# Patient Record
Sex: MALE | Race: BLACK OR AFRICAN AMERICAN | Employment: UNEMPLOYED | URBAN - METROPOLITAN AREA
[De-identification: names, ages, dates, MRNs, and addresses within clinical notes are randomized per-mention and may not be internally consistent; named-entity substitution may affect disease eponyms.]

---

## 2017-03-22 ENCOUNTER — HOSPITAL ENCOUNTER (INPATIENT)
Age: 52
LOS: 1 days | Discharge: HOME | DRG: 199 | End: 2017-03-23
Attending: EMERGENCY MEDICINE | Admitting: INTERNAL MEDICINE
Payer: MEDICAID

## 2017-03-22 DIAGNOSIS — I16.1 HYPERTENSIVE EMERGENCY: Primary | ICD-10-CM

## 2017-03-22 LAB
ABSOLUTE EOS #: 0.2 K/UL (ref 0–0.4)
ABSOLUTE LYMPH #: 1.4 K/UL (ref 1–4.8)
ABSOLUTE MONO #: 0.2 K/UL (ref 0.2–0.8)
ANION GAP SERPL CALCULATED.3IONS-SCNC: 12 MMOL/L (ref 9–17)
BASOPHILS # BLD: 1 % (ref 0–2)
BASOPHILS ABSOLUTE: 0 K/UL (ref 0–0.2)
BUN BLDV-MCNC: 19 MG/DL (ref 6–20)
BUN/CREAT BLD: 12 (ref 9–20)
CALCIUM SERPL-MCNC: 8.3 MG/DL (ref 8.6–10.4)
CHLORIDE BLD-SCNC: 103 MMOL/L (ref 98–107)
CO2: 27 MMOL/L (ref 20–31)
CREAT SERPL-MCNC: 1.53 MG/DL (ref 0.7–1.2)
DIFFERENTIAL TYPE: NORMAL
EOSINOPHILS RELATIVE PERCENT: 4 % (ref 1–4)
GFR AFRICAN AMERICAN: 58 ML/MIN
GFR NON-AFRICAN AMERICAN: 48 ML/MIN
GFR SERPL CREATININE-BSD FRML MDRD: ABNORMAL ML/MIN/{1.73_M2}
GFR SERPL CREATININE-BSD FRML MDRD: ABNORMAL ML/MIN/{1.73_M2}
GLUCOSE BLD-MCNC: 120 MG/DL (ref 70–99)
HCT VFR BLD CALC: 43.8 % (ref 41–53)
HEMOGLOBIN: 15.2 G/DL (ref 13.5–17.5)
LYMPHOCYTES # BLD: 30 % (ref 24–44)
MCH RBC QN AUTO: 32.2 PG (ref 26–34)
MCHC RBC AUTO-ENTMCNC: 34.6 G/DL (ref 31–37)
MCV RBC AUTO: 93.1 FL (ref 80–100)
MONOCYTES # BLD: 5 % (ref 1–7)
MYOGLOBIN: 74 NG/ML (ref 28–72)
PDW BLD-RTO: 12.7 % (ref 11.5–14.5)
PLATELET # BLD: 152 K/UL (ref 130–400)
PLATELET ESTIMATE: NORMAL
PMV BLD AUTO: 10.3 FL (ref 6–12)
POTASSIUM SERPL-SCNC: 3.7 MMOL/L (ref 3.7–5.3)
RBC # BLD: 4.7 M/UL (ref 4.5–5.9)
RBC # BLD: NORMAL 10*6/UL
SEG NEUTROPHILS: 60 % (ref 36–66)
SEGMENTED NEUTROPHILS ABSOLUTE COUNT: 2.8 K/UL (ref 1.8–7.7)
SODIUM BLD-SCNC: 142 MMOL/L (ref 135–144)
TROPONIN INTERP: ABNORMAL
TROPONIN T: <0.03 NG/ML
WBC # BLD: 4.6 K/UL (ref 3.5–11)
WBC # BLD: NORMAL 10*3/UL

## 2017-03-22 PROCEDURE — 85025 COMPLETE CBC W/AUTO DIFF WBC: CPT

## 2017-03-22 PROCEDURE — 83874 ASSAY OF MYOGLOBIN: CPT

## 2017-03-22 PROCEDURE — 84484 ASSAY OF TROPONIN QUANT: CPT

## 2017-03-22 PROCEDURE — 80048 BASIC METABOLIC PNL TOTAL CA: CPT

## 2017-03-22 PROCEDURE — 6360000002 HC RX W HCPCS: Performed by: NURSE PRACTITIONER

## 2017-03-22 PROCEDURE — 93005 ELECTROCARDIOGRAM TRACING: CPT

## 2017-03-22 PROCEDURE — 2500000003 HC RX 250 WO HCPCS: Performed by: NURSE PRACTITIONER

## 2017-03-22 PROCEDURE — 2000000000 HC ICU R&B

## 2017-03-22 PROCEDURE — 99285 EMERGENCY DEPT VISIT HI MDM: CPT

## 2017-03-22 RX ORDER — SODIUM CHLORIDE 0.9 % (FLUSH) 0.9 %
10 SYRINGE (ML) INJECTION EVERY 12 HOURS SCHEDULED
Status: DISCONTINUED | OUTPATIENT
Start: 2017-03-22 | End: 2017-03-23 | Stop reason: HOSPADM

## 2017-03-22 RX ORDER — SODIUM CHLORIDE 0.9 % (FLUSH) 0.9 %
10 SYRINGE (ML) INJECTION PRN
Status: DISCONTINUED | OUTPATIENT
Start: 2017-03-22 | End: 2017-03-23 | Stop reason: HOSPADM

## 2017-03-22 RX ORDER — ACETAMINOPHEN 325 MG/1
650 TABLET ORAL EVERY 4 HOURS PRN
Status: DISCONTINUED | OUTPATIENT
Start: 2017-03-22 | End: 2017-03-23 | Stop reason: HOSPADM

## 2017-03-22 RX ORDER — HYDRALAZINE HYDROCHLORIDE 20 MG/ML
20 INJECTION INTRAMUSCULAR; INTRAVENOUS ONCE
Status: COMPLETED | OUTPATIENT
Start: 2017-03-22 | End: 2017-03-22

## 2017-03-22 RX ADMIN — METOPROLOL TARTRATE 5 MG: 5 INJECTION INTRAVENOUS at 18:48

## 2017-03-22 RX ADMIN — NICARDIPINE HYDROCHLORIDE 10 MG/HR: 0.2 INJECTION, SOLUTION INTRAVENOUS at 23:50

## 2017-03-22 RX ADMIN — HYDRALAZINE HYDROCHLORIDE 20 MG: 20 INJECTION INTRAMUSCULAR; INTRAVENOUS at 16:59

## 2017-03-22 RX ADMIN — NICARDIPINE HYDROCHLORIDE 5 MG/HR: 0.2 INJECTION, SOLUTION INTRAVENOUS at 20:01

## 2017-03-22 ASSESSMENT — ENCOUNTER SYMPTOMS
COLOR CHANGE: 0
NAUSEA: 0
ABDOMINAL PAIN: 0
SORE THROAT: 0
COUGH: 0
DIARRHEA: 0
CONSTIPATION: 0
SHORTNESS OF BREATH: 0
RHINORRHEA: 0
SINUS PRESSURE: 0
VOMITING: 0
WHEEZING: 0

## 2017-03-22 ASSESSMENT — PAIN SCALES - GENERAL: PAINLEVEL_OUTOF10: 0

## 2017-03-23 VITALS
SYSTOLIC BLOOD PRESSURE: 146 MMHG | OXYGEN SATURATION: 95 % | HEIGHT: 70 IN | DIASTOLIC BLOOD PRESSURE: 80 MMHG | BODY MASS INDEX: 31.65 KG/M2 | RESPIRATION RATE: 20 BRPM | WEIGHT: 221.1 LBS | HEART RATE: 70 BPM | TEMPERATURE: 97.9 F

## 2017-03-23 PROBLEM — I10 HTN (HYPERTENSION), MALIGNANT: Status: ACTIVE | Noted: 2017-03-23

## 2017-03-23 PROBLEM — Z72.0 TOBACCO ABUSE: Status: ACTIVE | Noted: 2017-03-23

## 2017-03-23 PROCEDURE — 96375 TX/PRO/DX INJ NEW DRUG ADDON: CPT

## 2017-03-23 PROCEDURE — 2580000003 HC RX 258: Performed by: INTERNAL MEDICINE

## 2017-03-23 PROCEDURE — 2500000003 HC RX 250 WO HCPCS: Performed by: NURSE PRACTITIONER

## 2017-03-23 PROCEDURE — 96365 THER/PROPH/DIAG IV INF INIT: CPT

## 2017-03-23 PROCEDURE — 6370000000 HC RX 637 (ALT 250 FOR IP): Performed by: INTERNAL MEDICINE

## 2017-03-23 PROCEDURE — 99222 1ST HOSP IP/OBS MODERATE 55: CPT | Performed by: INTERNAL MEDICINE

## 2017-03-23 RX ORDER — AMLODIPINE BESYLATE 5 MG/1
5 TABLET ORAL DAILY
Status: DISCONTINUED | OUTPATIENT
Start: 2017-03-23 | End: 2017-03-23 | Stop reason: HOSPADM

## 2017-03-23 RX ORDER — METOPROLOL TARTRATE 50 MG/1
50 TABLET, FILM COATED ORAL 2 TIMES DAILY
Status: DISCONTINUED | OUTPATIENT
Start: 2017-03-23 | End: 2017-03-23 | Stop reason: HOSPADM

## 2017-03-23 RX ORDER — AMLODIPINE BESYLATE 5 MG/1
5 TABLET ORAL DAILY
Qty: 30 TABLET | Refills: 3 | Status: SHIPPED | OUTPATIENT
Start: 2017-03-23 | End: 2018-06-26

## 2017-03-23 RX ORDER — METOPROLOL TARTRATE 50 MG/1
50 TABLET, FILM COATED ORAL 2 TIMES DAILY
Qty: 60 TABLET | Refills: 3 | Status: SHIPPED | OUTPATIENT
Start: 2017-03-23 | End: 2020-06-12

## 2017-03-23 RX ADMIN — AMLODIPINE BESYLATE 5 MG: 5 TABLET ORAL at 11:06

## 2017-03-23 RX ADMIN — NICARDIPINE HYDROCHLORIDE 7.5 MG/HR: 0.2 INJECTION, SOLUTION INTRAVENOUS at 04:14

## 2017-03-23 RX ADMIN — Medication 10 ML: at 10:42

## 2017-03-23 RX ADMIN — NICARDIPINE HYDROCHLORIDE 5 MG/HR: 0.2 INJECTION, SOLUTION INTRAVENOUS at 11:25

## 2017-03-23 RX ADMIN — METOPROLOL TARTRATE 50 MG: 50 TABLET ORAL at 11:06

## 2017-03-23 ASSESSMENT — PAIN SCALES - GENERAL
PAINLEVEL_OUTOF10: 0
PAINLEVEL_OUTOF10: 0

## 2017-03-24 ENCOUNTER — OFFICE VISIT (OUTPATIENT)
Dept: INTERNAL MEDICINE CLINIC | Age: 52
End: 2017-03-24
Payer: MEDICAID

## 2017-03-24 VITALS
TEMPERATURE: 98 F | HEIGHT: 70 IN | OXYGEN SATURATION: 95 % | WEIGHT: 224.8 LBS | BODY MASS INDEX: 32.18 KG/M2 | RESPIRATION RATE: 20 BRPM | DIASTOLIC BLOOD PRESSURE: 90 MMHG | HEART RATE: 80 BPM | SYSTOLIC BLOOD PRESSURE: 140 MMHG

## 2017-03-24 DIAGNOSIS — Z72.0 TOBACCO ABUSE: ICD-10-CM

## 2017-03-24 DIAGNOSIS — Z12.11 SPECIAL SCREENING FOR MALIGNANT NEOPLASMS, COLON: Primary | ICD-10-CM

## 2017-03-24 DIAGNOSIS — N28.9 RENAL INSUFFICIENCY: ICD-10-CM

## 2017-03-24 DIAGNOSIS — R73.9 HYPERGLYCEMIA: ICD-10-CM

## 2017-03-24 DIAGNOSIS — I10 ESSENTIAL HYPERTENSION: ICD-10-CM

## 2017-03-24 DIAGNOSIS — Z71.6 TOBACCO ABUSE COUNSELING: ICD-10-CM

## 2017-03-24 LAB
EKG ATRIAL RATE: 68 BPM
EKG P AXIS: 47 DEGREES
EKG P-R INTERVAL: 144 MS
EKG Q-T INTERVAL: 428 MS
EKG QRS DURATION: 96 MS
EKG QTC CALCULATION (BAZETT): 455 MS
EKG R AXIS: -31 DEGREES
EKG T AXIS: -157 DEGREES
EKG VENTRICULAR RATE: 68 BPM

## 2017-03-24 PROCEDURE — 99204 OFFICE O/P NEW MOD 45 MIN: CPT | Performed by: FAMILY MEDICINE

## 2017-03-24 ASSESSMENT — ENCOUNTER SYMPTOMS
EYES NEGATIVE: 1
RESPIRATORY NEGATIVE: 1
ALLERGIC/IMMUNOLOGIC NEGATIVE: 1

## 2018-06-26 ENCOUNTER — HOSPITAL ENCOUNTER (EMERGENCY)
Age: 53
Discharge: HOME | End: 2018-06-26
Attending: EMERGENCY MEDICINE
Payer: COMMERCIAL

## 2018-06-26 VITALS
SYSTOLIC BLOOD PRESSURE: 185 MMHG | WEIGHT: 215 LBS | HEIGHT: 70 IN | TEMPERATURE: 98.2 F | RESPIRATION RATE: 18 BRPM | OXYGEN SATURATION: 98 % | BODY MASS INDEX: 30.78 KG/M2 | HEART RATE: 63 BPM | DIASTOLIC BLOOD PRESSURE: 115 MMHG

## 2018-06-26 DIAGNOSIS — I10 ASYMPTOMATIC HYPERTENSION: Primary | ICD-10-CM

## 2018-06-26 PROCEDURE — 99283 EMERGENCY DEPT VISIT LOW MDM: CPT

## 2018-06-26 RX ORDER — AMLODIPINE BESYLATE 5 MG/1
5 TABLET ORAL DAILY
Qty: 30 TABLET | Refills: 0 | Status: SHIPPED | OUTPATIENT
Start: 2018-06-26 | End: 2018-07-10 | Stop reason: DRUGHIGH

## 2018-07-10 ENCOUNTER — OFFICE VISIT (OUTPATIENT)
Dept: INTERNAL MEDICINE CLINIC | Age: 53
End: 2018-07-10
Payer: COMMERCIAL

## 2018-07-10 VITALS
BODY MASS INDEX: 31.78 KG/M2 | TEMPERATURE: 98.4 F | DIASTOLIC BLOOD PRESSURE: 92 MMHG | HEIGHT: 70 IN | WEIGHT: 222 LBS | SYSTOLIC BLOOD PRESSURE: 148 MMHG | HEART RATE: 50 BPM | OXYGEN SATURATION: 98 %

## 2018-07-10 DIAGNOSIS — Z72.0 TOBACCO ABUSE: ICD-10-CM

## 2018-07-10 DIAGNOSIS — Z71.6 TOBACCO ABUSE COUNSELING: ICD-10-CM

## 2018-07-10 DIAGNOSIS — K04.7 DENTAL INFECTION: Primary | ICD-10-CM

## 2018-07-10 DIAGNOSIS — N28.9 RENAL INSUFFICIENCY: ICD-10-CM

## 2018-07-10 DIAGNOSIS — I10 HYPERTENSION, UNSPECIFIED TYPE: ICD-10-CM

## 2018-07-10 DIAGNOSIS — F41.8 ANXIETY ABOUT HEALTH: ICD-10-CM

## 2018-07-10 PROCEDURE — 99214 OFFICE O/P EST MOD 30 MIN: CPT | Performed by: FAMILY MEDICINE

## 2018-07-10 PROCEDURE — G8427 DOCREV CUR MEDS BY ELIG CLIN: HCPCS | Performed by: FAMILY MEDICINE

## 2018-07-10 PROCEDURE — G8417 CALC BMI ABV UP PARAM F/U: HCPCS | Performed by: FAMILY MEDICINE

## 2018-07-10 PROCEDURE — 4004F PT TOBACCO SCREEN RCVD TLK: CPT | Performed by: FAMILY MEDICINE

## 2018-07-10 PROCEDURE — 3017F COLORECTAL CA SCREEN DOC REV: CPT | Performed by: FAMILY MEDICINE

## 2018-07-10 RX ORDER — IBUPROFEN 800 MG/1
TABLET ORAL
COMMUNITY
Start: 2018-07-09 | End: 2020-08-11

## 2018-07-10 RX ORDER — CLINDAMYCIN HYDROCHLORIDE 150 MG/1
150 CAPSULE ORAL 3 TIMES DAILY
Qty: 21 CAPSULE | Refills: 0 | Status: SHIPPED | OUTPATIENT
Start: 2018-07-10 | End: 2018-07-17

## 2018-07-10 RX ORDER — AMLODIPINE BESYLATE 5 MG/1
10 TABLET ORAL DAILY
Qty: 30 TABLET | Refills: 0 | Status: SHIPPED | COMMUNITY
Start: 2018-07-10 | End: 2020-06-12

## 2018-07-10 ASSESSMENT — PATIENT HEALTH QUESTIONNAIRE - PHQ9
SUM OF ALL RESPONSES TO PHQ9 QUESTIONS 1 & 2: 0
SUM OF ALL RESPONSES TO PHQ QUESTIONS 1-9: 0
2. FEELING DOWN, DEPRESSED OR HOPELESS: 0
1. LITTLE INTEREST OR PLEASURE IN DOING THINGS: 0

## 2018-07-10 NOTE — PATIENT INSTRUCTIONS
Patient Education        Stopping Smoking: Care Instructions  Your Care Instructions  Cigarette smokers crave the nicotine in cigarettes. Giving it up is much harder than simply changing a habit. Your body has to stop craving the nicotine. It is hard to quit, but you can do it. There are many tools that people use to quit smoking. You may find that combining tools works best for you. There are several steps to quitting. First you get ready to quit. Then you get support to help you. After that, you learn new skills and behaviors to become a nonsmoker. For many people, a necessary step is getting and using medicine. Your doctor will help you set up the plan that best meets your needs. You may want to attend a smoking cessation program to help you quit smoking. When you choose a program, look for one that has proven success. Ask your doctor for ideas. You will greatly increase your chances of success if you take medicine as well as get counseling or join a cessation program.  Some of the changes you feel when you first quit tobacco are uncomfortable. Your body will miss the nicotine at first, and you may feel short-tempered and grumpy. You may have trouble sleeping or concentrating. Medicine can help you deal with these symptoms. You may struggle with changing your smoking habits and rituals. The last step is the tricky one: Be prepared for the smoking urge to continue for a time. This is a lot to deal with, but keep at it. You will feel better. Follow-up care is a key part of your treatment and safety. Be sure to make and go to all appointments, and call your doctor if you are having problems. It's also a good idea to know your test results and keep a list of the medicines you take. How can you care for yourself at home? · Ask your family, friends, and coworkers for support. You have a better chance of quitting if you have help and support.   · Join a support group, such as Nicotine Anonymous, for people who are

## 2018-07-11 ASSESSMENT — ENCOUNTER SYMPTOMS
EYES NEGATIVE: 1
RESPIRATORY NEGATIVE: 1
GASTROINTESTINAL NEGATIVE: 1
ALLERGIC/IMMUNOLOGIC NEGATIVE: 1

## 2018-07-11 NOTE — PROGRESS NOTES
Subjective:      Patient ID: Edilia Wilson is a 45 y.o. male. Hypertension   This is a chronic problem. The current episode started more than 1 month ago. The problem has been gradually improving since onset. The problem is controlled. Associated symptoms include anxiety. Risk factors for coronary artery disease include stress and male gender. Past treatments include beta blockers and calcium channel blockers. The current treatment provides moderate improvement. There are no compliance problems. Identifiable causes of hypertension include a hypertension causing med. Tobacco use, anti-inflammatories. Review of Systems   Constitutional: Negative. HENT: Negative. Tooth pain   Eyes: Negative. Respiratory: Negative. Cardiovascular: Negative. Gastrointestinal: Negative. Endocrine: Negative. Musculoskeletal: Negative. Skin: Negative. Allergic/Immunologic: Negative. Neurological: Negative. Hematological: Negative. Psychiatric/Behavioral: The patient is nervous/anxious. Past family and social history unremarkable. Objective:   Physical Exam   Constitutional: He is oriented to person, place, and time. He appears well-developed and well-nourished. HENT:   Head: Normocephalic and atraumatic. Right Ear: External ear normal.   Left Ear: External ear normal.   Nose: Nose normal.   Mouth/Throat: Oropharynx is clear and moist.   Dental infection   Eyes: Conjunctivae and EOM are normal. Pupils are equal, round, and reactive to light. Neck: Normal range of motion. Neck supple. Cardiovascular: Normal rate, regular rhythm, normal heart sounds and intact distal pulses. Pulmonary/Chest: Effort normal and breath sounds normal.   Abdominal: Soft. Bowel sounds are normal.   Musculoskeletal: Normal range of motion. Neurological: He is alert and oriented to person, place, and time. He has normal reflexes. Skin: Skin is warm and dry.    Psychiatric:   Anxious however that every mistake has been identified and corrected by editing.

## 2020-06-12 ENCOUNTER — OFFICE VISIT (OUTPATIENT)
Dept: FAMILY MEDICINE CLINIC | Age: 55
End: 2020-06-12
Payer: COMMERCIAL

## 2020-06-12 VITALS
HEIGHT: 70 IN | RESPIRATION RATE: 16 BRPM | DIASTOLIC BLOOD PRESSURE: 100 MMHG | OXYGEN SATURATION: 98 % | SYSTOLIC BLOOD PRESSURE: 160 MMHG | BODY MASS INDEX: 30.98 KG/M2 | WEIGHT: 216.4 LBS | HEART RATE: 82 BPM

## 2020-06-12 PROCEDURE — G8417 CALC BMI ABV UP PARAM F/U: HCPCS | Performed by: FAMILY MEDICINE

## 2020-06-12 PROCEDURE — G8427 DOCREV CUR MEDS BY ELIG CLIN: HCPCS | Performed by: FAMILY MEDICINE

## 2020-06-12 PROCEDURE — 99204 OFFICE O/P NEW MOD 45 MIN: CPT | Performed by: FAMILY MEDICINE

## 2020-06-12 PROCEDURE — 4004F PT TOBACCO SCREEN RCVD TLK: CPT | Performed by: FAMILY MEDICINE

## 2020-06-12 PROCEDURE — 3017F COLORECTAL CA SCREEN DOC REV: CPT | Performed by: FAMILY MEDICINE

## 2020-06-12 RX ORDER — TERBINAFINE HYDROCHLORIDE 250 MG/1
250 TABLET ORAL DAILY
Qty: 42 TABLET | Refills: 0 | Status: SHIPPED | OUTPATIENT
Start: 2020-06-12 | End: 2020-07-24

## 2020-06-12 RX ORDER — SILDENAFIL 100 MG/1
100 TABLET, FILM COATED ORAL PRN
Qty: 30 TABLET | Refills: 3 | Status: SHIPPED | OUTPATIENT
Start: 2020-06-12 | End: 2020-09-11

## 2020-06-12 RX ORDER — LISINOPRIL AND HYDROCHLOROTHIAZIDE 12.5; 1 MG/1; MG/1
1 TABLET ORAL DAILY
Qty: 30 TABLET | Refills: 1 | Status: SHIPPED | OUTPATIENT
Start: 2020-06-12 | End: 2020-08-03

## 2020-06-12 ASSESSMENT — PATIENT HEALTH QUESTIONNAIRE - PHQ9
SUM OF ALL RESPONSES TO PHQ QUESTIONS 1-9: 0
1. LITTLE INTEREST OR PLEASURE IN DOING THINGS: 0
SUM OF ALL RESPONSES TO PHQ9 QUESTIONS 1 & 2: 0
SUM OF ALL RESPONSES TO PHQ QUESTIONS 1-9: 0
2. FEELING DOWN, DEPRESSED OR HOPELESS: 0

## 2020-06-12 NOTE — PATIENT INSTRUCTIONS
Learning About Obesity  What is obesity? Obesity means having a body mass index (BMI) of 30 or above. BMI is a number that is calculated from your weight and your height. How do you know if your weight is in the obesity range? To know if your weight is in the obesity range, your doctor looks at your body mass index (BMI) and waist size. BMI is a number that is calculated from your weight and your height. To figure out your BMI for yourself, you can use an online tool, such as http://www.banks.com/ on the [employer] of [employer]. If your BMI is 30.0 or higher, it falls within the obesity range. Keep in mind that BMI and waist size are only guides. They are not tools to determine your ideal body weight. What causes obesity? When you take in more calories than you burn off, you gain weight. How you eat, how active you are, and other things affect how your body uses calories and whether you gain weight. If you have family members who have too much body fat, you may have inherited a tendency to gain weight. And your family also helps form your eating and lifestyle habits, which can lead to obesity. Also, our busy lives make it harder to plan and cook healthy meals. For many of us, it's easier to reach for prepared foods, go out to eat, or go to the drive-through. But these foods are often high in saturated fat and calories. Portions are often too large. What can you do to reach a healthy weight? Focus on health, not diets. Diets are hard to stay on and don't work in the long run. It is very hard to stay with a diet that includes lots of big changes in your eating habits. Instead of a diet, focus on lifestyle changes that will improve your health and achieve the right balance of energy and calories. To lose weight, you need to burn more calories than you take in.  You can do it by eating healthy foods in reasonable amounts and becoming more harder for you, and how to handle them. Try tracking. People who keep track of what they eat, feel, and do are better at losing weight. Try writing down things like:  · What and how much you eat. · How you feel before and after each meal.  · Details about each meal (like eating out or at home, eating alone, or with friends or family). · What you do to be active. Look and plan. As you track, look for patterns that you may want to change. Take note of:  · When you eat and whether you skip meals. · How often you eat out. · How many fruits and vegetables you eat. · When you eat beyond feeling full. · When and why you eat for reasons other than being hungry. When you stray from your plan, don't get upset. Figure out what made you slip up and how you can fix it. Can you take medicines or have surgery to lose weight? If you have a BMI in a certain range and have not been able to lose weight with diet and exercise, medicine or surgery may be an option for you. If you have a BMI of at least 30.0 (or a BMI of at least 27.0 and another health problem related to your weight), ask your doctor about weight-loss medicines. They work by making you feel less hungry, making you feel full more quickly, or changing how you digest fat. Medicines are used along with diet changes and more physical activity to help you make lasting changes. If you have a BMI of 40.0 or more (or a BMI of 35.0 or more and another health problem related to your weight), your doctor may talk with you about surgery. Weight-loss surgery has risks, and you will need to work with your doctor to compare the risk of having obesity with the risks of surgery. With any option you choose, you will still need to eat a healthy diet and get regular exercise. Follow-up care is a key part of your treatment and safety. Be sure to make and go to all appointments, and call your doctor if you are having problems.  It's also a good idea to know your test results and keep a list of the medicines you take. Where can you learn more? Go to https://chpepiceweb.[email or web address] org and sign in to your [employer] account. Enter N111 in the [facility] box to learn more about \"Learning About Obesity. \"     If you do not have an account, please click on the \"Sign Up Now\" link. Current as of: December 11, 2019               Content Version: 12.5  © 4455-4066 Healthwise, Incorporated. Care instructions adapted under license by [facility] ([facility]). If you have questions about a medical condition or this instruction, always ask your healthcare professional. [address] any warranty or liability for your use of this information. Learning About Benefits From Quitting Smoking  How does quitting smoking make you healthier? If you're thinking about quitting smoking, you may have a few reasons to be smoke-free. Your health may be one of them. · When you quit smoking, you lower your risks for cancer, lung disease, heart attack, stroke, blood vessel disease, and blindness from macular degeneration. · When you're smoke-free, you get sick less often, and you heal faster. You are less likely to get colds, flu, bronchitis, and pneumonia. · As a nonsmoker, you may find that your mood is better and you are less stressed. When and how will you feel healthier? Quitting has real health benefits that start from day 1 of being smoke-free. And the longer you stay smoke-free, the healthier you get and the better you feel. The first hours  · After just 20 minutes, your blood pressure and heart rate go down. That means there's less stress on your heart and blood vessels. · Within 12 hours, the level of carbon monoxide in your blood drops back to normal. That makes room for more oxygen. With more oxygen in your body, you may notice that you have more energy than when you smoked. After 2 weeks  · Your lungs start to work better.   · Your risk of heart attack starts to drop. After 1 month  · When your lungs are clear, you cough less and breathe deeper, so it's easier to be active. · Your sense of taste and smell return. That means you can enjoy food more than you have since you started smoking. Over the years  · Over the years, your risks of heart disease, heart attack, and stroke are lower. · After 10 years, your risk of dying from lung cancer is cut by about half. And your risk for many other types of cancer is lower too. How would quitting help others in your life? When you quit smoking, you improve the health of everyone who now breathes in your smoke. · Their heart, lung, and cancer risks drop, much like yours. · They are sick less. For babies and small children, living smoke-free means they're less likely to have ear infections, pneumonia, and bronchitis. · If you're a woman who is or will be pregnant someday, quitting smoking means a healthier . · Children who are close to you are less likely to become adult smokers. Where can you learn more? Go to https://KnowtapeBreadtrip.SWYF. org and sign in to your [employer] account. Enter [phone] in the [facility] box to learn more about \"Learning About Benefits From Quitting Smoking. \"     If you do not have an account, please click on the \"Sign Up Now\" link. Current as of: 2020               Content Version: 12.5  © 3845-4867 Healthwise, Incorporated. Care instructions adapted under license by [facility] ([facility]). If you have questions about a medical condition or this instruction, always ask your healthcare professional. [place] [address] any warranty or liability for your use of this information. Deciding About Using Medicines To Quit Smoking  How can you decide about using medicines to quit smoking? What are the medicines you can use? Your doctor may prescribe varenicline (Chantix) or bupropion (Zyban).  These medicines can help you cope with cravings for

## 2020-06-12 NOTE — PROGRESS NOTES
monitoring  03/22/2018    Creatinine monitoring  03/22/2018        Current Medications:     Current Outpatient Medications   Medication Sig Dispense Refill    sildenafil (VIAGRA) 100 MG tablet Take 1 tablet by mouth as needed for Erectile Dysfunction 30 tablet 3    terbinafine (LAMISIL) 250 MG tablet Take 1 tablet by mouth daily 42 tablet 0    ciclopirox (PENLAC) 8 % solution Apply to adjacent skin and affected nails daily. Remove with alcohol every 7 days 6 mL 2    lisinopril-hydroCHLOROthiazide (PRINZIDE;ZESTORETIC) 10-12.5 MG per tablet Take 1 tablet by mouth daily 30 tablet 1    ibuprofen (ADVIL;MOTRIN) 800 MG tablet        No current facility-administered medications for this visit. Allergies:   No Known Allergies     Medical History:     Past Medical History:   Diagnosis Date    Benign essential HTN        Past Surgical History:   Procedure Laterality Date    TONSILLECTOMY         Family History   Problem Relation Age of Onset    Hypertension Mother     Hypertension Father         Social History:     Social History     Socioeconomic History    Marital status:      Spouse name: Not on file    Number of children: Not on file    Years of education: Not on file    Highest education level: Not on file   Occupational History    Not on file   Social Needs    Financial resource strain: Not on file    Food insecurity     Worry: Not on file     Inability: Not on file    Transportation needs     Medical: Not on file     Non-medical: Not on file   Tobacco Use    Smoking status: Current Every Day Smoker     Types: Cigarettes    Smokeless tobacco: Former User   Substance and Sexual Activity    Alcohol use:  Yes    Drug use: No    Sexual activity: Not on file   Lifestyle    Physical activity     Days per week: Not on file     Minutes per session: Not on file    Stress: Not on file   Relationships    Social connections     Talks on phone: Not on file     Gets together: Not on file Future  -     lisinopril-hydroCHLOROthiazide (PRINZIDE;ZESTORETIC) 10-12.5 MG per tablet; Take 1 tablet by mouth daily    Dyslipidemia  -     ALT; Future  -     AST; Future  -     CBC Auto Differential; Future  -     Lipid Panel; Future  -     TSH with Reflex; Future    Hyperglycemia  -     Hemoglobin A1C; Future    Other male erectile dysfunction  -     Testosterone; Future  -     sildenafil (VIAGRA) 100 MG tablet; Take 1 tablet by mouth as needed for Erectile Dysfunction    Colon cancer screening  -     Jazmin Lyon MD, Gastroenterology, [address]    Toenail fungus  -     terbinafine (LAMISIL) 250 MG tablet; Take 1 tablet by mouth daily  -     ciclopirox (PENLAC) 8 % solution; Apply to adjacent skin and affected nails daily. Remove with alcohol every 7 days    Other fatigue  -     Vitamin B12 & Folate; Future  -     Vitamin D 25 Hydroxy; Future    Personal history of tobacco use, presenting hazards to health  -     LA TOBACCO USE CESSATION INTERMEDIATE 3-10 MINUTES [26136]    Follow up on labs. Encouraged well balanced low sodium diet. Encouraged 150 mins of aerobic activity weekly. Strongly encouraged smoking cessation. Rx as above for toenail fungus. Referral for colonoscopy provided. EKG in one month. Needs tighter BP control. Start two agents today. Cerumen flushed by MA. Will send to ENT if he has further issues. All questions answered and addressed to patient satisfaction. Patient understands and agrees to the plan. The patient was evaluated and treated today based on the osteopathic principle that each person is a unit of body, mind, and spirit, the body is capable of self-regulation, self-healing, and health maintenance and that structure and function are reciprocally interrelated. Follow-up:   Return in about 1 month (around 7/12/2020) for htn/ekg; 20 min appt.       Farrah Stephens D.O.    BMI was elevated today, and weight loss plan recommended is : conventional weight

## 2020-06-23 ENCOUNTER — HOSPITAL ENCOUNTER (OUTPATIENT)
Age: 55
Setting detail: SPECIMEN
Discharge: HOME | End: 2020-06-23
Payer: COMMERCIAL

## 2020-06-23 LAB
ABSOLUTE EOS #: 0.12 K/UL (ref 0–0.44)
ABSOLUTE IMMATURE GRANULOCYTE: <0.03 K/UL (ref 0–0.3)
ABSOLUTE LYMPH #: 0.94 K/UL (ref 1.1–3.7)
ABSOLUTE MONO #: 0.18 K/UL (ref 0.1–1.2)
ALBUMIN SERPL-MCNC: 4.2 G/DL (ref 3.5–5.2)
ALT SERPL-CCNC: 11 U/L (ref 5–41)
ANION GAP SERPL CALCULATED.3IONS-SCNC: 16 MMOL/L (ref 9–17)
AST SERPL-CCNC: 16 U/L
BASOPHILS # BLD: 1 % (ref 0–2)
BASOPHILS ABSOLUTE: 0.03 K/UL (ref 0–0.2)
BUN BLDV-MCNC: 24 MG/DL (ref 6–20)
BUN/CREAT BLD: ABNORMAL (ref 9–20)
CALCIUM SERPL-MCNC: 8.6 MG/DL (ref 8.6–10.4)
CHLORIDE BLD-SCNC: 104 MMOL/L (ref 98–107)
CHOLESTEROL/HDL RATIO: 2.9
CHOLESTEROL: 169 MG/DL
CO2: 23 MMOL/L (ref 20–31)
CREAT SERPL-MCNC: 2.09 MG/DL (ref 0.7–1.2)
DIFFERENTIAL TYPE: ABNORMAL
EOSINOPHILS RELATIVE PERCENT: 3 % (ref 1–4)
ESTIMATED AVERAGE GLUCOSE: 80 MG/DL
FOLATE: 7.7 NG/ML
GFR AFRICAN AMERICAN: 40 ML/MIN
GFR NON-AFRICAN AMERICAN: 33 ML/MIN
GFR SERPL CREATININE-BSD FRML MDRD: ABNORMAL ML/MIN/{1.73_M2}
GFR SERPL CREATININE-BSD FRML MDRD: ABNORMAL ML/MIN/{1.73_M2}
GLUCOSE BLD-MCNC: 94 MG/DL (ref 70–99)
HBA1C MFR BLD: 4.4 % (ref 4–6)
HCT VFR BLD CALC: 46.8 % (ref 40.7–50.3)
HDLC SERPL-MCNC: 58 MG/DL
HEMOGLOBIN: 15.8 G/DL (ref 13–17)
HEPATITIS C ANTIBODY: NONREACTIVE
HIV AG/AB: NONREACTIVE
IMMATURE GRANULOCYTES: 1 %
LDL CHOLESTEROL: 88 MG/DL (ref 0–130)
LYMPHOCYTES # BLD: 24 % (ref 24–43)
MAGNESIUM: 2.4 MG/DL (ref 1.6–2.6)
MCH RBC QN AUTO: 31.2 PG (ref 25.2–33.5)
MCHC RBC AUTO-ENTMCNC: 33.8 G/DL (ref 28.4–34.8)
MCV RBC AUTO: 92.3 FL (ref 82.6–102.9)
MONOCYTES # BLD: 5 % (ref 3–12)
NRBC AUTOMATED: 0 PER 100 WBC
PDW BLD-RTO: 12.2 % (ref 11.8–14.4)
PHOSPHORUS: 2.5 MG/DL (ref 2.5–4.5)
PLATELET # BLD: 161 K/UL (ref 138–453)
PLATELET ESTIMATE: ABNORMAL
PMV BLD AUTO: 12.6 FL (ref 8.1–13.5)
POTASSIUM SERPL-SCNC: 3.6 MMOL/L (ref 3.7–5.3)
PROSTATE SPECIFIC ANTIGEN: 1.56 UG/L
RBC # BLD: 5.07 M/UL (ref 4.21–5.77)
RBC # BLD: ABNORMAL 10*6/UL
SEG NEUTROPHILS: 66 % (ref 36–65)
SEGMENTED NEUTROPHILS ABSOLUTE COUNT: 2.57 K/UL (ref 1.5–8.1)
SODIUM BLD-SCNC: 143 MMOL/L (ref 135–144)
TESTOSTERONE TOTAL: 556 NG/DL (ref 220–1000)
TRIGL SERPL-MCNC: 116 MG/DL
TSH SERPL DL<=0.05 MIU/L-ACNC: 1.44 MIU/L (ref 0.3–5)
VITAMIN B-12: 501 PG/ML (ref 232–1245)
VITAMIN D 25-HYDROXY: 14.6 NG/ML (ref 30–100)
VLDLC SERPL CALC-MCNC: NORMAL MG/DL (ref 1–30)
WBC # BLD: 3.9 K/UL (ref 3.5–11.3)
WBC # BLD: ABNORMAL 10*3/UL

## 2020-06-24 RX ORDER — ERGOCALCIFEROL (VITAMIN D2) 1250 MCG
50000 CAPSULE ORAL WEEKLY
Qty: 4 CAPSULE | Refills: 3 | Status: SHIPPED | OUTPATIENT
Start: 2020-06-24 | End: 2020-11-18

## 2020-06-26 ENCOUNTER — TELEPHONE (OUTPATIENT)
Dept: FAMILY MEDICINE CLINIC | Age: 55
End: 2020-06-26

## 2020-06-29 ENCOUNTER — HOSPITAL ENCOUNTER (OUTPATIENT)
Dept: GENERAL RADIOLOGY | Age: 55
Discharge: HOME | End: 2020-07-01
Payer: COMMERCIAL

## 2020-06-29 ENCOUNTER — HOSPITAL ENCOUNTER (OUTPATIENT)
Age: 55
Discharge: HOME | End: 2020-07-01
Payer: COMMERCIAL

## 2020-06-29 PROCEDURE — 71046 X-RAY EXAM CHEST 2 VIEWS: CPT

## 2020-07-06 ENCOUNTER — TELEPHONE (OUTPATIENT)
Dept: GASTROENTEROLOGY | Age: 55
End: 2020-07-06

## 2020-07-10 NOTE — TELEPHONE ENCOUNTER
Pt's wife, Terra Pate, called to sched GI referral.  After going through colon screen questionnaire with Renata over the phone, [facility] @ 2[facility]15pm.  Appt reminder mailed to pt's home address.  New pt pkt mailed

## 2020-08-03 RX ORDER — LISINOPRIL AND HYDROCHLOROTHIAZIDE 12.5; 1 MG/1; MG/1
TABLET ORAL
Qty: 30 TABLET | Refills: 1 | Status: SHIPPED | OUTPATIENT
Start: 2020-08-03 | End: 2020-08-11

## 2020-08-04 ENCOUNTER — OFFICE VISIT (OUTPATIENT)
Dept: GASTROENTEROLOGY | Age: 55
End: 2020-08-04
Payer: COMMERCIAL

## 2020-08-04 VITALS — RESPIRATION RATE: 14 BRPM | TEMPERATURE: 97.2 F | WEIGHT: 210 LBS | HEIGHT: 70 IN | BODY MASS INDEX: 30.06 KG/M2

## 2020-08-04 PROCEDURE — 3017F COLORECTAL CA SCREEN DOC REV: CPT | Performed by: INTERNAL MEDICINE

## 2020-08-04 PROCEDURE — G8417 CALC BMI ABV UP PARAM F/U: HCPCS | Performed by: INTERNAL MEDICINE

## 2020-08-04 PROCEDURE — G8427 DOCREV CUR MEDS BY ELIG CLIN: HCPCS | Performed by: INTERNAL MEDICINE

## 2020-08-04 PROCEDURE — 4004F PT TOBACCO SCREEN RCVD TLK: CPT | Performed by: INTERNAL MEDICINE

## 2020-08-04 PROCEDURE — 99204 OFFICE O/P NEW MOD 45 MIN: CPT | Performed by: INTERNAL MEDICINE

## 2020-08-04 RX ORDER — DEXTROMETHORPHN/ACETAMINOPH/CP 10-325-2MG
TABLET ORAL
COMMUNITY
End: 2020-09-29 | Stop reason: ALTCHOICE

## 2020-08-04 ASSESSMENT — ENCOUNTER SYMPTOMS
VOMITING: 0
ANAL BLEEDING: 0
COUGH: 1
DIARRHEA: 1
ABDOMINAL DISTENTION: 0
BLOOD IN STOOL: 0
NAUSEA: 0
CHOKING: 0
TROUBLE SWALLOWING: 1
RECTAL PAIN: 0
ABDOMINAL PAIN: 0
CONSTIPATION: 0
WHEEZING: 0

## 2020-08-04 NOTE — PROGRESS NOTES
to adjacent skin and affected nails daily. Remove with alcohol every 7 days, Disp: 6 mL, Rfl: 2    ibuprofen (ADVIL;MOTRIN) 800 MG tablet, , Disp: , Rfl:     ergocalciferol (DRISDOL) 1.25 MG (69061 UT) capsule, Take 1 capsule by mouth once a week (Patient not taking: Reported on 8/4/2020), Disp: 4 capsule, Rfl: 3    ALLERGIES:   No Known Allergies    FAMILY HISTORY:       Problem Relation Age of Onset    Hypertension Mother     Hypertension Father          SOCIAL HISTORY:   Social History     Socioeconomic History    Marital status:      Spouse name: Not on file    Number of children: Not on file    Years of education: Not on file    Highest education level: Not on file   Occupational History    Not on file   Social Needs    Financial resource strain: Not on file    Food insecurity     Worry: Not on file     Inability: Not on file    Transportation needs     Medical: Not on file     Non-medical: Not on file   Tobacco Use    Smoking status: Current Every Day Smoker     Types: Cigarettes    Smokeless tobacco: Former User   Substance and Sexual Activity    Alcohol use:  Yes    Drug use: No    Sexual activity: Not on file   Lifestyle    Physical activity     Days per week: Not on file     Minutes per session: Not on file    Stress: Not on file   Relationships    Social connections     Talks on phone: Not on file     Gets together: Not on file     Attends Hoahaoism service: Not on file     Active member of club or organization: Not on file     Attends meetings of clubs or organizations: Not on file     Relationship status: Not on file    Intimate partner violence     Fear of current or ex partner: Not on file     Emotionally abused: Not on file     Physically abused: Not on file     Forced sexual activity: Not on file   Other Topics Concern    Not on file   Social History Narrative    Not on file       REVIEW OF SYSTEMS: A 12-point review of systemswas obtained and pertinent positives and negatives were enumerated above in the history of present illness. All other reviewed systems / symptoms were negative. Review of Systems   Constitutional: Negative for appetite change, fatigue and unexpected weight change. HENT: Positive for trouble swallowing. Respiratory: Positive for cough. Negative for choking and wheezing. Cardiovascular: Positive for palpitations. Negative for chest pain and leg swelling. Gastrointestinal: Positive for diarrhea (occasional). Negative for abdominal distention, abdominal pain, anal bleeding, blood in stool, constipation, nausea, rectal pain and vomiting. Genitourinary: Negative for difficulty urinating. Allergic/Immunologic: Negative for environmental allergies and food allergies. Neurological: Negative for dizziness, weakness, light-headedness, numbness and headaches. Hematological: Does not bruise/bleed easily. Psychiatric/Behavioral: Negative for sleep disturbance. The patient is not nervous/anxious. LABORATORY DATA: Reviewed  Lab Results   Component Value Date    WBC 3.9 06/23/2020    HGB 15.8 06/23/2020    HCT 46.8 06/23/2020    MCV 92.3 06/23/2020     06/23/2020     06/23/2020    K 3.6 (L) 06/23/2020     06/23/2020    CO2 23 06/23/2020    BUN 24 (H) 06/23/2020    CREATININE 2.09 (H) 06/23/2020    LABALBU 4.2 06/23/2020    AST 16 06/23/2020    ALT 11 06/23/2020         Lab Results   Component Value Date    RBC 5.07 06/23/2020    HGB 15.8 06/23/2020    MCV 92.3 06/23/2020    MCH 31.2 06/23/2020    MCHC 33.8 06/23/2020    RDW 12.2 06/23/2020    MPV 12.6 06/23/2020    BASOPCT 1 06/23/2020    LYMPHSABS 0.94 (L) 06/23/2020    MONOSABS 0.18 06/23/2020    NEUTROABS 2.57 06/23/2020    EOSABS 0.12 06/23/2020    BASOSABS 0.03 06/23/2020         DIAGNOSTIC TESTING:     No results found.      Temp 97.2 °F (36.2 °C)   Resp 14   Ht 5' 9.5\" (1.765 m)   Wt 210 lb (95.3 kg)   BMI 30.57 kg/m²     PHYSICAL EXAMINATION: Vital signs reviewed per the nursing documentation. Body mass index is 30.57 kg/m². Physical Exam  Vitals signs and nursing note reviewed. Constitutional:       General: He is not in acute distress. Appearance: He is well-developed. He is not diaphoretic. HENT:      Head: Normocephalic and atraumatic. Eyes:      General: No scleral icterus. Pupils: Pupils are equal, round, and reactive to light. Neck:      Musculoskeletal: Normal range of motion and neck supple. Thyroid: No thyromegaly. Vascular: No JVD. Trachea: No tracheal deviation. Cardiovascular:      Rate and Rhythm: Normal rate and regular rhythm. Heart sounds: Normal heart sounds. No murmur. Pulmonary:      Effort: Pulmonary effort is normal. No respiratory distress. Breath sounds: Normal breath sounds. No wheezing. Abdominal:      General: Bowel sounds are normal. There is no distension. Palpations: Abdomen is soft. There is no mass. Tenderness: There is no abdominal tenderness. There is no guarding or rebound. Musculoskeletal: Normal range of motion. General: No tenderness. Skin:     General: Skin is warm. Coloration: Skin is not pale. Findings: No erythema or rash. Comments: He is not diaphoretic   Neurological:      Mental Status: He is alert and oriented to person, place, and time. Deep Tendon Reflexes: Reflexes are normal and symmetric. Psychiatric:         Behavior: Behavior normal.         Thought Content: Thought content normal.         Judgment: Judgment normal.         IMPRESSION: Mr. Sherman Armstrong is a 62 y.o. male with      Diagnosis Orders   1.  Colon cancer screening  COLONOSCOPY W/ OR W/O BIOPSY   2. Diarrhea, unspecified type  COLONOSCOPY W/ OR W/O BIOPSY   3. Esophageal dysphagia  EGD     Will proceed with the above although the symptoms of dysphagia and diarrhea are trivial  But seems likely that having them chronically  Will take biopsies for the diarrhea  Neck sure there is no oropharyngeal or esophageal cause of dysphagia      Diet/life style/natural hx /complication of the dx were all explained in details   Past medical, past surgical, social history, psychiatric history, medications or allergies, all reviewed and  updated    Spent 50 minutes providing patient education and counseling. Thank you for allowing me to participate in the care of Mr. Bert Villegas. For any further questions please do not hesitate to contact me. I have reviewed and agree with the MA/RN ROS. Note is dictated utilizing voice recognition software. Unfortunately this leads to occasional typographical errors. Please contact our office if you have any questions.     Rosa Maria Grossman MD  [facility]  O: #[phone]

## 2020-08-10 ENCOUNTER — TELEPHONE (OUTPATIENT)
Dept: FAMILY MEDICINE CLINIC | Age: 55
End: 2020-08-10

## 2020-08-10 NOTE — TELEPHONE ENCOUNTER
Patient wife called to say if he is not feeling well after echo,she will need to cancel appt,she does not want him to be a no show,but just does Not know what the test involves,so if he can not make it do not jose alejandro a no show due to late cancel please.

## 2020-08-10 NOTE — TELEPHONE ENCOUNTER
Writer attempted to contact pt by phone in regards to scheduling EGD/colon procs ordered at 8/4/20 OV, but there was no answer and we were unable to leave msg d/t vm is full. We will try at another date.

## 2020-08-11 ENCOUNTER — OFFICE VISIT (OUTPATIENT)
Dept: FAMILY MEDICINE CLINIC | Age: 55
End: 2020-08-11
Payer: COMMERCIAL

## 2020-08-11 ENCOUNTER — HOSPITAL ENCOUNTER (OUTPATIENT)
Dept: NON INVASIVE DIAGNOSTICS | Age: 55
Discharge: HOME | End: 2020-08-11
Payer: COMMERCIAL

## 2020-08-11 VITALS
OXYGEN SATURATION: 96 % | TEMPERATURE: 96.8 F | BODY MASS INDEX: 31.1 KG/M2 | HEIGHT: 69 IN | HEART RATE: 75 BPM | SYSTOLIC BLOOD PRESSURE: 180 MMHG | DIASTOLIC BLOOD PRESSURE: 100 MMHG | WEIGHT: 210 LBS

## 2020-08-11 LAB
LV EF: 40 %
LVEF MODALITY: NORMAL

## 2020-08-11 PROCEDURE — 99214 OFFICE O/P EST MOD 30 MIN: CPT | Performed by: FAMILY MEDICINE

## 2020-08-11 PROCEDURE — G8417 CALC BMI ABV UP PARAM F/U: HCPCS | Performed by: FAMILY MEDICINE

## 2020-08-11 PROCEDURE — 4004F PT TOBACCO SCREEN RCVD TLK: CPT | Performed by: FAMILY MEDICINE

## 2020-08-11 PROCEDURE — G8427 DOCREV CUR MEDS BY ELIG CLIN: HCPCS | Performed by: FAMILY MEDICINE

## 2020-08-11 PROCEDURE — 93306 TTE W/DOPPLER COMPLETE: CPT

## 2020-08-11 PROCEDURE — 3017F COLORECTAL CA SCREEN DOC REV: CPT | Performed by: FAMILY MEDICINE

## 2020-08-11 RX ORDER — AMLODIPINE BESYLATE 10 MG/1
10 TABLET ORAL DAILY
Qty: 30 TABLET | Refills: 1 | Status: SHIPPED | OUTPATIENT
Start: 2020-08-11 | End: 2020-09-11 | Stop reason: SDUPTHER

## 2020-08-11 RX ORDER — CARVEDILOL 6.25 MG/1
6.25 TABLET ORAL 2 TIMES DAILY
Qty: 60 TABLET | Refills: 1 | Status: SHIPPED | OUTPATIENT
Start: 2020-08-11 | End: 2020-10-05

## 2020-08-11 RX ORDER — LISINOPRIL AND HYDROCHLOROTHIAZIDE 25; 20 MG/1; MG/1
1 TABLET ORAL DAILY
Qty: 30 TABLET | Refills: 1 | Status: SHIPPED | OUTPATIENT
Start: 2020-08-11 | End: 2020-08-11

## 2020-08-11 RX ORDER — ATORVASTATIN CALCIUM 20 MG/1
20 TABLET, FILM COATED ORAL DAILY
Qty: 30 TABLET | Refills: 3 | Status: SHIPPED | OUTPATIENT
Start: 2020-08-11 | End: 2020-09-11 | Stop reason: SDUPTHER

## 2020-08-11 SDOH — ECONOMIC STABILITY: FOOD INSECURITY: WITHIN THE PAST 12 MONTHS, YOU WORRIED THAT YOUR FOOD WOULD RUN OUT BEFORE YOU GOT MONEY TO BUY MORE.: NEVER TRUE

## 2020-08-11 SDOH — ECONOMIC STABILITY: TRANSPORTATION INSECURITY
IN THE PAST 12 MONTHS, HAS LACK OF TRANSPORTATION KEPT YOU FROM MEETINGS, WORK, OR FROM GETTING THINGS NEEDED FOR DAILY LIVING?: NO

## 2020-08-11 SDOH — ECONOMIC STABILITY: FOOD INSECURITY: WITHIN THE PAST 12 MONTHS, THE FOOD YOU BOUGHT JUST DIDN'T LAST AND YOU DIDN'T HAVE MONEY TO GET MORE.: NEVER TRUE

## 2020-08-11 SDOH — ECONOMIC STABILITY: TRANSPORTATION INSECURITY
IN THE PAST 12 MONTHS, HAS THE LACK OF TRANSPORTATION KEPT YOU FROM MEDICAL APPOINTMENTS OR FROM GETTING MEDICATIONS?: NO

## 2020-08-11 SDOH — ECONOMIC STABILITY: INCOME INSECURITY: HOW HARD IS IT FOR YOU TO PAY FOR THE VERY BASICS LIKE FOOD, HOUSING, MEDICAL CARE, AND HEATING?: NOT HARD AT ALL

## 2020-08-11 ASSESSMENT — PATIENT HEALTH QUESTIONNAIRE - PHQ9
SUM OF ALL RESPONSES TO PHQ QUESTIONS 1-9: 0
SUM OF ALL RESPONSES TO PHQ9 QUESTIONS 1 & 2: 0
1. LITTLE INTEREST OR PLEASURE IN DOING THINGS: 0
SUM OF ALL RESPONSES TO PHQ QUESTIONS 1-9: 0
2. FEELING DOWN, DEPRESSED OR HOPELESS: 0

## 2020-08-11 NOTE — PATIENT INSTRUCTIONS
harder for you, and how to handle them. Try tracking. People who keep track of what they eat, feel, and do are better at losing weight. Try writing down things like:  · What and how much you eat. · How you feel before and after each meal.  · Details about each meal (like eating out or at home, eating alone, or with friends or family). · What you do to be active. Look and plan. As you track, look for patterns that you may want to change. Take note of:  · When you eat and whether you skip meals. · How often you eat out. · How many fruits and vegetables you eat. · When you eat beyond feeling full. · When and why you eat for reasons other than being hungry. When you stray from your plan, don't get upset. Figure out what made you slip up and how you can fix it. Can you take medicines or have surgery to lose weight? If you have a BMI in a certain range and have not been able to lose weight with diet and exercise, medicine or surgery may be an option for you. If you have a BMI of at least 30.0 (or a BMI of at least 27.0 and another health problem related to your weight), ask your doctor about weight-loss medicines. They work by making you feel less hungry, making you feel full more quickly, or changing how you digest fat. Medicines are used along with diet changes and more physical activity to help you make lasting changes. If you have a BMI of 40.0 or more (or a BMI of 35.0 or more and another health problem related to your weight), your doctor may talk with you about surgery. Weight-loss surgery has risks, and you will need to work with your doctor to compare the risk of having obesity with the risks of surgery. With any option you choose, you will still need to eat a healthy diet and get regular exercise. Follow-up care is a key part of your treatment and safety. Be sure to make and go to all appointments, and call your doctor if you are having problems.  It's also a good idea to know your test results and with changing your smoking habits and rituals. The last step is the tricky one: Be prepared for the smoking urge to continue for a time. This is a lot to deal with, but keep at it. You will feel better. Follow-up care is a key part of your treatment and safety. Be sure to make and go to all appointments, and call your doctor if you are having problems. It's also a good idea to know your test results and keep a list of the medicines you take. How can you care for yourself at home? · Ask your family, friends, and coworkers for support. You have a better chance of quitting if you have help and support. · Join a support group, such as Nicotine Anonymous, for people who are trying to quit smoking. · Consider signing up for a smoking cessation program, such as the American Lung Association's Freedom from Smoking program.  · Get text messaging support. Go to the website at www.smokefree. gov to sign up for the [facility] program.  · Set a quit date. Pick your date carefully so that it is not right in the middle of a big deadline or stressful time. Once you quit, do not even take a puff. Get rid of all ashtrays and lighters after your last cigarette. Clean your house and your clothes so that they do not smell of smoke. · Learn how to be a nonsmoker. Think about ways you can avoid those things that make you reach for a cigarette. ? Avoid situations that put you at greatest risk for smoking. For some people, it is hard to have a drink with friends without smoking. For others, they might skip a coffee break with coworkers who smoke. ? Change your daily routine. Take a different route to work or eat a meal in a different place. · Cut down on stress. Calm yourself or release tension by doing an activity you enjoy, such as reading a book, taking a hot bath, or gardening. · Talk to your doctor or pharmacist about nicotine replacement therapy, which replaces the nicotine in your body.  You still get nicotine but you do not use tobacco. Nicotine replacement products help you slowly reduce the amount of nicotine you need. These products come in several forms, many of them available over-the-counter:  ? Nicotine patches  ? Nicotine gum and lozenges  ? Nicotine inhaler  · Ask your doctor about bupropion (Wellbutrin) or varenicline (Chantix), which are prescription medicines. They do not contain nicotine. They help you by reducing withdrawal symptoms, such as stress and anxiety. · Some people find hypnosis, acupuncture, and massage helpful for ending the smoking habit. · Eat a healthy diet and get regular exercise. Having healthy habits will help your body move past its craving for nicotine. · Be prepared to keep trying. Most people are not successful the first few times they try to quit. Do not get mad at yourself if you smoke again. Make a list of things you learned and think about when you want to try again, such as next week, next month, or next year. Where can you learn more? Go to https://UndertonechiomaGazemetrix.SCYNEXIS. org and sign in to your Flash Auto [employer]ailing account. Enter N300 in the HelloWallet box to learn more about \"Stopping Smoking: Care Instructions. \"     If you do not have an account, please click on the \"Sign Up Now\" link. Current as of: March 12, 2020               Content Version: 12.5  © 6419-2037 Healthwise, Incorporated. Care instructions adapted under license by [facility] . If you have questions about a medical condition or this instruction, always ask your healthcare professional. [place] [address] any warranty or liability for your use of this information. Learning About Benefits From Quitting Smoking  How does quitting smoking make you healthier? If you're thinking about quitting smoking, you may have a few reasons to be smoke-free. Your health may be one of them.   · When you quit smoking, you lower your risks for cancer, lung disease, heart attack, stroke, blood vessel disease, and blindness from macular degeneration. · When you're smoke-free, you get sick less often, and you heal faster. You are less likely to get colds, flu, bronchitis, and pneumonia. · As a nonsmoker, you may find that your mood is better and you are less stressed. When and how will you feel healthier? Quitting has real health benefits that start from day 1 of being smoke-free. And the longer you stay smoke-free, the healthier you get and the better you feel. The first hours  · After just 20 minutes, your blood pressure and heart rate go down. That means there's less stress on your heart and blood vessels. · Within 12 hours, the level of carbon monoxide in your blood drops back to normal. That makes room for more oxygen. With more oxygen in your body, you may notice that you have more energy than when you smoked. After 2 weeks  · Your lungs start to work better. · Your risk of heart attack starts to drop. After 1 month  · When your lungs are clear, you cough less and breathe deeper, so it's easier to be active. · Your sense of taste and smell return. That means you can enjoy food more than you have since you started smoking. Over the years  · Over the years, your risks of heart disease, heart attack, and stroke are lower. · After 10 years, your risk of dying from lung cancer is cut by about half. And your risk for many other types of cancer is lower too. How would quitting help others in your life? When you quit smoking, you improve the health of everyone who now breathes in your smoke. · Their heart, lung, and cancer risks drop, much like yours. · They are sick less. For babies and small children, living smoke-free means they're less likely to have ear infections, pneumonia, and bronchitis. · If you're a woman who is or will be pregnant someday, quitting smoking means a healthier . · Children who are close to you are less likely to become adult smokers.   Where can you learn more?  Go to https://chpepiceweb.healthSocialeyes Apppartners. org and sign in to your ""t account. Enter [id number] in the Hypemarks box to learn more about \"Learning About Benefits From Quitting Smoking. \"     If you do not have an account, please click on the \"Sign Up Now\" link. Current as of: March 12, 2020               Content Version: 12.5  © 2006-2020 Healthwise, Incorporated. Care instructions adapted under license by [facility] ([facility]). If you have questions about a medical condition or this instruction, always ask your healthcare professional. [place] [address] any warranty or liability for your use of this information.

## 2020-08-11 NOTE — PROGRESS NOTES
Socioeconomic History    Marital status:      Spouse name: Not on file    Number of children: Not on file    Years of education: Not on file    Highest education level: Not on file   Occupational History    Not on file   Social Needs    Financial resource strain: Not hard at all    Food insecurity     Worry: Never true     Inability: Never true   [employer] needs     Medical: No     Non-medical: No   Tobacco Use    Smoking status: Current Every Day Smoker     Types: Cigarettes    Smokeless tobacco: Former User    Tobacco comment: 1 ciagarette a day   Substance and Sexual Activity    Alcohol use: Yes    Drug use: No    Sexual activity: Not on file   Lifestyle    Physical activity     Days per week: Not on file     Minutes per session: Not on file    Stress: Not on file   Relationships    Social connections     Talks on phone: Not on file     Gets together: Not on file     Attends Latter day service: Not on file     Active member of club or organization: Not on file     Attends meetings of clubs or organizations: Not on file     Relationship status: Not on file    Intimate partner violence     Fear of current or ex partner: Not on file     Emotionally abused: Not on file     Physically abused: Not on file     Forced sexual activity: Not on file   Other Topics Concern    Not on file   Social History Narrative    Not on file        ROS:     Constitutional: No fevers, chills, fatigue. ENT: No nasal congestion or sore throat  Respiratory: No difficulty in breathing or cough. Cardiovascular: No chest pain, palpitations or shortness of breath  Gastrointestinal: No abdominal pain or change in bowel movements. Genitourinary: No change in urinary frequency or dysuria. Skin: No rashes or skin lesions. Neurological: No weakness. No headaches.          Last Filed Vitals:  BP (!) 180/100   Pulse 75   Temp 96.8 °F (36 °C) (Temporal)   Ht 5' 9\" (1.753 m)   Wt 210 lb (95.3 kg)   SpO2 96% BMI 31.01 kg/m²      Physical Examination:     GENERAL APPEARANCE: in no acute distress, well developed, well nourished. HEAD: normocephalic, atraumatic. EYES: extraocular movement intact (EOMI), pupils equal, round, reactive to light and accommodation. EARS: normal, tympanic membrane intact, clear, auditory canal clear. NOSE: nares patent, no erythema, sinuses nontender bilaterally, no rhinorrhea. ORAL CAVITY: mucosa moist, no lesions. THROAT: clear, no mass, no exudate. NECK/THYROID: neck supple, full range of motion, no thyromegaly. HEART: no murmurs, regular rate and rhythm, S1, S2 normal.   LUNGS: clear to auscultation bilaterally, no wheezes, rales, rhonchi.    ABDOMEN: normal, bowel sounds present, soft, nontender, nondistended, no rebound guarding or rigidity    Recent Labs/ In Office Testing/ Radiograph review:     Hospital Outpatient Visit on 06/23/2020   Component Date Value Ref Range Status    ALT 06/23/2020 11  5 - 41 U/L Final    AST 06/23/2020 16  <40 U/L Final    WBC 06/23/2020 3.9  3.5 - 11.3 k/uL Final    RBC 06/23/2020 5.07  4.21 - 5.77 m/uL Final    Hemoglobin 06/23/2020 15.8  13.0 - 17.0 g/dL Final    Hematocrit 06/23/2020 46.8  40.7 - 50.3 % Final    MCV 06/23/2020 92.3  82.6 - 102.9 fL Final    MCH 06/23/2020 31.2  25.2 - 33.5 pg Final    MCHC 06/23/2020 33.8  28.4 - 34.8 g/dL Final    RDW 06/23/2020 12.2  11.8 - 14.4 % Final    Platelets 01/72/9815 161  138 - 453 k/uL Final    MPV 06/23/2020 12.6  8.1 - 13.5 fL Final    NRBC Automated 06/23/2020 0.0  0.0 per 100 WBC Final    Differential Type 06/23/2020 NOT REPORTED   Final    Seg Neutrophils 06/23/2020 66* 36 - 65 % Final    Lymphocytes 06/23/2020 24  24 - 43 % Final    Monocytes 06/23/2020 5  3 - 12 % Final    Eosinophils % 06/23/2020 3  1 - 4 % Final    Basophils 06/23/2020 1  0 - 2 % Final    Immature Granulocytes 06/23/2020 1* 0 % Final    Segs Absolute 06/23/2020 2.57  1.50 - 8.10 k/uL Final    Absolute Lymph # 06/23/2020 0.94* 1.10 - 3.70 k/uL Final    Absolute Mono # 06/23/2020 0.18  0.10 - 1.20 k/uL Final    Absolute Eos # 06/23/2020 0.12  0.00 - 0.44 k/uL Final    Basophils Absolute 06/23/2020 0.03  0.00 - 0.20 k/uL Final    Absolute Immature Granulocyte 06/23/2020 <0.03  0.00 - 0.30 k/uL Final    WBC Morphology 06/23/2020 NOT REPORTED   Final    RBC Morphology 06/23/2020 NOT REPORTED   Final    Platelet Estimate 43/71/6028 NOT REPORTED   Final    Hemoglobin A1C 06/23/2020 4.4  4.0 - 6.0 % Final    Estimated Avg Glucose 06/23/2020 80  mg/dL Final    Comment: The ADA and AACC recommend providing the estimated average glucose result to permit better   patient understanding of their HBA1c result.  Hepatitis C Ab 06/23/2020 NONREACTIVE  NONREACTIVE Final    Comment:       The hepatitis C procedure used in our laboratory is a Chemiluminescent test specific for   three recombinant HCV antigens. A negative anti-HCV result indicates that the antibodies to   hepatitis C virus are not present at this time. Individuals with reactive anti-HCV should be considered infected and infectious until proven   otherwise. Confirmation of all equivocal or reactive results is recommended by ordering   HCV RNA by PCR.  HIV Ag/Ab 06/23/2020 NONREACTIVE  NONREACTIVE Final    Comment: No laboratory evidence of HIV infection. If acute HIV infection is suspected, consider   testing for HIV-1 RNA.       Testosterone 06/23/2020 556  220 - 1,000 ng/dL Final    Vitamin B-12 06/23/2020 501  232 - 1245 pg/mL Final    Folate 06/23/2020 7.7  >4.8 ng/mL Final    Vit D, 25-Hydroxy 06/23/2020 14.6* 30.0 - 100.0 ng/mL Final    Comment:    Reference Range:  Vitamin D status         Range   Deficiency              <20 ng/mL   Mild Deficiency       20-30 ng/mL   Sufficiency           ng/mL   Toxicity               >100 ng/mL      Cholesterol 06/23/2020 169  <200 mg/dL Final    Comment:    Cholesterol Guidelines: <200  Desirable   200-240  Borderline      >240  Undesirable         HDL 06/23/2020 58  >40 mg/dL Final    Comment:    HDL Guidelines:    <40     Undesirable   40-59    Borderline    >59     Desirable         LDL Cholesterol 06/23/2020 88  0 - 130 mg/dL Final    Comment:    LDL Guidelines:     <100    Desirable   100-129   Near to/above Desirable   130-159   Borderline      >159   Undesirable     Direct (measured) LDL and calculated LDL are not interchangeable tests.  Chol/HDL Ratio 06/23/2020 2.9  <5 Final            Triglycerides 06/23/2020 116  <150 mg/dL Final    Comment:    Triglyceride Guidelines:     <150   Desirable   150-199  Borderline   200-499  High     >499   Very high   Based on AHA Guidelines for fasting triglyceride, October 2012.  VLDL 06/23/2020 NOT REPORTED  1 - 30 mg/dL Final    Magnesium 06/23/2020 2.4  1.6 - 2.6 mg/dL Final    Glucose 06/23/2020 94  70 - 99 mg/dL Final    BUN 06/23/2020 24* 6 - 20 mg/dL Final    CREATININE 06/23/2020 2.09* 0.70 - 1.20 mg/dL Final    Bun/Cre Ratio 06/23/2020 NOT REPORTED  9 - 20 Final    Calcium 06/23/2020 8.6  8.6 - 10.4 mg/dL Final    Alb 06/23/2020 4.2  3.5 - 5.2 g/dL Final    Phosphorus 06/23/2020 2.5  2.5 - 4.5 mg/dL Final    Sodium 06/23/2020 143  135 - 144 mmol/L Final    Potassium 06/23/2020 3.6* 3.7 - 5.3 mmol/L Final    Chloride 06/23/2020 104  98 - 107 mmol/L Final    CO2 06/23/2020 23  20 - 31 mmol/L Final    Anion Gap 06/23/2020 16  9 - 17 mmol/L Final    GFR Non- 06/23/2020 33* >60 mL/min Final    GFR  06/23/2020 40* >60 mL/min Final    GFR Comment 06/23/2020        Final    Comment: Average GFR for 52-63 years old:   80 mL/min/1.73sq m  Chronic Kidney Disease:   <60 mL/min/1.73sq m  Kidney failure:   <15 mL/min/1.73sq m              eGFR calculated using average adult body mass.  Additional eGFR calculator available at:        [email or web address]  GFR Staging 06/23/2020 NOT REPORTED   Final    PSA 06/23/2020 1.56  <4.1 ug/L Final    Comment: The Roche \"ECLIA\" assay is used. Results obtained with different assay methods cannot be   used interchangeably.  TSH 06/23/2020 1.44  0.30 - 5.00 mIU/L Final       No results found for this visit on 08/11/20. Assessment/Plan:     Farhad Mcneal was seen today for hypertension. Diagnoses and all orders for this visit:    HTN, goal below 130/80  -     Discontinue: lisinopril-hydroCHLOROthiazide (PRINZIDE;ZESTORETIC) 20-25 MG per tablet; Take 1 tablet by mouth daily  -     amLODIPine (NORVASC) 10 MG tablet; Take 1 tablet by mouth daily  -     carvedilol (COREG) 6.25 MG tablet; Take 1 tablet by mouth 2 times daily  -     Renal Function Panel; Future    Hypertensive urgency    BMI 31.0-31.9,adult    Depressed left ventricular ejection fraction  -     [facility] - Radha Dobson MD, Cardiology, [place]    Dyslipidemia  -     atorvastatin (LIPITOR) 20 MG tablet; Take 1 tablet by mouth daily    CKD (chronic kidney disease) stage 3, GFR 30-59 ml/min ([facility])  -     [facility]) - Janna Enriquez MD, Nephrology, [place]  -     Renal Function Panel; Future    Dietary counseling  -      BMI ABOVE NORMAL F/U    Personal history of tobacco use, presenting hazards to health  -     [place] TOBACCO USE CESSATION INTERMEDIATE 3-10 MINUTES [40519]      The 10-year ASCVD risk score (Twan Santana., et al., 2013) is: 30.7%    Values used to calculate the score:      Age: 62 years      Sex: Male      Is Non- : Yes      Diabetic: No      Tobacco smoker: Yes      Systolic Blood Pressure: 292 mmHg      Is BP treated: Yes      HDL Cholesterol: 58 mg/dL      Total Cholesterol: 169 mg/dL    Will start statin due to his 10 year ASCVD risk score >7.5%. I did ask him to start an 81 mg ASA daily as well. Cr 2.04. Decreased GFR. Stop lisinopril/hctz. Encouraged him not to take NSAIDs and only tylenol for aches and pains.

## 2020-09-10 ENCOUNTER — HOSPITAL ENCOUNTER (OUTPATIENT)
Age: 55
Setting detail: SPECIMEN
Discharge: HOME | End: 2020-09-10
Payer: COMMERCIAL

## 2020-09-10 LAB
ALBUMIN SERPL-MCNC: 4.4 G/DL (ref 3.5–5.2)
ANION GAP SERPL CALCULATED.3IONS-SCNC: 13 MMOL/L (ref 9–17)
BUN BLDV-MCNC: 30 MG/DL (ref 6–20)
BUN/CREAT BLD: ABNORMAL (ref 9–20)
CALCIUM SERPL-MCNC: 9 MG/DL (ref 8.6–10.4)
CHLORIDE BLD-SCNC: 105 MMOL/L (ref 98–107)
CO2: 26 MMOL/L (ref 20–31)
CREAT SERPL-MCNC: 1.9 MG/DL (ref 0.7–1.2)
GFR AFRICAN AMERICAN: 45 ML/MIN
GFR NON-AFRICAN AMERICAN: 37 ML/MIN
GFR SERPL CREATININE-BSD FRML MDRD: ABNORMAL ML/MIN/{1.73_M2}
GFR SERPL CREATININE-BSD FRML MDRD: ABNORMAL ML/MIN/{1.73_M2}
GLUCOSE BLD-MCNC: 88 MG/DL (ref 70–99)
PHOSPHORUS: 3 MG/DL (ref 2.5–4.5)
POTASSIUM SERPL-SCNC: 4.1 MMOL/L (ref 3.7–5.3)
SODIUM BLD-SCNC: 144 MMOL/L (ref 135–144)

## 2020-09-11 ENCOUNTER — OFFICE VISIT (OUTPATIENT)
Dept: FAMILY MEDICINE CLINIC | Age: 55
End: 2020-09-11
Payer: COMMERCIAL

## 2020-09-11 VITALS
HEART RATE: 67 BPM | WEIGHT: 213 LBS | TEMPERATURE: 98 F | HEIGHT: 70 IN | DIASTOLIC BLOOD PRESSURE: 80 MMHG | BODY MASS INDEX: 30.49 KG/M2 | OXYGEN SATURATION: 97 % | SYSTOLIC BLOOD PRESSURE: 130 MMHG

## 2020-09-11 PROCEDURE — 4004F PT TOBACCO SCREEN RCVD TLK: CPT | Performed by: FAMILY MEDICINE

## 2020-09-11 PROCEDURE — G8417 CALC BMI ABV UP PARAM F/U: HCPCS | Performed by: FAMILY MEDICINE

## 2020-09-11 PROCEDURE — 99214 OFFICE O/P EST MOD 30 MIN: CPT | Performed by: FAMILY MEDICINE

## 2020-09-11 PROCEDURE — G8427 DOCREV CUR MEDS BY ELIG CLIN: HCPCS | Performed by: FAMILY MEDICINE

## 2020-09-11 PROCEDURE — 3017F COLORECTAL CA SCREEN DOC REV: CPT | Performed by: FAMILY MEDICINE

## 2020-09-11 RX ORDER — ATORVASTATIN CALCIUM 20 MG/1
20 TABLET, FILM COATED ORAL DAILY
Qty: 90 TABLET | Refills: 1 | Status: SHIPPED | OUTPATIENT
Start: 2020-09-11 | End: 2020-12-15 | Stop reason: SDUPTHER

## 2020-09-11 RX ORDER — AMLODIPINE BESYLATE 10 MG/1
10 TABLET ORAL DAILY
Qty: 90 TABLET | Refills: 1 | Status: SHIPPED | OUTPATIENT
Start: 2020-09-11 | End: 2020-12-15 | Stop reason: SDUPTHER

## 2020-09-11 ASSESSMENT — PATIENT HEALTH QUESTIONNAIRE - PHQ9
SUM OF ALL RESPONSES TO PHQ QUESTIONS 1-9: 0
SUM OF ALL RESPONSES TO PHQ9 QUESTIONS 1 & 2: 0
SUM OF ALL RESPONSES TO PHQ QUESTIONS 1-9: 0
1. LITTLE INTEREST OR PLEASURE IN DOING THINGS: 0
2. FEELING DOWN, DEPRESSED OR HOPELESS: 0

## 2020-09-11 NOTE — PATIENT INSTRUCTIONS
Learning About Obesity  What is obesity? Obesity means having a body mass index (BMI) of 30 or above. BMI is a number that is calculated from your weight and your height. How do you know if your weight is in the obesity range? To know if your weight is in the obesity range, your doctor looks at your body mass index (BMI) and waist size. BMI is a number that is calculated from your weight and your height. To figure out your BMI for yourself, you can use an online tool, such as http://www.banks.com/ on the [employer]. If your BMI is 30.0 or higher, it falls within the obesity range. Keep in mind that BMI and waist size are only guides. They are not tools to determine your ideal body weight. What causes obesity? When you take in more calories than you burn off, you gain weight. How you eat, how active you are, and other things affect how your body uses calories and whether you gain weight. If you have family members who have too much body fat, you may have inherited a tendency to gain weight. And your family also helps form your eating and lifestyle habits, which can lead to obesity. Also, our busy lives make it harder to plan and cook healthy meals. For many of us, it's easier to reach for prepared foods, go out to eat, or go to the drive-through. But these foods are often high in saturated fat and calories. Portions are often too large. What can you do to reach a healthy weight? Focus on health, not diets. Diets are hard to stay on and don't work in the long run. It is very hard to stay with a diet that includes lots of big changes in your eating habits. Instead of a diet, focus on lifestyle changes that will improve your health and achieve the right balance of energy and calories. To lose weight, you need to burn more calories than you take in.  You can do it by eating healthy foods in reasonable amounts and becoming more harder for you, and how to handle them. Try tracking. People who keep track of what they eat, feel, and do are better at losing weight. Try writing down things like:  · What and how much you eat. · How you feel before and after each meal.  · Details about each meal (like eating out or at home, eating alone, or with friends or family). · What you do to be active. Look and plan. As you track, look for patterns that you may want to change. Take note of:  · When you eat and whether you skip meals. · How often you eat out. · How many fruits and vegetables you eat. · When you eat beyond feeling full. · When and why you eat for reasons other than being hungry. When you stray from your plan, don't get upset. Figure out what made you slip up and how you can fix it. Can you take medicines or have surgery to lose weight? If you have a BMI in a certain range and have not been able to lose weight with diet and exercise, medicine or surgery may be an option for you. If you have a BMI of at least 30.0 (or a BMI of at least 27.0 and another health problem related to your weight), ask your doctor about weight-loss medicines. They work by making you feel less hungry, making you feel full more quickly, or changing how you digest fat. Medicines are used along with diet changes and more physical activity to help you make lasting changes. If you have a BMI of 40.0 or more (or a BMI of 35.0 or more and another health problem related to your weight), your doctor may talk with you about surgery. Weight-loss surgery has risks, and you will need to work with your doctor to compare the risk of having obesity with the risks of surgery. With any option you choose, you will still need to eat a healthy diet and get regular exercise. Follow-up care is a key part of your treatment and safety. Be sure to make and go to all appointments, and call your doctor if you are having problems.  It's also a good idea to know your test results and keep a list of the medicines you take. Where can you learn more? Go to https://chpepiceweb.Keepy. org and sign in to your [employer] account. Enter N111 in the [facility] box to learn more about \"Learning About Obesity. \"     If you do not have an account, please click on the \"Sign Up Now\" link. Current as of: December 11, 2019               Content Version: 12.5  © 9826-4945 Healthwise, Incorporated. Care instructions adapted under license by [facility] ([facility]). If you have questions about a medical condition or this instruction, always ask your healthcare professional. [address] any warranty or liability for your use of this information. Stopping Smoking: Care Instructions  Your Care Instructions     Cigarette smokers crave the nicotine in cigarettes. Giving it up is much harder than simply changing a habit. Your body has to stop craving the nicotine. It is hard to quit, but you can do it. There are many tools that people use to quit smoking. You may find that combining tools works best for you. There are several steps to quitting. First you get ready to quit. Then you get support to help you. After that, you learn new skills and behaviors to become a nonsmoker. For many people, a necessary step is getting and using medicine. Your doctor will help you set up the plan that best meets your needs. You may want to attend a smoking cessation program to help you quit smoking. When you choose a program, look for one that has proven success. Ask your doctor for ideas. You will greatly increase your chances of success if you take medicine as well as get counseling or join a cessation program.  Some of the changes you feel when you first quit tobacco are uncomfortable. Your body will miss the nicotine at first, and you may feel short-tempered and grumpy. You may have trouble sleeping or concentrating. Medicine can help you deal with these symptoms.  You may struggle with changing your smoking habits and rituals. The last step is the tricky one: Be prepared for the smoking urge to continue for a time. This is a lot to deal with, but keep at it. You will feel better. Follow-up care is a key part of your treatment and safety. Be sure to make and go to all appointments, and call your doctor if you are having problems. It's also a good idea to know your test results and keep a list of the medicines you take. How can you care for yourself at home? · Ask your family, friends, and coworkers for support. You have a better chance of quitting if you have help and support. · Join a support group, such as Nicotine Anonymous, for people who are trying to quit smoking. · Consider signing up for a smoking cessation program, such as the American Lung Association's Freedom from Smoking program.  · Get text messaging support. Go to the website at www.smokefree. gov to sign up for the [facility] program.  · Set a quit date. Pick your date carefully so that it is not right in the middle of a big deadline or stressful time. Once you quit, do not even take a puff. Get rid of all ashtrays and lighters after your last cigarette. Clean your house and your clothes so that they do not smell of smoke. · Learn how to be a nonsmoker. Think about ways you can avoid those things that make you reach for a cigarette. ? Avoid situations that put you at greatest risk for smoking. For some people, it is hard to have a drink with friends without smoking. For others, they might skip a coffee break with coworkers who smoke. ? Change your daily routine. Take a different route to work or eat a meal in a different place. · Cut down on stress. Calm yourself or release tension by doing an activity you enjoy, such as reading a book, taking a hot bath, or gardening. · Talk to your doctor or pharmacist about nicotine replacement therapy, which replaces the nicotine in your body.  You still get nicotine but you do not use tobacco. Nicotine replacement products help you slowly reduce the amount of nicotine you need. These products come in several forms, many of them available over-the-counter:  ? Nicotine patches  ? Nicotine gum and lozenges  ? Nicotine inhaler  · Ask your doctor about bupropion (Wellbutrin) or varenicline (Chantix), which are prescription medicines. They do not contain nicotine. They help you by reducing withdrawal symptoms, such as stress and anxiety. · Some people find hypnosis, acupuncture, and massage helpful for ending the smoking habit. · Eat a healthy diet and get regular exercise. Having healthy habits will help your body move past its craving for nicotine. · Be prepared to keep trying. Most people are not successful the first few times they try to quit. Do not get mad at yourself if you smoke again. Make a list of things you learned and think about when you want to try again, such as next week, next month, or next year. Where can you learn more? Go to https://[employer].takokat. org and sign in to your InC[employer]u account. Enter V053 in the [employer] box to learn more about \"Stopping Smoking: Care Instructions. \"     If you do not have an account, please click on the \"Sign Up Now\" link. Current as of: March 12, 2020               Content Version: 12.5  © 1521-3665 Healthwise, Incorporated. Care instructions adapted under license by [facility] ([facility]). If you have questions about a medical condition or this instruction, always ask your healthcare professional. [place] [address] any warranty or liability for your use of this information. Learning About Benefits From Quitting Smoking  How does quitting smoking make you healthier? If you're thinking about quitting smoking, you may have a few reasons to be smoke-free. Your health may be one of them.   · When you quit smoking, you lower your risks for cancer, lung disease, heart attack, stroke, blood vessel disease, and blindness from macular degeneration. · When you're smoke-free, you get sick less often, and you heal faster. You are less likely to get colds, flu, bronchitis, and pneumonia. · As a nonsmoker, you may find that your mood is better and you are less stressed. When and how will you feel healthier? Quitting has real health benefits that start from day 1 of being smoke-free. And the longer you stay smoke-free, the healthier you get and the better you feel. The first hours  · After just 20 minutes, your blood pressure and heart rate go down. That means there's less stress on your heart and blood vessels. · Within 12 hours, the level of carbon monoxide in your blood drops back to normal. That makes room for more oxygen. With more oxygen in your body, you may notice that you have more energy than when you smoked. After 2 weeks  · Your lungs start to work better. · Your risk of heart attack starts to drop. After 1 month  · When your lungs are clear, you cough less and breathe deeper, so it's easier to be active. · Your sense of taste and smell return. That means you can enjoy food more than you have since you started smoking. Over the years  · Over the years, your risks of heart disease, heart attack, and stroke are lower. · After 10 years, your risk of dying from lung cancer is cut by about half. And your risk for many other types of cancer is lower too. How would quitting help others in your life? When you quit smoking, you improve the health of everyone who now breathes in your smoke. · Their heart, lung, and cancer risks drop, much like yours. · They are sick less. For babies and small children, living smoke-free means they're less likely to have ear infections, pneumonia, and bronchitis. · If you're a woman who is or will be pregnant someday, quitting smoking means a healthier . · Children who are close to you are less likely to become adult smokers.   Where can you learn

## 2020-09-11 NOTE — PROGRESS NOTES
Progress Note    Luisito Blank is a 62 y.o.  male who presents today alone for evaluation of   Chief Complaint   Patient presents with    Hypertension    Hyperlipidemia           HPI:   Patient is here for follow up on HTN/decreased LVEF on echo EF 40%/moderate LVH. Patient denies cp/sob/le edema/dizziness/lightheadedness/blurry va/ha. Patient denies PND/orthopnea. Patient states he does smoke 1 cig per day. Patient denies excessive sodium/caffeine. Patients last Cr was 1.90. Patient denies chest pressure/tightness/squeezing. PHQ-9 Total Score: 0 (9/11/2020  2:29 PM)      Health Maintenance Due   Topic Date Due    Pneumococcal 0-64 years Vaccine (1 of 1 - PPSV23) 11/13/1971    DTaP/Tdap/Td vaccine (1 - Tdap) 11/13/1982    Colon cancer screen colonoscopy  11/13/2013    Flu vaccine (1) 09/01/2020        Current Medications:     Current Outpatient Medications   Medication Sig Dispense Refill    amLODIPine (NORVASC) 10 MG tablet Take 1 tablet by mouth daily 90 tablet 1    atorvastatin (LIPITOR) 20 MG tablet Take 1 tablet by mouth daily 90 tablet 1    carvedilol (COREG) 6.25 MG tablet Take 1 tablet by mouth 2 times daily 60 tablet 1    DM-APAP-CPM (CORICIDIN HBP) -2 MG TABS Take by mouth      ergocalciferol (DRISDOL) 1.25 MG (72598 UT) capsule Take 1 capsule by mouth once a week 4 capsule 3     No current facility-administered medications for this visit.         Allergies:   No Known Allergies     Medical History:     Past Medical History:   Diagnosis Date    Benign essential HTN        Past Surgical History:   Procedure Laterality Date    TONSILLECTOMY         Family History   Problem Relation Age of Onset    Hypertension Mother     Hypertension Father         Social History:     Social History     Socioeconomic History    Marital status:      Spouse name: Not on file    Number of children: Not on file    Years of education: Not on file    Highest education level: Not on file   Occupational History    Not on file   Social Needs    Financial resource strain: Not hard at all   [place] insecurity     Worry: Never true     Inability: Never true   [employer] needs     Medical: No     Non-medical: No   Tobacco Use    Smoking status: Current Every Day Smoker     Types: Cigarettes    Smokeless tobacco: Former User    Tobacco comment: 1 ciagarette a day   Substance and Sexual Activity    Alcohol use: Yes    Drug use: No    Sexual activity: Not on file   Lifestyle    Physical activity     Days per week: Not on file     Minutes per session: Not on file    Stress: Not on file   Relationships    Social connections     Talks on phone: Not on file     Gets together: Not on file     Attends Rastafarian service: Not on file     Active member of club or organization: Not on file     Attends meetings of clubs or organizations: Not on file     Relationship status: Not on file    Intimate partner violence     Fear of current or ex partner: Not on file     Emotionally abused: Not on file     Physically abused: Not on file     Forced sexual activity: Not on file   Other Topics Concern    Not on file   Social History Narrative    Not on file        ROS:     Constitutional: No fevers, chills, fatigue. ENT: No nasal congestion or sore throat  Respiratory: No difficulty in breathing or cough. Cardiovascular: No chest pain, palpitations or shortness of breath  Gastrointestinal: No abdominal pain or change in bowel movements. Genitourinary: No change in urinary frequency or dysuria. Skin: No rashes or skin lesions. Neurological: No weakness. No headaches. Last Filed Vitals:  /80   Pulse 67   Temp 98 °F (36.7 °C)   Ht 5' 9.5\" (1.765 m)   Wt 213 lb (96.6 kg)   SpO2 97%   BMI 31.00 kg/m²      Physical Examination:     GENERAL APPEARANCE: in no acute distress, well developed, well nourished. HEAD: normocephalic, atraumatic.    EYES: extraocular movement intact (EOMI),

## 2020-09-29 PROBLEM — N18.30 CKD (CHRONIC KIDNEY DISEASE), STAGE III (HCC): Status: ACTIVE | Noted: 2020-09-29

## 2020-10-05 RX ORDER — CARVEDILOL 6.25 MG/1
TABLET ORAL
Qty: 60 TABLET | Refills: 1 | Status: SHIPPED | OUTPATIENT
Start: 2020-10-05 | End: 2020-12-07

## 2020-11-23 ENCOUNTER — TELEPHONE (OUTPATIENT)
Dept: GASTROENTEROLOGY | Age: 55
End: 2020-11-23

## 2020-11-23 NOTE — TELEPHONE ENCOUNTER
Called and spoke with pt's wife, Hamilton Sarabia, regarding changing OV to VV with Dr Emmett Vicente on Tuesday 11/24/20 due to Covid concerns. Informed pt they will receive call between 330pm and 430pm. Wife states that pt's cell phone number is [phone]. She said that if we have problems with sending link to that number, we call send link to her cell phone ([phone]). Disclaimer read and pt voices understanding.

## 2020-11-24 ENCOUNTER — TELEPHONE (OUTPATIENT)
Dept: GASTROENTEROLOGY | Age: 55
End: 2020-11-24

## 2020-11-24 NOTE — TELEPHONE ENCOUNTER
Called patient for his VV today to discuss colonoscopy and EGD. Patient was never cleared by his cardiologist so the procedure was never done. Advised patient to follow up with his cardiologist for that clearance so we can schedule him for a f/u in order to get this scheduled.

## 2020-12-07 RX ORDER — CARVEDILOL 6.25 MG/1
TABLET ORAL
Qty: 60 TABLET | Refills: 1 | Status: SHIPPED | OUTPATIENT
Start: 2020-12-07 | End: 2020-12-15 | Stop reason: SDUPTHER

## 2020-12-07 NOTE — TELEPHONE ENCOUNTER
Klaudia Dejesus is calling to request a refill on the following medication(s):    Medication Request:  Requested Prescriptions     Pending Prescriptions Disp Refills    carvedilol (COREG) 6.25 MG tablet [Pharmacy Med Name: CARVEDILOL 6.25 MG TABLET] 60 tablet 1     Sig: take 1 tablet by mouth twice a day       Last Visit Date (If Applicable):  4/48/3563    Next Visit Date:    12/15/2020

## 2020-12-15 ENCOUNTER — OFFICE VISIT (OUTPATIENT)
Dept: FAMILY MEDICINE CLINIC | Age: 55
End: 2020-12-15
Payer: COMMERCIAL

## 2020-12-15 ENCOUNTER — HOSPITAL ENCOUNTER (OUTPATIENT)
Age: 55
Setting detail: SPECIMEN
Discharge: HOME | End: 2020-12-15
Payer: COMMERCIAL

## 2020-12-15 VITALS
TEMPERATURE: 98.6 F | HEART RATE: 73 BPM | SYSTOLIC BLOOD PRESSURE: 140 MMHG | OXYGEN SATURATION: 98 % | DIASTOLIC BLOOD PRESSURE: 90 MMHG | WEIGHT: 233.8 LBS | BODY MASS INDEX: 34.03 KG/M2

## 2020-12-15 LAB
COMPLEMENT C3: 127 MG/DL (ref 90–180)
COMPLEMENT C4: 42 MG/DL (ref 10–40)
CREATININE URINE: 225.8 MG/DL (ref 39–259)
FREE KAPPA/LAMBDA RATIO: 0.87 (ref 0.26–1.65)
KAPPA FREE LIGHT CHAINS QNT: 2.5 MG/DL (ref 0.37–1.94)
LAMBDA FREE LIGHT CHAINS QNT: 2.87 MG/DL (ref 0.57–2.63)
PHOSPHORUS: 3 MG/DL (ref 2.5–4.5)
PTH INTACT: 160.3 PG/ML (ref 15–65)
TOTAL PROTEIN, URINE: 43 MG/DL
URINE TOTAL PROTEIN CREATININE RATIO: 0.19 (ref 0–0.2)

## 2020-12-15 PROCEDURE — 4004F PT TOBACCO SCREEN RCVD TLK: CPT | Performed by: FAMILY MEDICINE

## 2020-12-15 PROCEDURE — G8484 FLU IMMUNIZE NO ADMIN: HCPCS | Performed by: FAMILY MEDICINE

## 2020-12-15 PROCEDURE — 3017F COLORECTAL CA SCREEN DOC REV: CPT | Performed by: FAMILY MEDICINE

## 2020-12-15 PROCEDURE — G8427 DOCREV CUR MEDS BY ELIG CLIN: HCPCS | Performed by: FAMILY MEDICINE

## 2020-12-15 PROCEDURE — 99214 OFFICE O/P EST MOD 30 MIN: CPT | Performed by: FAMILY MEDICINE

## 2020-12-15 PROCEDURE — G8417 CALC BMI ABV UP PARAM F/U: HCPCS | Performed by: FAMILY MEDICINE

## 2020-12-15 RX ORDER — SILDENAFIL 100 MG/1
100 TABLET, FILM COATED ORAL PRN
Qty: 30 TABLET | Refills: 3 | Status: SHIPPED | OUTPATIENT
Start: 2020-12-15 | End: 2021-03-03

## 2020-12-15 RX ORDER — AMLODIPINE BESYLATE 10 MG/1
10 TABLET ORAL DAILY
Qty: 90 TABLET | Refills: 1 | Status: SHIPPED | OUTPATIENT
Start: 2020-12-15 | End: 2021-09-21 | Stop reason: SDUPTHER

## 2020-12-15 RX ORDER — CARVEDILOL 6.25 MG/1
TABLET ORAL
Qty: 180 TABLET | Refills: 1 | Status: SHIPPED | OUTPATIENT
Start: 2020-12-15 | End: 2021-03-03 | Stop reason: HOSPADM

## 2020-12-15 RX ORDER — ATORVASTATIN CALCIUM 20 MG/1
20 TABLET, FILM COATED ORAL DAILY
Qty: 90 TABLET | Refills: 1 | Status: SHIPPED | OUTPATIENT
Start: 2020-12-15 | End: 2021-08-27

## 2020-12-15 ASSESSMENT — PATIENT HEALTH QUESTIONNAIRE - PHQ9
2. FEELING DOWN, DEPRESSED OR HOPELESS: 0
SUM OF ALL RESPONSES TO PHQ9 QUESTIONS 1 & 2: 0
1. LITTLE INTEREST OR PLEASURE IN DOING THINGS: 0
SUM OF ALL RESPONSES TO PHQ QUESTIONS 1-9: 0

## 2020-12-15 NOTE — PATIENT INSTRUCTIONS
Learning About Obesity  What is obesity? Obesity means having a body mass index (BMI) of 30 or above. BMI is a number that is calculated from your weight and your height. How do you know if your weight is in the obesity range? To know if your weight is in the obesity range, your doctor looks at your body mass index (BMI) and waist size. BMI is a number that is calculated from your weight and your height. To figure out your BMI for yourself, you can use an online tool, such as http://www.banks.com/ on the [employer] of [employer]. If your BMI is 30.0 or higher, it falls within the obesity range. Keep in mind that BMI and waist size are only guides. They are not tools to determine your ideal body weight. What causes obesity? When you take in more calories than you burn off, you gain weight. How you eat, how active you are, and other things affect how your body uses calories and whether you gain weight. If you have family members who have too much body fat, you may have inherited a tendency to gain weight. And your family also helps form your eating and lifestyle habits, which can lead to obesity. Also, our busy lives make it harder to plan and cook healthy meals. For many of us, it's easier to reach for prepared foods, go out to eat, or go to the drive-through. But these foods are often high in saturated fat and calories. Portions are often too large. What can you do to reach a healthy weight? Focus on health, not diets. Diets are hard to stay on and don't work in the long run. It is very hard to stay with a diet that includes lots of big changes in your eating habits. Instead of a diet, focus on lifestyle changes that will improve your health and achieve the right balance of energy and calories. To lose weight, you need to burn more calories than you take in. You can do it by eating healthy foods in reasonable amounts and becoming more active, even a little bit every day. Making small changes over time can add up to a lot. Make a plan for change. Many people have found that naming their reasons for change and staying focused on their plan can make a big difference. Work with your doctor to create a plan that is right for you. · Ask yourself: Donna Grant are my personal, most powerful reasons for wanting this change? What will my life look like when I've made the change? \"  · Set your long-term goal. Make it specific, such as \"I will lose x pounds. \"  · Break your long-term goal into smaller, short-term goals. Make these small steps specific and within your reach, things you know you can do. These steps are what keep you going from day to day. Talk with your doctor about other weight-loss options. If you have a BMI in a certain range and have not been able to lose weight with diet and exercise, medicine or surgery may be an option for you. Before your doctor will prescribe medicines or surgery, he or she will probably want you to be more active and follow your healthy eating plan for a period of time. These habits are key lifelong changes for managing your weight, with or without other medical treatment. And these changes can help you avoid weight-related health problems. How can you stay on your plan for change? Be ready. Choose to start during a time when there are few events that might trigger slip-ups, like holidays, social events, and high-stress periods. Decide on your first few steps. Most people have more success when they make small changes, one step at a time. For example, you might switch a daily candy bar to a piece of fruit, walk 10 minutes more, or add more vegetables to a meal.  Line up your support people. Make sure you're not going to be alone as you make this change. Connect with people who understand how important it is to you. Ask family members and friends for help in keeping with your plan. And think about who could make it harder for you, and how to handle them. Try tracking. People who keep track of what they eat, feel, and do are better at losing weight. Try writing down things like:  · What and how much you eat. · How you feel before and after each meal.  · Details about each meal (like eating out or at home, eating alone, or with friends or family). · What you do to be active. Look and plan. As you track, look for patterns that you may want to change. Take note of:  · When you eat and whether you skip meals. · How often you eat out. · How many fruits and vegetables you eat. · When you eat beyond feeling full. · When and why you eat for reasons other than being hungry. When you stray from your plan, don't get upset. Figure out what made you slip up and how you can fix it. Can you take medicines or have surgery to lose weight? If you have a BMI in a certain range and have not been able to lose weight with diet and exercise, medicine or surgery may be an option for you. If you have a BMI of at least 30.0 (or a BMI of at least 27.0 and another health problem related to your weight), ask your doctor about weight-loss medicines. They work by making you feel less hungry, making you feel full more quickly, or changing how you digest fat. Medicines are used along with diet changes and more physical activity to help you make lasting changes. If you have a BMI of 40.0 or more (or a BMI of 35.0 or more and another health problem related to your weight), your doctor may talk with you about surgery. Weight-loss surgery has risks, and you will need to work with your doctor to compare the risk of having obesity with the risks of surgery. With any option you choose, you will still need to eat a healthy diet and get regular exercise. Follow-up care is a key part of your treatment and safety. Be sure to make and go to all appointments, and call your doctor if you are having problems. It's also a good idea to know your test results and keep a list of the medicines you take. Where can you learn more? Go to https://LaunchSide.compereportbraineb.Agree[employer] - Onvelop. org and sign in to your [employer] account. Enter N111 in the i.Meter box to learn more about \"Learning About Obesity. \"     If you do not have an account, please click on the \"Sign Up Now\" link. Current as of: December 11, 2019               Content Version: 12.6  © 4778-8586 [employer], [employer]. Care instructions adapted under license by [facility] ([facility]). If you have questions about a medical condition or this instruction, always ask your healthcare professional. [place] [address] any warranty or liability for your use of this information. Stopping Smoking: Care Instructions  Your Care Instructions     Cigarette smokers crave the nicotine in cigarettes. Giving it up is much harder than simply changing a habit. Your body has to stop craving the nicotine. It is hard to quit, but you can do it. There are many tools that people use to quit smoking. You may find that combining tools works best for you. There are several steps to quitting. First you get ready to quit. Then you get support to help you. After that, you learn new skills and behaviors to become a nonsmoker. For many people, a necessary step is getting and using medicine. Your doctor will help you set up the plan that best meets your needs. You may want to attend a smoking cessation program to help you quit smoking. When you choose a program, look for one that has proven success. Ask your doctor for ideas. You will greatly increase your chances of success if you take medicine as well as get counseling or join a cessation program.  Some of the changes you feel when you first quit tobacco are uncomfortable. Your body will miss the nicotine at first, and you may feel short-tempered and grumpy. You may have trouble sleeping or concentrating. Medicine can help you deal with these symptoms. You may struggle with changing your smoking habits and rituals. The last step is the tricky one: Be prepared for the smoking urge to continue for a time. This is a lot to deal with, but keep at it. You will feel better. Follow-up care is a key part of your treatment and safety. Be sure to make and go to all appointments, and call your doctor if you are having problems. It's also a good idea to know your test results and keep a list of the medicines you take. How can you care for yourself at home? · Ask your family, friends, and coworkers for support. You have a better chance of quitting if you have help and support. · Join a support group, such as Nicotine Anonymous, for people who are trying to quit smoking. · Consider signing up for a smoking cessation program, such as the American Lung Association's Freedom from Smoking program.  · Get text messaging support. Go to the website at www.smokefree. gov to sign up for the [facility] program.  · Set a quit date. Pick your date carefully so that it is not right in the middle of a big deadline or stressful time. Once you quit, do not even take a puff. Get rid of all ashtrays and lighters after your last cigarette. Clean your house and your clothes so that they do not smell of smoke. · Learn how to be a nonsmoker. Think about ways you can avoid those things that make you reach for a cigarette. ? Avoid situations that put you at greatest risk for smoking. For some people, it is hard to have a drink with friends without smoking. For others, they might skip a coffee break with coworkers who smoke. ? Change your daily routine. Take a different route to work or eat a meal in a different place. · Cut down on stress. Calm yourself or release tension by doing an activity you enjoy, such as reading a book, taking a hot bath, or gardening. · Talk to your doctor or pharmacist about nicotine replacement therapy, which replaces the nicotine in your body. You still get nicotine but you do not use tobacco. Nicotine replacement products help you slowly reduce the amount of nicotine you need. These products come in several forms, many of them available over-the-counter:  ? Nicotine patches  ? Nicotine gum and lozenges  ? Nicotine inhaler  · Ask your doctor about bupropion (Wellbutrin) or varenicline (Chantix), which are prescription medicines. They do not contain nicotine. They help you by reducing withdrawal symptoms, such as stress and anxiety. · Some people find hypnosis, acupuncture, and massage helpful for ending the smoking habit. · Eat a healthy diet and get regular exercise. Having healthy habits will help your body move past its craving for nicotine. · Be prepared to keep trying. Most people are not successful the first few times they try to quit. Do not get mad at yourself if you smoke again. Make a list of things you learned and think about when you want to try again, such as next week, next month, or next year. Where can you learn more? Go to https://tod.Neuroware.io. org and sign in to your [employer] account. Enter K309 in the [facility] box to learn more about \"Stopping Smoking: Care Instructions. \"

## 2020-12-15 NOTE — PROGRESS NOTES
Cardiovascular: No chest pain, palpitations or shortness of breath  Gastrointestinal: No abdominal pain or change in bowel movements. Genitourinary: No change in urinary frequency or dysuria. +ED  Skin: No rashes or skin lesions. Neurological: No weakness. No headaches. Last Filed Vitals:  BP (!) 140/90   Pulse 73   Temp 98.6 °F (37 °C) (Temporal)   Wt 233 lb 12.8 oz (106.1 kg)   SpO2 98%   BMI 34.03 kg/m²      Physical Examination:     GENERAL APPEARANCE: in no acute distress, well developed, well nourished. HEAD: normocephalic, atraumatic. EYES: extraocular movement intact (EOMI), pupils equal, round, reactive to light and accommodation. EARS: normal, tympanic membrane intact, clear, auditory canal clear. NOSE: nares patent, no erythema, sinuses nontender bilaterally, no rhinorrhea. ORAL CAVITY: mucosa moist, no lesions. THROAT: clear, no mass, no exudate. NECK/THYROID: neck supple, full range of motion, no thyromegaly. HEART: no murmurs, regular rate and rhythm, S1, S2 normal.   LUNGS: clear to auscultation bilaterally, no wheezes, rales, rhonchi.    ABDOMEN: normal, bowel sounds present, soft, nontender, nondistended, no rebound guarding or rigidity    Recent Labs/ In Office Testing/ Radiograph review:     Hospital Outpatient Visit on 09/10/2020   Component Date Value Ref Range Status    Glucose 09/10/2020 88  70 - 99 mg/dL Final    BUN 09/10/2020 30* 6 - 20 mg/dL Final    CREATININE 09/10/2020 1.90* 0.70 - 1.20 mg/dL Final    Bun/Cre Ratio 09/10/2020 NOT REPORTED  9 - 20 Final    Calcium 09/10/2020 9.0  8.6 - 10.4 mg/dL Final    Alb 09/10/2020 4.4  3.5 - 5.2 g/dL Final    Phosphorus 09/10/2020 3.0  2.5 - 4.5 mg/dL Final    Sodium 09/10/2020 144  135 - 144 mmol/L Final    Potassium 09/10/2020 4.1  3.7 - 5.3 mmol/L Final    Chloride 09/10/2020 105  98 - 107 mmol/L Final    CO2 09/10/2020 26  20 - 31 mmol/L Final    Anion Gap 09/10/2020 13  9 - 17 mmol/L Final    GFR Non- 09/10/2020 37* >60 mL/min Final    GFR  09/10/2020 45* >60 mL/min Final    GFR Comment 09/10/2020        Final    Comment: Average GFR for 52-63 years old:   80 mL/min/1.73sq m  Chronic Kidney Disease:   <60 mL/min/1.73sq m  Kidney failure:   <15 mL/min/1.73sq m              eGFR calculated using average adult body mass. Additional eGFR calculator available at:        Cellity.br            GFR Staging 09/10/2020 NOT REPORTED   Final       No results found for this visit on 12/15/20. Assessment/Plan:     Shelbie Gibbs was seen today for hyperlipidemia, hypertension, chronic kidney disease, heart problem and discuss labs. Diagnoses and all orders for this visit:    HTN, goal below 130/80  -     Magnesium; Future  -     Renal Function Panel; Future  -     carvedilol (COREG) 6.25 MG tablet; take 1 tablet by mouth twice a day  -     amLODIPine (NORVASC) 10 MG tablet; Take 1 tablet by mouth daily    Dyslipidemia  -     ALT; Future  -     AST; Future  -     CBC Auto Differential; Future  -     Lipid Panel; Future  -     atorvastatin (LIPITOR) 20 MG tablet; Take 1 tablet by mouth daily    Stage 3b chronic kidney disease  -     Magnesium; Future  -     Renal Function Panel; Future    Hyperglycemia  -     Hemoglobin A1C; Future    BMI 32.0-32.9,adult    Other male erectile dysfunction  -     Eleonora Marie MD, Urology, [place]  -     sildenafil (VIAGRA) 100 MG tablet; Take 1 tablet by mouth as needed for Erectile Dysfunction    Dietary counseling  -      BMI ABOVE NORMAL F/U    Cardiomegaly  -     AFL - Estrella Liriano MD, Cardiology, [address] [place] [address] history of tobacco use, presenting hazards to health  -     CT TOBACCO USE CESSATION INTERMEDIATE 3-10 MINUTES [59275]    Follow up on labs as above. Referral to cardiology as above. Strongly encouraged smoking cessation. Refills as above. He did decline vaccinations today. Referral to urology as requested. Encouraged well balanced low sodium diet. Asked him to take his medications as directed as he did miss these today. Asked him to log his BP at home. All questions answered and addressed to patient satisfaction. Patient understands and agrees to the plan. The patient was evaluated and treated today based on the osteopathic principle that each person is a unit of body, mind, and spirit, the body is capable of self-regulation, self-healing, and health maintenance and that structure and function are reciprocally interrelated. Follow-up:   Return in about 3 months (around 3/15/2021) for htn; 20 min appt. Alexandro Haley D.O.    BMI was elevated today, and weight loss plan recommended is : conventional weight loss. Tobacco Cessation Counseling: Patient advised about behavior change, including information about personal health harms, usage of appropriate cessation measures and benefits of cessation.   Time spent (minutes): 3-10

## 2020-12-16 LAB
ALBUMIN (CALCULATED): 4.4 G/DL (ref 3.2–5.2)
ALBUMIN PERCENT: 65 % (ref 45–65)
ALPHA 1 PERCENT: 2 % (ref 3–6)
ALPHA 2 PERCENT: 9 % (ref 6–13)
ALPHA-1-GLOBULIN: 0.1 G/DL (ref 0.1–0.4)
ALPHA-2-GLOBULIN: 0.6 G/DL (ref 0.5–0.9)
ANTI-NUCLEAR ANTIBODY (ANA): NEGATIVE
BETA GLOBULIN: 0.7 G/DL (ref 0.5–1.1)
BETA PERCENT: 11 % (ref 11–19)
GAMMA GLOBULIN %: 13 % (ref 9–20)
GAMMA GLOBULIN: 0.9 G/DL (ref 0.5–1.5)
PATHOLOGIST: ABNORMAL
PATHOLOGIST: NORMAL
PROTEIN ELECTROPHORESIS, SERUM: ABNORMAL
SERUM IFX INTERP: NORMAL
TOTAL PROT. SUM,%: 100 % (ref 98–102)
TOTAL PROT. SUM: 6.7 G/DL (ref 6.3–8.2)
TOTAL PROTEIN: 6.7 G/DL (ref 6.4–8.3)

## 2020-12-17 ENCOUNTER — HOSPITAL ENCOUNTER (OUTPATIENT)
Age: 55
Setting detail: SPECIMEN
Discharge: HOME | End: 2020-12-17
Payer: COMMERCIAL

## 2020-12-17 LAB
ABSOLUTE EOS #: 0.18 K/UL (ref 0–0.44)
ABSOLUTE IMMATURE GRANULOCYTE: <0.03 K/UL (ref 0–0.3)
ABSOLUTE LYMPH #: 1.28 K/UL (ref 1.1–3.7)
ABSOLUTE MONO #: 0.34 K/UL (ref 0.1–1.2)
ALBUMIN SERPL-MCNC: 4.1 G/DL (ref 3.5–5.2)
ALT SERPL-CCNC: 17 U/L (ref 5–41)
ANION GAP SERPL CALCULATED.3IONS-SCNC: 14 MMOL/L (ref 9–17)
AST SERPL-CCNC: 21 U/L
BASOPHILS # BLD: 1 % (ref 0–2)
BASOPHILS ABSOLUTE: 0.03 K/UL (ref 0–0.2)
BUN BLDV-MCNC: 24 MG/DL (ref 6–20)
BUN/CREAT BLD: ABNORMAL (ref 9–20)
CALCIUM SERPL-MCNC: 9 MG/DL (ref 8.6–10.4)
CHLORIDE BLD-SCNC: 104 MMOL/L (ref 98–107)
CHOLESTEROL/HDL RATIO: 2.5
CHOLESTEROL: 130 MG/DL
CO2: 25 MMOL/L (ref 20–31)
CREAT SERPL-MCNC: 1.9 MG/DL (ref 0.7–1.2)
DIFFERENTIAL TYPE: ABNORMAL
EOSINOPHILS RELATIVE PERCENT: 4 % (ref 1–4)
ESTIMATED AVERAGE GLUCOSE: 77 MG/DL
GFR AFRICAN AMERICAN: 45 ML/MIN
GFR NON-AFRICAN AMERICAN: 37 ML/MIN
GFR SERPL CREATININE-BSD FRML MDRD: ABNORMAL ML/MIN/{1.73_M2}
GFR SERPL CREATININE-BSD FRML MDRD: ABNORMAL ML/MIN/{1.73_M2}
GLUCOSE BLD-MCNC: 69 MG/DL (ref 70–99)
HBA1C MFR BLD: 4.3 % (ref 4–6)
HCT VFR BLD CALC: 41.2 % (ref 40.7–50.3)
HDLC SERPL-MCNC: 53 MG/DL
HEMOGLOBIN: 14.1 G/DL (ref 13–17)
IMMATURE GRANULOCYTES: 0 %
LDL CHOLESTEROL: 43 MG/DL (ref 0–130)
LYMPHOCYTES # BLD: 27 % (ref 24–43)
MAGNESIUM: 2.1 MG/DL (ref 1.6–2.6)
MCH RBC QN AUTO: 31.1 PG (ref 25.2–33.5)
MCHC RBC AUTO-ENTMCNC: 34.2 G/DL (ref 28.4–34.8)
MCV RBC AUTO: 90.7 FL (ref 82.6–102.9)
MONOCYTES # BLD: 7 % (ref 3–12)
NRBC AUTOMATED: 0 PER 100 WBC
PDW BLD-RTO: 11.7 % (ref 11.8–14.4)
PHOSPHORUS: 2.8 MG/DL (ref 2.5–4.5)
PLATELET # BLD: 165 K/UL (ref 138–453)
PLATELET ESTIMATE: ABNORMAL
PMV BLD AUTO: 11.8 FL (ref 8.1–13.5)
POTASSIUM SERPL-SCNC: 3.9 MMOL/L (ref 3.7–5.3)
RBC # BLD: 4.54 M/UL (ref 4.21–5.77)
RBC # BLD: ABNORMAL 10*6/UL
SEG NEUTROPHILS: 61 % (ref 36–65)
SEGMENTED NEUTROPHILS ABSOLUTE COUNT: 2.83 K/UL (ref 1.5–8.1)
SODIUM BLD-SCNC: 143 MMOL/L (ref 135–144)
TRIGL SERPL-MCNC: 172 MG/DL
VLDLC SERPL CALC-MCNC: ABNORMAL MG/DL (ref 1–30)
WBC # BLD: 4.7 K/UL (ref 3.5–11.3)
WBC # BLD: ABNORMAL 10*3/UL

## 2020-12-18 LAB
ALDOSTERONE COMMENT: NORMAL
ALDOSTERONE: 21.7 NG/DL

## 2020-12-19 LAB
RENIN ACTIVITY: 1.8 NG/ML/HR
RENIN COMMENT: NORMAL

## 2021-01-08 ENCOUNTER — OFFICE VISIT (OUTPATIENT)
Dept: UROLOGY | Age: 56
End: 2021-01-08
Payer: COMMERCIAL

## 2021-01-08 VITALS
SYSTOLIC BLOOD PRESSURE: 132 MMHG | WEIGHT: 233 LBS | HEART RATE: 62 BPM | DIASTOLIC BLOOD PRESSURE: 88 MMHG | HEIGHT: 70 IN | TEMPERATURE: 98.3 F | BODY MASS INDEX: 33.36 KG/M2

## 2021-01-08 DIAGNOSIS — Z12.5 PROSTATE CANCER SCREENING: ICD-10-CM

## 2021-01-08 DIAGNOSIS — N52.01 ERECTILE DYSFUNCTION DUE TO ARTERIAL INSUFFICIENCY: Primary | ICD-10-CM

## 2021-01-08 PROCEDURE — G8484 FLU IMMUNIZE NO ADMIN: HCPCS | Performed by: UROLOGY

## 2021-01-08 PROCEDURE — G8427 DOCREV CUR MEDS BY ELIG CLIN: HCPCS | Performed by: UROLOGY

## 2021-01-08 PROCEDURE — 99204 OFFICE O/P NEW MOD 45 MIN: CPT | Performed by: UROLOGY

## 2021-01-08 PROCEDURE — 3017F COLORECTAL CA SCREEN DOC REV: CPT | Performed by: UROLOGY

## 2021-01-08 PROCEDURE — G8417 CALC BMI ABV UP PARAM F/U: HCPCS | Performed by: UROLOGY

## 2021-01-08 PROCEDURE — 4004F PT TOBACCO SCREEN RCVD TLK: CPT | Performed by: UROLOGY

## 2021-01-08 ASSESSMENT — ENCOUNTER SYMPTOMS
COUGH: 0
SHORTNESS OF BREATH: 0
NAUSEA: 0
CONSTIPATION: 0
WHEEZING: 0
BACK PAIN: 0
DIARRHEA: 0
EYE REDNESS: 0
VOMITING: 0
EYE PAIN: 0
ABDOMINAL PAIN: 0

## 2021-01-08 NOTE — PROGRESS NOTES
[address] [address]  Dept: [phone]  Dept Fax: [phone] Ronak Patel [facility] Urology Office Note - New patient    Patient:  To Armendariz  YOB: 1965  Date: 1/8/2021    The patient is a 60 y.o. male who presents todayfor evaluation of the following problems:   Chief Complaint   Patient presents with    New Patient     Ref by Robert Quijano DO     Erectile Dysfunction    referred by Rony Sandy DO.      HPI  He is here for ED. He has been having issues with both obtaining and maintaining erections. He has noticed things have been worsened he can rarely get an erection on his own. He tried Viagra once, with no results. He has no FHx of prostate cancer. (Patient's old records have been requested, reviewed and summarized in today's note.)    Summary of old records: N/A    Additional History: N/A    Procedures Today: N/A    Last several PSA's:  Lab Results   Component Value Date    PSA 1.56 06/23/2020     Last total testosterone:  Lab Results   Component Value Date    TESTOSTERONE 556 06/23/2020     Urinalysis today:  No results found for this visit on 01/08/21. AUA Symptom Score (1/8/2021):   INCOMPLETE EMPTYING: How often have you had the sensation of not emptying your bladder?: Less than 1 to 5 times  FREQUENCY: How often do you have to urinate less than every two hours?: Less than 1 to 5 times  INTERMITTENCY: How often have you found you stopped and started again several times when you urinated?: Not at all  URGENCY: How often have you found it difficult to postpone urination?: Not at all  WEAK STREAM: How often have you had a weak urinary stream?: Not at all  STRAINING: How often have you had to strain to start  urination?: Not at all  NOCTURIA: How many times did you typically get up at night to uriniate?: 2 Times  TOTAL I-PSS SCORE[de-identified] 4 How would you feel if you were to spend the rest of your life with your urinary condition?: Mostly Satisfied    Last BUN and creatinine:  Lab Results   Component Value Date    BUN 24 (H) 12/17/2020     Lab Results   Component Value Date    CREATININE 1.90 (H) 12/17/2020       Additional Lab/Culture results: none    Imaging Reviewed during this Office Visit: none  (results were independently reviewed by physician and radiology report verified)    PAST MEDICAL, FAMILY AND SOCIAL HISTORY:  Past Medical History:   Diagnosis Date    Benign essential HTN     CKD (chronic kidney disease), stage III 9/29/2020    Secondary to suspected ischemic nephrosclerosis baseline creatinine 1.51.9     Past Surgical History:   Procedure Laterality Date    TONSILLECTOMY       Family History   Problem Relation Age of Onset    Hypertension Mother     Hypertension Father      Outpatient Medications Marked as Taking for the 1/8/21 encounter (Office Visit) with Blu Ames MD   Medication Sig Dispense Refill    sildenafil (VIAGRA) 100 MG tablet Take 1 tablet by mouth as needed for Erectile Dysfunction 30 tablet 3    carvedilol (COREG) 6.25 MG tablet take 1 tablet by mouth twice a day 180 tablet 1    amLODIPine (NORVASC) 10 MG tablet Take 1 tablet by mouth daily 90 tablet 1    atorvastatin (LIPITOR) 20 MG tablet Take 1 tablet by mouth daily 90 tablet 1    VITAMIN D, CHOLECALCIFEROL, PO Take by mouth daily      aspirin 81 MG EC tablet Take 81 mg by mouth daily          Patient has no known allergies.   Social History     Tobacco Use   Smoking Status Current Some Day Smoker    Types: Cigarettes   Smokeless Tobacco Former User   Tobacco Comment    1 ciagarette a day      (If patient a smoker, smoking cessation counseling offered)   Social History     Substance and Sexual Activity   Alcohol Use Yes       REVIEW OF SYSTEMS:  Review of Systems    Physical Exam:    This a 60 y.o. male   Vitals:    01/08/21 1110   BP: (!) 184/102 Pulse: 62   Temp: 98.3 °F (36.8 °C)     Body mass index is 33.91 kg/m². Physical Exam  Constitutional: Patient in no acute distress. Neuro: Alert and oriented to person, place and time. Psych: Mood normal, affect normal  Skin: No rash noted  Lungs:Respiratory effort is normal  Cardiovascular: Warm & Pink  Abdomen: Soft, non-tender, non-distendedwith no CVA,  No flank tenderness,  Orhepatosplenomegaly   Lymphatics: No palpable lymphadenopathy. Bladder non-tender and not distended. Musculoskeletal: Normal gait and station  Penis normal and circumcised  Urethral meatus normal  Scrotal exam normal  Testicles normal bilaterally  Epididymis normal bilaterally  No evidence of inguinal hernia  Normal rectal tone with no masses  Prostate:  40 grams, smooth. Assessment and Plan      1. Erectile dysfunction due to arterial insufficiency    2. Prostate cancer screening           Plan:        Would continue with Viagra as needed. We discussed the proper way to take it. PSA LIAM normal.   F/U in 6 months. Prescriptions Ordered:  No orders of the defined types were placed in this encounter. Orders Placed:  No orders of the defined types were placed in this encounter. Elizabeth Pop MD    Agree with the ROS entered by the MA.

## 2021-02-15 NOTE — TELEPHONE ENCOUNTER
Pt's wife called stating pt's cardiologist is Dr Bean Ashraf; their fax number is [phone].      Writer faxed clearance to Dr Bean Ashraf

## 2021-02-15 NOTE — TELEPHONE ENCOUNTER
Writer spoke to pt's wife, Savi Felt, over the phone in regards to see if pt is still interested in scheduling EGD/colon proc. Wife states pt needs clearance from cardiologist and he just completed his stress test on 2/12/21. Wife could not remember pt's cardiologist's name. She will call us back w/ information so we may send clearance to them for his procedures.

## 2021-03-03 ENCOUNTER — HOSPITAL ENCOUNTER (OUTPATIENT)
Dept: CARDIAC CATH/INVASIVE PROCEDURES | Age: 56
Discharge: HOME | End: 2021-03-03
Payer: COMMERCIAL

## 2021-03-03 VITALS
RESPIRATION RATE: 18 BRPM | TEMPERATURE: 98 F | HEIGHT: 70 IN | OXYGEN SATURATION: 97 % | WEIGHT: 228 LBS | DIASTOLIC BLOOD PRESSURE: 93 MMHG | BODY MASS INDEX: 32.64 KG/M2 | SYSTOLIC BLOOD PRESSURE: 155 MMHG | HEART RATE: 63 BPM

## 2021-03-03 DIAGNOSIS — I10 HTN, GOAL BELOW 130/80: ICD-10-CM

## 2021-03-03 LAB
ANION GAP SERPL CALCULATED.3IONS-SCNC: 10 MMOL/L (ref 9–17)
BUN BLDV-MCNC: 26 MG/DL (ref 6–20)
BUN/CREAT BLD: ABNORMAL (ref 9–20)
CALCIUM SERPL-MCNC: 8.8 MG/DL (ref 8.6–10.4)
CHLORIDE BLD-SCNC: 102 MMOL/L (ref 98–107)
CO2: 25 MMOL/L (ref 20–31)
CREAT SERPL-MCNC: 1.97 MG/DL (ref 0.7–1.2)
GFR AFRICAN AMERICAN: 43 ML/MIN
GFR NON-AFRICAN AMERICAN: 31 ML/MIN
GFR NON-AFRICAN AMERICAN: 35 ML/MIN
GFR SERPL CREATININE-BSD FRML MDRD: 38 ML/MIN
GFR SERPL CREATININE-BSD FRML MDRD: ABNORMAL ML/MIN/{1.73_M2}
GLUCOSE BLD-MCNC: 101 MG/DL (ref 74–100)
GLUCOSE BLD-MCNC: 90 MG/DL (ref 70–99)
PLATELET # BLD: 153 K/UL (ref 138–453)
POC CHLORIDE: 107 MMOL/L (ref 98–107)
POC CREATININE: 2.19 MG/DL (ref 0.51–1.19)
POC HEMATOCRIT: 41 % (ref 41–53)
POC HEMOGLOBIN: 13.9 G/DL (ref 13.5–17.5)
POC POTASSIUM: 4.1 MMOL/L (ref 3.5–4.5)
POC SODIUM: 141 MMOL/L (ref 138–146)
POTASSIUM SERPL-SCNC: 4 MMOL/L (ref 3.7–5.3)
SODIUM BLD-SCNC: 137 MMOL/L (ref 135–144)

## 2021-03-03 PROCEDURE — 82947 ASSAY GLUCOSE BLOOD QUANT: CPT

## 2021-03-03 PROCEDURE — C1725 CATH, TRANSLUMIN NON-LASER: HCPCS

## 2021-03-03 PROCEDURE — 6370000000 HC RX 637 (ALT 250 FOR IP): Performed by: INTERNAL MEDICINE

## 2021-03-03 PROCEDURE — 2709999900 HC NON-CHARGEABLE SUPPLY

## 2021-03-03 PROCEDURE — 2500000003 HC RX 250 WO HCPCS

## 2021-03-03 PROCEDURE — 84132 ASSAY OF SERUM POTASSIUM: CPT

## 2021-03-03 PROCEDURE — 85014 HEMATOCRIT: CPT

## 2021-03-03 PROCEDURE — 36415 COLL VENOUS BLD VENIPUNCTURE: CPT

## 2021-03-03 PROCEDURE — 85049 AUTOMATED PLATELET COUNT: CPT

## 2021-03-03 PROCEDURE — 6360000002 HC RX W HCPCS

## 2021-03-03 PROCEDURE — 80048 BASIC METABOLIC PNL TOTAL CA: CPT

## 2021-03-03 PROCEDURE — 82435 ASSAY OF BLOOD CHLORIDE: CPT

## 2021-03-03 PROCEDURE — 6360000004 HC RX CONTRAST MEDICATION

## 2021-03-03 PROCEDURE — 93458 L HRT ARTERY/VENTRICLE ANGIO: CPT | Performed by: INTERNAL MEDICINE

## 2021-03-03 PROCEDURE — 82565 ASSAY OF CREATININE: CPT

## 2021-03-03 PROCEDURE — C1769 GUIDE WIRE: HCPCS

## 2021-03-03 PROCEDURE — C1894 INTRO/SHEATH, NON-LASER: HCPCS

## 2021-03-03 PROCEDURE — 84295 ASSAY OF SERUM SODIUM: CPT

## 2021-03-03 RX ORDER — SODIUM CHLORIDE 9 MG/ML
INJECTION, SOLUTION INTRAVENOUS CONTINUOUS
Status: DISCONTINUED | OUTPATIENT
Start: 2021-03-03 | End: 2021-03-04 | Stop reason: HOSPADM

## 2021-03-03 RX ORDER — SODIUM CHLORIDE 0.9 % (FLUSH) 0.9 %
10 SYRINGE (ML) INJECTION PRN
Status: DISCONTINUED | OUTPATIENT
Start: 2021-03-03 | End: 2021-03-04 | Stop reason: HOSPADM

## 2021-03-03 RX ORDER — HYDRALAZINE HYDROCHLORIDE 20 MG/ML
20 INJECTION INTRAMUSCULAR; INTRAVENOUS EVERY 8 HOURS PRN
Status: DISCONTINUED | OUTPATIENT
Start: 2021-03-03 | End: 2021-03-04 | Stop reason: HOSPADM

## 2021-03-03 RX ORDER — SODIUM CHLORIDE 0.9 % (FLUSH) 0.9 %
10 SYRINGE (ML) INJECTION EVERY 12 HOURS SCHEDULED
Status: DISCONTINUED | OUTPATIENT
Start: 2021-03-03 | End: 2021-03-04 | Stop reason: HOSPADM

## 2021-03-03 RX ORDER — ACETAMINOPHEN 325 MG/1
650 TABLET ORAL EVERY 4 HOURS PRN
Status: DISCONTINUED | OUTPATIENT
Start: 2021-03-03 | End: 2021-03-04 | Stop reason: HOSPADM

## 2021-03-03 RX ORDER — HYDRALAZINE HYDROCHLORIDE 50 MG/1
50 TABLET, FILM COATED ORAL 3 TIMES DAILY
Qty: 90 TABLET | Refills: 3 | Status: SHIPPED | OUTPATIENT
Start: 2021-03-03 | End: 2021-09-21 | Stop reason: SDUPTHER

## 2021-03-03 RX ORDER — DOXAZOSIN 2 MG/1
1 TABLET ORAL DAILY
Status: DISCONTINUED | OUTPATIENT
Start: 2021-03-03 | End: 2021-03-04 | Stop reason: HOSPADM

## 2021-03-03 RX ORDER — DOXAZOSIN MESYLATE 1 MG/1
1 TABLET ORAL NIGHTLY
COMMUNITY
End: 2021-03-16

## 2021-03-03 RX ORDER — CARVEDILOL 25 MG/1
12.5 TABLET ORAL 2 TIMES DAILY
Qty: 60 TABLET | Refills: 3 | Status: SHIPPED | OUTPATIENT
Start: 2021-03-03 | End: 2021-09-21 | Stop reason: SDUPTHER

## 2021-03-03 RX ORDER — CARVEDILOL 6.25 MG/1
6.25 TABLET ORAL ONCE
Status: COMPLETED | OUTPATIENT
Start: 2021-03-03 | End: 2021-03-03

## 2021-03-03 RX ORDER — AMLODIPINE BESYLATE 10 MG/1
10 TABLET ORAL DAILY
Status: DISCONTINUED | OUTPATIENT
Start: 2021-03-03 | End: 2021-03-04 | Stop reason: HOSPADM

## 2021-03-03 RX ADMIN — HYDRALAZINE HYDROCHLORIDE 20 MG: 20 INJECTION INTRAMUSCULAR; INTRAVENOUS at 09:19

## 2021-03-03 RX ADMIN — AMLODIPINE BESYLATE 10 MG: 10 TABLET ORAL at 16:02

## 2021-03-03 RX ADMIN — DOXAZOSIN 1 MG: 2 TABLET ORAL at 16:04

## 2021-03-03 RX ADMIN — CARVEDILOL 6.25 MG: 6.25 TABLET, FILM COATED ORAL at 16:03

## 2021-03-03 NOTE — PROGRESS NOTES
Dr. Evelyn Evans informed of patient's high blood  Pressure and high creatinine. Orders received and carried out.

## 2021-03-03 NOTE — PROGRESS NOTES
Received post procedure to [facility] to room 4. Assessment obtained. Restrictions reviewed with patient. Post procedure pathway initiated. Right femoral site soft , band aid dry and intact. No hematoma noted. Family at side. Patient without complaints. Head of bed flat with right leg straight. Served lunch-visitor assisting.

## 2021-03-03 NOTE — H&P
[facility]  Pre- Procedure History and Physical/Update          Patient Name:  Virgil Gamez  MRN:    [id number]  YOB: 1965  Date of evaluation:  3/3/2021       Please refer to the consult note / H&P completed by Dr. Marcelo Royal on 2/12/21 in the medical record and note that:       [] I have examined the patient and reviewed the H&P/Consult and there are no changes to be made to the assessment or plan. [x] I have examined the patient and reviewed the H&P/Consult and have noted the following changes:      2/12/21:  1. Inferior large area of moderate ischemia (Reversible Defect)  2. No infarction (Fixed Defect)  3. Overall lower normal LV function with EF 47%. 4. Inferior wall hypokines    Past Medical History:   Diagnosis Date    Benign essential HTN     CKD (chronic kidney disease), stage III 9/29/2020    Secondary to suspected ischemic nephrosclerosis baseline creatinine 1.5-1.9       Past Surgical History:   Procedure Laterality Date    TONSILLECTOMY          reports that he has quit smoking. His smoking use included cigarettes. He has quit using smokeless tobacco. He reports current alcohol use. He reports that he does not use drugs. Prior to Admission medications    Medication Sig Start Date End Date Taking?  Authorizing Provider   doxazosin (CARDURA) 1 MG tablet Take 1 mg by mouth nightly   Yes Historical Provider, MD   spironolactone (ALDACTONE) 25 MG tablet Take 0.5 tablets by mouth daily 1/20/21  Yes Viraj Oseguera MD   carvedilol (COREG) 6.25 MG tablet take 1 tablet by mouth twice a day 12/15/20  Yes Conda Lemon, DO   amLODIPine (NORVASC) 10 MG tablet Take 1 tablet by mouth daily 12/15/20  Yes Conda Lemon, DO   atorvastatin (LIPITOR) 20 MG tablet Take 1 tablet by mouth daily 12/15/20  Yes Conda Lemon, DO   aspirin 81 MG EC tablet Take 81 mg by mouth daily   Yes Historical Provider, MD   VITAMIN D, CHOLECALCIFEROL, PO Take by mouth daily    Historical Provider, MD       No Known Allergies      REVIEW OF SYSTEMS:     A detailed review of system was performed as already noted and is otherwise as above. PHYSICAL EXAM:     Vitals:    03/03/21 1000   BP: (!) 152/74   Pulse: 64   Resp: 17   Temp:    SpO2: 97%        Constitutional and General Appearance: alert, cooperative, no distress and appears stated age  HEENT: PERRL, no cervical lymphadenopathy. No masses palpable. Normal oral mucosa  Respiratory:  · Normal excursion and expansion without use of accessory muscles  · Resp Auscultation: Good respiratory effort. No for increased work of breathing. On auscultation: clear to auscultation bilaterally  Cardiovascular:  · The apical impulse is not displaced  · Heart tones are crisp and normal. regular S1 and S2.  · Jugular venous pulsation Normal  · The carotid upstroke is normal in amplitude and contour without delay or bruit  · Peripheral pulses are symmetrical and full  Abdomen:  · No masses or tenderness  · Bowel sounds present  Extremities:  ·  No Cyanosis or Clubbing  ·  Lower extremity edema: No  · Skin: Warm and dry  Neurological:  · Alert and oriented. · Moves all extremities well  · No abnormalities of mood, affect, memory, mentation, or behavior are noted      Active Problems:    * No active hospital problems. *  Resolved Problems:    * No resolved hospital problems. *    2/12/21:  1. Inferior large area of moderate ischemia (Reversible Defect)  2. No infarction (Fixed Defect)  3. Overall lower normal LV function with EF 47%. 4. Inferior wall hypokines    Echo 08/11/2020:  EF 40%, moderate LVH, grade 1 diastolic dysfunction, no valvular abnormalities that are significant      Assessment:  1. Abnormal stress test 2/12/21: Inferior large area of moderate ischemia  2. Hypertension  3. CKD stage III, baseline Cr 1.7-1.9  4. Hyperlipidemia  5. Cardiomyopathy, EF 40%  6. Tobacco use  7.  Lipid panel will 17 20 showed cholesterol 130, HDL 53, LDL 43, triglyceride 172, ALT 17, AST 21  8. Erectile dysfunction          Plan:  1. Patient creatinine returned at 1.97 today, discussed with Dr Marya Rees with Nephrology who recommended proceeding with cardiac cath with 4 hours of hydration pre and post procedure. 2. Proceed with planned cardiac cath. 3. Further orders to follow. ASA:II  Mallampati:III     The risks, benefits, and alternatives of the prcoedure were discussed in detail with the patient. The patient agrees to proceed with the procedure, verbalizes understanding and signed consent.        Milton Fox MD  Fellow, [facility]

## 2021-03-04 NOTE — PROGRESS NOTES
All discharge instructions reviewed, questions answered, paper signed and given copy. Patient discharged per ambulatory with brother and belongings.

## 2021-03-11 RX ORDER — POLYETHYLENE GLYCOL 3350, SODIUM SULFATE ANHYDROUS, SODIUM BICARBONATE, SODIUM CHLORIDE, POTASSIUM CHLORIDE 236; 22.74; 6.74; 5.86; 2.97 G/4L; G/4L; G/4L; G/4L; G/4L
4 POWDER, FOR SOLUTION ORAL ONCE
Qty: 4000 ML | Refills: 0 | Status: SHIPPED | OUTPATIENT
Start: 2021-03-11 | End: 2021-03-11

## 2021-03-16 ENCOUNTER — OFFICE VISIT (OUTPATIENT)
Dept: FAMILY MEDICINE CLINIC | Age: 56
End: 2021-03-16
Payer: COMMERCIAL

## 2021-03-16 VITALS
OXYGEN SATURATION: 97 % | WEIGHT: 232.4 LBS | TEMPERATURE: 97.9 F | DIASTOLIC BLOOD PRESSURE: 100 MMHG | HEART RATE: 67 BPM | BODY MASS INDEX: 33.35 KG/M2 | SYSTOLIC BLOOD PRESSURE: 160 MMHG

## 2021-03-16 DIAGNOSIS — I10 HTN, GOAL BELOW 130/80: Primary | ICD-10-CM

## 2021-03-16 DIAGNOSIS — R73.9 HYPERGLYCEMIA: ICD-10-CM

## 2021-03-16 DIAGNOSIS — Z71.3 DIETARY COUNSELING: ICD-10-CM

## 2021-03-16 DIAGNOSIS — E78.5 DYSLIPIDEMIA: ICD-10-CM

## 2021-03-16 DIAGNOSIS — Z87.891 PERSONAL HISTORY OF TOBACCO USE, PRESENTING HAZARDS TO HEALTH: ICD-10-CM

## 2021-03-16 PROCEDURE — 3017F COLORECTAL CA SCREEN DOC REV: CPT | Performed by: FAMILY MEDICINE

## 2021-03-16 PROCEDURE — G8484 FLU IMMUNIZE NO ADMIN: HCPCS | Performed by: FAMILY MEDICINE

## 2021-03-16 PROCEDURE — 99214 OFFICE O/P EST MOD 30 MIN: CPT | Performed by: FAMILY MEDICINE

## 2021-03-16 PROCEDURE — 4004F PT TOBACCO SCREEN RCVD TLK: CPT | Performed by: FAMILY MEDICINE

## 2021-03-16 PROCEDURE — G8427 DOCREV CUR MEDS BY ELIG CLIN: HCPCS | Performed by: FAMILY MEDICINE

## 2021-03-16 PROCEDURE — G8417 CALC BMI ABV UP PARAM F/U: HCPCS | Performed by: FAMILY MEDICINE

## 2021-03-16 RX ORDER — DOXAZOSIN 2 MG/1
2 TABLET ORAL DAILY
Qty: 90 TABLET | Refills: 0 | Status: SHIPPED | OUTPATIENT
Start: 2021-03-16 | End: 2021-06-09

## 2021-03-16 RX ORDER — NEBULIZER AND COMPRESSOR
EACH MISCELLANEOUS
Qty: 1 KIT | Refills: 0 | Status: SHIPPED | OUTPATIENT
Start: 2021-03-16

## 2021-03-16 ASSESSMENT — PATIENT HEALTH QUESTIONNAIRE - PHQ9
SUM OF ALL RESPONSES TO PHQ QUESTIONS 1-9: 0
SUM OF ALL RESPONSES TO PHQ9 QUESTIONS 1 & 2: 0
1. LITTLE INTEREST OR PLEASURE IN DOING THINGS: 0
2. FEELING DOWN, DEPRESSED OR HOPELESS: 0

## 2021-03-16 NOTE — PATIENT INSTRUCTIONS
Learning About Obesity  What is obesity? Obesity means having an unhealthy amount of body fat. This puts your health in danger. It can lead to other health problems, such as type 2 diabetes and high blood pressure. How do you know if your weight is in the obesity range? To know if your weight is in the obesity range, your doctor looks at your body mass index (BMI) and waist size. BMI is a number that is calculated from your weight and your height. To figure out your BMI for yourself, you can use an online tool, such as http://www.banks.com/ on the [employer] of [employer]. If your BMI is 30.0 or higher, it falls within the obesity range. Keep in mind that BMI and waist size are only guides. They are not tools to determine your ideal body weight. What causes obesity? When you take in more calories than you burn off, you gain weight. How you eat, how active you are, and other things affect how your body uses calories and whether you gain weight. If you have family members who have too much body fat, you may have inherited a tendency to gain weight. And your family also helps form your eating and lifestyle habits, which can lead to obesity. Also, our busy lives make it harder to plan and cook healthy meals. For many of us, it's easier to reach for prepared foods, go out to eat, or go to the drive-through. But these foods are often high in saturated fat and calories. Portions are often too large. What can you do to reach a healthy weight? Focus on health, not diets. Diets are hard to stay on and don't work in the long run. It is very hard to stay with a diet that includes lots of big changes in your eating habits. Instead of a diet, focus on lifestyle changes that will improve your health and achieve the right balance of energy and calories. To lose weight, you need to burn more calories than you take in.  You can do it by eating healthy foods in reasonable amounts and becoming more active, even a little bit every day. Making small changes over time can add up to a lot. Make a plan for change. Many people have found that naming their reasons for change and staying focused on their plan can make a big difference. Work with your doctor to create a plan that is right for you. · Ask yourself: Todd Mario are my personal, most powerful reasons for wanting this change? What will my life look like when I've made the change? \"  · Set your long-term goal. Make it specific, such as \"I will lose x pounds. \"  · Break your long-term goal into smaller, short-term goals. Make these small steps specific and within your reach, things you know you can do. These steps are what keep you going from day to day. Talk with your doctor about other weight-loss options. If you have a BMI in a certain range and have not been able to lose weight with diet and exercise, medicine or surgery may be an option for you. Before your doctor will prescribe medicines or surgery, he or she will probably want you to be more active and follow your healthy eating plan for a period of time. These habits are key lifelong changes for managing your weight, with or without other medical treatment. And these changes can help you avoid weight-related health problems. How can you stay on your plan for change? Be ready. Choose to start during a time when there are few events like holidays, social events, and high-stress periods. These events might trigger slip-ups. Decide on your first few steps. Most people have more success when they make small changes, one step at a time. For example, you might switch a daily candy bar to a piece of fruit, walk 10 minutes more, or add more vegetables to a meal.  Line up your support people. Make sure you're not going to be alone as you make this change. Connect with people who understand how important it is to you.  Ask family members and friends for help in keeping with your plan. And think about who could make it harder for you, and how to handle them. Try tracking. People who keep track of what they eat, feel, and do are better at losing weight. Try writing down things like:  · What and how much you eat. · How you feel before and after each meal.  · Details about each meal (like eating out or at home, eating alone, or with friends or family). · What you do to be active. Look and plan. As you track, look for patterns that you may want to change. Take note of:  · When you eat and whether you skip meals. · How often you eat out. · How many fruits and vegetables you eat. · When you eat beyond feeling full. · When and why you eat for reasons other than being hungry. When you stray from your plan, don't get upset. Figure out what made you slip up and how you can fix it. Can you take medicines or have surgery to lose weight? If you have a BMI in a certain range and have not been able to lose weight with diet and exercise, medicine or surgery may be an option for you. If you have a BMI of at least 30.0 (or a BMI of at least 27.0 and another health problem related to your weight), ask your doctor about weight-loss medicines. They work by making you feel less hungry, making you feel full more quickly, or changing how you digest fat. Medicines are used along with diet changes and more physical activity to help you make lasting changes. If you have a BMI of 40.0 or more (or a BMI of 35.0 or more and another health problem related to your weight), your doctor may talk with you about surgery. Weight-loss surgery has risks, and you will need to work with your doctor to compare the risk of having obesity with the risks of surgery. With any option you choose, you will still need to eat a healthy diet and get regular exercise. Follow-up care is a key part of your treatment and safety. Be sure to make and go to all appointments, and call your doctor if you are having problems. It's also a good idea to know your test results and keep a list of the medicines you take. Where can you learn more? Go to https://chpepiceweb.[employer]. org and sign in to your [employer] account. Enter N111 in the [facility] box to learn more about \"Learning About Obesity. \"     If you do not have an account, please click on the \"Sign Up Now\" link. Current as of: September 23, 2020               Content Version: 12.8  © 2006-2021 [employer]. Care instructions adapted under license by [facility] ([facility]). If you have questions about a medical condition or this instruction, always ask your healthcare professional. [address] any warranty or liability for your use of this information. Stopping Smoking: Care Instructions  Your Care Instructions     Cigarette smokers crave the nicotine in cigarettes. Giving it up is much harder than simply changing a habit. Your body has to stop craving the nicotine. It is hard to quit, but you can do it. There are many tools that people use to quit smoking. You may find that combining tools works best for you. There are several steps to quitting. First you get ready to quit. Then you get support to help you. After that, you learn new skills and behaviors to become a nonsmoker. For many people, a necessary step is getting and using medicine. Your doctor will help you set up the plan that best meets your needs. You may want to attend a smoking cessation program to help you quit smoking. When you choose a program, look for one that has proven success. Ask your doctor for ideas. You will greatly increase your chances of success if you take medicine as well as get counseling or join a cessation program.  Some of the changes you feel when you first quit tobacco are uncomfortable. Your body will miss the nicotine at first, and you may feel short-tempered and grumpy. You may have trouble sleeping or concentrating.  Medicine can help you deal with these symptoms. You may struggle with changing your smoking habits and rituals. The last step is the tricky one: Be prepared for the smoking urge to continue for a time. This is a lot to deal with, but keep at it. You will feel better. Follow-up care is a key part of your treatment and safety. Be sure to make and go to all appointments, and call your doctor if you are having problems. It's also a good idea to know your test results and keep a list of the medicines you take. How can you care for yourself at home? · Ask your family, friends, and coworkers for support. You have a better chance of quitting if you have help and support. · Join a support group, such as Nicotine Anonymous, for people who are trying to quit smoking. · Consider signing up for a smoking cessation program, such as the American Lung Association's Freedom from Smoking program.  · Get text messaging support. Go to the website at www.smokefree. gov to sign up for the [facility] program.  · Set a quit date. Pick your date carefully so that it is not right in the middle of a big deadline or stressful time. Once you quit, do not even take a puff. Get rid of all ashtrays and lighters after your last cigarette. Clean your house and your clothes so that they do not smell of smoke. · Learn how to be a nonsmoker. Think about ways you can avoid those things that make you reach for a cigarette. ? Avoid situations that put you at greatest risk for smoking. For some people, it is hard to have a drink with friends without smoking. For others, they might skip a coffee break with coworkers who smoke. ? Change your daily routine. Take a different route to work or eat a meal in a different place. · Cut down on stress. Calm yourself or release tension by doing an activity you enjoy, such as reading a book, taking a hot bath, or gardening.   · Talk to your doctor or pharmacist about nicotine replacement therapy, which replaces the nicotine in your body. You still get nicotine but you do not use tobacco. Nicotine replacement products help you slowly reduce the amount of nicotine you need. These products come in several forms, many of them available over-the-counter:  ? Nicotine patches  ? Nicotine gum and lozenges  ? Nicotine inhaler  · Ask your doctor about bupropion (Wellbutrin) or varenicline (Chantix), which are prescription medicines. They do not contain nicotine. They help you by reducing withdrawal symptoms, such as stress and anxiety. · Some people find hypnosis, acupuncture, and massage helpful for ending the smoking habit. · Eat a healthy diet and get regular exercise. Having healthy habits will help your body move past its craving for nicotine. · Be prepared to keep trying. Most people are not successful the first few times they try to quit. Do not get mad at yourself if you smoke again. Make a list of things you learned and think about when you want to try again, such as next week, next month, or next year. Where can you learn more? Go to https://GoComm.Thumb. org and sign in to your [employer] account. Enter T073 in the [facility] box to learn more about \"Stopping Smoking: Care Instructions. \"     If you do not have an account, please click on the \"Sign Up Now\" link. Current as of: March 12, 2020               Content Version: 12.8  © 6660-4809 Healthwise[employer]. Care instructions adapted under license by [facility] ([facility]). If you have questions about a medical condition or this instruction, always ask your healthcare professional. [place] [address] any warranty or liability for your use of this information. Learning About Benefits From Quitting Smoking  How does quitting smoking make you healthier? If you're thinking about quitting smoking, you may have a few reasons to be smoke-free. Your health may be one of them.   · When you quit smoking, you lower your risks for cancer, lung disease, heart attack, stroke, blood vessel disease, and blindness from macular degeneration. · When you're smoke-free, you get sick less often, and you heal faster. You are less likely to get colds, flu, bronchitis, and pneumonia. · As a nonsmoker, you may find that your mood is better and you are less stressed. When and how will you feel healthier? Quitting has real health benefits that start from day 1 of being smoke-free. And the longer you stay smoke-free, the healthier you get and the better you feel. The first hours  · After just 20 minutes, your blood pressure and heart rate go down. That means there's less stress on your heart and blood vessels. · Within 12 hours, the level of carbon monoxide in your blood drops back to normal. That makes room for more oxygen. With more oxygen in your body, you may notice that you have more energy than when you smoked. After 2 weeks  · Your lungs start to work better. · Your risk of heart attack starts to drop. After 1 month  · When your lungs are clear, you cough less and breathe deeper, so it's easier to be active. · Your sense of taste and smell return. That means you can enjoy food more than you have since you started smoking. Over the years  · Over the years, your risks of heart disease, heart attack, and stroke are lower. · After 10 years, your risk of dying from lung cancer is cut by about half. And your risk for many other types of cancer is lower too. How would quitting help others in your life? When you quit smoking, you improve the health of everyone who now breathes in your smoke. · Their heart, lung, and cancer risks drop, much like yours. · They are sick less. For babies and small children, living smoke-free means they're less likely to have ear infections, pneumonia, and bronchitis. · If you're a woman who is or will be pregnant someday, quitting smoking means a healthier .   · Children who are close to you are less

## 2021-03-16 NOTE — PROGRESS NOTES
Progress Note    Abiodun Coughlin is a 60 y.o.  male who presents today alone for evaluation of   Chief Complaint   Patient presents with    Hypertension           HPI:   Patient is here for follow up on HTN/HFrEF/CKD/dyslipidemia. Patient denies cp/sob/le edema/dizziness/lightheadedness/blurry va/ha. Patient denies PND/orthopnea. Patient states he does smoke 1 cig per week. Patient denies excessive sodium/caffeine. Patient denies chest pressure/tightness/squeezing. Patient is following with cardiology. He did recently have a CC due to an abnormal stress test. Patient did not require stenting. Patient is following with nephrology for his CKD and HTN. PHQ-9 Total Score: 0 (3/16/2021  3:35 PM)      Health Maintenance Due   Topic Date Due    COVID-19 Vaccine (1) Never done    DTaP/Tdap/Td vaccine (1 - Tdap) Never done    Colon cancer screen colonoscopy  Never done    Flu vaccine (1) Never done        Current Medications:     Current Outpatient Medications   Medication Sig Dispense Refill    Blood Pressure Monitoring (ADULT BLOOD PRESSURE CUFF LG) KIT Check blood pressure daily 1 kit 0    doxazosin (CARDURA) 2 MG tablet Take 1 tablet by mouth daily 90 tablet 0    carvedilol (COREG) 25 MG tablet Take 0.5 tablets by mouth 2 times daily 60 tablet 3    hydrALAZINE (APRESOLINE) 50 MG tablet Take 1 tablet by mouth 3 times daily 90 tablet 3    spironolactone (ALDACTONE) 25 MG tablet Take 0.5 tablets by mouth daily 30 tablet 3    amLODIPine (NORVASC) 10 MG tablet Take 1 tablet by mouth daily 90 tablet 1    atorvastatin (LIPITOR) 20 MG tablet Take 1 tablet by mouth daily 90 tablet 1    VITAMIN D, CHOLECALCIFEROL, PO Take by mouth daily      aspirin 81 MG EC tablet Take 81 mg by mouth daily       No current facility-administered medications for this visit.         Allergies:   No Known Allergies     Medical History:     Past Medical History:   Diagnosis Date    Benign essential HTN     CKD (chronic kidney disease), stage III 9/29/2020    Secondary to suspected ischemic nephrosclerosis baseline creatinine 1.5-1.9       Past Surgical History:   Procedure Laterality Date    TONSILLECTOMY         Family History   Problem Relation Age of Onset    Hypertension Mother     Hypertension Father         Social History:     Social History     Socioeconomic History    Marital status:      Spouse name: Not on file    Number of children: Not on file    Years of education: Not on file    Highest education level: Not on file   Occupational History    Not on file   Social Needs    Financial resource strain: Not hard at all   Yuliana-Cely insecurity     Worry: Never true     Inability: Never true   [employer] needs     Medical: No     Non-medical: No   Tobacco Use    Smoking status: Light Tobacco Smoker     Packs/day: 1.00     Types: Cigarettes    Smokeless tobacco: Former User   Substance and Sexual Activity    Alcohol use: Yes     Comment: HALF PINT A WEEK    Drug use: No    Sexual activity: Not on file   Lifestyle    Physical activity     Days per week: Not on file     Minutes per session: Not on file    Stress: Not on file   Relationships    Social connections     Talks on phone: Not on file     Gets together: Not on file     Attends Restorationist service: Not on file     Active member of club or organization: Not on file     Attends meetings of clubs or organizations: Not on file     Relationship status: Not on file    Intimate partner violence     Fear of current or ex partner: Not on file     Emotionally abused: Not on file     Physically abused: Not on file     Forced sexual activity: Not on file   Other Topics Concern    Not on file   Social History Narrative    Not on file        ROS:     Constitutional: No fevers, chills, fatigue. ENT: No nasal congestion or sore throat  Respiratory: No difficulty in breathing or cough.    Cardiovascular: No chest pain, palpitations or shortness of breath  Gastrointestinal: No abdominal pain or change in bowel movements. Genitourinary: No change in urinary frequency or dysuria. Skin: No rashes or skin lesions. Neurological: No weakness. No headaches. Last Filed Vitals:  BP (!) 160/100   Pulse 67   Temp 97.9 °F (36.6 °C) (Temporal)   Wt 232 lb 6.4 oz (105.4 kg)   SpO2 97%   BMI 33.35 kg/m²      Physical Examination:     GENERAL APPEARANCE: in no acute distress, well developed, well nourished. HEAD: normocephalic, atraumatic. EYES: extraocular movement intact (EOMI), pupils equal, round, reactive to light and accommodation. EARS: normal, tympanic membrane intact, clear, auditory canal clear. NOSE: nares patent, no erythema, sinuses nontender bilaterally, no rhinorrhea. ORAL CAVITY: mucosa moist, no lesions. THROAT: clear, no mass, no exudate. NECK/THYROID: neck supple, full range of motion, no thyromegaly. HEART: no murmurs, regular rate and rhythm, S1, S2 normal.   LUNGS: clear to auscultation bilaterally, no wheezes, rales, rhonchi. ABDOMEN: normal, bowel sounds present, soft, nontender, nondistended, no rebound guarding or rigidity    Recent Labs/ In Office Testing/ Radiograph review:     Hospital Outpatient Visit on 03/03/2021   Component Date Value Ref Range Status    Platelets 86/52/0989 153  138 - 453 k/uL Final    POC Hemoglobin 03/03/2021 13.9  13.5 - 17.5 g/dL Final    POC Hematocrit 03/03/2021 41  41 - 53 % Final    POC Creatinine 03/03/2021 2.19* 0.51 - 1.19 mg/dL Final    GFR Comment 03/03/2021 38* >60 mL/min Final    GFR Non- 03/03/2021 31* >60 mL/min Final    GFR Comment 03/03/2021        Final    Comment: Average GFR for 52-63 years old:   80 mL/min/1.73sq m  Chronic Kidney Disease:   <60 mL/min/1.73sq m  Kidney failure:   <15 mL/min/1.73sq m              eGFR calculated using average adult body mass.  Additional eGFR calculator available at: [employer]            POC Sodium 03/03/2021 141  138 - 146 mmol/L Final    POC Potassium 03/03/2021 4.1  3.5 - 4.5 mmol/L Final    POC Chloride 03/03/2021 107  98 - 107 mmol/L Final    POC Glucose 03/03/2021 101* 74 - 100 mg/dL Final    Glucose 03/03/2021 90  70 - 99 mg/dL Final    BUN 03/03/2021 26* 6 - 20 mg/dL Final    CREATININE 03/03/2021 1.97* 0.70 - 1.20 mg/dL Final    Bun/Cre Ratio 03/03/2021 NOT REPORTED  9 - 20 Final    Calcium 03/03/2021 8.8  8.6 - 10.4 mg/dL Final    Sodium 03/03/2021 137  135 - 144 mmol/L Final    Potassium 03/03/2021 4.0  3.7 - 5.3 mmol/L Final    Chloride 03/03/2021 102  98 - 107 mmol/L Final    CO2 03/03/2021 25  20 - 31 mmol/L Final    Anion Gap 03/03/2021 10  9 - 17 mmol/L Final    GFR Non- 03/03/2021 35* >60 mL/min Final    GFR  03/03/2021 43* >60 mL/min Final    GFR Comment 03/03/2021        Final    Comment: Average GFR for 52-63 years old:   80 mL/min/1.73sq m  Chronic Kidney Disease:   <60 mL/min/1.73sq m  Kidney failure:   <15 mL/min/1.73sq m              eGFR calculated using average adult body mass. Additional eGFR calculator available at:        [employer]            GFR Staging 03/03/2021 NOT REPORTED   Final       No results found for this visit on 03/16/21. Assessment/Plan:     Sammy Richmond was seen today for hypertension. Diagnoses and all orders for this visit:    HTN, goal below 130/80  -     Blood Pressure Monitoring (ADULT BLOOD PRESSURE CUFF LG) KIT; Check blood pressure daily  -     doxazosin (CARDURA) 2 MG tablet; Take 1 tablet by mouth daily  -     ALT; Future  -     AST; Future  -     Magnesium; Future    Dyslipidemia  -     Lipid Panel; Future  -     ALT; Future  -     AST;  Future    Hyperglycemia  -     Hemoglobin A1C; Future    BMI 33.0-33.9,adult    Dietary counseling  -      BMI ABOVE NORMAL F/U    Personal history of

## 2021-04-05 ENCOUNTER — HOSPITAL ENCOUNTER (OUTPATIENT)
Dept: PREADMISSION TESTING | Age: 56
Discharge: HOME | End: 2021-04-09
Payer: COMMERCIAL

## 2021-04-05 VITALS — WEIGHT: 220 LBS | BODY MASS INDEX: 32.58 KG/M2 | HEIGHT: 69 IN

## 2021-04-05 NOTE — PROGRESS NOTES
Pre-op Instructions For Out-Patient Endoscopy Surgery    Medication Instructions:  · Please stop herbs and any supplements now (includes vitamins and minerals). · Please contact your surgeon and prescribing physician for pre-op instructions for any blood thinners. · If you have inhalers/aerosol treatments at home, please use them the morning of your surgery and bring the inhalers with you to the hospital.    · Please take the following medications the morning of your surgery with a sip of water:  Cardura, Hydralazine, Coreg, Amlodipine. Surgery Instructions:  1. After midnight before surgery:  Do not eat or drink anything, including water, mints, gum, and hard candy. You may brush your teeth without swallowing. No smoking, chewing tobacco, or street drugs. 2. Please shower or bathe before surgery. 3. Please do not wear any cologne, lotion, powder, jewelry, piercings, perfume, makeup, nail polish, hair accessories, or hair spray on the day of surgery. Wear loose comfortable clothing. 4. Leave your valuables at home. Bring a storage case for any glasses/contacts. 5. An adult who is responsible for you MUST drive you home and should be with you for the first 24 hours after surgery. The Day of Surgery:  · Arrive at [facility] AT [facility] Surgery Entrance at the time directed by your surgeon and check in at the desk. · If you have a living will or healthcare power of , please bring a copy. · You will be taken to the pre-op holding area where you will be prepared for surgery. A physical assessment will be performed by a nurse practitioner or house officer. Your IV will be started and you will meet your anesthesiologist.    · We are currently limiting visitors to only one designated person in the pre-op holding area. When you go to surgery, your family will be directed to the surgical waiting room, where the doctor should speak with them after your surgery.     · After surgery, you will be taken to the recovery room then when you are awake and stable you will go to the short stay unit for preparation to be discharged. Only your one designated person is allowed to come to short stay for your discharge. Instructions read to Anna Marie Romano and verbalizes understanding.

## 2021-04-07 ENCOUNTER — IMMUNIZATION (OUTPATIENT)
Dept: FAMILY MEDICINE CLINIC | Age: 56
End: 2021-04-07
Payer: COMMERCIAL

## 2021-04-07 PROCEDURE — 91300 COVID-19, PFIZER VACCINE 30MCG/0.3ML DOSE: CPT | Performed by: INTERNAL MEDICINE

## 2021-04-07 PROCEDURE — 0001A COVID-19, PFIZER VACCINE 30MCG/0.3ML DOSE: CPT | Performed by: INTERNAL MEDICINE

## 2021-04-10 ENCOUNTER — HOSPITAL ENCOUNTER (OUTPATIENT)
Dept: LAB | Age: 56
Setting detail: SPECIMEN
Discharge: HOME | End: 2021-04-10
Payer: COMMERCIAL

## 2021-04-10 DIAGNOSIS — Z01.818 PREOP TESTING: Primary | ICD-10-CM

## 2021-04-10 PROCEDURE — U0005 INFEC AGEN DETEC AMPLI PROBE: HCPCS

## 2021-04-10 PROCEDURE — U0003 INFECTIOUS AGENT DETECTION BY NUCLEIC ACID (DNA OR RNA); SEVERE ACUTE RESPIRATORY SYNDROME CORONAVIRUS 2 (SARS-COV-2) (CORONAVIRUS DISEASE [COVID-19]), AMPLIFIED PROBE TECHNIQUE, MAKING USE OF HIGH THROUGHPUT TECHNOLOGIES AS DESCRIBED BY CMS-2020-01-R: HCPCS

## 2021-04-11 LAB
SARS-COV-2: NORMAL
SARS-COV-2: NOT DETECTED
SOURCE: NORMAL

## 2021-04-12 ENCOUNTER — TELEPHONE (OUTPATIENT)
Dept: PRIMARY CARE CLINIC | Age: 56
End: 2021-04-12

## 2021-04-13 ENCOUNTER — TELEPHONE (OUTPATIENT)
Dept: GASTROENTEROLOGY | Age: 56
End: 2021-04-13

## 2021-04-13 ENCOUNTER — ANESTHESIA EVENT (OUTPATIENT)
Dept: ENDOSCOPY | Age: 56
End: 2021-04-13
Payer: COMMERCIAL

## 2021-04-13 NOTE — TELEPHONE ENCOUNTER
Writer spoke to pt's wife, Jocy Mejia, over the phone informing her we need change arrival & proc time on 4/14/21. Jocy Mejia is asked if they are able to arrive at 8:15am and proc will be at 10:15am.  Jocy Mejia states they are able to arrive at 8:15am.  Writer also reminded her of pt's CLD today. Renata states she has reminded her  of CLD.

## 2021-04-14 ENCOUNTER — ANESTHESIA (OUTPATIENT)
Dept: ENDOSCOPY | Age: 56
End: 2021-04-14
Payer: COMMERCIAL

## 2021-04-14 ENCOUNTER — HOSPITAL ENCOUNTER (OUTPATIENT)
Age: 56
Setting detail: OUTPATIENT SURGERY
Discharge: HOME | End: 2021-04-14
Attending: INTERNAL MEDICINE | Admitting: INTERNAL MEDICINE
Payer: COMMERCIAL

## 2021-04-14 VITALS — OXYGEN SATURATION: 97 % | DIASTOLIC BLOOD PRESSURE: 56 MMHG | SYSTOLIC BLOOD PRESSURE: 94 MMHG

## 2021-04-14 VITALS
OXYGEN SATURATION: 95 % | BODY MASS INDEX: 32.58 KG/M2 | RESPIRATION RATE: 14 BRPM | WEIGHT: 220 LBS | DIASTOLIC BLOOD PRESSURE: 74 MMHG | TEMPERATURE: 95.5 F | HEART RATE: 56 BPM | HEIGHT: 69 IN | SYSTOLIC BLOOD PRESSURE: 124 MMHG

## 2021-04-14 PROCEDURE — 3609010300 HC COLONOSCOPY W/BIOPSY SINGLE/MULTIPLE: Performed by: INTERNAL MEDICINE

## 2021-04-14 PROCEDURE — 7100000001 HC PACU RECOVERY - ADDTL 15 MIN: Performed by: INTERNAL MEDICINE

## 2021-04-14 PROCEDURE — 7100000031 HC ASPR PHASE II RECOVERY - ADDTL 15 MIN: Performed by: INTERNAL MEDICINE

## 2021-04-14 PROCEDURE — 6360000002 HC RX W HCPCS: Performed by: NURSE ANESTHETIST, CERTIFIED REGISTERED

## 2021-04-14 PROCEDURE — 88341 IMHCHEM/IMCYTCHM EA ADD ANTB: CPT

## 2021-04-14 PROCEDURE — 3700000000 HC ANESTHESIA ATTENDED CARE: Performed by: INTERNAL MEDICINE

## 2021-04-14 PROCEDURE — 2500000003 HC RX 250 WO HCPCS: Performed by: NURSE ANESTHETIST, CERTIFIED REGISTERED

## 2021-04-14 PROCEDURE — 88342 IMHCHEM/IMCYTCHM 1ST ANTB: CPT

## 2021-04-14 PROCEDURE — 43450 DILATE ESOPHAGUS 1/MULT PASS: CPT | Performed by: INTERNAL MEDICINE

## 2021-04-14 PROCEDURE — 45380 COLONOSCOPY AND BIOPSY: CPT | Performed by: INTERNAL MEDICINE

## 2021-04-14 PROCEDURE — 2580000003 HC RX 258: Performed by: ANESTHESIOLOGY

## 2021-04-14 PROCEDURE — 3700000001 HC ADD 15 MINUTES (ANESTHESIA): Performed by: INTERNAL MEDICINE

## 2021-04-14 PROCEDURE — 7100000030 HC ASPR PHASE II RECOVERY - FIRST 15 MIN: Performed by: INTERNAL MEDICINE

## 2021-04-14 PROCEDURE — 88305 TISSUE EXAM BY PATHOLOGIST: CPT

## 2021-04-14 PROCEDURE — 7100000000 HC PACU RECOVERY - FIRST 15 MIN: Performed by: INTERNAL MEDICINE

## 2021-04-14 PROCEDURE — 6360000002 HC RX W HCPCS: Performed by: ANESTHESIOLOGY

## 2021-04-14 PROCEDURE — 3609012400 HC EGD TRANSORAL BIOPSY SINGLE/MULTIPLE: Performed by: INTERNAL MEDICINE

## 2021-04-14 PROCEDURE — 2709999900 HC NON-CHARGEABLE SUPPLY: Performed by: INTERNAL MEDICINE

## 2021-04-14 PROCEDURE — 43239 EGD BIOPSY SINGLE/MULTIPLE: CPT | Performed by: INTERNAL MEDICINE

## 2021-04-14 RX ORDER — MORPHINE SULFATE 2 MG/ML
2 INJECTION, SOLUTION INTRAMUSCULAR; INTRAVENOUS EVERY 5 MIN PRN
Status: DISCONTINUED | OUTPATIENT
Start: 2021-04-14 | End: 2021-04-14 | Stop reason: HOSPADM

## 2021-04-14 RX ORDER — SODIUM CHLORIDE 9 MG/ML
25 INJECTION, SOLUTION INTRAVENOUS PRN
Status: DISCONTINUED | OUTPATIENT
Start: 2021-04-14 | End: 2021-04-14 | Stop reason: HOSPADM

## 2021-04-14 RX ORDER — LABETALOL HYDROCHLORIDE 5 MG/ML
5 INJECTION, SOLUTION INTRAVENOUS EVERY 10 MIN PRN
Status: DISCONTINUED | OUTPATIENT
Start: 2021-04-14 | End: 2021-04-14 | Stop reason: HOSPADM

## 2021-04-14 RX ORDER — LIDOCAINE HYDROCHLORIDE 10 MG/ML
INJECTION, SOLUTION EPIDURAL; INFILTRATION; INTRACAUDAL; PERINEURAL PRN
Status: DISCONTINUED | OUTPATIENT
Start: 2021-04-14 | End: 2021-04-14 | Stop reason: SDUPTHER

## 2021-04-14 RX ORDER — SODIUM CHLORIDE 0.9 % (FLUSH) 0.9 %
10 SYRINGE (ML) INJECTION PRN
Status: DISCONTINUED | OUTPATIENT
Start: 2021-04-14 | End: 2021-04-14 | Stop reason: HOSPADM

## 2021-04-14 RX ORDER — PROPOFOL 10 MG/ML
INJECTION, EMULSION INTRAVENOUS CONTINUOUS PRN
Status: DISCONTINUED | OUTPATIENT
Start: 2021-04-14 | End: 2021-04-14 | Stop reason: SDUPTHER

## 2021-04-14 RX ORDER — PROPOFOL 10 MG/ML
INJECTION, EMULSION INTRAVENOUS PRN
Status: DISCONTINUED | OUTPATIENT
Start: 2021-04-14 | End: 2021-04-14 | Stop reason: SDUPTHER

## 2021-04-14 RX ORDER — LIDOCAINE HYDROCHLORIDE 10 MG/ML
1 INJECTION, SOLUTION EPIDURAL; INFILTRATION; INTRACAUDAL; PERINEURAL
Status: DISCONTINUED | OUTPATIENT
Start: 2021-04-14 | End: 2021-04-14 | Stop reason: HOSPADM

## 2021-04-14 RX ORDER — DIPHENHYDRAMINE HYDROCHLORIDE 50 MG/ML
12.5 INJECTION INTRAMUSCULAR; INTRAVENOUS
Status: DISCONTINUED | OUTPATIENT
Start: 2021-04-14 | End: 2021-04-14 | Stop reason: HOSPADM

## 2021-04-14 RX ORDER — SODIUM CHLORIDE, SODIUM LACTATE, POTASSIUM CHLORIDE, CALCIUM CHLORIDE 600; 310; 30; 20 MG/100ML; MG/100ML; MG/100ML; MG/100ML
INJECTION, SOLUTION INTRAVENOUS CONTINUOUS
Status: DISCONTINUED | OUTPATIENT
Start: 2021-04-14 | End: 2021-04-14 | Stop reason: HOSPADM

## 2021-04-14 RX ORDER — GLYCOPYRROLATE 1 MG/5 ML
SYRINGE (ML) INTRAVENOUS PRN
Status: DISCONTINUED | OUTPATIENT
Start: 2021-04-14 | End: 2021-04-14 | Stop reason: SDUPTHER

## 2021-04-14 RX ORDER — SODIUM CHLORIDE 0.9 % (FLUSH) 0.9 %
10 SYRINGE (ML) INJECTION EVERY 12 HOURS SCHEDULED
Status: DISCONTINUED | OUTPATIENT
Start: 2021-04-14 | End: 2021-04-14 | Stop reason: HOSPADM

## 2021-04-14 RX ORDER — ONDANSETRON 2 MG/ML
4 INJECTION INTRAMUSCULAR; INTRAVENOUS
Status: COMPLETED | OUTPATIENT
Start: 2021-04-14 | End: 2021-04-14

## 2021-04-14 RX ORDER — MEPERIDINE HYDROCHLORIDE 25 MG/ML
12.5 INJECTION INTRAMUSCULAR; INTRAVENOUS; SUBCUTANEOUS EVERY 5 MIN PRN
Status: DISCONTINUED | OUTPATIENT
Start: 2021-04-14 | End: 2021-04-14 | Stop reason: HOSPADM

## 2021-04-14 RX ADMIN — PROPOFOL 100 MG: 10 INJECTION, EMULSION INTRAVENOUS at 10:32

## 2021-04-14 RX ADMIN — Medication 0.2 MG: at 11:02

## 2021-04-14 RX ADMIN — LIDOCAINE HYDROCHLORIDE 50 MG: 10 INJECTION, SOLUTION EPIDURAL; INFILTRATION; INTRACAUDAL; PERINEURAL at 10:32

## 2021-04-14 RX ADMIN — SODIUM CHLORIDE, POTASSIUM CHLORIDE, SODIUM LACTATE AND CALCIUM CHLORIDE: 600; 310; 30; 20 INJECTION, SOLUTION INTRAVENOUS at 08:59

## 2021-04-14 RX ADMIN — ONDANSETRON 4 MG: 2 INJECTION INTRAMUSCULAR; INTRAVENOUS at 12:20

## 2021-04-14 RX ADMIN — PROPOFOL 50 MG: 10 INJECTION, EMULSION INTRAVENOUS at 10:34

## 2021-04-14 RX ADMIN — PROPOFOL 150 MCG/KG/MIN: 10 INJECTION, EMULSION INTRAVENOUS at 10:32

## 2021-04-14 ASSESSMENT — PULMONARY FUNCTION TESTS
PIF_VALUE: 0
PIF_VALUE: 1
PIF_VALUE: 0

## 2021-04-14 ASSESSMENT — PAIN SCALES - GENERAL
PAINLEVEL_OUTOF10: 0

## 2021-04-14 ASSESSMENT — ENCOUNTER SYMPTOMS
SHORTNESS OF BREATH: 0
STRIDOR: 0

## 2021-04-14 ASSESSMENT — LIFESTYLE VARIABLES: SMOKING_STATUS: 0

## 2021-04-14 ASSESSMENT — PAIN - FUNCTIONAL ASSESSMENT: PAIN_FUNCTIONAL_ASSESSMENT: 0-10

## 2021-04-14 NOTE — H&P
HISTORY and Sina Gut[id number]       NAME:  Ros Vizcarra  MRN: [id number]   YOB: 1965   Date: 4/14/2021   Age: 60 y.o. Gender: male       COMPLAINT AND PRESENT HISTORY:     Ros Vizcarra is 60 y.o.,  male, having a screening colonoscopy and EGD. Pt denies having previous EGD or colonoscopy. Reports he has had intermittent diarrhea that initially started about two weeks ago. Has not taken any medications for relief. Reports he believes it is diet related but not able to specify a particular food making symptoms worse. Denies abdominal pain, heartburn, dysphagia, nausea, vomiting, hematochezia or melena. Denies Family Hx of colon or esophageal cancer. Completed prep. NPO. Took BP medications this am but unsure which ones. Denies taking any blood thinning medications. Denies chest pain/pressure, palpitations, SOB, recent URI, fever or chills. Denies complications with anesthesia with past procedures. PAST MEDICAL HISTORY     Past Medical History:   Diagnosis Date    Benign essential HTN     CKD (chronic kidney disease), stage III 9/29/2020    Secondary to suspected ischemic nephrosclerosis baseline creatinine 1.5-1.9    Enlarged heart        SURGICAL HISTORY       Past Surgical History:   Procedure Laterality Date    CARDIAC CATHETERIZATION Right 03/2021    no blockage, no stenting.     TONSILLECTOMY         FAMILY HISTORY       Family History   Problem Relation Age of Onset    Hypertension Mother     Hypertension Father        SOCIAL HISTORY       Social History     Socioeconomic History    Marital status:      Spouse name: Not on file    Number of children: Not on file    Years of education: Not on file    Highest education level: Not on file   Occupational History    Not on file   Social Needs    Financial resource strain: Not hard at all    Food insecurity     Worry: Never true     Inability: Never true   [employer] needs     Medical: No Non-medical: No   Tobacco Use    Smoking status: Former Smoker     Packs/day: 1.00     Types: Cigarettes    Smokeless tobacco: Former User    Tobacco comment: quit feb,2021   Substance and Sexual Activity    Alcohol use: Yes     Comment: socially    Drug use: No    Sexual activity: Not on file   Lifestyle    Physical activity     Days per week: Not on file     Minutes per session: Not on file    Stress: Not on file   Relationships    Social connections     Talks on phone: Not on file     Gets together: Not on file     Attends Mu-ism service: Not on file     Active member of club or organization: Not on file     Attends meetings of clubs or organizations: Not on file     Relationship status: Not on file    Intimate partner violence     Fear of current or ex partner: Not on file     Emotionally abused: Not on file     Physically abused: Not on file     Forced sexual activity: Not on file   Other Topics Concern    Not on file   Social History Narrative    Not on file        REVIEW OF SYSTEMS      No Known Allergies    No current facility-administered medications on file prior to encounter. Current Outpatient Medications on File Prior to Encounter   Medication Sig Dispense Refill    carvedilol (COREG) 25 MG tablet Take 0.5 tablets by mouth 2 times daily 60 tablet 3    hydrALAZINE (APRESOLINE) 50 MG tablet Take 1 tablet by mouth 3 times daily 90 tablet 3    spironolactone (ALDACTONE) 25 MG tablet Take 0.5 tablets by mouth daily 30 tablet 3    amLODIPine (NORVASC) 10 MG tablet Take 1 tablet by mouth daily 90 tablet 1    atorvastatin (LIPITOR) 20 MG tablet Take 1 tablet by mouth daily 90 tablet 1    VITAMIN D, CHOLECALCIFEROL, PO Take by mouth daily      aspirin 81 MG EC tablet Take 81 mg by mouth daily     Notation: Above medications are not currently reconciled at time of signing this H&P note, to be reconciled in pre-op per RN. Negative except for what is mentioned in the HPI.      GENERAL PHYSICAL EXAM     Vitals: Review vitals per RN flowsheet. GENERAL APPEARANCE:   Joseluis Tian is 60 y.o. male, nourished, conscious, alert. Does not appear to be in distress or pain at this time. SKIN:  Warm, dry, no cyanosis or jaundice. HEAD:  Normocephalic, atraumatic. EYES:  Pupils equal, reactive to light. EARS:  No discharge, no marked hearing loss. NOSE:  No rhinorrhea, epistaxis or septal deformity. THROAT:  Not congested. No ulceration bleeding or discharge. NECK:  No stiffness, trachea central.  No palpable masses or L.N.                 CHEST:  Symmetrical and equal on expansion. HEART:  RRR S1 > S2. No audible murmurs or gallops. LUNGS:  Equal on expansion, normal breath sounds. No wheezing, rhonchi or rales. ABDOMEN:  Soft on palpation. No localized tenderness. No guarding or rigidity. LOCOMOTOR, BACK AND SPINE:  No tenderness or deformities. EXTREMITIES:  Symmetrical, no pretibial edema. No calf tenderness. No discoloration or ulcerations. NEUROLOGIC:  The patient is conscious, alert, oriented. No facial drooping. Speech clear. No apparent focal sensory or motor deficits.              PROVISIONAL DIAGNOSES / SURGERY:      COLON CA SCREENING, DIARRHEA, ESOPHAGEAL DYSPHASIA    EGD / COLORECTAL CANCER SCREENING, NOT HIGH RISK    Patient Active Problem List    Diagnosis Date Noted    CKD (chronic kidney disease), stage III 09/29/2020    Renal insufficiency 03/24/2017    HTN (hypertension), malignant 03/23/2017    Tobacco abuse 03/23/2017           CHUCHO Anderson - CNP on 4/14/2021 at 8:34 AM

## 2021-04-14 NOTE — OP NOTE
met .        The prep was fair. Findings:  Terminal ileum: normal    Cecum/Ascending colon: normal    Transverse colon: normal    Descending/Sigmoid colon: abnormal:   4 mm polyp in the sigmoid removed with cold biopsy    Rectum/Anus: examined in normal and retroflexed positions and was abnormal: Hemorrhoids      Random colon biopsies taken because of the diarrhea      Withdrawal Time was (minutes): 10    The colon was decompressed and the scope was removed. The patient tolerated the procedure well. Recommendations/Plan:   1. Lifestyle and dietary modifications as discussed  2. F/U Biopsies  3. F/U In OfficeYes  4. Discussed with the family  5.  Repeat colonoscopy [id number]    Electronically signed by Hans Daniels MD  on 4/14/2021 at 11:32 AM

## 2021-04-14 NOTE — OP NOTE
Operative Note    PROCEDURE NOTE    DATE OF PROCEDURE: 4/14/2021     SURGEON: Suad Alcala MD  Facility: [facility]  ASSISTANT: None  Anesthesia: MAC  PREOPERATIVE DIAGNOSIS:   Dysphagia    Diagnosis:  Patient had irregular Z-line with a start of Little's esophagus I went ahead and took biopsies  I do not see loni stricturing or ring but because of the symptoms I went ahead and passed [facility]   Started with the [id number] [facility], the patient tolerated the procedure very well    Significant gastritis with numerous number of gastric polyps with balloon erosion on the polyps indicating that this most likely inflammatory type polyps we biopsied them      Gastritis biopsies were taken      Normal duodenum      Of note on the upper esophageal sphincter the patient seems to have a little bit of a spasm for which I elected to use Kiran's to dilate the entire esophagus including the upper esophageal sphincter        POSTOPERATIVE DIAGNOSIS: As described below    OPERATION: Upper GI endoscopy with Biopsy    ANESTHESIA: Moderate Sedation     ESTIMATED BLOOD LOSS: Less than 50 ml    COMPLICATIONS: None. SPECIMENS:  Was Obtained: as above     HISTORY: The patient is a 60y.o. year old male with history of above preop diagnosis. I recommended esophagogastroduodenoscopy with possible biopsy and I explained the risk, benefits, expected outcome, and alternatives to the procedure. Risks included but are not limited to bleeding, infection, respiratory distress, hypotension, and perforation of the esophagus, stomach, or duodenum. Patient understands and is in agreement. The patient was counseled at length about the risks of kimberly Covid-19 during their perioperative period and any recovery window from their procedure. The patient was made aware that kimberly Covid-19  may worsen their prognosis for recovering from their procedure  and lend to a higher morbidity and/or mortality risk.   All material risks, benefits, and reasonable alternatives including postponing the procedure were discussed. The patient does wish to proceed with the procedure at this time. PROCEDURE: The patient was given IV conscious sedation. The patient's SPO2 remained above 90% throughout the procedure. The gastroscope was inserted orally and advanced under direct vision through the esophagus, through the stomach, through the pylorus, and into the descending duodenum. Post sedation note : The patient's SPO2 remained above 90% throughout the procedure. the vital signs remained stable , and no immediate complication form the procedure noted, patient will be ready for d/c when criteria is met . Findings:    Retropharyngeal area was grossly normal appearing    Patient had irregular Z-line with a start of Little's esophagus I went ahead and took biopsies  I do not see loni stricturing or ring but because of the symptoms I went ahead and passed [facility]   Started with the 92-84-75 [facility], the patient tolerated the procedure very well    Significant gastritis with numerous number of gastric polyps with balloon erosion on the polyps indicating that this most likely inflammatory type polyps we biopsied them      Gastritis biopsies were taken      Normal duodenum      Of note on the upper esophageal sphincter the patient seems to have a little bit of a spasm for which I elected to use Kiran's to dilate the entire esophagus including the upper esophageal sphincter    The scope was removed and the patient tolerated the procedure well. Recommendations/Plan:   1. F/U Biopsies  2. F/U In Office in 3-4 weeks  3.  Discussed with the family    Electronically signed by Sandeep Vizcarra MD  on 4/14/2021 at 10:48 AM

## 2021-04-14 NOTE — ANESTHESIA POSTPROCEDURE EVALUATION
POST- ANESTHESIA EVALUATION       Pt Name: Robert Rodriguez  MRN: [id number]  YOB: 1965  Date of evaluation: 4/14/2021  Time:  12:35 PM      /88   Pulse (!) 46   Temp 95.5 °F (35.3 °C)   Resp 14   Ht 5' 9\" (1.753 m)   Wt 220 lb (99.8 kg)   SpO2 95%   BMI 32.49 kg/m²      Consciousness Level  Awake  Cardiopulmonary Status  Stable  Pain Adequately Treated YES  Nausea / Vomiting  NO  Adequate Hydration  YES  Anesthesia Related Complications NONE      Electronically signed by Whitney Joshi MD on 4/14/2021 at 12:35 PM       Department of Anesthesiology  Postprocedure Note    Patient: Robert Rodriguez  MRN: [id number]  YOB: 1965  Date of evaluation: 4/14/2021  Time:  12:35 PM     Procedure Summary     Date: 04/14/21 Room / Location: [address] [place] [address] 04 / [address] [place] [address]    Anesthesia Start: 1657 Anesthesia Stop: 1138    Procedures:       EGD BIOPSY, ESOPHAGEAL DIALATION (N/A Esophagus)      COLONOSCOPY WITH BIOPSY (N/A ) Diagnosis: (COLON CA SCREENING, DIARRHEA, ESOPHAGEAL DYSPHASIA)    Surgeons: Moy Wen MD Responsible Provider: Whitney Joshi MD    Anesthesia Type: MAC, general ASA Status: 3          Anesthesia Type: MAC, general    Eduardo Phase I: Eduardo Score: 10    Eduardo Phase II:      Last vitals: Reviewed and per EMR flowsheets.        Anesthesia Post Evaluation

## 2021-04-14 NOTE — ANESTHESIA PRE PROCEDURE
Department of Anesthesiology  Preprocedure Note       Name:  Alejandra Lima   Age:  60 y.o.  :  1965                                          MRN:  [id number]         Date:  2021      Surgeon: Max Madrid):  Agapito Hanson MD    Procedure: Procedure(s):  EGD  COLORECTAL CANCER SCREENING, NOT HIGH RISK    Medications prior to admission:   Prior to Admission medications    Medication Sig Start Date End Date Taking?  Authorizing Provider   doxazosin (CARDURA) 2 MG tablet Take 1 tablet by mouth daily 3/16/21  Yes Ethan Soto DO   carvedilol (COREG) 25 MG tablet Take 0.5 tablets by mouth 2 times daily 3/3/21  Yes Celso Grubbs MD   hydrALAZINE (APRESOLINE) 50 MG tablet Take 1 tablet by mouth 3 times daily 3/3/21  Yes Celso Grubbs MD   spironolactone (ALDACTONE) 25 MG tablet Take 0.5 tablets by mouth daily 21  Yes Purnima Meredith MD   amLODIPine (NORVASC) 10 MG tablet Take 1 tablet by mouth daily 12/15/20  Yes Ethan Soto DO   atorvastatin (LIPITOR) 20 MG tablet Take 1 tablet by mouth daily 12/15/20  Yes Ethan Soto DO   VITAMIN D, CHOLECALCIFEROL, PO Take by mouth daily   Yes Historical Provider, MD   Blood Pressure Monitoring (ADULT BLOOD PRESSURE CUFF LG) KIT Check blood pressure daily 3/16/21   Ethan Soto DO   aspirin 81 MG EC tablet Take 81 mg by mouth daily    Historical Provider, MD       Current medications:    Current Facility-Administered Medications   Medication Dose Route Frequency Provider Last Rate Last Admin    lactated ringers infusion   Intravenous Continuous Celso Bhatt MD        sodium chloride flush 0.9 % injection 10 mL  10 mL Intravenous 2 times per day Celso Bhatt MD        sodium chloride flush 0.9 % injection 10 mL  10 mL Intravenous PRN Celso Bhatt MD        0.9 % sodium chloride infusion  25 mL Intravenous PRN Celso Bhatt MD        lidocaine PF 1 % injection 1 mL  1 mL Intradermal Once PRN Celso Bhatt MD           Allergies:  No Known Allergies Problem List:    Patient Active Problem List   Diagnosis Code    HTN (hypertension), malignant I10    Tobacco abuse Z72.0    Renal insufficiency N28.9    CKD (chronic kidney disease), stage III N18.30       Past Medical History:        Diagnosis Date    Benign essential HTN     CKD (chronic kidney disease), stage III 9/29/2020    Secondary to suspected ischemic nephrosclerosis baseline creatinine 1.51.9    Enlarged heart        Past Surgical History:        Procedure Laterality Date    CARDIAC CATHETERIZATION Right 03/2021    no blockage, no stenting.  TONSILLECTOMY         Social History:    Social History     Tobacco Use    Smoking status: Current Some Day Smoker     Packs/day: 1.00     Types: Cigarettes    Smokeless tobacco: Former User    Tobacco comment: quit feb,2021   Substance Use Topics    Alcohol use: Yes     Comment: socially                                Ready to quit: Not Answered  Counseling given: Not Answered  Comment: quit feb,2021      Vital Signs (Current):   Vitals:    04/14/21 0845   BP: 139/80   Pulse: 57   Resp: 16   Temp: 97.5 °F (36.4 °C)   TempSrc: Infrared   SpO2: 96%   Weight: 220 lb (99.8 kg)   Height: 5' 9\" (1.753 m)                                              BP Readings from Last 3 Encounters:   04/14/21 139/80   03/16/21 (!) 160/100   03/03/21 (!) 155/93       NPO Status: Time of last liquid consumption: 2359                        Time of last solid consumption: 2359                        Date of last liquid consumption: 04/13/21                        Date of last solid food consumption: 04/12/21    BMI:   Wt Readings from Last 3 Encounters:   04/14/21 220 lb (99.8 kg)   04/05/21 220 lb (99.8 kg)   03/16/21 232 lb 6.4 oz (105.4 kg)     Body mass index is 32.49 kg/m².     CBC:   Lab Results   Component Value Date    WBC 4.7 12/17/2020    RBC 4.54 12/17/2020    HGB 14.1 12/17/2020    HCT 41.2 12/17/2020    MCV 90.7 12/17/2020    RDW 11.7 12/17/2020     03/03/2021       CMP:   Lab Results   Component Value Date     03/03/2021    K 4.0 03/03/2021     03/03/2021    CO2 25 03/03/2021    BUN 26 03/03/2021    CREATININE 1.97 03/03/2021    GFRAA 43 03/03/2021    LABGLOM 35 03/03/2021    GLUCOSE 90 03/03/2021    PROT 6.7 12/15/2020    CALCIUM 8.8 03/03/2021    AST 21 12/17/2020    ALT 17 12/17/2020       POC Tests: No results for input(s): POCGLU, POCNA, POCK, POCCL, POCBUN, POCHEMO, POCHCT in the last 72 hours. Coags: No results found for: PROTIME, INR, APTT    HCG (If Applicable): No results found for: PREGTESTUR, PREGSERUM, HCG, HCGQUANT     ABGs: No results found for: PHART, PO2ART, HGG9SAI, VZU6TOF, BEART, Y7OVFMOX     Type & Screen (If Applicable):  No results found for: LABABO, LABRH    Drug/Infectious Status (If Applicable):  Lab Results   Component Value Date    HEPCAB NONREACTIVE 06/23/2020       COVID-19 Screening (If Applicable):   Lab Results   Component Value Date    COVID19 Not Detected 04/10/2021           Anesthesia Evaluation  Patient summary reviewed and Nursing notes reviewed no history of anesthetic complications:   Airway: Mallampati: III  TM distance: >3 FB   Neck ROM: full  Mouth opening: > = 3 FB Dental:          Pulmonary:Negative Pulmonary ROS and normal exam  breath sounds clear to auscultation      (-) pneumonia, COPD, asthma, shortness of breath, recent URI, sleep apnea, rhonchi, wheezes, rales, stridor and not a current smoker          Patient smoked on day of surgery.                  Cardiovascular:  Exercise tolerance: good (>4 METS),   (+) hypertension: no interval change,     (-) pacemaker, valvular problems/murmurs, past MI, CAD, CABG/stent, dysrhythmias,  angina,  CHF, orthopnea, PND,  FAJARDO, murmur, weak pulses,  friction rub, systolic click, carotid bruit,  JVD and peripheral edema      Rhythm: regular  Rate: normal           Beta Blocker:  Dose within 24 Hrs         Neuro/Psych:   Negative Neuro/Psych ROS     (-) seizures, neuromuscular disease, TIA, CVA, headaches, psychiatric history and depression/anxiety            GI/Hepatic/Renal: Neg GI/Hepatic/Renal ROS       (-) hiatal hernia, GERD, PUD, hepatitis, liver disease, no renal disease, bowel prep and no morbid obesity       Endo/Other: Negative Endo/Other ROS   (+) no malignancy/cancer. (-) diabetes mellitus, hypothyroidism, hyperthyroidism, blood dyscrasia, arthritis, no electrolyte abnormalities, no malignancy/cancer               Abdominal:           Vascular: negative vascular ROS. - PVD, DVT and PE. Anesthesia Plan      MAC and general     ASA 3       Induction: intravenous. MIPS: Postoperative opioids intended and Prophylactic antiemetics administered. Anesthetic plan and risks discussed with patient. Plan discussed with CRNA. The patient was counseled at length about the risks of kimberly Covid-19 during their perioperative period and any recovery window from their procedure. The patient was made aware that kimberly Covid-19  may worsen their prognosis for recovering from their procedure  and lend to a higher morbidity and/or mortality risk. All material risks, benefits, and reasonable alternatives including postponing the procedure were discussed. The patient DOES wish to proceed with the procedure at this time.           Jennifer Palacio MD   4/14/2021

## 2021-04-16 LAB — SURGICAL PATHOLOGY REPORT: NORMAL

## 2021-04-28 ENCOUNTER — IMMUNIZATION (OUTPATIENT)
Dept: FAMILY MEDICINE CLINIC | Age: 56
End: 2021-04-28
Payer: COMMERCIAL

## 2021-04-28 PROCEDURE — 91300 COVID-19, PFIZER VACCINE 30MCG/0.3ML DOSE: CPT | Performed by: INTERNAL MEDICINE

## 2021-04-28 PROCEDURE — 0002A COVID-19, PFIZER VACCINE 30MCG/0.3ML DOSE: CPT | Performed by: INTERNAL MEDICINE

## 2021-06-09 DIAGNOSIS — I10 HTN, GOAL BELOW 130/80: ICD-10-CM

## 2021-06-09 RX ORDER — DOXAZOSIN 2 MG/1
TABLET ORAL
Qty: 90 TABLET | Refills: 0 | Status: SHIPPED | OUTPATIENT
Start: 2021-06-09 | End: 2021-09-13

## 2021-06-09 NOTE — TELEPHONE ENCOUNTER
Mily Waddell is calling to request a refill on the following medication(s):    Medication Request:  Requested Prescriptions     Pending Prescriptions Disp Refills    doxazosin (CARDURA) 2 MG tablet [Pharmacy Med Name: DOXAZOSIN MESYLATE 2 MG TAB] 90 tablet 0     Sig: take 1 tablet by mouth once daily       Last Visit Date (If Applicable):  3/30/4116    Next Visit Date:    6/29/2021

## 2021-06-29 ENCOUNTER — OFFICE VISIT (OUTPATIENT)
Dept: FAMILY MEDICINE CLINIC | Age: 56
End: 2021-06-29
Payer: COMMERCIAL

## 2021-06-29 ENCOUNTER — OFFICE VISIT (OUTPATIENT)
Dept: GASTROENTEROLOGY | Age: 56
End: 2021-06-29
Payer: COMMERCIAL

## 2021-06-29 VITALS
WEIGHT: 228 LBS | TEMPERATURE: 97.3 F | HEART RATE: 58 BPM | DIASTOLIC BLOOD PRESSURE: 92 MMHG | BODY MASS INDEX: 33.19 KG/M2 | SYSTOLIC BLOOD PRESSURE: 163 MMHG

## 2021-06-29 VITALS
SYSTOLIC BLOOD PRESSURE: 131 MMHG | WEIGHT: 229 LBS | HEIGHT: 70 IN | TEMPERATURE: 97.3 F | BODY MASS INDEX: 32.78 KG/M2 | OXYGEN SATURATION: 96 % | HEART RATE: 58 BPM | DIASTOLIC BLOOD PRESSURE: 89 MMHG

## 2021-06-29 DIAGNOSIS — Z71.3 DIETARY COUNSELING: ICD-10-CM

## 2021-06-29 DIAGNOSIS — Z13.89 MULTIPHASIC SCREENING: ICD-10-CM

## 2021-06-29 DIAGNOSIS — R13.19 ESOPHAGEAL DYSPHAGIA: ICD-10-CM

## 2021-06-29 DIAGNOSIS — D36.9 ADENOMATOUS POLYPS: Primary | ICD-10-CM

## 2021-06-29 DIAGNOSIS — J45.20 MILD INTERMITTENT EXTRINSIC ASTHMA WITHOUT COMPLICATION: ICD-10-CM

## 2021-06-29 DIAGNOSIS — K29.60 LYMPHOCYTIC GASTRITIS: ICD-10-CM

## 2021-06-29 DIAGNOSIS — Z00.00 WELL ADULT EXAM: Primary | ICD-10-CM

## 2021-06-29 DIAGNOSIS — E78.5 DYSLIPIDEMIA: ICD-10-CM

## 2021-06-29 DIAGNOSIS — N18.32 STAGE 3B CHRONIC KIDNEY DISEASE (HCC): ICD-10-CM

## 2021-06-29 DIAGNOSIS — Z71.82 EXERCISE COUNSELING: ICD-10-CM

## 2021-06-29 DIAGNOSIS — I10 HTN, GOAL BELOW 130/80: ICD-10-CM

## 2021-06-29 DIAGNOSIS — R73.9 HYPERGLYCEMIA: ICD-10-CM

## 2021-06-29 PROCEDURE — 3017F COLORECTAL CA SCREEN DOC REV: CPT | Performed by: INTERNAL MEDICINE

## 2021-06-29 PROCEDURE — 99396 PREV VISIT EST AGE 40-64: CPT | Performed by: FAMILY MEDICINE

## 2021-06-29 PROCEDURE — G8427 DOCREV CUR MEDS BY ELIG CLIN: HCPCS | Performed by: INTERNAL MEDICINE

## 2021-06-29 PROCEDURE — 4004F PT TOBACCO SCREEN RCVD TLK: CPT | Performed by: INTERNAL MEDICINE

## 2021-06-29 PROCEDURE — G8417 CALC BMI ABV UP PARAM F/U: HCPCS | Performed by: FAMILY MEDICINE

## 2021-06-29 PROCEDURE — 99214 OFFICE O/P EST MOD 30 MIN: CPT | Performed by: INTERNAL MEDICINE

## 2021-06-29 PROCEDURE — G8417 CALC BMI ABV UP PARAM F/U: HCPCS | Performed by: INTERNAL MEDICINE

## 2021-06-29 RX ORDER — ALBUTEROL SULFATE 90 UG/1
2 AEROSOL, METERED RESPIRATORY (INHALATION) EVERY 6 HOURS PRN
Qty: 1 INHALER | Refills: 3 | Status: SHIPPED | OUTPATIENT
Start: 2021-06-29

## 2021-06-29 ASSESSMENT — ENCOUNTER SYMPTOMS
DIARRHEA: 0
VOMITING: 0
ABDOMINAL PAIN: 0
TROUBLE SWALLOWING: 0
WHEEZING: 0
CONSTIPATION: 0
ANAL BLEEDING: 0
ABDOMINAL DISTENTION: 0
NAUSEA: 0
CHOKING: 0
RECTAL PAIN: 0
COUGH: 1
BLOOD IN STOOL: 0

## 2021-06-29 ASSESSMENT — PATIENT HEALTH QUESTIONNAIRE - PHQ9
SUM OF ALL RESPONSES TO PHQ QUESTIONS 1-9: 0
SUM OF ALL RESPONSES TO PHQ QUESTIONS 1-9: 0
1. LITTLE INTEREST OR PLEASURE IN DOING THINGS: 0
SUM OF ALL RESPONSES TO PHQ QUESTIONS 1-9: 0
2. FEELING DOWN, DEPRESSED OR HOPELESS: 0
SUM OF ALL RESPONSES TO PHQ9 QUESTIONS 1 & 2: 0

## 2021-06-29 NOTE — PROGRESS NOTES
GI CLINIC FOLLOW UP    INTERVAL HISTORY:   No referring provider defined for this encounter. Chief Complaint   Patient presents with    Follow-up     Patient is f/u on EGD and colonoscopy           HISTORY OF PRESENT ILLNESS: Marlon Roger is a 60 y.o. male , referred for evaluation of*dysphagia/diarrhea/screening     Here for f/u    saw him last visit with above, diarrhea, need for screening colonoscopy for which we proceeded with the colonoscopy result as below also EGD the result as below   please refer to last visit note   Today asymptomatic    No N/v   no abd pain   no melena/hematemsis/hematochezia  No dysphagia/odynophagia   no wt loss   no diarrhea /contipation     We scheduled him for egd/colon :  As below       Findings:     Retropharyngeal area was grossly normal appearing     Patient had irregular Z-line with a start of Little's esophagus I went ahead and took biopsies  I do not see loni stricturing or ring but because of the symptoms I went ahead and passed [facility]   Started with the 060290 [facility], the patient tolerated the procedure very well     Significant gastritis with numerous number of gastric polyps with balloon erosion on the polyps indicating that this most likely inflammatory type polyps we biopsied them      Gastritis biopsies were taken      Normal duodenum      Of note on the upper esophageal sphincter the patient seems to have a little bit of a spasm for which I elected to use Kiran's to dilate the entire esophagus including the upper esophageal sphincter     The scope was removed and the patient tolerated the procedure well.      Recommendations/Plan:   1. F/U Biopsies  2. F/U In Office in 3-4 weeks  3.  Discussed with the family     Electronically signed by Ansley Ly MD  on 4/14/2021 at 10:48 AM   The prep was fair.       Findings:  Terminal ileum: normal     Cecum/Ascending colon: normal     Transverse colon: normal     Descending/Sigmoid colon: abnormal:   4 mm Surgical History:   Procedure Laterality Date    CARDIAC CATHETERIZATION Right 03/2021    no blockage, no stenting.     COLONOSCOPY N/A 4/14/2021    COLONOSCOPY WITH BIOPSY performed by Cristobal Heimlich, MD at [facility] N/A 4/14/2021    EGD BIOPSY, ESOPHAGEAL DILATION performed by Cristobal Heimlich, MD at [address]:    Current Outpatient Medications:     albuterol sulfate HFA (PROVENTIL HFA) 108 (90 Base) MCG/ACT inhaler, Inhale 2 puffs into the lungs every 6 hours as needed for Wheezing, Disp: 1 Inhaler, Rfl: 3    doxazosin (CARDURA) 2 MG tablet, take 1 tablet by mouth once daily, Disp: 90 tablet, Rfl: 0    Blood Pressure Monitoring (ADULT BLOOD PRESSURE CUFF LG) KIT, Check blood pressure daily, Disp: 1 kit, Rfl: 0    carvedilol (COREG) 25 MG tablet, Take 0.5 tablets by mouth 2 times daily, Disp: 60 tablet, Rfl: 3    hydrALAZINE (APRESOLINE) 50 MG tablet, Take 1 tablet by mouth 3 times daily, Disp: 90 tablet, Rfl: 3    spironolactone (ALDACTONE) 25 MG tablet, Take 0.5 tablets by mouth daily, Disp: 30 tablet, Rfl: 3    amLODIPine (NORVASC) 10 MG tablet, Take 1 tablet by mouth daily, Disp: 90 tablet, Rfl: 1    atorvastatin (LIPITOR) 20 MG tablet, Take 1 tablet by mouth daily, Disp: 90 tablet, Rfl: 1    VITAMIN D, CHOLECALCIFEROL, PO, Take by mouth daily, Disp: , Rfl:     aspirin 81 MG EC tablet, Take 81 mg by mouth daily, Disp: , Rfl:     ALLERGIES:   No Known Allergies    FAMILY HISTORY:       Problem Relation Age of Onset    Hypertension Mother     Hypertension Father          SOCIAL HISTORY:   Social History     Socioeconomic History    Marital status:      Spouse name: Not on file    Number of children: Not on file    Years of education: Not on file    Highest education level: Not on file   Occupational History    Not on file   Tobacco Use    Smoking status: Light Tobacco Smoker     Packs/day: 1.00     Types: pain and vomiting. Genitourinary: Negative for difficulty urinating. Allergic/Immunologic: Negative for environmental allergies and food allergies. Neurological: Negative for dizziness, weakness, light-headedness, numbness and headaches. Hematological: Does not bruise/bleed easily. Psychiatric/Behavioral: Negative for sleep disturbance. The patient is not nervous/anxious. LABORATORY DATA: Reviewed  Lab Results   Component Value Date    WBC 4.7 12/17/2020    HGB 14.1 12/17/2020    HCT 41.2 12/17/2020    MCV 90.7 12/17/2020     03/03/2021     03/03/2021    K 4.0 03/03/2021     03/03/2021    CO2 25 03/03/2021    BUN 26 (H) 03/03/2021    CREATININE 1.97 (H) 03/03/2021    LABALBU 4.1 12/17/2020    AST 21 12/17/2020    ALT 17 12/17/2020         Lab Results   Component Value Date    RBC 4.54 12/17/2020    HGB 14.1 12/17/2020    MCV 90.7 12/17/2020    MCH 31.1 12/17/2020    MCHC 34.2 12/17/2020    RDW 11.7 (L) 12/17/2020    MPV 11.8 12/17/2020    BASOPCT 1 12/17/2020    LYMPHSABS 1.28 12/17/2020    MONOSABS 0.34 12/17/2020    NEUTROABS 2.83 12/17/2020    EOSABS 0.18 12/17/2020    BASOSABS 0.03 12/17/2020         DIAGNOSTIC TESTING:     No results found. PHYSICAL EXAMINATION: Vital signs reviewed per the nursing documentation. BP (!) 163/92   Pulse 58   Temp 97.3 °F (36.3 °C)   Wt 228 lb (103.4 kg)   BMI 33.19 kg/m²   Body mass index is 33.19 kg/m². Physical Exam  Vitals and nursing note reviewed. Constitutional:       General: He is not in acute distress. Appearance: He is well-developed. He is not diaphoretic. HENT:      Head: Normocephalic and atraumatic. Eyes:      General: No scleral icterus. Pupils: Pupils are equal, round, and reactive to light. Neck:      Thyroid: No thyromegaly. Vascular: No JVD. Trachea: No tracheal deviation. Cardiovascular:      Rate and Rhythm: Normal rate and regular rhythm.       Heart sounds: Normal heart

## 2021-06-29 NOTE — PATIENT INSTRUCTIONS

## 2021-06-29 NOTE — PROGRESS NOTES
Progress Note    Surekha Reid is a 60 y.o.  male who presents today alone for evaluation of   Chief Complaint   Patient presents with    Annual Exam           HPI:   Patient is here for CPE today. Patient PMH HTN, CKD, and cardiomegaly. Patient [address] tonsillectomy and PCI. Patient fhx mom HTN. Patient is a light tobacco smoker. Patient states he has smoked for 20-30 years. Patient denies regular EtOH consumption. Patient denies illicits. Patient denies SIG E CAPS. Patient denies SI/HI. Patient denies anxiety. Patient denies specific diet. Patient denies regular aerobic activity. Patient follows with nephrology and cardiology regularly. Patient last colonoscopy 2021 and was recommend to have repeat in 5 yrs. Patient is due for PSA. PHQ-9 Total Score: 0 (6/29/2021  2:19 PM)    Patient would like rx for albuterol inhaler. Patient states he occasionally has wheezing when his allergies flare. Patient denies history of asthma as a child. Patient is not currently taking an AH daily.     Health Maintenance Due   Topic Date Due    Pneumococcal 0-64 years Vaccine (1 of 2 - PPSV23) Never done    DTaP/Tdap/Td vaccine (1 - Tdap) Never done        Current Medications:     Current Outpatient Medications   Medication Sig Dispense Refill    albuterol sulfate HFA (PROVENTIL HFA) 108 (90 Base) MCG/ACT inhaler Inhale 2 puffs into the lungs every 6 hours as needed for Wheezing 1 Inhaler 3    doxazosin (CARDURA) 2 MG tablet take 1 tablet by mouth once daily 90 tablet 0    Blood Pressure Monitoring (ADULT BLOOD PRESSURE CUFF LG) KIT Check blood pressure daily 1 kit 0    carvedilol (COREG) 25 MG tablet Take 0.5 tablets by mouth 2 times daily 60 tablet 3    hydrALAZINE (APRESOLINE) 50 MG tablet Take 1 tablet by mouth 3 times daily 90 tablet 3    spironolactone (ALDACTONE) 25 MG tablet Take 0.5 tablets by mouth daily 30 tablet 3    amLODIPine (NORVASC) 10 MG tablet Take 1 tablet by mouth daily 90 tablet 1    atorvastatin (LIPITOR) 20 MG tablet Take 1 tablet by mouth daily 90 tablet 1    VITAMIN D, CHOLECALCIFEROL, PO Take by mouth daily      aspirin 81 MG EC tablet Take 81 mg by mouth daily       No current facility-administered medications for this visit. Allergies:   No Known Allergies     Medical History:     Past Medical History:   Diagnosis Date    Benign essential HTN     CKD (chronic kidney disease), stage III ([facility] [address] 9/29/2020    Secondary to suspected ischemic nephrosclerosis baseline creatinine 1.51.9    Enlarged heart        Past Surgical History:   Procedure Laterality Date    CARDIAC CATHETERIZATION Right 03/2021    no blockage, no stenting.     COLONOSCOPY N/A 4/14/2021    COLONOSCOPY WITH BIOPSY performed by Ruthann Guadalupe MD at [facility] ENDOSCOPY N/A 4/14/2021    EGD BIOPSY, ESOPHAGEAL DILATION performed by Ruthann Guadalupe MD at [address] [place] [address]       Family History   Problem Relation Age of Onset    Hypertension Mother     Hypertension Father         Social History:     Social History     Socioeconomic History    Marital status:      Spouse name: Not on file    Number of children: Not on file    Years of education: Not on file    Highest education level: Not on file   Occupational History    Not on file   Tobacco Use    Smoking status: Light Tobacco Smoker     Packs/day: 1.00     Types: Cigarettes    Smokeless tobacco: Former User    Tobacco comment: 1-2 per week   Vaping Use    Vaping Use: Never used   Substance and Sexual Activity    Alcohol use: Yes     Comment: socially    Drug use: No    Sexual activity: Not on file   Other Topics Concern    Not on file   Social History Narrative    Not on file     Social Determinants of Health     Financial Resource Strain: Low Risk     Difficulty of Paying Living Expenses: Not hard at all   Food Insecurity: No Food Insecurity    Worried About [facility] in the Last Year: Never true  Ran Out of Food in the Last Year: Never true   Transportation Needs: No Transportation Needs    Lack of Transportation (Medical): No    Lack of Transportation (Non-Medical): No   Physical Activity:     Days of Exercise per Week:     Minutes of Exercise per Session:    Stress:     Feeling of Stress :    Social Connections:     Frequency of Communication with Friends and Family:     Frequency of Social Gatherings with Friends and Family:     Attends Jehovah[employer] witness Services:     Active Member of Clubs or Organizations:     Attends Club or Organization Meetings:     Marital Status:    Intimate Partner Violence:     Fear of Current or Ex-Partner:     Emotionally Abused:     Physically Abused:     Sexually Abused:         ROS:     Constitutional: No fevers, chills, fatigue. ENT: No nasal congestion or sore throat  Respiratory: No difficulty in breathing or cough. +amb wheeze  Cardiovascular: No chest pain, palpitations or shortness of breath  Gastrointestinal: No abdominal pain or change in bowel movements. Genitourinary: No change in urinary frequency or dysuria. Skin: No rashes or skin lesions. Neurological: No weakness. No headaches. Last Filed Vitals:  /89 (Site: Left Upper Arm, Position: Sitting, Cuff Size: Large Adult)   Pulse 58   Temp 97.3 °F (36.3 °C) (Temporal)   Ht 5' 9.5\" (1.765 m)   Wt 229 lb (103.9 kg)   SpO2 96%   BMI 33.33 kg/m²      Physical Examination:     GENERAL APPEARANCE: in no acute distress, well developed, well nourished. HEAD: normocephalic, atraumatic. EYES: extraocular movement intact (EOMI), pupils equal, round, reactive to light and accommodation. EARS: normal, tympanic membrane intact, clear, auditory canal clear. NOSE: nares patent, no erythema, sinuses nontender bilaterally, no rhinorrhea. ORAL CAVITY: mucosa moist, no lesions. THROAT: clear, no mass, no exudate. NECK/THYROID: neck supple, full range of motion, no thyromegaly.    HEART: Description  Specimen \"A\":  Received in formalin labeled \"esophagus\" are two  portions of pink soft tissue each measuring approximately 0.2 cm in  greatest dimension. The specimen is entirely submitted in a single  cassette. [facility]         Specimen \"B\":  Received in formalin labeled \"gastric polyp\" are three  portions of pink soft tissue each measuring approximately 0.2 cm in  greatest dimension. The specimen is entirely submitted in a single  cassette. [facility]         Specimen \"C\":  Received in formalin labeled \"stom                          ach\" are four  portions of pink soft tissue each measuring approximately 0.2 cm in  greatest dimension. The specimen is entirely submitted in a single  cassette. [facility]         Specimen \"D\":  Received in formalin labeled \"random colon biopsies\"  are multiple portions of pink soft tissue each measuring approximately  0.2 to 0.3 cm in greatest dimension. The specimen is entirely  submitted in a single cassette. [facility]         Specimen \"E\":  Received in formalin labeled \"sigmoid polyp\" are two  portions of pink soft tissue ranging from 0.2 to 0.3 cm in greatest  dimension. The specimen is entirely submitted in a single cassette. [facility]          Microscopic Description  Specimen \"A\":  Microscopic examination of two H&E slides confirms  the diagnosis. Specimen \"B\":  Microscopic examination of two H&E slides confirms  the diagnosis. Specimen \"C\":  Sections of gastric-type mucosa are noted. The mucosa  is lined by foveolar epithelium. The lamina propria is expanded                           by an  infiltrate of lymphocytes and plasma cells. Increased intraepithelial  lymphocytes are also present. An immunostain for Helicobacter pylori  microorganisms is negative. A CD3 immunostain highlights increased  numbers of intraepithelial T-lymphocytes (controls adequate).              Specimen \"D\":  Microscopic examination of two H&E slides confirms  the

## 2021-08-27 DIAGNOSIS — E78.5 DYSLIPIDEMIA: ICD-10-CM

## 2021-08-27 RX ORDER — ATORVASTATIN CALCIUM 20 MG/1
TABLET, FILM COATED ORAL
Qty: 90 TABLET | Refills: 1 | Status: SHIPPED | OUTPATIENT
Start: 2021-08-27 | End: 2021-09-21 | Stop reason: SDUPTHER

## 2021-08-27 NOTE — TELEPHONE ENCOUNTER
Roland Vazquez is calling to request a refill on the following medication(s):    Medication Request:  Requested Prescriptions     Pending Prescriptions Disp Refills    atorvastatin (LIPITOR) 20 MG tablet [Pharmacy Med Name: ATORVASTATIN 20 MG TABLET] 90 tablet 1     Sig: take 1 tablet by mouth once daily       Last Visit Date (If Applicable):  7/72/3858    Next Visit Date:    1/4/2022

## 2021-09-13 DIAGNOSIS — I10 HTN, GOAL BELOW 130/80: ICD-10-CM

## 2021-09-13 RX ORDER — DOXAZOSIN 2 MG/1
TABLET ORAL
Qty: 90 TABLET | Refills: 0 | Status: SHIPPED | OUTPATIENT
Start: 2021-09-13 | End: 2021-12-13

## 2021-09-13 NOTE — TELEPHONE ENCOUNTER
Anna Gudino is calling to request a refill on the following medication(s):    Medication Request:  Requested Prescriptions     Pending Prescriptions Disp Refills    doxazosin (CARDURA) 2 MG tablet [Pharmacy Med Name: DOXAZOSIN MESYLATE 2 MG TAB] 90 tablet 0     Sig: take 1 tablet by mouth once daily       Last Visit Date (If Applicable):  4/62/6743    Next Visit Date:    1/4/2022

## 2021-09-21 DIAGNOSIS — E78.5 DYSLIPIDEMIA: ICD-10-CM

## 2021-09-21 DIAGNOSIS — I10 HTN, GOAL BELOW 130/80: ICD-10-CM

## 2021-09-21 RX ORDER — HYDRALAZINE HYDROCHLORIDE 50 MG/1
50 TABLET, FILM COATED ORAL 3 TIMES DAILY
Qty: 90 TABLET | Refills: 3 | Status: SHIPPED | OUTPATIENT
Start: 2021-09-21

## 2021-09-21 RX ORDER — SPIRONOLACTONE 25 MG/1
12.5 TABLET ORAL DAILY
Qty: 30 TABLET | Refills: 3 | Status: SHIPPED | OUTPATIENT
Start: 2021-09-21

## 2021-09-21 RX ORDER — AMLODIPINE BESYLATE 10 MG/1
10 TABLET ORAL DAILY
Qty: 90 TABLET | Refills: 1 | Status: SHIPPED | OUTPATIENT
Start: 2021-09-21 | End: 2022-04-08

## 2021-09-21 RX ORDER — CARVEDILOL 25 MG/1
12.5 TABLET ORAL 2 TIMES DAILY
Qty: 60 TABLET | Refills: 3 | Status: SHIPPED | OUTPATIENT
Start: 2021-09-21

## 2021-09-21 RX ORDER — ATORVASTATIN CALCIUM 20 MG/1
TABLET, FILM COATED ORAL
Qty: 90 TABLET | Refills: 1 | Status: SHIPPED | OUTPATIENT
Start: 2021-09-21 | End: 2022-06-06

## 2021-09-21 NOTE — TELEPHONE ENCOUNTER
Cal Lr is calling to request a refill on the following medication(s):    Medication Request:  Requested Prescriptions     Pending Prescriptions Disp Refills    atorvastatin (LIPITOR) 20 MG tablet 90 tablet 1     Sig: take 1 tablet by mouth once daily    spironolactone (ALDACTONE) 25 MG tablet 30 tablet 3     Sig: Take 0.5 tablets by mouth daily    carvedilol (COREG) 25 MG tablet 60 tablet 3     Sig: Take 0.5 tablets by mouth 2 times daily    amLODIPine (NORVASC) 10 MG tablet 90 tablet 1     Sig: Take 1 tablet by mouth daily    hydrALAZINE (APRESOLINE) 50 MG tablet 90 tablet 3     Sig: Take 1 tablet by mouth 3 times daily       Last Visit Date (If Applicable):  1/21/8653    Next Visit Date:    1/4/2022

## 2021-10-07 ENCOUNTER — TELEPHONE (OUTPATIENT)
Dept: FAMILY MEDICINE CLINIC | Age: 56
End: 2021-10-07

## 2021-10-07 NOTE — TELEPHONE ENCOUNTER
Pt currently has a job with heavy lifting. Pt has an enlarged heart.   pts wife wants to make sure that this is not something that is ok for him to do heavy lifting because when he came home after work he was very winded

## 2021-12-12 DIAGNOSIS — I10 HTN, GOAL BELOW 130/80: ICD-10-CM

## 2021-12-13 RX ORDER — DOXAZOSIN 2 MG/1
TABLET ORAL
Qty: 90 TABLET | Refills: 1 | Status: SHIPPED | OUTPATIENT
Start: 2021-12-13

## 2021-12-13 NOTE — TELEPHONE ENCOUNTER
Salome Jorge is calling to request a refill on the following medication(s):    Medication Request:  Requested Prescriptions     Pending Prescriptions Disp Refills    doxazosin (CARDURA) 2 MG tablet [Pharmacy Med Name: DOXAZOSIN MESYLATE 2 MG TAB] 90 tablet 0     Sig: take 1 tablet by mouth once daily       Last Visit Date (If Applicable):  5/13/7235    Next Visit Date:    1/4/2022

## 2022-02-21 ENCOUNTER — NURSE ONLY (OUTPATIENT)
Dept: FAMILY MEDICINE CLINIC | Age: 57
End: 2022-02-21
Payer: COMMERCIAL

## 2022-02-21 DIAGNOSIS — Z23 NEED FOR COVID-19 VACCINE: Primary | ICD-10-CM

## 2022-02-21 PROCEDURE — 91305 COVID-19, PFIZER GRAY TOP, DO NOT DILUTE, TRIS-SUCROSE, 12+ YRS, PF, 30MCG/ 0.3 ML DOSE: CPT | Performed by: FAMILY MEDICINE

## 2022-02-21 PROCEDURE — 0054A COVID-19, PFIZER GRAY TOP, DO NOT DILUTE, TRIS-SUCROSE, 12+ YRS, PF, 30MCG/ 0.3 ML DOSE: CPT | Performed by: FAMILY MEDICINE

## 2022-02-21 NOTE — PROGRESS NOTES
Patient presents in office for Covid Vaccine booster. Patient to wait 15 minutes before leaving office.

## 2022-03-28 ENCOUNTER — OFFICE VISIT (OUTPATIENT)
Dept: FAMILY MEDICINE CLINIC | Age: 57
End: 2022-03-28
Payer: COMMERCIAL

## 2022-03-28 VITALS
BODY MASS INDEX: 33.55 KG/M2 | HEART RATE: 72 BPM | DIASTOLIC BLOOD PRESSURE: 88 MMHG | WEIGHT: 230.5 LBS | SYSTOLIC BLOOD PRESSURE: 130 MMHG

## 2022-03-28 DIAGNOSIS — Z71.3 DIETARY COUNSELING: ICD-10-CM

## 2022-03-28 DIAGNOSIS — E78.5 DYSLIPIDEMIA: ICD-10-CM

## 2022-03-28 DIAGNOSIS — R73.9 HYPERGLYCEMIA: Primary | ICD-10-CM

## 2022-03-28 DIAGNOSIS — I10 HTN, GOAL BELOW 130/80: ICD-10-CM

## 2022-03-28 DIAGNOSIS — N18.32 STAGE 3B CHRONIC KIDNEY DISEASE (HCC): ICD-10-CM

## 2022-03-28 DIAGNOSIS — Z13.89 MULTIPHASIC SCREENING: ICD-10-CM

## 2022-03-28 PROCEDURE — G8484 FLU IMMUNIZE NO ADMIN: HCPCS | Performed by: FAMILY MEDICINE

## 2022-03-28 PROCEDURE — 3017F COLORECTAL CA SCREEN DOC REV: CPT | Performed by: FAMILY MEDICINE

## 2022-03-28 PROCEDURE — G8417 CALC BMI ABV UP PARAM F/U: HCPCS | Performed by: FAMILY MEDICINE

## 2022-03-28 PROCEDURE — G8427 DOCREV CUR MEDS BY ELIG CLIN: HCPCS | Performed by: FAMILY MEDICINE

## 2022-03-28 PROCEDURE — 4004F PT TOBACCO SCREEN RCVD TLK: CPT | Performed by: FAMILY MEDICINE

## 2022-03-28 PROCEDURE — 99214 OFFICE O/P EST MOD 30 MIN: CPT | Performed by: FAMILY MEDICINE

## 2022-03-28 SDOH — ECONOMIC STABILITY: FOOD INSECURITY: WITHIN THE PAST 12 MONTHS, YOU WORRIED THAT YOUR FOOD WOULD RUN OUT BEFORE YOU GOT MONEY TO BUY MORE.: NEVER TRUE

## 2022-03-28 SDOH — ECONOMIC STABILITY: FOOD INSECURITY: WITHIN THE PAST 12 MONTHS, THE FOOD YOU BOUGHT JUST DIDN'T LAST AND YOU DIDN'T HAVE MONEY TO GET MORE.: NEVER TRUE

## 2022-03-28 ASSESSMENT — PATIENT HEALTH QUESTIONNAIRE - PHQ9
SUM OF ALL RESPONSES TO PHQ QUESTIONS 1-9: 0
SUM OF ALL RESPONSES TO PHQ QUESTIONS 1-9: 0
2. FEELING DOWN, DEPRESSED OR HOPELESS: 0
1. LITTLE INTEREST OR PLEASURE IN DOING THINGS: 0
SUM OF ALL RESPONSES TO PHQ QUESTIONS 1-9: 0
SUM OF ALL RESPONSES TO PHQ QUESTIONS 1-9: 0
SUM OF ALL RESPONSES TO PHQ9 QUESTIONS 1 & 2: 0

## 2022-03-28 ASSESSMENT — SOCIAL DETERMINANTS OF HEALTH (SDOH): HOW HARD IS IT FOR YOU TO PAY FOR THE VERY BASICS LIKE FOOD, HOUSING, MEDICAL CARE, AND HEATING?: NOT HARD AT ALL

## 2022-03-28 NOTE — PROGRESS NOTES
Progress Note    Ros Vizcarra is a 60 y.o.  male who presents today alone for evaluation of   Chief Complaint   Patient presents with    Hypertension    Chronic Kidney Disease           HPI:   Patient is here for follow up on HTN/HFrEF/CKD/dyslipidemia. Patient denies cp/sob/le edema/dizziness/lightheadedness/blurry va/ha. Patient denies PND/orthopnea. Patient states he does smoke 1 cig per month. Patient states he has not had a cig in 3 weeks. Patient denies excessive sodium/caffeine. Patient denies chest pressure/tightness/squeezing. Patient denies checking blood pressure at home. Patient is following with cardiology. Patient is following with nephrology for his CKD and HTN.      PHQ-9 Total Score: 0 (3/28/2022 10:24 AM)      Health Maintenance Due   Topic Date Due    Pneumococcal 0-64 years Vaccine (1 of 2 - PPSV23) Never done    DTaP/Tdap/Td vaccine (1 - Tdap) Never done    Shingles Vaccine (1 of 2) Never done    Flu vaccine (1) Never done    Lipid screen  12/17/2021    Potassium monitoring  03/03/2022    Creatinine monitoring  03/03/2022        Current Medications:     Current Outpatient Medications   Medication Sig Dispense Refill    doxazosin (CARDURA) 2 MG tablet take 1 tablet by mouth once daily 90 tablet 1    atorvastatin (LIPITOR) 20 MG tablet take 1 tablet by mouth once daily 90 tablet 1    spironolactone (ALDACTONE) 25 MG tablet Take 0.5 tablets by mouth daily 30 tablet 3    carvedilol (COREG) 25 MG tablet Take 0.5 tablets by mouth 2 times daily 60 tablet 3    amLODIPine (NORVASC) 10 MG tablet Take 1 tablet by mouth daily 90 tablet 1    hydrALAZINE (APRESOLINE) 50 MG tablet Take 1 tablet by mouth 3 times daily 90 tablet 3    albuterol sulfate HFA (PROVENTIL HFA) 108 (90 Base) MCG/ACT inhaler Inhale 2 puffs into the lungs every 6 hours as needed for Wheezing 1 Inhaler 3    VITAMIN D, CHOLECALCIFEROL, PO Take by mouth daily      aspirin 81 MG EC tablet Take 81 mg by mouth daily      Blood Pressure Monitoring (ADULT BLOOD PRESSURE CUFF LG) KIT Check blood pressure daily 1 kit 0     No current facility-administered medications for this visit. Allergies:   No Known Allergies     Medical History:     Past Medical History:   Diagnosis Date    Benign essential HTN     CKD (chronic kidney disease), stage III ([facility] [address] 9/29/2020    Secondary to suspected ischemic nephrosclerosis baseline creatinine 1.51.9    Enlarged heart        Past Surgical History:   Procedure Laterality Date    CARDIAC CATHETERIZATION Right 03/2021    no blockage, no stenting.     COLONOSCOPY N/A 4/14/2021    COLONOSCOPY WITH BIOPSY performed by Torsten Phelps MD at [address] [place] [address] ENDOSCOPY N/A 4/14/2021    EGD BIOPSY, ESOPHAGEAL DILATION performed by Torsten Phelps MD at [facility] ENDO       Family History   Problem Relation Age of Onset    Hypertension Mother     Hypertension Father         Social History:     Social History     Socioeconomic History    Marital status:      Spouse name: Not on file    Number of children: Not on file    Years of education: Not on file    Highest education level: Not on file   Occupational History    Not on file   Tobacco Use    Smoking status: Light Tobacco Smoker     Packs/day: 1.00     Types: Cigarettes    Smokeless tobacco: Former User    Tobacco comment: 1-2 per week   Vaping Use    Vaping Use: Never used   Substance and Sexual Activity    Alcohol use: Yes     Comment: socially    Drug use: No    Sexual activity: Not on file   Other Topics Concern    Not on file   Social History Narrative    Not on file     Social Determinants of Health     Financial Resource Strain: Low Risk     Difficulty of Paying Living Expenses: Not hard at all   Food Insecurity: No Food Insecurity    Worried About [address] in the Last Year: Never true    [address] in the Last Year: Never true   Transportation Needs: No Transportation Needs    Lack of Transportation (Medical): No    Lack of Transportation (Non-Medical): No   Physical Activity:     Days of Exercise per Week: Not on file    Minutes of Exercise per Session: Not on file   Stress:     Feeling of Stress : Not on file   Social Connections:     Frequency of Communication with Friends and Family: Not on file    Frequency of Social Gatherings with Friends and Family: Not on file    Attends Mandaen Services: Not on file    Active Member of [address] [place] [address] or Organizations: Not on file    Attends Club or Organization Meetings: Not on file    Marital Status: Not on file   Intimate Partner Violence:     Fear of Current or Ex-Partner: Not on file    Emotionally Abused: Not on file    Physically Abused: Not on file    Sexually Abused: Not on file   Housing Stability:     Unable to Pay for Housing in the Last Year: Not on file    Number of [place] in the Last Year: Not on file    Unstable Housing in the Last Year: Not on file        ROS:     Constitutional: No fevers, chills, fatigue. ENT: No nasal congestion or sore throat  Respiratory: No difficulty in breathing or cough. Cardiovascular: No chest pain, palpitations or shortness of breath  Gastrointestinal: No abdominal pain or change in bowel movements. Genitourinary: No change in urinary frequency or dysuria. Skin: No rashes or skin lesions. Neurological: No weakness. No headaches. Last Filed Vitals:  /88   Pulse 72   Wt 230 lb 8 oz (104.6 kg)   BMI 33.55 kg/m²      Physical Examination:     GENERAL APPEARANCE: in no acute distress, well developed, well nourished. HEAD: normocephalic, atraumatic. EYES: extraocular movement intact (EOMI), pupils equal, round, reactive to light and accommodation. EARS: normal, tympanic membrane intact, clear, auditory canal clear. NOSE: nares patent, no erythema, sinuses nontender bilaterally, no rhinorrhea. ORAL CAVITY: mucosa moist, no lesions. THROAT: clear, no mass, no exudate. NECK/THYROID: neck supple, full range of motion, no thyromegaly. HEART: no murmurs, regular rate and rhythm, S1, S2 normal.   LUNGS: clear to auscultation bilaterally, no wheezes, rales, rhonchi. ABDOMEN: normal, bowel sounds present, soft, nontender, nondistended, no rebound guarding or rigidity    Recent Labs/ In Office Testing/ Radiograph review:     Admission on 04/14/2021, Discharged on 04/14/2021   Component Date Value Ref Range Status    Surgical Pathology Report 04/14/2021    Final                    Value:[id number]  [facility]  [address] [place] [address]. [facility].  [phone]  Fax: [phone]  SURGICAL PATHOLOGY REPORT    Patient Name: Nimco Gallagher  MR#: [id number]  Specimen #[id number]       Final Diagnosis  SPECIMEN \"A\": ESOPHAGUS, BIOPSY:         GASTRIC-TYPE MUCOSA WITH CHRONIC ACTIVE INFLAMMATION    THERE IS NO INTESTINAL METAPLASIA OR DYSPLASIA IDENTIFIED    BENIGN SQUAMOUS MUCOSA    SPECIMEN \"B\":  STOMACH, BIOPSY:         GASTRIC MUCOSA WITH FOVEOLAR HYPERPLASIA CONSISTENT WITH  HYPERPLASTIC POLYP    SPECIMEN \"C\":  STOMACH, BIOPSY:         CHRONIC INACTIVE GASTRITIS WITH INCREASED INTRAEPITHELIAL  T-LYMPHOCYTES CONSISTENT WITH LYMPHOCYTIC GASTRITIS    SPECIMEN \"D\":  COLON, RANDOM BIOPSIES:         LARGE INTESTINAL-TYPE MUCOSA DEMONSTRATING NO SIGNIFICANT  HISTOPATHOLOGIC FEATURES    THE FEATURES OF LYMPHOCYTIC AND/OR COLLAGENOUS COLITIS ARE NOT  IDENTIFIED    SPECIMEN \"E\":  SIGMOID COLON, POLYPECTOMY:         TUBULAR ADENOMA            Christian Harrison.  Fa                          ABHISHEK alegria  **Electronically Signed Out**         [facility]/4/15/2021    Clinical Information  Colon cancer screening, diarrhea, esophageal dysphasia; EGD biopsy  esophageal dilatation, gastric polyp; colonoscopy with biopsy, random  biopsies, hemorrhoids, sigmoid polyp; formalin time: A) 10:36, B)  10:40, C) 10:42, D) 11:23, E) 11:26    Source:  A: Esophagus - rule out Little's  B: Gastric polyp  C: Stomach biopsy  D: Random colon biopsies  E: Sigmoid polyp    Gross Description  Specimen \"A\":  Received in formalin labeled \"esophagus\" are two  portions of pink soft tissue each measuring approximately 0.2 cm in  greatest dimension. The specimen is entirely submitted in a single  cassette. [facility]         Specimen \"B\":  Received in formalin labeled \"gastric polyp\" are three  portions of pink soft tissue each measuring approximately 0.2 cm in  greatest dimension. The specimen is entirely submitted in a single  cassette. [facility]         Specimen \"C\":  Received in formalin labeled \"stom                          ach\" are four  portions of pink soft tissue each measuring approximately 0.2 cm in  greatest dimension. The specimen is entirely submitted in a single  cassette. [facility]         Specimen \"D\":  Received in formalin labeled \"random colon biopsies\"  are multiple portions of pink soft tissue each measuring approximately  0.2 to 0.3 cm in greatest dimension. The specimen is entirely  submitted in a single cassette. [facility]         Specimen \"E\":  Received in formalin labeled \"sigmoid polyp\" are two  portions of pink soft tissue ranging from 0.2 to 0.3 cm in greatest  dimension. The specimen is entirely submitted in a single cassette. [facility]          Microscopic Description  Specimen \"A\":  Microscopic examination of two H&E slides confirms  the diagnosis. Specimen \"B\":  Microscopic examination of two H&E slides confirms  the diagnosis. Specimen \"C\":  Sections of gastric-type mucosa are noted. The mucosa  is lined by foveolar epithelium. The lamina propria is expanded                           by an  infiltrate of lymphocytes and plasma cells. Increased intraepithelial  lymphocytes are also present. An immunostain for Helicobacter pylori  microorganisms is negative.   A CD3 immunostain highlights increased  numbers of intraepithelial T-lymphocytes (controls adequate). Specimen \"D\":  Microscopic examination of two H&E slides confirms  the diagnosis. Specimen \"E\":  Microscopic examination of two H&E slides confirms  the diagnosis. No results found for this visit on 03/28/22. Assessment/Plan:     Chris Lake was seen today for hypertension and chronic kidney disease. Diagnoses and all orders for this visit:    Hyperglycemia  -     Hemoglobin A1C; Future    Stage 3b chronic kidney disease ([facility] [address]  -     Renal Function Panel; Future    HTN, goal below 130/80  -     Magnesium; Future  -     Renal Function Panel; Future    Dyslipidemia  -     ALT; Future  -     AST; Future  -     CBC with Auto Differential; Future  -     Lipid Panel; Future  -     TSH with Reflex; Future    Multiphasic screening  -     PSA Screening; Future    Dietary counseling  -      BMI ABOVE NORMAL F/U    Follow up on labs as above. Strongly encouraged smoking cessation. Encouraged well balanced low sodium diet. Asked him to log his BP at home. Rx for BP cuff provided. Encouraged regular follow up with his specialists. All questions answered and addressed to patient satisfaction. Patient understands and agrees to the plan. The patient was evaluated and treated today based on the osteopathic principle that each person is a unit of body, mind, and spirit, the body is capable of self-regulation, self-healing, and health maintenance and that structure and function are reciprocally interrelated. Follow-up:   Return in about 6 months (around 9/28/2022) for CPE; 40 min appt. Alejandrina Milian D.O.    BMI was elevated today, and weight loss plan recommended is : conventional weight loss.

## 2022-04-08 DIAGNOSIS — I10 HTN, GOAL BELOW 130/80: ICD-10-CM

## 2022-04-08 RX ORDER — AMLODIPINE BESYLATE 10 MG/1
TABLET ORAL
Qty: 90 TABLET | Refills: 1 | Status: SHIPPED | OUTPATIENT
Start: 2022-04-08

## 2022-04-08 NOTE — TELEPHONE ENCOUNTER
Anibal Laguna is calling to request a refill on the following medication(s):    Medication Request:  Requested Prescriptions     Pending Prescriptions Disp Refills    amLODIPine (NORVASC) 10 MG tablet [Pharmacy Med Name: AMLODIPINE BESYLATE 10 MG TAB] 90 tablet 1     Sig: take 1 tablet by mouth once daily       Last Visit Date (If Applicable):  4/32/3450    Next Visit Date:    9/30/2022

## 2022-06-06 DIAGNOSIS — E78.5 DYSLIPIDEMIA: ICD-10-CM

## 2022-06-06 RX ORDER — ATORVASTATIN CALCIUM 20 MG/1
TABLET, FILM COATED ORAL
Qty: 90 TABLET | Refills: 1 | Status: SHIPPED | OUTPATIENT
Start: 2022-06-06

## 2022-06-06 NOTE — TELEPHONE ENCOUNTER
Ligia Schilling is calling to request a refill on the following medication(s):    Medication Request:  Requested Prescriptions     Pending Prescriptions Disp Refills    atorvastatin (LIPITOR) 20 MG tablet [Pharmacy Med Name: ATORVASTATIN 20 MG TABLET] 90 tablet 1     Sig: take 1 tablet by mouth once daily       Last Visit Date (If Applicable):  6/05/1844    Next Visit Date:    9/30/2022

## 2022-07-12 DIAGNOSIS — R13.19 ESOPHAGEAL DYSPHAGIA: ICD-10-CM

## 2022-07-12 DIAGNOSIS — R19.7 DIARRHEA, UNSPECIFIED TYPE: ICD-10-CM

## 2022-07-12 DIAGNOSIS — Z12.11 COLON CANCER SCREENING: ICD-10-CM

## 2022-08-05 NOTE — TELEPHONE ENCOUNTER
Last visit: 3/28/22  Last Med refill: 9/21/21      Next Visit Date:  No future appointments.     Health Maintenance   Topic Date Due    Pneumococcal 0-64 years Vaccine (1 - PCV) Never done    DTaP/Tdap/Td vaccine (1 - Tdap) Never done    Shingles vaccine (1 of 2) Never done    Prostate Specific Antigen (PSA) Screening or Monitoring  06/23/2021    Lipids  12/17/2021    COVID-19 Vaccine (4 - Booster for Pfizer series) 06/21/2022    Flu vaccine (1) 09/01/2022    Depression Screen  03/28/2023    Colorectal Cancer Screen  04/14/2026    Hepatitis C screen  Completed    HIV screen  Completed    Hepatitis A vaccine  Aged Out    Hepatitis B vaccine  Aged Out    Hib vaccine  Aged Out    Meningococcal (ACWY) vaccine  Aged Out       Hemoglobin A1C (%)   Date Value   12/17/2020 4.3   06/23/2020 4.4             ( goal A1C is < 7)   No results found for: LABMICR  LDL Cholesterol (mg/dL)   Date Value   12/17/2020 43   06/23/2020 88       (goal LDL is <100)   AST (U/L)   Date Value   12/17/2020 21     ALT (U/L)   Date Value   12/17/2020 17     BUN (mg/dL)   Date Value   03/03/2021 26 (H)     BP Readings from Last 3 Encounters:   03/28/22 130/88   06/29/21 (!) 163/92   06/29/21 131/89          (goal 120/80)    All Future Testing planned in [facility]  Lab Frequency Next Occurrence   ALT Once 09/30/2022   AST Once 09/30/2022   CBC with Auto Differential Once 09/30/2022   Hemoglobin A1C Once 09/30/2022   Lipid Panel Once 09/30/2022   Magnesium Once 09/30/2022   PSA Screening Once 09/30/2022   Renal Function Panel Once 09/30/2022   TSH with Reflex Once 09/30/2022               Patient Active Problem List:     HTN (hypertension), malignant     Tobacco abuse     Renal insufficiency     CKD (chronic kidney disease), stage III

## 2022-08-09 ENCOUNTER — TELEPHONE (OUTPATIENT)
Dept: FAMILY MEDICINE CLINIC | Age: 57
End: 2022-08-09

## 2022-08-09 RX ORDER — CARVEDILOL 25 MG/1
TABLET ORAL
Qty: 60 TABLET | Refills: 3 | OUTPATIENT
Start: 2022-08-09

## 2022-08-09 NOTE — TELEPHONE ENCOUNTER
Spoke with patient. I informed that the office received a refill request for his blood pressure medication and Dr. Dann Hinojosa has left the practice. We refill for thirty days but he will need to establish care with a new PCP. Gave him the phone number to Dr. Seymour Mazariegos.

## 2022-08-10 NOTE — TELEPHONE ENCOUNTER
Per last office note from previous PCP, the patient is seeing cardiology and nephrology. Specifically, the note states following with nephrology for CKD and HTN.  Please ask patient to request this medication from either his cardiologist or nephrology

## 2022-12-28 ENCOUNTER — APPOINTMENT (OUTPATIENT)
Dept: GENERAL RADIOLOGY | Age: 57
DRG: 201 | End: 2022-12-28
Payer: COMMERCIAL

## 2022-12-28 ENCOUNTER — HOSPITAL ENCOUNTER (INPATIENT)
Age: 57
LOS: 2 days | Discharge: HOME | DRG: 201 | End: 2022-12-31
Attending: EMERGENCY MEDICINE | Admitting: STUDENT IN AN ORGANIZED HEALTH CARE EDUCATION/TRAINING PROGRAM
Payer: COMMERCIAL

## 2022-12-28 DIAGNOSIS — I48.91 NEW ONSET A-FIB (HCC): Primary | ICD-10-CM

## 2022-12-28 LAB
ABSOLUTE EOS #: 0.07 K/UL (ref 0–0.44)
ABSOLUTE IMMATURE GRANULOCYTE: <0.03 K/UL (ref 0–0.3)
ABSOLUTE LYMPH #: 1.27 K/UL (ref 1.1–3.7)
ABSOLUTE MONO #: 0.3 K/UL (ref 0.1–1.2)
BASOPHILS # BLD: 0 % (ref 0–2)
BASOPHILS ABSOLUTE: <0.03 K/UL (ref 0–0.2)
EOSINOPHILS RELATIVE PERCENT: 1 % (ref 1–4)
HCT VFR BLD CALC: 39.3 % (ref 40.7–50.3)
HEMOGLOBIN: 12.7 G/DL (ref 13–17)
IMMATURE GRANULOCYTES: 0 %
LYMPHOCYTES # BLD: 26 % (ref 24–43)
MCH RBC QN AUTO: 27.7 PG (ref 25.2–33.5)
MCHC RBC AUTO-ENTMCNC: 32.3 G/DL (ref 28.4–34.8)
MCV RBC AUTO: 85.8 FL (ref 82.6–102.9)
MONOCYTES # BLD: 6 % (ref 3–12)
NRBC AUTOMATED: 0 PER 100 WBC
PDW BLD-RTO: 13.4 % (ref 11.8–14.4)
PLATELET # BLD: 182 K/UL (ref 138–453)
PMV BLD AUTO: 12 FL (ref 8.1–13.5)
RBC # BLD: 4.58 M/UL (ref 4.21–5.77)
SEG NEUTROPHILS: 67 % (ref 36–65)
SEGMENTED NEUTROPHILS ABSOLUTE COUNT: 3.17 K/UL (ref 1.5–8.1)
WBC # BLD: 4.9 K/UL (ref 3.5–11.3)

## 2022-12-28 PROCEDURE — 93005 ELECTROCARDIOGRAM TRACING: CPT | Performed by: STUDENT IN AN ORGANIZED HEALTH CARE EDUCATION/TRAINING PROGRAM

## 2022-12-28 PROCEDURE — 85025 COMPLETE CBC W/AUTO DIFF WBC: CPT

## 2022-12-28 PROCEDURE — 84484 ASSAY OF TROPONIN QUANT: CPT

## 2022-12-28 PROCEDURE — 2580000003 HC RX 258

## 2022-12-28 PROCEDURE — 71045 X-RAY EXAM CHEST 1 VIEW: CPT

## 2022-12-28 PROCEDURE — 80048 BASIC METABOLIC PNL TOTAL CA: CPT

## 2022-12-28 PROCEDURE — 99285 EMERGENCY DEPT VISIT HI MDM: CPT

## 2022-12-28 PROCEDURE — 96374 THER/PROPH/DIAG INJ IV PUSH: CPT

## 2022-12-28 PROCEDURE — 2500000003 HC RX 250 WO HCPCS

## 2022-12-28 PROCEDURE — 96376 TX/PRO/DX INJ SAME DRUG ADON: CPT

## 2022-12-28 PROCEDURE — 83880 ASSAY OF NATRIURETIC PEPTIDE: CPT

## 2022-12-28 RX ORDER — DILTIAZEM HYDROCHLORIDE 5 MG/ML
10 INJECTION INTRAVENOUS ONCE
Status: COMPLETED | OUTPATIENT
Start: 2022-12-28 | End: 2022-12-28

## 2022-12-28 RX ADMIN — DILTIAZEM HYDROCHLORIDE 5 MG/HR: 5 INJECTION, SOLUTION INTRAVENOUS at 23:53

## 2022-12-28 RX ADMIN — DILTIAZEM HYDROCHLORIDE 10 MG: 5 INJECTION INTRAVENOUS at 23:22

## 2022-12-28 ASSESSMENT — ENCOUNTER SYMPTOMS
COLOR CHANGE: 0
SINUS PAIN: 0
BACK PAIN: 0
CHEST TIGHTNESS: 1
EYE PAIN: 0
NAUSEA: 0
RHINORRHEA: 0
VOMITING: 0
COUGH: 0
ABDOMINAL PAIN: 0
CHOKING: 0
SHORTNESS OF BREATH: 0
DIARRHEA: 0
CONSTIPATION: 0

## 2022-12-28 ASSESSMENT — PAIN SCALES - GENERAL: PAINLEVEL_OUTOF10: 5

## 2022-12-28 ASSESSMENT — PAIN DESCRIPTION - DESCRIPTORS: DESCRIPTORS: DULL

## 2022-12-28 ASSESSMENT — PAIN - FUNCTIONAL ASSESSMENT: PAIN_FUNCTIONAL_ASSESSMENT: 0-10

## 2022-12-29 ENCOUNTER — APPOINTMENT (OUTPATIENT)
Dept: ULTRASOUND IMAGING | Age: 57
DRG: 201 | End: 2022-12-29
Payer: COMMERCIAL

## 2022-12-29 ENCOUNTER — APPOINTMENT (OUTPATIENT)
Dept: NUCLEAR MEDICINE | Age: 57
DRG: 201 | End: 2022-12-29
Payer: COMMERCIAL

## 2022-12-29 PROBLEM — I50.22 CHRONIC SYSTOLIC (CONGESTIVE) HEART FAILURE (HCC): Status: ACTIVE | Noted: 2022-12-29

## 2022-12-29 PROBLEM — I24.89 DEMAND ISCHEMIA: Status: ACTIVE | Noted: 2022-12-29

## 2022-12-29 PROBLEM — I48.91 ATRIAL FIBRILLATION WITH RVR (HCC): Status: ACTIVE | Noted: 2022-12-29

## 2022-12-29 PROBLEM — K22.70 BARRETT'S ESOPHAGUS WITHOUT DYSPLASIA: Status: ACTIVE | Noted: 2022-12-29

## 2022-12-29 PROBLEM — I24.8 DEMAND ISCHEMIA (HCC): Status: ACTIVE | Noted: 2022-12-29

## 2022-12-29 PROBLEM — I42.8 NONISCHEMIC CARDIOMYOPATHY (HCC): Status: ACTIVE | Noted: 2022-12-29

## 2022-12-29 PROBLEM — F10.10 ALCOHOL CONSUMPTION BINGE DRINKING: Status: ACTIVE | Noted: 2022-12-29

## 2022-12-29 LAB
ANION GAP SERPL CALCULATED.3IONS-SCNC: 13 MMOL/L (ref 9–17)
BUN BLDV-MCNC: 26 MG/DL (ref 6–20)
CALCIUM SERPL-MCNC: 8.6 MG/DL (ref 8.6–10.4)
CHLORIDE BLD-SCNC: 103 MMOL/L (ref 98–107)
CO2: 23 MMOL/L (ref 20–31)
CREAT SERPL-MCNC: 2.25 MG/DL (ref 0.7–1.2)
EKG ATRIAL RATE: 120 BPM
EKG Q-T INTERVAL: 316 MS
EKG QRS DURATION: 108 MS
EKG QTC CALCULATION (BAZETT): 442 MS
EKG R AXIS: -30 DEGREES
EKG T AXIS: 171 DEGREES
EKG VENTRICULAR RATE: 118 BPM
GFR SERPL CREATININE-BSD FRML MDRD: 33 ML/MIN/1.73M2
GLUCOSE BLD-MCNC: 112 MG/DL (ref 70–99)
PARTIAL THROMBOPLASTIN TIME: 26 SEC (ref 20.5–30.5)
PARTIAL THROMBOPLASTIN TIME: 32.5 SEC (ref 20.5–30.5)
PARTIAL THROMBOPLASTIN TIME: 50.2 SEC (ref 20.5–30.5)
PARTIAL THROMBOPLASTIN TIME: 60.3 SEC (ref 20.5–30.5)
POTASSIUM SERPL-SCNC: 3.6 MMOL/L (ref 3.7–5.3)
PRO-BNP: 1380 PG/ML
SODIUM BLD-SCNC: 139 MMOL/L (ref 135–144)
TROPONIN, HIGH SENSITIVITY: 122 NG/L (ref 0–22)
TROPONIN, HIGH SENSITIVITY: 165 NG/L (ref 0–22)
TROPONIN, HIGH SENSITIVITY: 277 NG/L (ref 0–22)
TROPONIN, HIGH SENSITIVITY: 280 NG/L (ref 0–22)
TSH SERPL DL<=0.05 MIU/L-ACNC: 0.92 UIU/ML (ref 0.3–5)

## 2022-12-29 PROCEDURE — 93017 CV STRESS TEST TRACING ONLY: CPT

## 2022-12-29 PROCEDURE — 84443 ASSAY THYROID STIM HORMONE: CPT

## 2022-12-29 PROCEDURE — 6360000002 HC RX W HCPCS: Performed by: NURSE PRACTITIONER

## 2022-12-29 PROCEDURE — 6360000002 HC RX W HCPCS

## 2022-12-29 PROCEDURE — 6370000000 HC RX 637 (ALT 250 FOR IP): Performed by: NURSE PRACTITIONER

## 2022-12-29 PROCEDURE — 6370000000 HC RX 637 (ALT 250 FOR IP): Performed by: INTERNAL MEDICINE

## 2022-12-29 PROCEDURE — 85730 THROMBOPLASTIN TIME PARTIAL: CPT

## 2022-12-29 PROCEDURE — A9500 TC99M SESTAMIBI: HCPCS | Performed by: STUDENT IN AN ORGANIZED HEALTH CARE EDUCATION/TRAINING PROGRAM

## 2022-12-29 PROCEDURE — 2060000000 HC ICU INTERMEDIATE R&B

## 2022-12-29 PROCEDURE — 99223 1ST HOSP IP/OBS HIGH 75: CPT | Performed by: INTERNAL MEDICINE

## 2022-12-29 PROCEDURE — 93356 MYOCRD STRAIN IMG SPCKL TRCK: CPT

## 2022-12-29 PROCEDURE — 84484 ASSAY OF TROPONIN QUANT: CPT

## 2022-12-29 PROCEDURE — 36415 COLL VENOUS BLD VENIPUNCTURE: CPT

## 2022-12-29 PROCEDURE — 76775 US EXAM ABDO BACK WALL LIM: CPT

## 2022-12-29 PROCEDURE — 78452 HT MUSCLE IMAGE SPECT MULT: CPT

## 2022-12-29 PROCEDURE — 2580000003 HC RX 258: Performed by: STUDENT IN AN ORGANIZED HEALTH CARE EDUCATION/TRAINING PROGRAM

## 2022-12-29 PROCEDURE — 6360000002 HC RX W HCPCS: Performed by: STUDENT IN AN ORGANIZED HEALTH CARE EDUCATION/TRAINING PROGRAM

## 2022-12-29 PROCEDURE — 3430000000 HC RX DIAGNOSTIC RADIOPHARMACEUTICAL: Performed by: STUDENT IN AN ORGANIZED HEALTH CARE EDUCATION/TRAINING PROGRAM

## 2022-12-29 PROCEDURE — 93005 ELECTROCARDIOGRAM TRACING: CPT

## 2022-12-29 PROCEDURE — 93306 TTE W/DOPPLER COMPLETE: CPT

## 2022-12-29 RX ORDER — SODIUM CHLORIDE 0.9 % (FLUSH) 0.9 %
5-40 SYRINGE (ML) INJECTION PRN
Status: DISCONTINUED | OUTPATIENT
Start: 2022-12-29 | End: 2022-12-29 | Stop reason: ALTCHOICE

## 2022-12-29 RX ORDER — POTASSIUM CHLORIDE 20 MEQ/1
40 TABLET, EXTENDED RELEASE ORAL ONCE
Status: COMPLETED | OUTPATIENT
Start: 2022-12-29 | End: 2022-12-29

## 2022-12-29 RX ORDER — TECHNETIUM TC-99M SESTAMIBI 1 MG/10ML
12.6 INJECTION INTRAVENOUS
Status: COMPLETED | OUTPATIENT
Start: 2022-12-29 | End: 2022-12-29

## 2022-12-29 RX ORDER — ATROPINE SULFATE 0.1 MG/ML
0.5 INJECTION INTRAVENOUS EVERY 5 MIN PRN
Status: DISCONTINUED | OUTPATIENT
Start: 2022-12-29 | End: 2022-12-29 | Stop reason: ALTCHOICE

## 2022-12-29 RX ORDER — HEPARIN SODIUM 1000 [USP'U]/ML
4000 INJECTION, SOLUTION INTRAVENOUS; SUBCUTANEOUS ONCE
Status: COMPLETED | OUTPATIENT
Start: 2022-12-29 | End: 2022-12-29

## 2022-12-29 RX ORDER — SODIUM CHLORIDE 0.9 % (FLUSH) 0.9 %
10 SYRINGE (ML) INJECTION PRN
Status: DISCONTINUED | OUTPATIENT
Start: 2022-12-29 | End: 2022-12-31 | Stop reason: HOSPADM

## 2022-12-29 RX ORDER — ATORVASTATIN CALCIUM 40 MG/1
40 TABLET, FILM COATED ORAL DAILY
Status: DISCONTINUED | OUTPATIENT
Start: 2022-12-29 | End: 2022-12-31 | Stop reason: HOSPADM

## 2022-12-29 RX ORDER — AMINOPHYLLINE DIHYDRATE 25 MG/ML
50 INJECTION, SOLUTION INTRAVENOUS PRN
Status: DISCONTINUED | OUTPATIENT
Start: 2022-12-29 | End: 2022-12-29 | Stop reason: ALTCHOICE

## 2022-12-29 RX ORDER — HEPARIN SODIUM AND DEXTROSE 10000; 5 [USP'U]/100ML; G/100ML
5-30 INJECTION INTRAVENOUS CONTINUOUS
Status: DISCONTINUED | OUTPATIENT
Start: 2022-12-29 | End: 2022-12-31

## 2022-12-29 RX ORDER — CARVEDILOL 12.5 MG/1
12.5 TABLET ORAL 2 TIMES DAILY
Status: DISCONTINUED | OUTPATIENT
Start: 2022-12-29 | End: 2022-12-30

## 2022-12-29 RX ORDER — SODIUM CHLORIDE 9 MG/ML
INJECTION, SOLUTION INTRAVENOUS PRN
Status: DISCONTINUED | OUTPATIENT
Start: 2022-12-29 | End: 2022-12-31 | Stop reason: HOSPADM

## 2022-12-29 RX ORDER — NITROGLYCERIN 0.4 MG/1
0.4 TABLET SUBLINGUAL EVERY 5 MIN PRN
Status: DISCONTINUED | OUTPATIENT
Start: 2022-12-29 | End: 2022-12-29 | Stop reason: ALTCHOICE

## 2022-12-29 RX ORDER — SODIUM CHLORIDE 9 MG/ML
500 INJECTION, SOLUTION INTRAVENOUS CONTINUOUS PRN
Status: DISCONTINUED | OUTPATIENT
Start: 2022-12-29 | End: 2022-12-29 | Stop reason: ALTCHOICE

## 2022-12-29 RX ORDER — ASPIRIN 81 MG/1
81 TABLET ORAL DAILY
Status: DISCONTINUED | OUTPATIENT
Start: 2022-12-29 | End: 2022-12-31 | Stop reason: HOSPADM

## 2022-12-29 RX ORDER — HEPARIN SODIUM 1000 [USP'U]/ML
4000 INJECTION, SOLUTION INTRAVENOUS; SUBCUTANEOUS PRN
Status: DISCONTINUED | OUTPATIENT
Start: 2022-12-29 | End: 2022-12-31

## 2022-12-29 RX ORDER — HEPARIN SODIUM 1000 [USP'U]/ML
2000 INJECTION, SOLUTION INTRAVENOUS; SUBCUTANEOUS PRN
Status: DISCONTINUED | OUTPATIENT
Start: 2022-12-29 | End: 2022-12-31

## 2022-12-29 RX ORDER — DOXAZOSIN 2 MG/1
2 TABLET ORAL DAILY
Status: DISCONTINUED | OUTPATIENT
Start: 2022-12-29 | End: 2022-12-31 | Stop reason: HOSPADM

## 2022-12-29 RX ORDER — ONDANSETRON 2 MG/ML
4 INJECTION INTRAMUSCULAR; INTRAVENOUS ONCE
Status: COMPLETED | OUTPATIENT
Start: 2022-12-29 | End: 2022-12-29

## 2022-12-29 RX ORDER — SPIRONOLACTONE 25 MG/1
12.5 TABLET ORAL DAILY
Status: DISCONTINUED | OUTPATIENT
Start: 2022-12-29 | End: 2022-12-31 | Stop reason: HOSPADM

## 2022-12-29 RX ORDER — ONDANSETRON 2 MG/ML
4 INJECTION INTRAMUSCULAR; INTRAVENOUS EVERY 6 HOURS PRN
Status: DISCONTINUED | OUTPATIENT
Start: 2022-12-29 | End: 2022-12-31 | Stop reason: HOSPADM

## 2022-12-29 RX ORDER — ACETAMINOPHEN 325 MG/1
650 TABLET ORAL EVERY 6 HOURS PRN
Status: DISCONTINUED | OUTPATIENT
Start: 2022-12-29 | End: 2022-12-31 | Stop reason: HOSPADM

## 2022-12-29 RX ORDER — ATORVASTATIN CALCIUM 20 MG/1
20 TABLET, FILM COATED ORAL DAILY
Status: DISCONTINUED | OUTPATIENT
Start: 2022-12-29 | End: 2022-12-29

## 2022-12-29 RX ORDER — ACETAMINOPHEN 650 MG/1
650 SUPPOSITORY RECTAL EVERY 6 HOURS PRN
Status: DISCONTINUED | OUTPATIENT
Start: 2022-12-29 | End: 2022-12-31 | Stop reason: HOSPADM

## 2022-12-29 RX ORDER — SODIUM CHLORIDE 0.9 % (FLUSH) 0.9 %
5-40 SYRINGE (ML) INJECTION EVERY 12 HOURS SCHEDULED
Status: DISCONTINUED | OUTPATIENT
Start: 2022-12-29 | End: 2022-12-31 | Stop reason: HOSPADM

## 2022-12-29 RX ORDER — POLYETHYLENE GLYCOL 3350 17 G/17G
17 POWDER, FOR SOLUTION ORAL DAILY PRN
Status: DISCONTINUED | OUTPATIENT
Start: 2022-12-29 | End: 2022-12-31 | Stop reason: HOSPADM

## 2022-12-29 RX ORDER — TECHNETIUM TC-99M SESTAMIBI 1 MG/10ML
48 INJECTION INTRAVENOUS
Status: COMPLETED | OUTPATIENT
Start: 2022-12-29 | End: 2022-12-29

## 2022-12-29 RX ORDER — ALBUTEROL SULFATE 90 UG/1
2 AEROSOL, METERED RESPIRATORY (INHALATION) PRN
Status: DISCONTINUED | OUTPATIENT
Start: 2022-12-29 | End: 2022-12-29 | Stop reason: ALTCHOICE

## 2022-12-29 RX ORDER — ONDANSETRON 4 MG/1
4 TABLET, ORALLY DISINTEGRATING ORAL EVERY 8 HOURS PRN
Status: DISCONTINUED | OUTPATIENT
Start: 2022-12-29 | End: 2022-12-31 | Stop reason: HOSPADM

## 2022-12-29 RX ORDER — ENOXAPARIN SODIUM 100 MG/ML
1 INJECTION SUBCUTANEOUS 2 TIMES DAILY
Status: DISCONTINUED | OUTPATIENT
Start: 2022-12-29 | End: 2022-12-29

## 2022-12-29 RX ORDER — METOPROLOL TARTRATE 5 MG/5ML
5 INJECTION INTRAVENOUS EVERY 5 MIN PRN
Status: DISCONTINUED | OUTPATIENT
Start: 2022-12-29 | End: 2022-12-29 | Stop reason: ALTCHOICE

## 2022-12-29 RX ORDER — HYDRALAZINE HYDROCHLORIDE 50 MG/1
50 TABLET, FILM COATED ORAL 3 TIMES DAILY
Status: DISCONTINUED | OUTPATIENT
Start: 2022-12-29 | End: 2022-12-31 | Stop reason: HOSPADM

## 2022-12-29 RX ADMIN — HEPARIN SODIUM 4000 UNITS: 1000 INJECTION INTRAVENOUS; SUBCUTANEOUS at 02:23

## 2022-12-29 RX ADMIN — HEPARIN SODIUM 2000 UNITS: 1000 INJECTION INTRAVENOUS; SUBCUTANEOUS at 16:26

## 2022-12-29 RX ADMIN — ONDANSETRON 4 MG: 2 INJECTION INTRAMUSCULAR; INTRAVENOUS at 14:44

## 2022-12-29 RX ADMIN — HYDRALAZINE HYDROCHLORIDE 50 MG: 50 TABLET, FILM COATED ORAL at 20:36

## 2022-12-29 RX ADMIN — CARVEDILOL 12.5 MG: 12.5 TABLET, FILM COATED ORAL at 20:36

## 2022-12-29 RX ADMIN — ASPIRIN 81 MG: 81 TABLET, COATED ORAL at 08:54

## 2022-12-29 RX ADMIN — POTASSIUM CHLORIDE 40 MEQ: 1500 TABLET, EXTENDED RELEASE ORAL at 11:13

## 2022-12-29 RX ADMIN — HYDRALAZINE HYDROCHLORIDE 50 MG: 50 TABLET, FILM COATED ORAL at 15:09

## 2022-12-29 RX ADMIN — SODIUM CHLORIDE, PRESERVATIVE FREE 10 ML: 5 INJECTION INTRAVENOUS at 13:22

## 2022-12-29 RX ADMIN — SODIUM CHLORIDE, PRESERVATIVE FREE 10 ML: 5 INJECTION INTRAVENOUS at 11:59

## 2022-12-29 RX ADMIN — SPIRONOLACTONE 12.5 MG: 25 TABLET ORAL at 08:54

## 2022-12-29 RX ADMIN — TETRAKIS(2-METHOXYISOBUTYLISOCYANIDE)COPPER(I) TETRAFLUOROBORATE 12.6 MILLICURIE: 1 INJECTION, POWDER, LYOPHILIZED, FOR SOLUTION INTRAVENOUS at 11:59

## 2022-12-29 RX ADMIN — HYDRALAZINE HYDROCHLORIDE 50 MG: 50 TABLET, FILM COATED ORAL at 09:00

## 2022-12-29 RX ADMIN — TETRAKIS(2-METHOXYISOBUTYLISOCYANIDE)COPPER(I) TETRAFLUOROBORATE 48 MILLICURIE: 1 INJECTION, POWDER, LYOPHILIZED, FOR SOLUTION INTRAVENOUS at 13:22

## 2022-12-29 RX ADMIN — REGADENOSON 0.4 MG: 0.08 INJECTION, SOLUTION INTRAVENOUS at 11:57

## 2022-12-29 RX ADMIN — DESMOPRESSIN ACETATE 40 MG: 0.2 TABLET ORAL at 20:36

## 2022-12-29 RX ADMIN — DOXAZOSIN 2 MG: 2 TABLET ORAL at 08:54

## 2022-12-29 RX ADMIN — HEPARIN SODIUM 10 UNITS/KG/HR: 10000 INJECTION, SOLUTION INTRAVENOUS at 02:25

## 2022-12-29 RX ADMIN — CARVEDILOL 12.5 MG: 12.5 TABLET, FILM COATED ORAL at 08:54

## 2022-12-29 RX ADMIN — SODIUM CHLORIDE, PRESERVATIVE FREE 10 ML: 5 INJECTION INTRAVENOUS at 11:41

## 2022-12-29 RX ADMIN — ONDANSETRON 4 MG: 2 INJECTION INTRAMUSCULAR; INTRAVENOUS at 01:18

## 2022-12-29 RX ADMIN — HEPARIN SODIUM 12 UNITS/KG/HR: 10000 INJECTION, SOLUTION INTRAVENOUS at 16:29

## 2022-12-29 ASSESSMENT — PAIN SCALES - GENERAL: PAINLEVEL_OUTOF10: 3

## 2022-12-29 NOTE — PROGRESS NOTES
Pt  was in [employer] completing his final resting scan for stress test. Ya Fitzpatrick , nuclear med tech, called the [facility] asking for RN assistance with pt as pt became nauseated, diaphoretic, and vomited in [employer] med. Pt /84, HR sinus tanika at 58 bpm. Oxygen sat 98 percent. Pt refused to have 12 lead EKG completed. Notified Dr. Burleigh Bernheim of above. Dr Clara Oneill scrubbed in cath lab and unavailable. Dr Orly Kumar states she could come down to nuc med in about 15 min but to call Dr Manuel Narayan to see if he can evaluate pt.   1440 : Dr Manuel Narayan came to evaluate pt. Pt still refusing 12 lead EKG. Pt denies chest pain but states \" my stomach hurts, I don't feel good\". Instructed  per Dr Israel Starkey to give pt 4 mg IV Zofran. Nuc med tech states will ask radiology for stat read of stress scan and Dr Israel Starkey states Dr Clara Oneill will read the EKG reports. Gina Mullen RN from ER states pt needs to be back for timed lab tests and other testing. 1450 : Writer took pt back to ER on monitor per stretcher accompanied by pt transport and notified RN of all of above.

## 2022-12-29 NOTE — ED NOTES
The following labs were labeled with patient stickers & tubed to lab;    []Lavender   []On Ice  [x]Blue  []Green/ Yellow  []Green/ Black []On Ice  []Pink  []Red  []Yellow    []COVID-19 Swab []Rapid  []COVID-19 Swab []PCR    []Urine Sample  []Pelvic Cultures    []Blood Cultures     John Gay RN  12/29/22 [id number]

## 2022-12-29 NOTE — CONSULTS
Attestation signed by      Attending Physician Statement:    I have discussed the care of  Diya Layton , including pertinent history and exam findings, with the Cardiology fellow/resident. I have seen and examined the patient and the key elements of all parts of the encounter have been performed by me. I agree with the assessment, plan and orders as documented by the fellow/resident, after I modified exam findings and plan of treatments, and the final version is my approved version of the assessment. Additional Comments:     Patient with hx of mild nonischemic CMP and CKD, presented with atrial fibrillation with RVR, new onset, spontaneously converted to NSR with rate control, currently denies any chest pain, dyspnea or palpitations. BP is normal. HS troponin's significantly elevated with flat trend. LHC last year showed minimal CAD. Continue coreg. Add low dose cardizem. Continue heparin, will need DOAC on discharge. Repeat echocardiogram. Pharmacological stress test for further risk stratification. [facility]    Consult / H&P               Today's Date: 12/29/2022  Patient Name: Diya Layton  Date of admission: 12/28/2022 10:51 PM  Patient's age: 59 y.o., 1965  Admission Dx: Atrial fibrillation with RVR ([address] [I48.91]    Reason for Consult:  Cardiac evaluation    Requesting Physician: No admitting provider for patient encounter. CHIEF COMPLAINT:  Afib    History Obtained From:  patient    HISTORY OF PRESENT ILLNESS:      The patient is a 59 y.o.  male who is admitted to the hospital for new onset atrial fibrillation. Patient that while he was moving furniture yesterday, he started to have dizziness, shortness of breath, palpitations and chest pain. He said that chest pain was mild, left-sided and does not radiate. EKG showed A. fib with RVR with T wave inversions. Troponins are elevated. Patient had a cardiac cath in 3/21 which showed mild CAD. Echo on 8/20 showed EF of 40%. Past Medical History:   has a past medical history of Benign essential HTN, CKD (chronic kidney disease), stage III ([address] and Enlarged heart. Past Surgical History:   has a past surgical history that includes Tonsillectomy; Cardiac catheterization (Right, 03/2021); Upper gastrointestinal endoscopy (N/A, 4/14/2021); and Colonoscopy (N/A, 4/14/2021). Home Medications:    Prior to Admission medications    Medication Sig Start Date End Date Taking?  Authorizing Provider   atorvastatin (LIPITOR) 20 MG tablet take 1 tablet by mouth once daily 6/6/22   Baljinder Loera, DO   amLODIPine (NORVASC) 10 MG tablet take 1 tablet by mouth once daily 4/8/22   Baljinder Loera, DO   doxazosin (CARDURA) 2 MG tablet take 1 tablet by mouth once daily 12/13/21   Baljinder Loera, DO   spironolactone (ALDACTONE) 25 MG tablet Take 0.5 tablets by mouth daily 9/21/21   Baljinder Loera, DO   carvedilol (COREG) 25 MG tablet Take 0.5 tablets by mouth 2 times daily 9/21/21   Baljinder Loera, DO   hydrALAZINE (APRESOLINE) 50 MG tablet Take 1 tablet by mouth 3 times daily 9/21/21   Baljinder Loera, DO   albuterol sulfate HFA (PROVENTIL HFA) 108 (90 Base) MCG/ACT inhaler Inhale 2 puffs into the lungs every 6 hours as needed for Wheezing 6/29/21   Baljinder Loera, DO   Blood Pressure Monitoring (ADULT BLOOD PRESSURE CUFF LG) KIT Check blood pressure daily 3/16/21   Baljinder Loera, DO   VITAMIN D, CHOLECALCIFEROL, PO Take by mouth daily    Historical Provider, MD   aspirin 81 MG EC tablet Take 81 mg by mouth daily    Historical Provider, MD      Current Facility-Administered Medications: aspirin EC tablet 81 mg, 81 mg, Oral, Daily  carvedilol (COREG) tablet 12.5 mg, 12.5 mg, Oral, BID  doxazosin (CARDURA) tablet 2 mg, 2 mg, Oral, Daily  hydrALAZINE (APRESOLINE) tablet 50 mg, 50 mg, Oral, TID  spironolactone (ALDACTONE) tablet 12.5 mg, 12.5 mg, Oral, Daily  sodium chloride flush 0.9 % injection 5-40 mL, 5-40 mL, IntraVENous, 2 times per day  sodium chloride flush 0.9 % injection 10 mL, 10 mL, IntraVENous, PRN  0.9 % sodium chloride infusion, , IntraVENous, PRN  ondansetron (ZOFRAN-ODT) disintegrating tablet 4 mg, 4 mg, Oral, Q8H PRN **OR** ondansetron (ZOFRAN) injection 4 mg, 4 mg, IntraVENous, Q6H PRN  polyethylene glycol (GLYCOLAX) packet 17 g, 17 g, Oral, Daily PRN  acetaminophen (TYLENOL) tablet 650 mg, 650 mg, Oral, Q6H PRN **OR** acetaminophen (TYLENOL) suppository 650 mg, 650 mg, Rectal, Q6H PRN  heparin (porcine) injection 4,000 Units, 4,000 Units, IntraVENous, PRN  heparin (porcine) injection 2,000 Units, 2,000 Units, IntraVENous, PRN  heparin 25,000 units in dextrose 5 % 250 mL infusion (rate based), 5-30 Units/kg/hr, IntraVENous, Continuous  atorvastatin (LIPITOR) tablet 40 mg, 40 mg, Oral, Daily  [COMPLETED] dilTIAZem injection 10 mg, 10 mg, IntraVENous, Once **FOLLOWED BY** dilTIAZem 125 mg in dextrose 5 % 125 mL infusion, 2.5-15 mg/hr, IntraVENous, Continuous    Allergies:  Patient has no known allergies. Social History:   reports that he has been smoking cigarettes. He has been smoking an average of 1 pack per day. He has quit using smokeless tobacco. He reports current alcohol use. He reports that he does not use drugs. Family History: family history includes Hypertension in his father and mother. No h/o sudden cardiac death. No for premature CAD    REVIEW OF SYSTEMS:    Constitutional: there has been no unanticipated weight loss. There's been No change in energy level, No change in activity level. Eyes: No visual changes or diplopia. No scleral icterus. ENT: No Headaches  Cardiovascular: Chest pain, SOB, dizziness, palpitations   Respiratory: No previous pulmonary problems, No cough  Gastrointestinal: No abdominal pain. No change in bowel or bladder habits. Genitourinary: No dysuria, trouble voiding, or hematuria.   Musculoskeletal:  No gait disturbance, No weakness or joint complaints. Integumentary: No rash or pruritis. Neurological: No headache, diplopia, change in muscle strength, numbness or tingling. No change in gait, balance, coordination, mood, affect, memory, mentation, behavior. Psychiatric: No anxiety, or depression. Endocrine: No temperature intolerance. No excessive thirst, fluid intake, or urination. No tremor. Hematologic/Lymphatic: No abnormal bruising or bleeding, blood clots or swollen lymph nodes. Allergic/Immunologic: No nasal congestion or hives. PHYSICAL EXAM:      BP (!) 132/98   Pulse 94   Temp 97.9 °F (36.6 °C) (Oral)   Resp 25   Ht 5' 10\" (1.778 m)   Wt 205 lb (93 kg)   SpO2 95%   BMI 29.41 kg/m²    Constitutional and General Appearance: alert, cooperative, no distress and appears stated age  HEENT: PERRL, no cervical lymphadenopathy. No masses palpable. Normal oral mucosa  Respiratory:  Normal excursion and expansion without use of accessory muscles  Resp Auscultation: Good respiratory effort. No for increased work of breathing. On auscultation: clear to auscultation bilaterally  Cardiovascular: The apical impulse is not displaced  Normal rate. Irregular rhythm   Jugular venous pulsation Normal  The carotid upstroke is normal in amplitude and contour without delay or bruit  Peripheral pulses are symmetrical and full   Abdomen:   No masses or tenderness  Bowel sounds present  Extremities:   No Cyanosis or Clubbing   Lower extremity edema: No   Skin: Warm and dry  Neurological:  Alert and oriented. Moves all extremities well  No abnormalities of mood, affect, memory, mentation, or behavior are noted    DATA:    Diagnostics:    EKG: Afib with RVR, T wave changes   ECHO: not obtained. Stress Test: not obtained. Cardiac Angiography: not obtained.     Labs:     CBC:   Recent Labs     12/28/22  2311   WBC 4.9   HGB 12.7*   HCT 39.3*        BMP:   Recent Labs     12/28/22  2311      K 3.6*   CO2 23   BUN 26*   CREATININE 2.25* LABGLOM 33*   GLUCOSE 112*     BNP: No results for input(s): BNP in the last 72 hours. PT/INR: No results for input(s): PROTIME, INR in the last 72 hours. APTT:  Recent Labs     12/29/22  0142   APTT 26.0     CARDIAC ENZYMES:No results for input(s): CKTOTAL, CKMB, CKMBINDEX, TROPONINI in the last 72 hours. FASTING LIPID PANEL:  Lab Results   Component Value Date/Time    HDL 53 12/17/2020 01:39 PM    TRIG 172 12/17/2020 01:39 PM     LIVER PROFILE:No results for input(s): AST, ALT, LABALBU in the last 72 hours. IMPRESSION:    New onset Afib with RVR, now in SR   Hypertension   Elevated troponin       RECOMMENDATIONS:  Patient is converted to sinus rhythm   Stress test today  Echo  Keep K> 4, Mg > 2      Discussed with patient and Nurse.     Electronically signed by Wale Corona MD on 12/29/2022 at 7:32 [address] [place] [address] [facility]      [phone]

## 2022-12-29 NOTE — ED PROVIDER NOTES
[facility]  ED  Emergency Department Encounter  Emergency Medicine Resident     Pt Cristopher Rivera  MRN: [id number]  [place] 1965  Date of evaluation: 12/28/22  PCP:  No primary care provider on file. CHIEF COMPLAINT       No chief complaint on file. Chest pain    HISTORY OF PRESENT ILLNESS  (Location/Symptom, Timing/Onset, Context/Setting, Quality, Duration, Modifying Factors, Severity.)      Tiana Jj is a 59 y.o. male who presents with chest pain/chest tightness without radiation that started this afternoon/evening. Patient states that he was helping move a heavy piece of furniture for his mother when he experiences pain. Patient has a history of an enlarged heart and essential hypertension. Per wife at bedside the patient has poor medication compliance and takes his prescriptions intermittently. He did take all of his medications prior to coming to the hospital.    Patient reports that he drank approximately half pint of liquor this afternoon while playing chess. He smokes cigarettes intermittently. Denies any illicit narcotic usage. PAST MEDICAL / SURGICAL / SOCIAL / FAMILY HISTORY      has a past medical history of Benign essential HTN, CKD (chronic kidney disease), stage III ([address] and Enlarged heart.       has a past surgical history that includes Tonsillectomy; Cardiac catheterization (Right, 03/2021); Upper gastrointestinal endoscopy (N/A, 4/14/2021); and Colonoscopy (N/A, 4/14/2021).       Social History     Socioeconomic History    Marital status:      Spouse name: Not on file    Number of children: Not on file    Years of education: Not on file    Highest education level: Not on file   Occupational History    Not on file   Tobacco Use    Smoking status: Light Smoker     Packs/day: 1.00     Types: Cigarettes    Smokeless tobacco: Former    Tobacco comments:     1-2 per week   Vaping Use    Vaping Use: Never used   Substance and Sexual Activity    Alcohol use: Yes     Comment: socially    Drug use: No    Sexual activity: Not on file   Other Topics Concern    Not on file   Social History Narrative    Not on file     Social Determinants of Health     Financial Resource Strain: Low Risk     Difficulty of Paying Living Expenses: Not hard at all   Food Insecurity: No Food Insecurity    Worried About Running Out of Food in the Last Year: Never true    [place] of Food in the Last Year: Never true   Transportation Needs: No Transportation Needs    Lack of Transportation (Medical): No    Lack of Transportation (Non-Medical): No   Physical Activity: Not on file   Stress: Not on file   Social Connections: Not on file   Intimate Partner Violence: Not on file   Housing Stability: Not on file       Family History   Problem Relation Age of Onset    Hypertension Mother     Hypertension Father        Allergies:  Patient has no known allergies. Home Medications:  Prior to Admission medications    Medication Sig Start Date End Date Taking?  Authorizing Provider   atorvastatin (LIPITOR) 20 MG tablet take 1 tablet by mouth once daily 6/6/22   Sheran Maxcy, DO   amLODIPine (NORVASC) 10 MG tablet take 1 tablet by mouth once daily 4/8/22   Sheran Maxcy, DO   doxazosin (CARDURA) 2 MG tablet take 1 tablet by mouth once daily 12/13/21   Sheran Maxcy, DO   spironolactone (ALDACTONE) 25 MG tablet Take 0.5 tablets by mouth daily 9/21/21   Sheran Maxcy, DO   carvedilol (COREG) 25 MG tablet Take 0.5 tablets by mouth 2 times daily 9/21/21   Sheran Maxcy, DO   hydrALAZINE (APRESOLINE) 50 MG tablet Take 1 tablet by mouth 3 times daily 9/21/21   Sheran Maxcy, DO   albuterol sulfate HFA (PROVENTIL HFA) 108 (90 Base) MCG/ACT inhaler Inhale 2 puffs into the lungs every 6 hours as needed for Wheezing 6/29/21   Sheran Maxcy, DO   Blood Pressure Monitoring (ADULT BLOOD PRESSURE CUFF LG) KIT Check blood pressure daily 3/16/21   Sheran Maxcy, DO   VITAMIN D, CHOLECALCIFEROL, PO Take by mouth daily    Historical Provider, MD   aspirin 81 MG EC tablet Take 81 mg by mouth daily    Historical Provider, MD       REVIEW OF SYSTEMS    (2-9 systems for level 4, 10 or more for level 5)      Review of Systems   Constitutional:  Negative for activity change, chills, diaphoresis, fatigue and fever. HENT:  Negative for congestion, ear pain, rhinorrhea and sinus pain. Eyes:  Negative for pain and visual disturbance. Respiratory:  Positive for chest tightness. Negative for cough, choking and shortness of breath. Cardiovascular:  Positive for chest pain. Gastrointestinal:  Negative for abdominal pain, constipation, diarrhea, nausea and vomiting. Endocrine: Negative for polyuria. Genitourinary:  Negative for dysuria, frequency and urgency. Musculoskeletal:  Negative for arthralgias, back pain, joint swelling and neck stiffness. Skin:  Negative for color change, pallor and wound. Neurological:  Positive for dizziness. Negative for weakness, light-headedness, numbness and headaches. Psychiatric/Behavioral:  Negative for confusion. The patient is not nervous/anxious. PHYSICAL EXAM   (up to 7 for level 4, 8 or more for level 5)      INITIAL VITALS:   BP (!) 135/95   Pulse (!) 115   Temp 97.9 °F (36.6 °C) (Oral)   Resp 20   Ht 5' 10\" (1.778 m)   Wt 205 lb (93 kg)   SpO2 92%   BMI 29.41 kg/m²     Physical Exam  Vitals and nursing note reviewed. Constitutional:       General: He is not in acute distress. Appearance: Normal appearance. He is normal weight. He is not ill-appearing, toxic-appearing or diaphoretic. HENT:      Head: Normocephalic and atraumatic. Right Ear: External ear normal.      Left Ear: External ear normal.      Nose: Nose normal.      Mouth/Throat:      Mouth: Mucous membranes are moist.      Pharynx: Oropharynx is clear. No oropharyngeal exudate or posterior oropharyngeal erythema. Eyes:      General: No scleral icterus. Right eye: No discharge. Left eye: No discharge. Extraocular Movements: Extraocular movements intact. Conjunctiva/sclera: Conjunctivae normal.      Pupils: Pupils are equal, round, and reactive to light. Cardiovascular:      Rate and Rhythm: Tachycardia present. Rhythm irregular. Pulses: Normal pulses. Heart sounds: No murmur heard. No friction rub. No gallop. Pulmonary:      Effort: Pulmonary effort is normal. No respiratory distress. Breath sounds: Normal breath sounds. No stridor. No wheezing. Abdominal:      General: Abdomen is flat. There is no distension. Palpations: Abdomen is soft. Tenderness: There is no abdominal tenderness. There is no guarding. Musculoskeletal:         General: No swelling or tenderness. Normal range of motion. Cervical back: Normal range of motion. No rigidity. Right lower leg: No edema. Left lower leg: No edema. Skin:     General: Skin is warm and dry. Capillary Refill: Capillary refill takes less than 2 seconds. Coloration: Skin is not jaundiced. Findings: No bruising. Neurological:      General: No focal deficit present. Mental Status: He is alert and oriented to person, place, and time. Psychiatric:         Mood and Affect: Mood normal.         Behavior: Behavior normal.         Thought Content:  Thought content normal.         Judgment: Judgment normal.       DIFFERENTIAL  DIAGNOSIS     PLAN (LABS / IMAGING / EKG):  Orders Placed This Encounter   Procedures    XR CHEST PORTABLE    Basic Metabolic Panel    CBC with Auto Differential    Brain Natriuretic Peptide    Troponin    Troponin    APTT    Inpatient consult to Hospitalist    Inpatient consult to Cardiology    Inpatient consult to Cardiology    EKG 12 Lead    ADMIT TO INPATIENT       MEDICATIONS ORDERED:  Orders Placed This Encounter   Medications    FOLLOWED BY Linked Order Group     dilTIAZem injection 10 mg     dilTIAZem 125 mg in dextrose 5 % 125 mL infusion      Order Specific Question:   Titrate Infusion? Answer:   Yes      Order Specific Question:   Initial Infusion Dose: Answer:   5 mg/hr      Order Specific Question:   Goal of Therapy is:       Answer:   HR less than 100 bpm      Order Specific Question:   Contact Provider if:      Answer:   SBP less than 90 mmHg      Order Specific Question:   Contact Provider if:      Answer:   HR less than 60 bpm      Order Specific Question:   HOLD for      Answer:   SBP less than 90 mmHg      Order Specific Question:   HOLD for      Answer:   HR less than 60 bpm    ondansetron (ZOFRAN) injection 4 mg    heparin (porcine) injection 4,000 Units    heparin (porcine) injection 4,000 Units    heparin (porcine) injection 2,000 Units    heparin 25,000 units in dextrose 5 % 250 mL infusion (rate based)       DDX: Atrial fibrillation, A. fib with RVR, dehydration, hypertensive medication noncompliance, cardiac ischemia, myocardial infarction    DIAGNOSTIC RESULTS / EMERGENCY DEPARTMENT COURSE / MDM   LAB RESULTS:  Results for orders placed or performed during the hospital encounter of 55/99/84   Basic Metabolic Panel   Result Value Ref Range    Glucose 112 (H) 70 - 99 mg/dL    BUN 26 (H) 6 - 20 mg/dL    Creatinine 2.25 (H) 0.70 - 1.20 mg/dL    Est, Glom Filt Rate 33 (L) >60 mL/min/1.73m2    Calcium 8.6 8.6 - 10.4 mg/dL    Sodium 139 135 - 144 mmol/L    Potassium 3.6 (L) 3.7 - 5.3 mmol/L    Chloride 103 98 - 107 mmol/L    CO2 23 20 - 31 mmol/L    Anion Gap 13 9 - 17 mmol/L   CBC with Auto Differential   Result Value Ref Range    WBC 4.9 3.5 - 11.3 k/uL    RBC 4.58 4.21 - 5.77 m/uL    Hemoglobin 12.7 (L) 13.0 - 17.0 g/dL    Hematocrit 39.3 (L) 40.7 - 50.3 %    MCV 85.8 82.6 - 102.9 fL    MCH 27.7 25.2 - 33.5 pg    MCHC 32.3 28.4 - 34.8 g/dL    RDW 13.4 11.8 - 14.4 %    Platelets 400 761 - 607 k/uL    MPV 12.0 8.1 - 13.5 fL    NRBC Automated 0.0 0.0 per 100 WBC    Seg Neutrophils 67 (H) 36 - 65 %    Lymphocytes 26 24 - 43 %    Monocytes 6 3 - 12 %    Eosinophils % 1 1 - 4 %    Basophils 0 0 - 2 %    Immature Granulocytes 0 0 %    Segs Absolute 3.17 1.50 - 8.10 k/uL    Absolute Lymph # 1.27 1.10 - 3.70 k/uL    Absolute Mono # 0.30 0.10 - 1.20 k/uL    Absolute Eos # 0.07 0.00 - 0.44 k/uL    Basophils Absolute <0.03 0.00 - 0.20 k/uL    Absolute Immature Granulocyte <0.03 0.00 - 0.30 k/uL   Brain Natriuretic Peptide   Result Value Ref Range    Pro-BNP 1,380 (H) <300 pg/mL   Troponin   Result Value Ref Range    Troponin, High Sensitivity 122 (HH) 0 - 22 ng/L   Troponin   Result Value Ref Range    Troponin, High Sensitivity 165 (HH) 0 - 22 ng/L   APTT   Result Value Ref Range    PTT 26.0 20.5 - 30.5 sec   EKG 12 Lead   Result Value Ref Range    Ventricular Rate 118 BPM    Atrial Rate 120 BPM    QRS Duration 108 ms    Q-T Interval 316 ms    QTc Calculation (Bazett) 442 ms    R Axis -30 degrees    T Axis 171 degrees       IMPRESSION: Laboratory results indicate that the patient had recent myocardial ischemia due to elevated troponins continue to trend upward as well as elevated BNP, which is indicative of increased heart distress likely secondary to poor medication compliance. RADIOLOGY:  XR CHEST PORTABLE   Final Result   Cardiomegaly. Mild pulmonary vascular congestion. EKG  Atrial fibrillation with rapid ventricular response    All EKG's are interpreted by the Emergency Department Physician who either signs or Co-signs this chart in the absence of a cardiologist.    EMERGENCY DEPARTMENT COURSE:      ED Course as of 12/29/22 0230   Wed Dec 28, 2022   2300 Patient assessed at bedside by resident. History and physical performed. Patient discussed with attending. Will order cardiac work-up: Chest x-ray, troponin x2, BNP, CMP, BNP. EKG shows atrial fibrillation. EKG assessed by attending and resident.   Cardizem bolus of 10 mg with titratable infusion ordered to chemically cardiovert patient. [AS]   2329 Diltiazem bolus 10 mg given at 2322. [AS]   2294 Chest x-ray done at bedside. Resident reviewed imaging; x-ray consistent with patient's history of cardiomegaly. No other obvious acute cardiopulmonary issues seen. Pending final read from radiology. Patient's heart rate during reassessment fluctuated between 99 and 115. [AS]   9107 Attending assessed patient and had discussion with him and wife at bedside. Patient will need to be admitted under the medicine service due to new onset Afib with RVR on a diltiazem drip. [AS]   Thu Dec 29, 2022   0001 Pending cardiac labs and 1 troponin result before [facility] consult placed. [AS]   0025 [facility] consulted follow results of cardiac labs. We will also consult cardiology for elevated troponin level of 122. Pending second troponin. BNP 1380. We will also reorder EKG now that patient has been on diltiazem drip. [AS]   5275 Discussed patient with [facility] resident. We will follow-up with [facility] resident regarding cardiology recommendations. [facility] accepting patient. [AS]   9897 Discussed patient with cardiology resident. They recommend that the patient be started on heparin due to A. fib with RVR in case they need to electrocardiovert him [AS]   Buck Joseph 9022 staff reports the patient is feeling nauseous. Ordered one-time dose of IV Zofran 4 mg.  on EKG. [AS]   0133 Low-dose heparin order set placed. [AS]   [id number] Nursing staff reports patient's heart rate less than 100. Instructed nursing staff to titrate Cardizem drip to a lower dose. [AS]   0230 Patient currently boarding in the ED. [AS]      ED Course User Index  [AS] Francine Ricci, DO       No notes of EC Admission Criteria type on file. PROCEDURES:  None    CONSULTS:  IP CONSULT TO HOSPITALIST  IP CONSULT TO CARDIOLOGY  IP CONSULT TO CARDIOLOGY    CRITICAL CARE:  None    FINAL IMPRESSION      1.  New onset a-fib ([address]          DISPOSITION / PLAN DISPOSITION Admitted 12/29/2022 12:43:20 AM      PATIENT REFERRED TO:  No follow-up provider specified.     DISCHARGE MEDICATIONS:  New Prescriptions    No medications on file       David Chamorro DO  Emergency Medicine Resident    (Please note that portions of thisnote were completed with a voice recognition program.  Efforts were made to edit the dictations but occasionally words are mis-transcribed.)       Margareth Flynn DO  Resident  12/29/22 0237

## 2022-12-29 NOTE — PROCEDURES
[address]                  [facility] [address]                              CARDIAC STRESS TEST    PATIENT NAME: Rufino Mai                     :        1965  MED REC NO:   [id number]                             ROOM:       21  ACCOUNT NO:   [de-identified]                           ADMIT DATE: 2022  PROVIDER:     Santos Roman    DATE OF STUDY:  2022    ORDERING PROVIDER:  Jadon Blevins MD    PRIMARY CARE PROVIDER:  None specified    INTERPRETING PHYSICIAN:  Santos Roman MD    LEXISCAN STRESS STUDY:  Indication:  chest pain    Procedure was explained and consent form was signed    Medications:  Lexiscan, 0.4 mg  Resting heart rate:  60 bpm  Resting blood pressure:  143/88 mm/Hg  Infusion heart rate:  78 bpm  Infusion blood pressure:  143/88 mm/Hg  Resting EKG:  Abnormal (normal sinus rhythm, T wave abnormality in  inferolateral leads suggestive of ischemia)  Stress heart response:  Normal response  Stress BP response:  Appropriate  Stress EKG(S):  No changes seen  Chest discomfort:  Pain during stress, and post exercise  Ischemic EKG changes:  Uninterpretable    IMPRESSION:  Electrocardiographically uninterpretable Lexiscan stress study. Radio-isotope results to follow from the department of Nuclear Medicine.         [facility]    D: 2022 15:26:52       T: 2022 15:28:15     JESSA/ESTEFANY  Job#: [id number]     Doc#: Unknown    CC:    ()

## 2022-12-29 NOTE — ED NOTES
The following labs were labeled with appropriate pt sticker and tubed to lab:     [x] Blue     [x] Lavender   [] on ice  [x] Green/yellow  [] Green/black [] on ice  [] Isacc Paredes  [] on ice  [] Yellow  [x] Red  [] Type/ Screen  [] ABG  [] VBG    [] COVID-19 swab    [] Rapid  [] PCR  [] Flu swab  [] Peds Viral Panel     [] Urine Sample  [] Fecal Sample  [] Pelvic Cultures  [] Blood Cultures  [] X 2  [] STREP Cultures         Neto Guevara RN  12/28/22 5647

## 2022-12-29 NOTE — ED NOTES
Labeled blood specimens sent to lab via tube system.     [] Lavender   [] on ice   [x] Blue   [] Green/yellow  [] Green/black [] on ice  [] Pink  [] Red  [] Yellow  [] Blood Cultures        Netta Christianson RN  12/29/22 7154

## 2022-12-29 NOTE — ED NOTES
The following labs were labeled with appropriate pt sticker and tubed to lab:     [] Blue     [] Lavender   [] on ice  [x] Green/yellow  [] Green/black [] on ice  [] Nano Greener  [] on ice  [] Yellow  [] Red  [] Type/ Screen  [] ABG  [] VBG    [] COVID-19 swab    [] Rapid  [] PCR  [] Flu swab  [] Peds Viral Panel     [] Urine Sample  [] Fecal Sample  [] Pelvic Cultures  [] Blood Cultures  [] X 2  [] STREP Cultures         Natanael Cagle RN  12/29/22 0151

## 2022-12-29 NOTE — PROGRESS NOTES
Lexiscan stress test ordered for pt by Burleigh Bernheim MD, cardiology fellow. Pt has elevated trops of 280, 270, today and , K+  of 3.6.  on 12/28/22. Notified Zulay Bro CNP of above labs and that earlier cardiology note from this AM said plan for cardiac cath. Tonyaanita Garces checked with Burleigh Bernheim MD and she states Dr Orly Kumar  want stress test to be done today, not cardiac cath. Order received to have ER  cover pt's last potassium of 3.6 mmol/L with 40 meq KCL today prior to sending pt to stress lab. Writer spoke with ER CHESTER Mcelroy, and notified her of above.

## 2022-12-29 NOTE — ED NOTES
Critical troponin at 277 ng/L. Cardiology and admitting doctor notified no new orders made.             Conner Vargas RN  12/29/22 0690

## 2022-12-29 NOTE — PROGRESS NOTES
Patient admit to Car2 from ER on Heparin gtts 10units/kg/hr. Patient orient to room, unit and call light. Assessment performed. Patient on telemetry, all vitals taken. Will continue to monitor.

## 2022-12-29 NOTE — H&P
[facility]  Office: [address], DO, Andrew Chapin, DO, Alton Warner, DO, Kaylin Perry Blood, DO, Avani Newell MD, Jj Braswell MD, Chasity Lind MD, William Desouza MD,  Jacky Sorensen MD, Augusto Guerrero MD, Lv Neumann, DO, Veronika Hair MD,  Shivam Griffin MD, Elisha Lu MD, Panfilo Ohara DO, Rajwinder Silvestre MD, Thierry Agustin MD, Mayelin Lomas, DO, Arianna Felix MD, Collette Hahn, MD, Cam Horton MD, Jacqui Riddle MD, Lee Ann Ma, DO, Harish Montes MD, Alin Mora MD, Andrew Oliveros, CNP,  Perez Barron, CNP, Kirt Razo, CNP, Jerome Parrish, CNP,  Antwan Russell, [facility], Shree Hurley, CNP, Chapo Guerin, CNP, Eliana Garcia, CNP, Mara Warren, CNP, Stacey Polk, CNP, Wilmar Wadsworth PA-C, Doreen Garcia, CNS, Deidre Ledesma, CNP, Joanna Rojas, [facility]         [address] [facility]    HISTORY AND PHYSICAL EXAMINATION            Date:   12/29/2022  Patient name:  James Pruitt  Date of admission:  12/28/2022 10:51 PM  MRN:   [id number]  Account:  [de-identified]  YOB: 1965  PCP:    No primary care provider on file. Room:   [facility]  Code Status:    Full Code    Chief Complaint:     Chief Complaint   Patient presents with    Chest Pain   \" Chest was hurting\"    History Obtained From:     patient and wife    History of Present Illness:     James Pruitt is a 59 y.o. male with history of nonischemic cardiomyopathy, EF 40%, hypertensive heart disease, Little's esophagitis with prior esophageal stricture, tobacco abuse, CKD 3B who presents with Chest Pain   and is admitted to the hospital for the management of Atrial fibrillation with RVR ([facility] [address] Patient describes abrupt onset of chest pain, left-sided mid sternum, without radiation, sharp quality 5 out of 10. Symptoms lasted approximately 2-1/2 to 3 hours.   + Associated shortness of breath difficulty breathing, felt acutely hot, near syncope with dizziness, felt acutely il with symptomatic palpitations, tachycardia .  symptoms began while attempting to move his mom's hospital bed. Patient admits that he had been drinking and smoking, thinks \"maybe I drink a little bit too much\". Patient and wife initially argued regarding quantity of alcohol patient consumes, patient states he only drank a few beers, wife rolled eyes and started yelling at patient, confronting patient regarding heavier alcohol use. Patient presented to the ED and was found to have A. fib with RVR, labs with elevated troponin. Patient denies prior issues with chest pain. Denies PND orthopnea worsening lower extremity edema or CHF issues. Denies medication changes. Denies fevers or chills or URI symptoms. Denies history of sleep apnea but wife suspects he probably has it with prolonged snoring and apneic episodes. No prior testing. Denies prior issues with A. Fib palpitations or tachycardia. Denies recent travel. Denies prior DVT PE. Past Medical History:     Past Medical History:   Diagnosis Date    Benign essential HTN     CKD (chronic kidney disease), stage III ([facility] [address] 9/29/2020    Secondary to suspected ischemic nephrosclerosis baseline creatinine 1.5-1.9    Enlarged heart         Past Surgical History:     Past Surgical History:   Procedure Laterality Date    CARDIAC CATHETERIZATION Right 03/2021    no blockage, no stenting. COLONOSCOPY N/A 4/14/2021    COLONOSCOPY WITH BIOPSY performed by Nohemi Peabody, MD at [address] [place] [address] N/A 4/14/2021    EGD BIOPSY, ESOPHAGEAL DILATION performed by Nohemi Peabody, MD at [facility] END[facility]        Medications Prior to Admission:     Prior to Admission medications    Medication Sig Start Date End Date Taking?  Authorizing Provider   atorvastatin (LIPITOR) 20 MG tablet take 1 tablet by mouth once daily 6/6/22   Tristan Night, DO   amLODIPine (NORVASC) 10 MG tablet take 1 tablet by mouth once daily 4/8/22   Roel Landis, DO   doxazosin (CARDURA) 2 MG tablet take 1 tablet by mouth once daily 12/13/21   Roel Landis, DO   spironolactone (ALDACTONE) 25 MG tablet Take 0.5 tablets by mouth daily 9/21/21   Roel Crericardo, DO   carvedilol (COREG) 25 MG tablet Take 0.5 tablets by mouth 2 times daily 9/21/21   Roel Crericardo, DO   hydrALAZINE (APRESOLINE) 50 MG tablet Take 1 tablet by mouth 3 times daily 9/21/21   Roel Crericardo, DO   albuterol sulfate HFA (PROVENTIL HFA) 108 (90 Base) MCG/ACT inhaler Inhale 2 puffs into the lungs every 6 hours as needed for Wheezing 6/29/21   Roel Landis, DO   Blood Pressure Monitoring (ADULT BLOOD PRESSURE CUFF LG) KIT Check blood pressure daily 3/16/21   Roel Landis, DO   VITAMIN D, CHOLECALCIFEROL, PO Take by mouth daily    Historical Provider, MD   aspirin 81 MG EC tablet Take 81 mg by mouth daily    Historical Provider, MD        Allergies:     Patient has no known allergies. Social History:     Tobacco:    reports that he has been smoking cigarettes. He has been smoking an average of 1 pack per day. He has quit using smokeless tobacco.  Alcohol:      reports current alcohol use. Drug Use:  reports no history of drug use. Smokes half pack per day, drinks excessively, 2-3 times a week, previously would drink up to a pint to 1/5 of liquor in 2 to 3 days, recently has \"cut down\" but is vague regarding ongoing quantity of alcohol. Patient states \"if I drink a Heineken or beer does not count the same as alcohol? \". Denies illegal drugs. Family History:     Family History   Problem Relation Age of Onset    Hypertension Mother     Hypertension Father    Denies family history of premature coronary disease or stroke. Does have family history of high blood pressure    Review of Systems:     Positive and Negative as described in HPI. Review of Systems  Patient does describe intermittent muscle cramping particularly at night.   Denies recent falls or injuries. Denies lumbago. Denies headaches. Denies vision changes, acute blurry vision. Denies history of stroke, TIA. Denies diabetes. Denies prior DVT PE. Denies URI symptoms cough congestion or exposure to COVID-19, was vaccinated. Previously did have episodes of choking with dysphagia that has improved, denies any recent issues. Denies worsening indigestion heartburn. Denies history of rectal bleeding melanotic stool or history of GI bleed. Denies history of liver disease or increased LFTs or concern for cirrhosis. Denies anxiety acute withdrawal symptoms or DTs when not drinking. Denies being an alcoholic. Denies falls injury syncope or collapse. Denies nausea vomiting abdominal pain constipation or diarrhea. Denies urinary symptoms, no dysuria hematuria frequency urgency. Denies recurrent bronchitis pneumonias or use of inhalers . Denies dyspnea with activity . review of systems otherwise -12 system reviewed otherwise unremarkable but was augmented by wife    Physical Exam:   BP (!) 168/108   Pulse 84   Temp 97.9 °F (36.6 °C) (Oral)   Resp 14   Ht 5' 10\" (1.778 m)   Wt 205 lb (93 kg)   SpO2 93%   BMI 29.41 kg/m²   Temp (24hrs), Av.9 °F (36.6 °C), Min:97.9 °F (36.6 °C), Max:97.9 °F (36.6 °C)    No results for input(s): POCGLU in the last 72 hours.   No intake or output data in the 24 hours ending 22 0711    Physical Exam  General sleeping but arouses easily, appropriate upon awakening, pleasant oriented, follows commands  Eye exam pupils equally round reactive sclera nonicteric normal horizontal gaze  ENT oral pharynx with dry mucous membranes very symmetric neck supple  Cardiovascular irregular, tachycardic, normal S1-S2 no murmurs rubs or gallops but limited with tachycardia, no JVD noted  Lungs adequate air exchange, nonlabored no tachypnea no accessory muscle use, no wheezing or rhonchi  GI soft nontender nondistended bowel sounds present  Vascular intact dorsalis pedis posterior tibialis with minimal nonpitting pedal edema in feet and ankles  Derm adequate foot hygiene, no rashes lesions or skin infections  Musculoskeletal passive range of motion of upper and lower limbs unremarkable no reproduction of symptoms with palpation of chest wall no synovitis or joint effusions  Neuro nonfocal normal speech and cognition strength symmetric upper lower limbs sensation grossly intact    Investigations:      Laboratory Testing:  Recent Results (from the past 24 hour(s))   Basic Metabolic Panel    Collection Time: 12/28/22 11:11 PM   Result Value Ref Range    Glucose 112 (H) 70 - 99 mg/dL    BUN 26 (H) 6 - 20 mg/dL    Creatinine 2.25 (H) 0.70 - 1.20 mg/dL    Est, Glom Filt Rate 33 (L) >60 mL/min/1.73m2    Calcium 8.6 8.6 - 10.4 mg/dL    Sodium 139 135 - 144 mmol/L    Potassium 3.6 (L) 3.7 - 5.3 mmol/L    Chloride 103 98 - 107 mmol/L    CO2 23 20 - 31 mmol/L    Anion Gap 13 9 - 17 mmol/L   CBC with Auto Differential    Collection Time: 12/28/22 11:11 PM   Result Value Ref Range    WBC 4.9 3.5 - 11.3 k/uL    RBC 4.58 4.21 - 5.77 m/uL    Hemoglobin 12.7 (L) 13.0 - 17.0 g/dL    Hematocrit 39.3 (L) 40.7 - 50.3 %    MCV 85.8 82.6 - 102.9 fL    MCH 27.7 25.2 - 33.5 pg    MCHC 32.3 28.4 - 34.8 g/dL    RDW 13.4 11.8 - 14.4 %    Platelets 824 259 - 694 k/uL    MPV 12.0 8.1 - 13.5 fL    NRBC Automated 0.0 0.0 per 100 WBC    Seg Neutrophils 67 (H) 36 - 65 %    Lymphocytes 26 24 - 43 %    Monocytes 6 3 - 12 %    Eosinophils % 1 1 - 4 %    Basophils 0 0 - 2 %    Immature Granulocytes 0 0 %    Segs Absolute 3.17 1.50 - 8.10 k/uL    Absolute Lymph # 1.27 1.10 - 3.70 k/uL    Absolute Mono # 0.30 0.10 - 1.20 k/uL    Absolute Eos # 0.07 0.00 - 0.44 k/uL    Basophils Absolute <0.03 0.00 - 0.20 k/uL    Absolute Immature Granulocyte <0.03 0.00 - 0.30 k/uL   Brain Natriuretic Peptide    Collection Time: 12/28/22 11:11 PM   Result Value Ref Range    Pro-BNP 1,380 (H) <300 pg/mL   Troponin    Collection Time: 12/28/22 11:11 PM   Result Value Ref Range    Troponin, High Sensitivity 122 (HH) 0 - 22 ng/L   Troponin    Collection Time: 12/29/22 12:25 AM   Result Value Ref Range    Troponin, High Sensitivity 165 (HH) 0 - 22 ng/L   EKG 12 Lead    Collection Time: 12/29/22 12:36 AM   Result Value Ref Range    Ventricular Rate 118 BPM    Atrial Rate 120 BPM    QRS Duration 108 ms    Q-T Interval 316 ms    QTc Calculation (Bazett) 442 ms    R Axis -30 degrees    T Axis 171 degrees   APTT    Collection Time: 12/29/22  1:42 AM   Result Value Ref Range    PTT 26.0 20.5 - 30.5 sec   Troponin    Collection Time: 12/29/22  3:35 AM   Result Value Ref Range    Troponin, High Sensitivity 277 (HH) 0 - 22 ng/L       Imaging/Diagnostics:  XR CHEST PORTABLE    Result Date: 12/29/2022  Cardiomegaly. Mild pulmonary vascular congestion. Portable chest x-ray independently reviewed by myself with cardiomegaly, pulmonary vascular ingestion     EKG with T wave inversions, A. fib with RVR with poor R wave progression precordial leads      Assessment :      Hospital Problems             Last Modified POA    * (Principal) Atrial fibrillation with RVR ([address] 12/29/2022 Yes    Demand ischemia ([address] 12/29/2022 Yes    Alcohol consumption binge drinking 12/29/2022 Yes    Chronic systolic (congestive) heart failure ([address] 12/29/2022 Yes    Nonischemic cardiomyopathy ([address] 12/29/2022 Yes    Little's esophagus without dysplasia 12/29/2022 Yes    HTN (hypertension), malignant 12/29/2022 Yes    Overview Addendum 9/29/2020  3:18 PM by Gin Larios MD     Essential versus secondary hypertension, is currently only on 1 medicine for blood pressure control, does not appear to have overt secondary causes present. At one point 2 to 3 years ago his systolic pressures were as high as 230 when he presented to 1 of the hospitals and required admission.          Tobacco abuse 12/29/2022 Yes    CKD (chronic kidney disease), stage III ([address] 12/29/2022 Yes    Overview Signed 9/29/2020  3:16 PM by Glenys Hernandez MD     Secondary to suspected ischemic nephrosclerosis baseline creatinine 1.5-1.9            Plan:     Patient status inpatient in the Progressive Unit/Step down    A. fib with RVR with demand ischemia with troponin elevation/chest pain syndrome with shortness of breath near syncope. Pending cardiology evaluation with consideration for cardioversion. Continued on heparin, Cardizem drip. Consider outpatient sleep study to evaluate occult apnea. Check thyroid studies. discussed behavior modification including reduction of alcohol, ? Holiday heart versus related to systolic heart failure. Hypertensive heart disease with nonischemic cardiomyopathy. Prior cath 3/2021 with minimal coronary disease, 10 to 20% disease. Continue blood pressure control. Chronic binge alcohol discussed with patient need to cut down/quit strongly encouraged behavior modification with concern for nonischemic cardiomyopathy, possible holiday heart. Consideration for treatment program with concern for possible alcoholism discussed with patient and wife. Tobacco abuse ongoing, counseled on cessation  GUZMAN with CKD 3B likely related to acute illness. Pending recheck labs in a.m., check bladder scan, urinalysis. Consider nephrology consult if persistently abnormal, avoid nephrotoxic medications, ACE /arb and NSAIDs. Chronic Little's with history of esophageal stricture. Continue PPI. As above encourage lifestyle modifications. Counseled on importance of lifestyle modification, tobacco cessation, reduction of alcohol with concern for excessive binge drinking/alcohol abuse, ? Alcoholism. Consideration for detox options as outpatient if appropriate discussed with patient and wife. Support offered. Question concerns addressed approximately 15 minutes. Plan of care reviewed, labs and results discussed in detail.         Anticipate likely discharge in 2 to 3 days contingent below progress      Prior records reviewed independently by myself approximately 15 minutes. Consultations:   IP CONSULT TO HOSPITALIST  IP CONSULT TO CARDIOLOGY  IP CONSULT TO CARDIOLOGY    Patient is admitted as inpatient status because of co-morbidities listed above, severity of signs and symptoms as outlined, requirement for current medical therapies and most importantly because of direct risk to patient if care not provided in a hospital setting. Expected length of stay > 48 hours. Cheryl Hinojosa MD  12/29/2022  7:11 AM    Copy sent to Dr. Agapito Olvera primary care provider on file.

## 2022-12-29 NOTE — ED PROVIDER NOTES
[facility] ED     Emergency Department     Faculty Attestation    I performed a history and physical examination of the patient and discussed management with the resident. I reviewed the residents note and agree with the documented findings and plan of care. Any areas of disagreement are noted on the chart. I was personally present for the key portions of any procedures. I have documented in the chart those procedures where I was not present during the key portions. I have reviewed the emergency nurses triage note. I agree with the chief complaint, past medical history, past surgical history, allergies, medications, social and family history as documented unless otherwise noted below. For Physician Assistant/ Nurse Practitioner cases/documentation I have personally evaluated this patient and have completed at least one if not all key elements of the E/M (history, physical exam, and MDM). Additional findings are as noted. Patient presents with chest pain that started earlier today while he was moving a hospital bed at his parents house. Patient says he has a history of a \"enlarged heart. \"  He says he was feeling short of breath as well. He says that his symptoms are improving now. On arrival, patient was found to be in A. fib with RVR. Patient denies history of atrial fibrillation. Patient's wife states that patient has not been taking his blood pressure medications for the past several days. On exam, patient is resting comfortably in the bed. He is alert and oriented and answering questions appropriately. Lungs clear to auscultation bilaterally. Heart sounds are irregularly irregular. Abdomen is soft and nontender. The bilateral calves are nontender nonswollen. Will administer Cardizem. We will get EKG, chest x-ray, labs and plan to admit patient.     EKG Interpretation    Interpreted by emergency department physician    Rhythm: atrial fibrillation - rapid  Rate: 130-140  Axis: left  Ectopy: none  Conduction: right bundle branch block (incomplete)  ST Segments: normal  T Waves: non specific changes  Q Waves: none    Clinical Impression: atrial fibrillation (new onset)    MD Cielo Brown MD  Attending Emergency  Physician            Parviz Hill MD  12/28/22 7235

## 2022-12-29 NOTE — ED NOTES
Pt denies chest pain and nausea. Breathing is even and unlabored. A/O X4. Pt is resting comfortably.      Bang Moura RN  12/29/22 9798

## 2022-12-29 NOTE — CARE COORDINATION
Case Management Assessment  Initial Evaluation    Date/Time of Evaluation: 12/29/2022 9:43 AM  Assessment Completed by: Natacha Dukes RN    If patient is discharged prior to next notation, then this note serves as note for discharge by case management. Patient Name: Pamella Rosado                   YOB: 1965  Diagnosis: Atrial fibrillation with RVR ([facility] [address] [I48.91]                   Date / Time: 12/28/2022 10:51 PM    Patient Admission Status: Inpatient   Readmission Risk (Low < 19, Mod (19-27), High > 27): Readmission Risk Score: 10    Current PCP: No primary care provider on file. PCP verified by CM? Yes (Dr. Hi Stevens)    Chart Reviewed: Yes      History Provided by: Patient  Patient Orientation: Alert and Oriented    Patient Cognition:      Hospitalization in the last 30 days (Readmission):  No    If yes, Readmission Assessment in CM Navigator will be completed. Advance Directives:      Code Status: Full Code   Patient's Primary Decision Maker is:        Discharge Planning:    Patient lives with: Spouse/Significant Other Type of Home: House  Primary Care Giver: Self  Patient Support Systems include: Spouse/Significant Other   Current Financial resources:    Current community resources:    Current services prior to admission: None            Current DME:              Type of Home Care services:  None    ADLS  Prior functional level: Independent in ADLs/IADLs  Current functional level: Independent in ADLs/IADLs    PT AM-PAC:   /24  OT AM-PAC:   /24    Family can provide assistance at DC: Would you like Case Management to discuss the discharge plan with any other family members/significant others, and if so, who?     Plans to Return to Present Housing: Yes  Other Identified Issues/Barriers to RETURNING to current housing: none  Potential Assistance needed at discharge: N/A            Potential DME:    Patient expects to discharge to: [address] [place] [address] for transportation at discharge: Financial    Payor: [address] [facility]  [address] / Plan: [address] [facility] [address] / Product Type: *No Product type* /     Does insurance require precert for SNF: Yes    Potential assistance Purchasing Medications: No  Meds-to-Beds request:        [facility] [address][id number] Ella Arias, [address] [facility] [address] Carli Rubio [phone]  [phone]  Phone: [phone] Fax: [phone]    [facility] [id number] - [address] [facility] [address] [phone] Ronny Brewer [phone]  [facility] [address]  Phone: [phone] Fax: [phone]      Notes:    Factors facilitating achievement of predicted outcomes:     Barriers to discharge: Additional Case Management Notes: home with wife    The Plan for Transition of Care is related to the following treatment goals of Atrial fibrillation with RVR ([address] [Q78.45]    IF APPLICABLE: The Patient and/or patient representative Jw Finley and his family were provided with a choice of provider and agrees with the discharge plan. Freedom of choice list with basic dialogue that supports the patient's individualized plan of care/goals and shares the quality data associated with the providers was provided to:     Patient Representative Name:       The Patient and/or Patient Representative Agree with the Discharge Plan?       Caroline Wen RN  Case Management Department  Ph: [phone] Fax:

## 2022-12-29 NOTE — ED NOTES
The following labs were labeled with patient stickers & tubed to lab;    []Lavender   []On Ice  []Blue  [x]Green/ Yellow  []Green/ Black []On Ice  []Pink  []Red  []Yellow    []COVID-19 Swab []Rapid  []COVID-19 Swab []PCR    []Urine Sample  []Pelvic Cultures    []Blood Cultures     John Gay RN  12/29/22 3121

## 2022-12-29 NOTE — ED NOTES
Pt to ED20 ambulatory accompanied by significant other due to non-radiating chest pain. Alert and oriented x4. Pt states that his pain is dull with a scale of 5/10. Pt complaints of mild dizziness. Pt states that he is taking 81 mg of Aspirin daily and multiple blood pressure medications. Attached to cardiac monitor. Call light provided.      Melody Chambers RN  12/28/22 8220

## 2022-12-30 LAB
ALBUMIN (CALCULATED): ABNORMAL G/DL
ALBUMIN PERCENT: ABNORMAL %
ALBUMIN SERPL-MCNC: 3.3 G/DL (ref 3.5–5.2)
ALBUMIN/GLOBULIN RATIO: 1.3 (ref 1–2.5)
ALP BLD-CCNC: 61 U/L (ref 40–129)
ALPHA 1 PERCENT: ABNORMAL %
ALPHA 2 PERCENT: ABNORMAL %
ALPHA-1-GLOBULIN: ABNORMAL G/DL
ALPHA-2-GLOBULIN: ABNORMAL G/DL
ALT SERPL-CCNC: 11 U/L (ref 5–41)
ANION GAP SERPL CALCULATED.3IONS-SCNC: 6 MMOL/L (ref 9–17)
AST SERPL-CCNC: 23 U/L
BETA GLOBULIN: ABNORMAL G/DL
BETA PERCENT: ABNORMAL %
BILIRUB SERPL-MCNC: 0.5 MG/DL (ref 0.3–1.2)
BUN BLDV-MCNC: 27 MG/DL (ref 6–20)
CALCIUM SERPL-MCNC: 8.2 MG/DL (ref 8.6–10.4)
CHLORIDE BLD-SCNC: 103 MMOL/L (ref 98–107)
CO2: 22 MMOL/L (ref 20–31)
CREAT SERPL-MCNC: 2.37 MG/DL (ref 0.7–1.2)
CREATININE URINE: 105.6 MG/DL (ref 39–259)
GAMMA GLOBULIN %: ABNORMAL %
GAMMA GLOBULIN: ABNORMAL G/DL
GFR SERPL CREATININE-BSD FRML MDRD: 31 ML/MIN/1.73M2
GLUCOSE BLD-MCNC: 98 MG/DL (ref 70–99)
INR BLD: 1
MAGNESIUM: 2 MG/DL (ref 1.6–2.6)
PARTIAL THROMBOPLASTIN TIME: 49.6 SEC (ref 20.5–30.5)
PARTIAL THROMBOPLASTIN TIME: 54.5 SEC (ref 20.5–30.5)
PARTIAL THROMBOPLASTIN TIME: 64.9 SEC (ref 20.5–30.5)
PARTIAL THROMBOPLASTIN TIME: 85.2 SEC (ref 20.5–30.5)
PATHOLOGIST: ABNORMAL
POTASSIUM SERPL-SCNC: 3.7 MMOL/L (ref 3.7–5.3)
PRO-BNP: 896 PG/ML
PROTEIN ELECTROPHORESIS, SERUM: ABNORMAL
PROTHROMBIN TIME: 10.5 SEC (ref 9.1–12.3)
SODIUM BLD-SCNC: 131 MMOL/L (ref 135–144)
TOTAL PROT. SUM,%: ABNORMAL %
TOTAL PROT. SUM: ABNORMAL G/DL
TOTAL PROTEIN, URINE: 29 MG/DL
TOTAL PROTEIN: 5.8 G/DL (ref 6.4–8.3)
TOTAL PROTEIN: 5.8 G/DL (ref 6.4–8.3)
TSH SERPL DL<=0.05 MIU/L-ACNC: 0.84 UIU/ML (ref 0.3–5)
URINE TOTAL PROTEIN CREATININE RATIO: 0.27 (ref 0–0.2)

## 2022-12-30 PROCEDURE — 84443 ASSAY THYROID STIM HORMONE: CPT

## 2022-12-30 PROCEDURE — 85610 PROTHROMBIN TIME: CPT

## 2022-12-30 PROCEDURE — 83735 ASSAY OF MAGNESIUM: CPT

## 2022-12-30 PROCEDURE — 85730 THROMBOPLASTIN TIME PARTIAL: CPT

## 2022-12-30 PROCEDURE — 80053 COMPREHEN METABOLIC PANEL: CPT

## 2022-12-30 PROCEDURE — 84166 PROTEIN E-PHORESIS/URINE/CSF: CPT

## 2022-12-30 PROCEDURE — 2580000003 HC RX 258: Performed by: NURSE PRACTITIONER

## 2022-12-30 PROCEDURE — 82570 ASSAY OF URINE CREATININE: CPT

## 2022-12-30 PROCEDURE — 2060000000 HC ICU INTERMEDIATE R&B

## 2022-12-30 PROCEDURE — 6370000000 HC RX 637 (ALT 250 FOR IP): Performed by: STUDENT IN AN ORGANIZED HEALTH CARE EDUCATION/TRAINING PROGRAM

## 2022-12-30 PROCEDURE — 84155 ASSAY OF PROTEIN SERUM: CPT

## 2022-12-30 PROCEDURE — 6370000000 HC RX 637 (ALT 250 FOR IP): Performed by: INTERNAL MEDICINE

## 2022-12-30 PROCEDURE — 93005 ELECTROCARDIOGRAM TRACING: CPT | Performed by: STUDENT IN AN ORGANIZED HEALTH CARE EDUCATION/TRAINING PROGRAM

## 2022-12-30 PROCEDURE — 83880 ASSAY OF NATRIURETIC PEPTIDE: CPT

## 2022-12-30 PROCEDURE — 6370000000 HC RX 637 (ALT 250 FOR IP)

## 2022-12-30 PROCEDURE — 6360000002 HC RX W HCPCS

## 2022-12-30 PROCEDURE — 84165 PROTEIN E-PHORESIS SERUM: CPT

## 2022-12-30 PROCEDURE — 6370000000 HC RX 637 (ALT 250 FOR IP): Performed by: NURSE PRACTITIONER

## 2022-12-30 PROCEDURE — 84156 ASSAY OF PROTEIN URINE: CPT

## 2022-12-30 PROCEDURE — 99222 1ST HOSP IP/OBS MODERATE 55: CPT | Performed by: INTERNAL MEDICINE

## 2022-12-30 PROCEDURE — 99232 SBSQ HOSP IP/OBS MODERATE 35: CPT | Performed by: STUDENT IN AN ORGANIZED HEALTH CARE EDUCATION/TRAINING PROGRAM

## 2022-12-30 PROCEDURE — 36415 COLL VENOUS BLD VENIPUNCTURE: CPT

## 2022-12-30 RX ORDER — DILTIAZEM HYDROCHLORIDE 120 MG/1
120 CAPSULE, COATED, EXTENDED RELEASE ORAL DAILY
Status: DISCONTINUED | OUTPATIENT
Start: 2022-12-30 | End: 2022-12-31 | Stop reason: HOSPADM

## 2022-12-30 RX ORDER — PANTOPRAZOLE SODIUM 40 MG/1
40 TABLET, DELAYED RELEASE ORAL
Status: DISCONTINUED | OUTPATIENT
Start: 2022-12-31 | End: 2022-12-31 | Stop reason: HOSPADM

## 2022-12-30 RX ORDER — SODIUM CHLORIDE 9 MG/ML
INJECTION, SOLUTION INTRAVENOUS CONTINUOUS
Status: DISCONTINUED | OUTPATIENT
Start: 2022-12-30 | End: 2022-12-31

## 2022-12-30 RX ORDER — CARVEDILOL 25 MG/1
25 TABLET ORAL 2 TIMES DAILY
Status: DISCONTINUED | OUTPATIENT
Start: 2022-12-30 | End: 2022-12-31 | Stop reason: HOSPADM

## 2022-12-30 RX ORDER — POTASSIUM CHLORIDE 20 MEQ/1
40 TABLET, EXTENDED RELEASE ORAL ONCE
Status: COMPLETED | OUTPATIENT
Start: 2022-12-30 | End: 2022-12-30

## 2022-12-30 RX ADMIN — DOXAZOSIN 2 MG: 2 TABLET ORAL at 08:43

## 2022-12-30 RX ADMIN — POTASSIUM CHLORIDE 40 MEQ: 1500 TABLET, EXTENDED RELEASE ORAL at 08:43

## 2022-12-30 RX ADMIN — SODIUM CHLORIDE, PRESERVATIVE FREE 10 ML: 5 INJECTION INTRAVENOUS at 08:43

## 2022-12-30 RX ADMIN — HEPARIN SODIUM 2000 UNITS: 1000 INJECTION INTRAVENOUS; SUBCUTANEOUS at 03:46

## 2022-12-30 RX ADMIN — ACETAMINOPHEN 650 MG: 325 TABLET ORAL at 10:46

## 2022-12-30 RX ADMIN — SPIRONOLACTONE 12.5 MG: 25 TABLET ORAL at 08:43

## 2022-12-30 RX ADMIN — DILTIAZEM HYDROCHLORIDE 120 MG: 120 CAPSULE, COATED, EXTENDED RELEASE ORAL at 10:50

## 2022-12-30 RX ADMIN — CARVEDILOL 25 MG: 25 TABLET, FILM COATED ORAL at 19:59

## 2022-12-30 RX ADMIN — DESMOPRESSIN ACETATE 40 MG: 0.2 TABLET ORAL at 19:59

## 2022-12-30 RX ADMIN — SODIUM CHLORIDE: 9 INJECTION, SOLUTION INTRAVENOUS at 12:44

## 2022-12-30 RX ADMIN — HEPARIN SODIUM 14 UNITS/KG/HR: 10000 INJECTION, SOLUTION INTRAVENOUS at 03:51

## 2022-12-30 RX ADMIN — HYDRALAZINE HYDROCHLORIDE 50 MG: 50 TABLET, FILM COATED ORAL at 15:15

## 2022-12-30 RX ADMIN — HYDRALAZINE HYDROCHLORIDE 50 MG: 50 TABLET, FILM COATED ORAL at 19:59

## 2022-12-30 RX ADMIN — HYDRALAZINE HYDROCHLORIDE 50 MG: 50 TABLET, FILM COATED ORAL at 08:42

## 2022-12-30 RX ADMIN — ASPIRIN 81 MG: 81 TABLET, COATED ORAL at 08:43

## 2022-12-30 RX ADMIN — CARVEDILOL 12.5 MG: 12.5 TABLET, FILM COATED ORAL at 08:43

## 2022-12-30 ASSESSMENT — PAIN SCALES - GENERAL: PAINLEVEL_OUTOF10: 3

## 2022-12-30 NOTE — PROGRESS NOTES
[facility]  Office: [address], DO, Roya Press, DO, Leonora Cushing, DO, Teresita Reyez Blood, DO, Gamal Tolentino MD, Monica Hernandez MD, Enid Wilson MD, Gerson Silvestre MD,  Cynthia Perry MD, Marisela Amanda MD, Nehal Washington, DO, Ravi Lockett MD,  Gerson Mcrae MD, Migdalia Yanes MD, Beverley Babb, DO, Umair Sweeney MD, Nickie Lyon MD, Delfina Vargas, DO, Mary Degroot MD, Elizabeth Davies MD, Laverne Davenport MD, Franki Russell MD, Eb Montero, DO, Srinath Escobar MD, Crys Roberts MD, Vic Guevara, CNP,  Teena Thomas, CNP, Yobani Servant, CNP, Yasmine Exon, CNP,  Arie Suero, [facility], Mare Mcleod, CNP, Edie Carlos, CNP, Adrienne Herman, CNP, Kiera Landin, CNP, Griselda Ponce, CNP, Roylene Goodell, PA-C, Justin Tolbert, CNS, Franco Ardon, CNP, Gaby Rogers, CNP         [address]    Progress Note    12/30/2022    7:44 AM    Name:   Francine Silva  MRN:     [id number]     [place]:      [de-identified]   Room:   2003/2003-01   Day:  1  Admit Date:  12/28/2022 10:51 PM    PCP:   No primary care provider on file. Code Status:  Full Code    Subjective:     C/C:   Chief Complaint   Patient presents with    Chest Pain     Interval History Status: improved. Vitals reviewed, afebrile and hemodynamically stable. Saturating well on room air. Labs reviewed, mild hyponatremia 131, elevated BUN and creatinine 27 and 2.37 respectively, elevated troponin 122-280. Overnight patient had no significant events. On examination patient resting comfortably in bed. Continues on heparin infusion. Chest pain currently resolved. Underwent Lexiscan stress test with global hypokinesia and left ventricular chamber dilation with a left ventricular ejection fraction of 28%.     Brief History:     Mr. Francine Silva is a 25-year-old male with a significant past medical history of hypertension, CKD stage III and nonischemic cardiomyopathy who initially presented to the emergency department with a chief complaint of sudden onset chest pain with dyspnea. Review of Systems:     Constitutional:  negative for chills, fevers, sweats  Respiratory: Positive for shortness of breath. Negative for cough, wheezing  Cardiovascular: Positive for chest pain and palpitations. Negative for lower extremity edema  Gastrointestinal:  negative for abdominal pain, constipation, diarrhea, nausea, vomiting  Neurological:  negative for dizziness, headache    Medications: Allergies:  No Known Allergies    Current Meds:   Scheduled Meds:    aspirin  81 mg Oral Daily    carvedilol  12.5 mg Oral BID    doxazosin  2 mg Oral Daily    hydrALAZINE  50 mg Oral TID    spironolactone  12.5 mg Oral Daily    sodium chloride flush  5-40 mL IntraVENous 2 times per day    atorvastatin  40 mg Oral Daily     Continuous Infusions:    sodium chloride      heparin (PORCINE) Infusion 14 Units/kg/hr (22 0351)     PRN Meds: sodium chloride flush, sodium chloride, ondansetron **OR** ondansetron, polyethylene glycol, acetaminophen **OR** acetaminophen, heparin (porcine), heparin (porcine), sodium chloride flush, sodium chloride flush    Data:     Past Medical History:   has a past medical history of Benign essential HTN, CKD (chronic kidney disease), stage III ([address] and Enlarged heart. Social History:   reports that he has been smoking cigarettes. He has been smoking an average of 1 pack per day. He has quit using smokeless tobacco. He reports current alcohol use. He reports that he does not use drugs.      Family History:   Family History   Problem Relation Age of Onset    Hypertension Mother     Hypertension Father        Vitals:  BP (!) 153/92   Pulse 74   Temp 98.1 °F (36.7 °C) (Oral)   Resp 18   Ht 5' 10\" (1.778 m)   Wt 205 lb (93 kg)   SpO2 95%   BMI 29.41 kg/m²   Temp (24hrs), Av.1 °F (36.7 °C), Min:98 °F (36.7 °C), Max:98.2 °F (36.8 °C)    No results for input(s): POCGLU in the last 72 hours. I/O (24Hr): Intake/Output Summary (Last 24 hours) at 12/30/2022 0744  Last data filed at 12/29/2022 1804  Gross per 24 hour   Intake 200 ml   Output 300 ml   Net -100 ml       Labs:  Hematology:  Recent Labs     12/28/22 2311 12/30/22 0245   WBC 4.9  --    RBC 4.58  --    HGB 12.7*  --    HCT 39.3*  --    MCV 85.8  --    MCH 27.7  --    MCHC 32.3  --    RDW 13.4  --      --    MPV 12.0  --    INR  --  1.0     Chemistry:  Recent Labs     12/28/22 2311 12/29/22  0025 12/29/22  0335 12/29/22  0809 12/30/22 0245     --   --   --  131*   K 3.6*  --   --   --  3.7     --   --   --  103   CO2 23  --   --   --  22   GLUCOSE 112*  --   --   --  98   BUN 26*  --   --   --  27*   CREATININE 2.25*  --   --   --  2.37*   MG  --   --   --   --  2.0   ANIONGAP 13  --   --   --  6*   LABGLOM 33*  --   --   --  31*   CALCIUM 8.6  --   --   --  8.2*   PROBNP 1,380*  --   --   --  896*   TROPHS 122* 165* 277* 280*  --      Recent Labs     12/30/22 0245   PROT 5.8*   LABALBU 3.3*   TSH 0.84   AST 23   ALT 11   ALKPHOS 61   BILITOT 0.5     ABG:No results found for: POCPH, PHART, PH, POCPCO2, IUF3LYW, PCO2, POCPO2, PO2ART, PO2, POCHCO3, FZV1PWG, HCO3, NBEA, PBEA, BEART, BE, THGBART, THB, IQN4LWX, VDCO6ZQD, R8KXFPJN, O2SAT, FIO2  No results found for: SPECIAL  No results found for: CULTURE    Radiology:  XR CHEST PORTABLE    Result Date: 12/29/2022  Cardiomegaly. Mild pulmonary vascular congestion. NM Cardiac Stress Test Nuclear Imaging    Result Date: 12/29/2022  No stress-induced ischemia. Global hypokinesis and left ventricular chamber dilatation with  mL. LVEF 28%. Risk stratification: High risk. US RETROPERITONEAL LIMITED    Result Date: 12/29/2022  Unremarkable ultrasound of the kidneys.        Physical Examination:        General appearance:  alert, cooperative and no distress  Mental Status:  oriented to person, place and time and normal affect  Lungs:  clear to auscultation bilaterally, normal effort  Heart:  regular rate and rhythm, no murmur  Abdomen:  soft, nontender, nondistended, normal bowel sounds  Extremities:  no edema, redness, tenderness in the calves  Skin:  no gross lesions, rashes, induration    Assessment:        Hospital Problems             Last Modified POA    * (Principal) Atrial fibrillation with RVR ([address] 12/29/2022 Yes    Demand ischemia ([address] 12/29/2022 Yes    Alcohol consumption binge drinking 12/29/2022 Yes    Chronic systolic (congestive) heart failure ([address] 12/29/2022 Yes    Nonischemic cardiomyopathy ([address] 12/29/2022 Yes    Little's esophagus without dysplasia 12/29/2022 Yes    HTN (hypertension), malignant 12/29/2022 Yes    Overview Addendum 9/29/2020  3:18 PM by Gin Larios MD     Essential versus secondary hypertension, is currently only on 1 medicine for blood pressure control, does not appear to have overt secondary causes present. At one point 2 to 3 years ago his systolic pressures were as high as 230 when he presented to 1 of the hospitals and required admission. Tobacco abuse 12/29/2022 Yes    CKD (chronic kidney disease), stage III ([address] 12/29/2022 Yes    Overview Signed 9/29/2020  3:16 PM by Gin Larios MD     Secondary to suspected ischemic nephrosclerosis baseline creatinine 1.5-1.9          Plan:        Atrial fibrillation with RVR. Cardiology consulted. Currently in normal sinus rhythm. Continue Coreg 25 mg twice daily, diltiazem 120 mg daily. Remains on heparin infusion. Will need anticoagulation on discharge. [id number] Score for Atrial Fibrillation Stroke Risk   Risk   Factors  Component Value   C CHF Yes 1   H HTN Yes 1   A2 Age >= 75 No,  (62 y.o.) 0   D DM No 0   S2 Prior Stroke/TIA No 0   V Vascular Disease No 0   A Age 74-69 No,  (62 y.o.) 0   Sc Sex male 0    [id number]  Score  2     NSTEMI. Troponin elevated 122> 280. Cardiology following.  Underwent Lexiscan stress test with global hypokinesia and left ventricular chamber dilation with a left ventricular ejection fraction of 28%. No plan for intervention per cardiology if chest pain-free. Likely outpatient work-up. Continue aspirin 81 mg daily, Lipitor 40 mg daily, Coreg 25 mg twice daily, Aldactone 12.5 mg daily. Chronic systolic and diastolic congestive heart failure. Echocardiogram reviewed with left ventricular ejection fraction 45 to 50% and grade 2 diastolic dysfunction. Continue Coreg 25 mg twice daily and Aldactone 12.5 mg daily. Little's esophagus without dyspepsia. Outpatient follow-up with PCP as well as GI. We will start patient on Protonix 40 mg daily. Hypertension. Continue Coreg 25 mg twice daily, diltiazem 120 mg daily, doxazosin 2 mg daily, hydralazine 50 mg 3 times daily, Aldactone 12.5 mg daily. Will need close outpatient follow-up. GUZMAN on CKD stage III. Nephrology following and case cardiac catheterization necessary. Baseline creatinine 1.5-1.9. Continue normal saline infusion 75 mils per hour. EtOH abuse. Counseled on cessation. Will monitor for signs of alcohol withdrawal.    Tobacco use disorder. Cessation encouraged. DVT prophylaxis: Heparin infusion. GI prophylaxis: Protonix 40 mg daily.     Amanda Arroyo DO  12/30/2022  7:44 AM

## 2022-12-30 NOTE — PROGRESS NOTES
[facility]  Progress Note                   Date:   12/30/2022  Patient name: Netta Red  Date of admission:  12/28/2022 10:51 PM  MRN:   [id number]  YOB: 1965  PCP: No primary care provider on file. Reason for Admission: New onset a-fib ([facility] [I48.91]  Atrial fibrillation with RVR ([facility] [I48.91]    Subjective:       Clinical Changes /Abnormalities: Patient seen and examined in room. He denies chest pain, SOB, dizziness and palpitations. He states he feels great and ready to go home. Labs/vitals/tele reviewed. Discussed with RN, no acute CV issues/concerns overnight. On heparin gtt, RA. Review of Systems    Medications:   Scheduled Meds:   dilTIAZem  120 mg Oral Daily    aspirin  81 mg Oral Daily    carvedilol  12.5 mg Oral BID    doxazosin  2 mg Oral Daily    hydrALAZINE  50 mg Oral TID    spironolactone  12.5 mg Oral Daily    sodium chloride flush  5-40 mL IntraVENous 2 times per day    atorvastatin  40 mg Oral Daily     Continuous Infusions:   sodium chloride      heparin (PORCINE) Infusion 12 Units/kg/hr (12/30/22 1017)     CBC:   Recent Labs     12/28/22  2311   WBC 4.9   HGB 12.7*        BMP:    Recent Labs     12/28/22  2311 12/30/22  0245    131*   K 3.6* 3.7    103   CO2 23 22   BUN 26* 27*   CREATININE 2.25* 2.37*   GLUCOSE 112* 98     Hepatic:  Recent Labs     12/30/22  0245   AST 23   ALT 11   BILITOT 0.5   ALKPHOS 61     Troponin:   Recent Labs     12/29/22  0025 12/29/22  0335 12/29/22  0809   TROPHS 165* 277* 280*     BNP: No results for input(s): BNP in the last 72 hours. Lipids: No results for input(s): CHOL, HDL in the last 72 hours.     Invalid input(s): LDLCALCU  INR:   Recent Labs     12/30/22  0245   INR 1.0       Objective:   Vitals: BP (!) 153/92   Pulse 74   Temp 98.7 °F (37.1 °C)   Resp 12   Ht 5' 10\" (1.778 m)   Wt 205 lb (93 kg)   SpO2 95%   BMI 29.41 kg/m²   General appearance: alert and cooperative with exam  HEENT: Head: Normocephalic, no lesions, without obvious abnormality. Neck:no JVD, trachea midline, no adenopathy  Lungs: Clear to auscultation  Heart: Regular rate and rhythm, s1/s2 auscultated, no murmurs, SR  Abdomen: soft, non-tender, bowel sounds active  Extremities: no edema  Neurologic: not done    ECHO 8/11/22  Summary  Left ventricle is normal in size Global left ventricular systolic function  is mildly reduced calculated ejection fraction by 3D Heart Model is 40 % . Moderate left ventricular hypertrophy. Grade I (mild) left ventricular diastolic dysfunction. No significant valvular regurgitation or stenosis seen. Cardiac Cath 3/2021   Angiographic Findings      Cardiac Arteries and Lesion Findings     LMCA: Mild irregularities 10-20%. LAD: Mild irregularities 10-20%. LCx: Mild irregularities 10-20%. RCA: Mild irregularities 10-20%. Coronary Tree      Dominance: Right     LV Analysis  LV function assessed as:Normal.  Ejection Fraction  +----------------------------------------------------------------------+---+  ! Method                                                                ! EF%! +----------------------------------------------------------------------+---+  ! LV gram                                                               !50 !  +----------------------------------------------------------------------+---+  ! LV gram                                                               !50 !  +----------------------------------------------------------------------+---+      Mild CAD. Left ventricular systolic function at lower limits of normal.      Recommendations      Medical therapy and risk factor modifications. ECHO 12/29/22  Summary  Left ventricle is normal in size. Global left ventricular systolic function  is low normal.  Visual LVEF 45-50%, with abnormal global L. strain of -10.2 %. There is  apical sparing pattern, concern for infiltrative cardiomyopathies.   Severe left ventricular hypertrophy. Grade II (moderate) left ventricular diastolic dysfunction. Left atrium is mildly dilated. Right atrium is mildly dilated . Mildly dilated right ventricular cavity. Right ventricular function appears normal .  Normal mitral valve structure. Mild mitral regurgitation. No mitral stenosis. Normal tricuspid valve leaflets. Mild tricuspid regurgitation. No tricuspid stenosis. Estimated right ventricular systolic pressure is 26 mmHg. Stress Test 12/29/22  No stress-induced ischemia. Global hypokinesis and left ventricular chamber dilatation with  mL. LVEF 28%. Risk stratification: High risk. IMPRESSION:  Electrocardiographically uninterpretable Lexiscan stress study. Radio-isotope results to follow from the department of Nuclear Medicine. Assessment / Acute Cardiac Problems:   New onset Afib with RVR, now in SR   Hypertension   Elevated troponin     Patient Active Problem List:     HTN (hypertension), malignant     Tobacco abuse     Renal insufficiency     CKD (chronic kidney disease), stage III (HCC)     Atrial fibrillation with RVR (HCC)     Demand ischemia (HCC)     Alcohol consumption binge drinking     Chronic systolic (congestive) heart failure (HCC)     Nonischemic cardiomyopathy ([facility]     Little's esophagus without dysplasia      Plan of Treatment:   Stable, he denies Chest pain and SOB. ECHO/Stress test reviewed as above. Discussed stress test, labs and vitals reviewed with Dr. Froilan Zelaya. No plans for further intervention. Patient to follow up as outpatient. New onset Afib. Current SR/SB. Continue cardizem and heparin gtt. Will need DOAC on discharge. Continue ASA, statin, BB, and aldactone. HTN. Elevated. Continue Cardura, hydralazine. Will increase Coreg 25 mg PO BID.      Electronically signed by CHUCHO Darden CNP on 12/30/2022 at 11:16 AM  [id number] [place] [address] Rd.  [phone]

## 2022-12-30 NOTE — CONSULTS
Nephrology Consult Note    Reason for Consult:  María Brewer on CKD stage 3b  Requesting Physician:  Amanda Arroyo DO     History Obtained From:  patient, spouse, electronic medical record    Chief Complaint:     Chief Complaint   Patient presents with    Chest Pain       History of Present Illness: This is a 59 y.o. male who presented to the hospital for evaluation of chest pain on 2022 found to have new onset atrial fibrillation with RVR, initiated on heparin/Cardizem. Evaluated by cardiology and underwent stress test with echocardiogram.  Echo completed on 2022 demonstrating an ejection fraction of 45 to 50% with left heart strain and concern for infiltrative cardiomyopathies severe left ventricular hypertrophy and grade 2 diastolic dysfunction. Left atrial dimension is also dilated at 4.4 cm. Stress test completed showing global hypokinesis without significant fixed or reversible perfusion deficits. Based on findings patient is likely a candidate for cardiac cath, we have been consulted for cardiac cath clearance, in the setting of acute kidney injury on CKD stage IIIb    Patient states he is seen a nephrologist in the outpatient setting once approximately 2 to 3 years ago, unable to recall physician's name. States it was on [employer]. UOP has been documented at 300 mL over the last 24 hours with a -100 mL fluid balance since admission, doubting accuracy    IVF started on 2022 at a lower rate given reduced ejection fraction    No Keller catheter    UA ordered on admission not collected, repeat UA ordered     Last kidney imaging ordered approximately 2 years ago by Dr. Azalia Duran, was not completed by patient and order has  follow-up ultrasound limited of the kidneys showing a right kidney measuring 9.5 x 4.7 x 3.9 cm and left kidney measuring 9.4 x 4.6 x 4.7 cm without hydronephrosis stones or lesions     Pt denies any hx of heavy or prolonged NSAID use.  There is no history of blood or bone marrow disorders. There is no hx of jaundice or hepatitis or sexually transmitted disease. Pt has no hx of collagen vascular disease or vasculitis. No history of dysuria or frequency. No recent procedures involving IV contrast. There is no hx of paraprotein disease. Pt denies any hx of recurrent UTI , incontinence or recurrent nephrolithiasis. Medication review shows use of ace/arb, diuretic. Past Medical History:     Past Medical History:   Diagnosis Date    Benign essential HTN     CKD (chronic kidney disease), stage III ([facility] [address] 9/29/2020    Secondary to suspected ischemic nephrosclerosis baseline creatinine 1.5-1.9    Enlarged heart         Past Surgical History:         Procedure Laterality Date    CARDIAC CATHETERIZATION Right 03/2021    no blockage, no stenting.     COLONOSCOPY N/A 4/14/2021    COLONOSCOPY WITH BIOPSY performed by Loc Mosquera MD at [address] [place] [address] N/A 4/14/2021    EGD BIOPSY, ESOPHAGEAL DILATION performed by Loc Mosquera MD at [facility]       Outpatient Medications:     Medications Prior to Admission: atorvastatin (LIPITOR) 20 MG tablet, take 1 tablet by mouth once daily  amLODIPine (NORVASC) 10 MG tablet, take 1 tablet by mouth once daily  doxazosin (CARDURA) 2 MG tablet, take 1 tablet by mouth once daily  spironolactone (ALDACTONE) 25 MG tablet, Take 0.5 tablets by mouth daily  carvedilol (COREG) 25 MG tablet, Take 0.5 tablets by mouth 2 times daily  hydrALAZINE (APRESOLINE) 50 MG tablet, Take 1 tablet by mouth 3 times daily  albuterol sulfate HFA (PROVENTIL HFA) 108 (90 Base) MCG/ACT inhaler, Inhale 2 puffs into the lungs every 6 hours as needed for Wheezing  Blood Pressure Monitoring (ADULT BLOOD PRESSURE CUFF LG) KIT, Check blood pressure daily  VITAMIN D, CHOLECALCIFEROL, PO, Take by mouth daily  aspirin 81 MG EC tablet, Take 81 mg by mouth daily    Current Medications    Scheduled Meds:    dilTIAZem  120 mg Oral Daily    carvedilol  25 mg Oral BID    aspirin  81 mg Oral Daily    doxazosin  2 mg Oral Daily    hydrALAZINE  50 mg Oral TID    spironolactone  12.5 mg Oral Daily    sodium chloride flush  5-40 mL IntraVENous 2 times per day    atorvastatin  40 mg Oral Daily     Continuous Infusions:    sodium chloride      sodium chloride      heparin (PORCINE) Infusion 12 Units/kg/hr (12/30/22 1017)     PRN Meds:  sodium chloride flush, sodium chloride, ondansetron **OR** ondansetron, polyethylene glycol, acetaminophen **OR** acetaminophen, heparin (porcine), heparin (porcine), sodium chloride flush, sodium chloride flush    Allergies     Patient has no known allergies. Family History     Family History   Problem Relation Age of Onset    Hypertension Mother     Hypertension Father        Social History     Social History     Socioeconomic History    Marital status:      Spouse name: Not on file    Number of children: Not on file    Years of education: Not on file    Highest education level: Not on file   Occupational History    Not on file   Tobacco Use    Smoking status: Light Smoker     Packs/day: 1.00     Types: Cigarettes    Smokeless tobacco: Former    Tobacco comments:     1-2 per week   Vaping Use    Vaping Use: Never used   Substance and Sexual Activity    Alcohol use: Yes     Comment: socially    Drug use: No    Sexual activity: Not on file   Other Topics Concern    Not on file   Social History Narrative    Not on file     Social Determinants of Health     Financial Resource Strain: Low Risk     Difficulty of Paying Living Expenses: Not hard at all   Food Insecurity: No Food Insecurity    Worried About [address] in the Last Year: Never true    [facility] in the Last Year: Never true   Transportation Needs: No Transportation Needs    Lack of Transportation (Medical): No    Lack of Transportation (Non-Medical):  No   Physical Activity: Not on file   Stress: Not on file   Social Connections: Not on file   Intimate Partner Violence: Not on file   Housing Stability: Not on file       ROS     Constitutional: No fever, no chills, no lethargy, no weakness, + HA  HEENT:  No headache, otalgia, itchy eyes, nasal discharge or sore throat. Cardiac:  + improving chest pain, dyspnea, orthopnea or PND. Chest:              No cough, phlegm or wheezing. Abdomen:  No abdominal pain, nausea or vomiting. Neuro:  No focal weakness, abnormal movements orseizure like activity. Skin:   No rashes, no itching. :   No hematuria, no pyuria, no dysuria, no flank pain. Extremities:  No swelling or joint pains. ROS was otherwise negative except as mentioned in the [address]      Objective:     Constitutional:    CURRENT TEMPERATURE:  Temp: 98.7 °F (37.1 °C)  MAXIMUM TEMPERATURE OVER 24HRS:  Temp (24hrs), Av.2 °F (36.8 °C), Min:98 °F (36.7 °C), Max:98.7 °F (37.1 °C)    CURRENT RESPIRATORY RATE:  Resp: 12  CURRENT PULSE:  Heart Rate: 74  CURRENT BLOOD PRESSURE:  BP: (!) 153/92  24HR BLOOD PRESSURE RANGE:  Systolic (88HIN), GHL:527 , Min:134 , MYU:942   ; Diastolic (23QED), USW:56, Min:83, Max:109    24HR INTAKE/OUTPUT:    Intake/Output Summary (Last 24 hours) at 2022 1154  Last data filed at 2022 1804  Gross per 24 hour   Intake 200 ml   Output 300 ml   Net -100 ml       Labs     PTH:   Lab Results   Component Value Date/Time    IPTH 160.3 12/15/2020 10:46 AM     CBC:   Recent Labs     22   WBC 4.9   RBC 4.58   HGB 12.7*   HCT 39.3*   MCV 85.8   MCH 27.7   MCHC 32.3   RDW 13.4      MPV 12.0      BMP:   Recent Labs     22  0245    131*   K 3.6* 3.7    103   CO2 23 22   BUN 26* 27*   CREATININE 2.25* 2.37*   GLUCOSE 112* 98   CALCIUM 8.6 8.2*        Magnesium:   Recent Labs     22  0245   MG 2.0     Albumin:   Recent Labs     22  0245   LABALBU 3.3*         SPEP:   Lab Results   Component Value Date/Time    PROT 5.8 2022 02:45 AM    ALBCAL 4.4 12/15/2020 10:46 AM    ALBPCT 65 12/15/2020 10:46 AM    LABALPH 0.1 12/15/2020 10:46 AM    LABALPH 0.6 12/15/2020 10:46 AM    A1PCT 2 12/15/2020 10:46 AM    A2PCT 9 12/15/2020 10:46 AM    LABBETA 0.7 12/15/2020 10:46 AM    BETAPCT 11 12/15/2020 10:46 AM    GAMGLOB 0.9 12/15/2020 10:46 AM    GGPCT 13 12/15/2020 10:46 AM    PATH ELECTRONICALLY SIGNED. Shria Chan M.D. 12/15/2020 10:46 AM    PATH ELECTRONICALLY SIGNED. Shira Chan M.D. 12/15/2020 10:46 AM     UPEP: No results found for: TPU     C3:   Lab Results   Component Value Date    C3 127 12/15/2020     C4:   Lab Results   Component Value Date    C4 42 (H) 12/15/2020     Urine Creatinine:    Lab Results   Component Value Date/Time    LABCREA 225.8 12/15/2020 10:47 AM         Imaging     XR CHEST PORTABLE    Result Date: 12/29/2022  Cardiomegaly. Mild pulmonary vascular congestion. NM Cardiac Stress Test Nuclear Imaging    Result Date: 12/29/2022  No stress-induced ischemia. Global hypokinesis and left ventricular chamber dilatation with  mL. LVEF 28%. Risk stratification: High risk. US RETROPERITONEAL LIMITED    Result Date: 12/29/2022  Unremarkable ultrasound of the kidneys. Physical Exam     General: AAO x 3,speaking in full sentences, no accessory muscle use. HEENT: Atraumatic, normocephalic, no throat congestion, moist mucosa. Eyes:   Pupils equal, round and reactive to light, EOMI. Neck:   No JVD, no thyromegaly, no lymphadenopathy. Chest:  Bilateral vesicular breath sounds, no rales or wheezes. Cardiac:  S1 S2 RR, no murmurs, gallops or rubs . Abdomen: Soft, non-tender, no masses or organomegaly, BS audible. :   No suprapubic or flank tenderness. Neuro:  AAO x 3, No FND. SKIN:  No rashes, good skin turgor.   Extremities:  No edema, palpable peripheral pulses, no calf tenderness    Assessment     GUZMAN secondary to poor p.o. intake and intravascular volume depletion complicated by atrial fibrillation  CKD stage IIIb with a baseline creatinine of 1.5-1.9, likely secondary to hypertensive nephrosclerosis. patient states he has seen a nephrologist in the outpatient setting approximately 2 or 3 years ago on [employer] but he cannot remember his name, and he only saw him once  nonischemic cardiomyopathy with an EF of 40%  Primary hypertension  New onset A. fib with RVR  Elevated troponin  Previous Little's esophagus with esophageal stricture  Alcohol use    Plan     Will Check Renal Ultrasound to r/o element of obstruction and to assess the kidney size/echotexture. Comprehensive urine testing including Urinalysis,  Urine protein and creatinine to quantify the proteinuria if any at all. Will Order serum and urine protein electrophoresis, light chain ratio  to r/o occult paraprotein disease. Start IV fluids normal saline at 75 mL an hour for 24 hours  BMP in am  Strict I & O  Low Potassium, Low phosphorus and low salt diet. Would like to see downtrending renal function prior to cardiac cath if needed during this inpatient hospital stay      Thank you for the consultation. Please do not hesitate to call with questions. Electronically signed by CHUCHO Schroeder NP on 12/30/2022 at 11:54 AM    Attending Physician Statement  I have discussed the care of [facility], including pertinent history and exam findings with the CNP. I have reviewed the key elements of all parts of the encounter with the CNP. I have seen and examined the patient. agree with the assessment and plan and status of the problem list as documented. Addiitionally I recommend continued monitoring of lab data. The patient says he has seen my partner, Dr Nasir Weber, in the past for CKD management. The patient either has GUZMAN on CKD or just progression of his underlying CKD. The patient would be at moderate risk from a renal standpoint for worsening creatinine if he undergoes cardiac catheterization.   If the patient does require cardiac catheterization, there should be a firm indication for the procedure to justify the risks of radiocontrast administration. We will continue to follow.     Jose Marie MD   Nephrology attending physician  [facility]  12/30/2022

## 2022-12-31 VITALS
HEIGHT: 70 IN | RESPIRATION RATE: 18 BRPM | OXYGEN SATURATION: 96 % | BODY MASS INDEX: 29.35 KG/M2 | TEMPERATURE: 97.9 F | HEART RATE: 77 BPM | SYSTOLIC BLOOD PRESSURE: 149 MMHG | WEIGHT: 205 LBS | DIASTOLIC BLOOD PRESSURE: 103 MMHG

## 2022-12-31 LAB
ABSOLUTE EOS #: 0.08 K/UL (ref 0–0.44)
ABSOLUTE IMMATURE GRANULOCYTE: <0.03 K/UL (ref 0–0.3)
ABSOLUTE LYMPH #: 1 K/UL (ref 1.1–3.7)
ABSOLUTE MONO #: 0.31 K/UL (ref 0.1–1.2)
ANION GAP SERPL CALCULATED.3IONS-SCNC: 8 MMOL/L (ref 9–17)
BASOPHILS # BLD: 1 % (ref 0–2)
BASOPHILS ABSOLUTE: 0.03 K/UL (ref 0–0.2)
BUN BLDV-MCNC: 23 MG/DL (ref 6–20)
CALCIUM SERPL-MCNC: 8.1 MG/DL (ref 8.6–10.4)
CHLORIDE BLD-SCNC: 105 MMOL/L (ref 98–107)
CO2: 23 MMOL/L (ref 20–31)
CREAT SERPL-MCNC: 2.05 MG/DL (ref 0.7–1.2)
EKG ATRIAL RATE: 127 BPM
EKG ATRIAL RATE: 59 BPM
EKG P AXIS: 50 DEGREES
EKG P-R INTERVAL: 142 MS
EKG Q-T INTERVAL: 298 MS
EKG Q-T INTERVAL: 476 MS
EKG QRS DURATION: 100 MS
EKG QRS DURATION: 110 MS
EKG QTC CALCULATION (BAZETT): 453 MS
EKG QTC CALCULATION (BAZETT): 471 MS
EKG R AXIS: -41 DEGREES
EKG R AXIS: -42 DEGREES
EKG T AXIS: -176 DEGREES
EKG T AXIS: 151 DEGREES
EKG VENTRICULAR RATE: 139 BPM
EKG VENTRICULAR RATE: 59 BPM
EOSINOPHILS RELATIVE PERCENT: 2 % (ref 1–4)
GFR SERPL CREATININE-BSD FRML MDRD: 37 ML/MIN/1.73M2
GLUCOSE BLD-MCNC: 99 MG/DL (ref 70–99)
HCT VFR BLD CALC: 35.6 % (ref 40.7–50.3)
HEMOGLOBIN: 11.2 G/DL (ref 13–17)
IMMATURE GRANULOCYTES: 0 %
LYMPHOCYTES # BLD: 26 % (ref 24–43)
MCH RBC QN AUTO: 27.9 PG (ref 25.2–33.5)
MCHC RBC AUTO-ENTMCNC: 31.5 G/DL (ref 28.4–34.8)
MCV RBC AUTO: 88.8 FL (ref 82.6–102.9)
MONOCYTES # BLD: 8 % (ref 3–12)
NRBC AUTOMATED: 0 PER 100 WBC
P E INTERPRETATION, U: NORMAL
PARTIAL THROMBOPLASTIN TIME: 65.1 SEC (ref 20.5–30.5)
PATHOLOGIST: NORMAL
PDW BLD-RTO: 13.4 % (ref 11.8–14.4)
PLATELET # BLD: 131 K/UL (ref 138–453)
PLATELET, FLUORESCENCE: 138 K/UL (ref 138–453)
PLATELET, IMMATURE FRACTION: 7.4 % (ref 1.1–10.3)
PMV BLD AUTO: 12 FL (ref 8.1–13.5)
POTASSIUM SERPL-SCNC: 3.6 MMOL/L (ref 3.7–5.3)
RBC # BLD: 4.01 M/UL (ref 4.21–5.77)
SEG NEUTROPHILS: 63 % (ref 36–65)
SEGMENTED NEUTROPHILS ABSOLUTE COUNT: 2.46 K/UL (ref 1.5–8.1)
SODIUM BLD-SCNC: 136 MMOL/L (ref 135–144)
SPECIMEN TYPE: NORMAL
URINE TOTAL PROTEIN: 29 MG/DL
WBC # BLD: 3.9 K/UL (ref 3.5–11.3)

## 2022-12-31 PROCEDURE — 99232 SBSQ HOSP IP/OBS MODERATE 35: CPT | Performed by: STUDENT IN AN ORGANIZED HEALTH CARE EDUCATION/TRAINING PROGRAM

## 2022-12-31 PROCEDURE — 6370000000 HC RX 637 (ALT 250 FOR IP): Performed by: STUDENT IN AN ORGANIZED HEALTH CARE EDUCATION/TRAINING PROGRAM

## 2022-12-31 PROCEDURE — 6370000000 HC RX 637 (ALT 250 FOR IP)

## 2022-12-31 PROCEDURE — 80048 BASIC METABOLIC PNL TOTAL CA: CPT

## 2022-12-31 PROCEDURE — 85730 THROMBOPLASTIN TIME PARTIAL: CPT

## 2022-12-31 PROCEDURE — 85025 COMPLETE CBC W/AUTO DIFF WBC: CPT

## 2022-12-31 PROCEDURE — 36415 COLL VENOUS BLD VENIPUNCTURE: CPT

## 2022-12-31 PROCEDURE — 85055 RETICULATED PLATELET ASSAY: CPT

## 2022-12-31 PROCEDURE — 6370000000 HC RX 637 (ALT 250 FOR IP): Performed by: INTERNAL MEDICINE

## 2022-12-31 PROCEDURE — 85049 AUTOMATED PLATELET COUNT: CPT

## 2022-12-31 PROCEDURE — 6360000002 HC RX W HCPCS

## 2022-12-31 PROCEDURE — 99231 SBSQ HOSP IP/OBS SF/LOW 25: CPT | Performed by: INTERNAL MEDICINE

## 2022-12-31 PROCEDURE — 6370000000 HC RX 637 (ALT 250 FOR IP): Performed by: NURSE PRACTITIONER

## 2022-12-31 PROCEDURE — 2580000003 HC RX 258: Performed by: NURSE PRACTITIONER

## 2022-12-31 RX ORDER — POTASSIUM CHLORIDE 20 MEQ/1
40 TABLET, EXTENDED RELEASE ORAL ONCE
Status: COMPLETED | OUTPATIENT
Start: 2022-12-31 | End: 2022-12-31

## 2022-12-31 RX ORDER — PANTOPRAZOLE SODIUM 40 MG/1
40 TABLET, DELAYED RELEASE ORAL
Qty: 30 TABLET | Refills: 3 | Status: SHIPPED | OUTPATIENT
Start: 2023-01-01

## 2022-12-31 RX ORDER — ATORVASTATIN CALCIUM 40 MG/1
40 TABLET, FILM COATED ORAL DAILY
Qty: 30 TABLET | Refills: 3 | Status: SHIPPED | OUTPATIENT
Start: 2022-12-31

## 2022-12-31 RX ORDER — DILTIAZEM HYDROCHLORIDE 120 MG/1
120 CAPSULE, COATED, EXTENDED RELEASE ORAL DAILY
Qty: 30 CAPSULE | Refills: 3 | Status: SHIPPED | OUTPATIENT
Start: 2023-01-01

## 2022-12-31 RX ORDER — CARVEDILOL 25 MG/1
25 TABLET ORAL 2 TIMES DAILY
Qty: 60 TABLET | Refills: 3 | Status: SHIPPED | OUTPATIENT
Start: 2022-12-31

## 2022-12-31 RX ADMIN — HYDRALAZINE HYDROCHLORIDE 50 MG: 50 TABLET, FILM COATED ORAL at 14:14

## 2022-12-31 RX ADMIN — DOXAZOSIN 2 MG: 2 TABLET ORAL at 10:10

## 2022-12-31 RX ADMIN — APIXABAN 5 MG: 5 TABLET, FILM COATED ORAL at 14:13

## 2022-12-31 RX ADMIN — ASPIRIN 81 MG: 81 TABLET, COATED ORAL at 08:51

## 2022-12-31 RX ADMIN — POTASSIUM CHLORIDE 40 MEQ: 1500 TABLET, EXTENDED RELEASE ORAL at 10:10

## 2022-12-31 RX ADMIN — CARVEDILOL 25 MG: 25 TABLET, FILM COATED ORAL at 08:48

## 2022-12-31 RX ADMIN — HEPARIN SODIUM 12 UNITS/KG/HR: 10000 INJECTION, SOLUTION INTRAVENOUS at 01:23

## 2022-12-31 RX ADMIN — PANTOPRAZOLE SODIUM 40 MG: 40 TABLET, DELAYED RELEASE ORAL at 08:48

## 2022-12-31 RX ADMIN — HYDRALAZINE HYDROCHLORIDE 50 MG: 50 TABLET, FILM COATED ORAL at 08:48

## 2022-12-31 RX ADMIN — SODIUM CHLORIDE, PRESERVATIVE FREE 5 ML: 5 INJECTION INTRAVENOUS at 08:50

## 2022-12-31 RX ADMIN — SPIRONOLACTONE 12.5 MG: 25 TABLET ORAL at 08:50

## 2022-12-31 RX ADMIN — DILTIAZEM HYDROCHLORIDE 120 MG: 120 CAPSULE, COATED, EXTENDED RELEASE ORAL at 08:49

## 2022-12-31 NOTE — PROGRESS NOTES
Dr. Galdino Costello paged regarding potassium level and htn. Patient with no complaints and would like to go home today. PRE OP INSTRUCTION SHEET   1. Do not eat or drink anything after 12 midnight  prior to surgery. This includes no water, chewing gum or mints. 2. Take the following pills will a small sip of water (see MAR)                                        3. Aspirin, Ibuprofen, Advil, Naproxen, Vitamin E, fish oil and other Anti-inflammatory products should be stopped for 5 days before surgery or as directed by your physician. 4. Check with your Doctor regarding stopping Plavix, Coumadin, Lovenox, Fragmin or other blood thinners   5. Do not smoke, and do not drink any alcoholic beverages 24 hours prior to surgery. This includes NA Beer. 6. You may brush your teeth and gargle the morning of surgery. DO NOT SWALLOW WATER   7. You MUST make arrangements for a responsible adult to take you home after your surgery. You will not be allowed to leave alone or drive yourself home. It is strongly suggested someone stay with you the first 24 hrs. Your surgery will be cancelled if you do not have a ride home. 8. A parent/legal guardian must accompany a child scheduled for surgery and plan to stay at the hospital until the child is discharged. Please do not bring other children with you. 9. Please wear simple, loose fitting clothing to the hospital.  Jayden Novoa not bring valuables (money, credit cards, checkbooks, etc.) Do not wear any makeup (including no eye makeup) or nail polish on your fingers or toes. 10. DO NOT wear any jewelry or piercings on day of surgery. All body piercing jewelry must be removed. 11. If you have dentures,glasses, or contacts they will be removed before going to the OR; we will provide you a container. 12. Please see your family doctor/and cardiologist for a history & physical and/or concerning medications. Bring any test results/reports from your physician's office. Have history and labs faxed to [phone].  Remember to bring Blood Bank bracelet on the day of surgery. 14. If you have a Living Will and Durable Power of  for Healthcare, please bring in a copy. 13. Notify your Surgeon if you develop any illness between now and surgery  time, cough, cold, fever, sore throat, nausea, vomiting, etc.  Please notify your surgeon if you experience dizziness, shortness of breath or blurred vision between now & the time of your surgery   16. DO NOT shave your operative site 96 hours prior to surgery. For face & neck surgery, men may use an electric razor 48 hours prior to surgery. 17. Shower with _x__Antibacterial soap (x_chlorhexidine for total joint  Pt's) shower two times before surgery.(the morning of and the night before. 18. To provide excellent care visitors will be limited to one in the room at any given time.   Please call pre admission testing if you any further questions [phone] or [phone]

## 2022-12-31 NOTE — PROGRESS NOTES
[facility]  Progress Note                   Date:   12/31/2022  Patient name: James Pruitt  Date of admission:  12/28/2022 10:51 PM  MRN:   [id number]  YOB: 1965  PCP: No primary care provider on file. Reason for Admission: New onset a-fib ([facility] [address] [I48.91]  Atrial fibrillation with RVR ([facility] [address] [I48.91]    Subjective:       Clinical Changes /Abnormalities: Patient seen and examined in room. He denies chest pain, SOB, dizziness and palpitations. He states he is ready to go home. Labs/vitals/tele reviewed. Discussed with RN, no acute CV issues/concerns overnight. On heparin gtt, RA. Review of Systems    Medications:   Scheduled Meds:   dilTIAZem  120 mg Oral Daily    carvedilol  25 mg Oral BID    pantoprazole  40 mg Oral QAM AC    aspirin  81 mg Oral Daily    doxazosin  2 mg Oral Daily    hydrALAZINE  50 mg Oral TID    spironolactone  12.5 mg Oral Daily    sodium chloride flush  5-40 mL IntraVENous 2 times per day    atorvastatin  40 mg Oral Daily     Continuous Infusions:   sodium chloride      heparin (PORCINE) Infusion 12 Units/kg/hr (12/31/22 0123)     CBC:   Recent Labs     12/28/22  2311 12/31/22  0712   WBC 4.9 3.9   HGB 12.7* 11.2*    131*       BMP:    Recent Labs     12/28/22  2311 12/30/22  0245 12/31/22  0712    131* 136   K 3.6* 3.7 3.6*    103 105   CO2 23 22 23   BUN 26* 27* 23*   CREATININE 2.25* 2.37* 2.05*   GLUCOSE 112* 98 99       Hepatic:  Recent Labs     12/30/22  0245   AST 23   ALT 11   BILITOT 0.5   ALKPHOS 61       Troponin:   Recent Labs     12/29/22  0025 12/29/22  0335 12/29/22  0809   TROPHS 165* 277* 280*       BNP: No results for input(s): BNP in the last 72 hours. Lipids: No results for input(s): CHOL, HDL in the last 72 hours.     Invalid input(s): LDLCALCU  INR:   Recent Labs     12/30/22  0245   INR 1.0         Objective:   Vitals: BP (!) 166/103   Pulse 79   Temp 97.9 °F (36.6 °C) (Oral)   Resp 18   Ht 5' 10\" (1.778 m) Wt 205 lb (93 kg)   SpO2 95%   BMI 29.41 kg/m²   General appearance: alert and cooperative with exam  HEENT: Head: Normocephalic, no lesions, without obvious abnormality. Neck:no JVD, trachea midline, no adenopathy  Lungs: Clear to auscultation  Heart: Regular rate and rhythm, s1/s2 auscultated, no murmurs, SR  Abdomen: soft, non-tender, bowel sounds active  Extremities: no edema  Neurologic: not done    ECHO 8/11/22  Summary  Left ventricle is normal in size Global left ventricular systolic function  is mildly reduced calculated ejection fraction by 3D Heart Model is 40 % . Moderate left ventricular hypertrophy. Grade I (mild) left ventricular diastolic dysfunction. No significant valvular regurgitation or stenosis seen. Cardiac Cath 3/2021   Angiographic Findings      Cardiac Arteries and Lesion Findings     LMCA: Mild irregularities 10-20%. LAD: Mild irregularities 10-20%. LCx: Mild irregularities 10-20%. RCA: Mild irregularities 10-20%. Coronary Tree      Dominance: Right     LV Analysis  LV function assessed as:Normal.  Ejection Fraction  +----------------------------------------------------------------------+---+  ! Method                                                                ! EF%! +----------------------------------------------------------------------+---+  ! LV gram                                                               !50 !  +----------------------------------------------------------------------+---+  ! LV gram                                                               !50 !  +----------------------------------------------------------------------+---+      Mild CAD. Left ventricular systolic function at lower limits of normal.      Recommendations      Medical therapy and risk factor modifications. ECHO 12/29/22  Summary  Left ventricle is normal in size. Global left ventricular systolic function  is low normal.  Visual LVEF 45-50%, with abnormal global L. strain of -10.2 %. There is  apical sparing pattern, concern for infiltrative cardiomyopathies. Severe left ventricular hypertrophy. Grade II (moderate) left ventricular diastolic dysfunction. Left atrium is mildly dilated. Right atrium is mildly dilated . Mildly dilated right ventricular cavity. Right ventricular function appears normal .  Normal mitral valve structure. Mild mitral regurgitation. No mitral stenosis. Normal tricuspid valve leaflets. Mild tricuspid regurgitation. No tricuspid stenosis. Estimated right ventricular systolic pressure is 26 mmHg. Stress Test 12/29/22  No stress-induced ischemia. Global hypokinesis and left ventricular chamber dilatation with  mL. LVEF 28%. Risk stratification: High risk. IMPRESSION:  Electrocardiographically uninterpretable Lexiscan stress study. Radio-isotope results to follow from the department of Nuclear Medicine. Assessment / Acute Cardiac Problems:   New onset Afib with RVR, now in SR   Hypertension   Elevated troponin     Patient Active Problem List:     HTN (hypertension), malignant     Tobacco abuse     Renal insufficiency     CKD (chronic kidney disease), stage III (HCC)     Atrial fibrillation with RVR (HCC)     Demand ischemia (HCC)     Alcohol consumption binge drinking     Chronic systolic (congestive) heart failure (HCC)     Nonischemic cardiomyopathy ([address]     Little's esophagus without dysplasia      Plan of Treatment:   Stable, he denies Chest pain and SOB. ECHO/Stress test reviewed as above. Discussed stress test, labs and vitals reviewed with Dr. Clara Oneill. No plans for further intervention. Patient to follow up as outpatient. New onset Afib. Current SR/SB. Continue cardizem, DC heparin gtt. Continue Eliquis. Continue ASA, statin, BB, and aldactone. HTN. Elevated. Continue Cardura, hydralazine and BB. Resume home Norvasc.      Electronically signed by CHUCHO Freire CNP on 12/31/2022 at 12:11 0849 [facility].  [phone]

## 2022-12-31 NOTE — CARE COORDINATION
Met with patient to discuss transitional planning. He is returning home independently.  He denies needs and has transportation    Discharge [address] Case Management Department  Written by: Sheryle Kell, RN    Patient Name: Saurav Wilson  Attending Provider: Ghazala Darby DO  Admit Date: 2022 10:51 PM  MRN: [id number]  Account: [de-identified]                     : 1965  Discharge Date: 2022      Disposition: home    Sheryle Kell, RN

## 2022-12-31 NOTE — PROGRESS NOTES
[facility]  Office: [address], DO, Anna Feldersuzanne, DO, Bandon Mail, DO, Angela Has Blood, DO, Hill Valenzuela MD, Danyelle Ashley MD, Merlin Quezada MD, Aarti Oconnell MD,  Cady Smyth MD, Misa Boo MD, Bishnu Sosa, DO, Michael Powell MD,  Malvin Carbajal MD, Rafa Schulte MD, Anshu Moreno, DO, Mukesh Irvin MD, Karon Kenney MD, Makenzie Memory, DO, Bartolome Servin MD, Kellee Mar MD, Orlando Li MD, Panfilo Stinson MD, Anibal Reid, DO, Efrain Martin MD, Suha Gomez MD, Eliane Moreira, CNP,  Gideon Raw, CNP, Sharlene Arellano, CNP, Smita Forbesoty, CNP,  Munira Bingham, DNP, Tevin Greene, CNP, Stacey Cadena, CNP, Landen Rascon, CNP, Guy Romero, CNP, Ade Munson, CNP, Mora Vergara PA-C, Butch Josue, CNS, Tatiana Guardado, CNP, Yemi Ascencio, CNP         [facility]    Progress Note    12/31/2022    7:27 AM    Name:   Ayaka Crabtree  MRN:     [id number]     [place]:      [de-identified]   Room:   2003/2003-01  IP Day:  2  Admit Date:  12/28/2022 10:51 PM    PCP:   No primary care provider on file. Code Status:  Full Code    Subjective:     C/C:   Chief Complaint   Patient presents with    Chest Pain     Interval History Status: improved. Vitals reviewed, afebrile and hemodynamically stable. Saturating well on room air. Labs reviewed, hypokalemia 3.6 replaced with 40 mEq and creatinine improving 2.05 from 2.37 yesterday. Overnight patient had no significant events. On examination patient resting comfortably in bed. Will transition to Eliquis with likely discharge this afternoon after being evaluated by nephrology and cardiology.     Brief History:     Mr. Ayaka Crabtree is a 61-year-old male with a significant past medical history of hypertension, CKD stage III and nonischemic cardiomyopathy who initially presented to the emergency department with a chief complaint of sudden onset chest pain with dyspnea. Review of Systems:     Constitutional:  negative for chills, fevers, sweats  Respiratory: Negative for cough, shortness of breath, wheezing  Cardiovascular: Negative for chest pain, lower extremity edema  Gastrointestinal:  negative for abdominal pain, constipation, diarrhea, nausea, vomiting  Neurological:  negative for dizziness, headache    Medications: Allergies:  No Known Allergies    Current Meds:   Scheduled Meds:    dilTIAZem  120 mg Oral Daily    carvedilol  25 mg Oral BID    pantoprazole  40 mg Oral QAM AC    aspirin  81 mg Oral Daily    doxazosin  2 mg Oral Daily    hydrALAZINE  50 mg Oral TID    spironolactone  12.5 mg Oral Daily    sodium chloride flush  5-40 mL IntraVENous 2 times per day    atorvastatin  40 mg Oral Daily     Continuous Infusions:    sodium chloride 75 mL/hr at 22 1244    sodium chloride      heparin (PORCINE) Infusion 12 Units/kg/hr (22 0123)     PRN Meds: sodium chloride flush, sodium chloride, ondansetron **OR** ondansetron, polyethylene glycol, acetaminophen **OR** acetaminophen, heparin (porcine), heparin (porcine), sodium chloride flush, sodium chloride flush    Data:     Past Medical History:   has a past medical history of Benign essential HTN, CKD (chronic kidney disease), stage III ([address] and Enlarged heart. Social History:   reports that he has been smoking cigarettes. He has been smoking an average of 1 pack per day. He has quit using smokeless tobacco. He reports current alcohol use. He reports that he does not use drugs.      Family History:   Family History   Problem Relation Age of Onset    Hypertension Mother     Hypertension Father        Vitals:  BP (!) 144/95   Pulse 67   Temp 98 °F (36.7 °C) (Oral)   Resp 18   Ht 5' 10\" (1.778 m)   Wt 205 lb (93 kg)   SpO2 95%   BMI 29.41 kg/m²   Temp (24hrs), Av.1 °F (36.7 °C), Min:97.8 °F (36.6 °C), Max:98.7 °F (37.1 °C)    No results for input(s): POCGLU in the last 72 hours. I/O (24Hr): No intake or output data in the 24 hours ending 12/31/22 0727      Labs:  Hematology:  Recent Labs     12/28/22 2311 12/30/22 0245   WBC 4.9  --    RBC 4.58  --    HGB 12.7*  --    HCT 39.3*  --    MCV 85.8  --    MCH 27.7  --    MCHC 32.3  --    RDW 13.4  --      --    MPV 12.0  --    INR  --  1.0       Chemistry:  Recent Labs     12/28/22 2311 12/29/22  0025 12/29/22  0335 12/29/22  0809 12/30/22 0245     --   --   --  131*   K 3.6*  --   --   --  3.7     --   --   --  103   CO2 23  --   --   --  22   GLUCOSE 112*  --   --   --  98   BUN 26*  --   --   --  27*   CREATININE 2.25*  --   --   --  2.37*   MG  --   --   --   --  2.0   ANIONGAP 13  --   --   --  6*   LABGLOM 33*  --   --   --  31*   CALCIUM 8.6  --   --   --  8.2*   PROBNP 1,380*  --   --   --  896*   TROPHS 122* 165* 277* 280*  --        Recent Labs     12/30/22 0245 12/30/22  1234   PROT 5.8* 5.8*   LABALBU 3.3*  --    TSH 0.84  --    AST 23  --    ALT 11  --    ALKPHOS 61  --    BILITOT 0.5  --        ABG:No results found for: POCPH, PHART, PH, POCPCO2, HWG5BXH, PCO2, POCPO2, PO2ART, PO2, POCHCO3, GBI9FOF, HCO3, NBEA, PBEA, BEART, BE, THGBART, THB, BHF1KQL, TRFJ8RBK, K5WCEUSP, O2SAT, FIO2  No results found for: SPECIAL  No results found for: CULTURE    Radiology:  XR CHEST PORTABLE    Result Date: 12/29/2022  Cardiomegaly. Mild pulmonary vascular congestion. NM Cardiac Stress Test Nuclear Imaging    Result Date: 12/29/2022  No stress-induced ischemia. Global hypokinesis and left ventricular chamber dilatation with  mL. LVEF 28%. Risk stratification: High risk. US RETROPERITONEAL LIMITED    Result Date: 12/29/2022  Unremarkable ultrasound of the kidneys.        Physical Examination:        General appearance:  alert, cooperative and no distress  Mental Status:  oriented to person, place and time and normal affect  Lungs:  clear to auscultation bilaterally, normal effort  Heart: regular rate and rhythm, no murmur  Abdomen:  soft, nontender, nondistended, normal bowel sounds  Extremities:  no edema, redness, tenderness in the calves  Skin:  no gross lesions, rashes, induration    Assessment:        Hospital Problems             Last Modified POA    * (Principal) New onset a-fib ([address] 12/30/2022 Yes    Demand ischemia ([address] 12/29/2022 Yes    Alcohol consumption binge drinking 12/29/2022 Yes    Chronic systolic (congestive) heart failure ([address] 12/29/2022 Yes    Nonischemic cardiomyopathy ([address] 12/29/2022 Yes    Little's esophagus without dysplasia 12/29/2022 Yes    HTN (hypertension), malignant 12/29/2022 Yes    Overview Addendum 9/29/2020  3:18 PM by Mehrdad Carr MD     Essential versus secondary hypertension, is currently only on 1 medicine for blood pressure control, does not appear to have overt secondary causes present. At one point 2 to 3 years ago his systolic pressures were as high as 230 when he presented to 1 of the hospitals and required admission. Tobacco abuse 12/29/2022 Yes    CKD (chronic kidney disease), stage III ([address] 12/29/2022 Yes    Overview Signed 9/29/2020  3:16 PM by Mehrdad Carr MD     Secondary to suspected ischemic nephrosclerosis baseline creatinine 1.5-1.9          Plan:        Atrial fibrillation with RVR. Cardiology consulted. Currently in normal sinus rhythm. Continue Coreg 25 mg twice daily, diltiazem 120 mg daily. Remains on heparin infusion. Will need anticoagulation on discharge. Will discharge on Eliquis. [id number] Score for Atrial Fibrillation Stroke Risk   Risk   Factors  Component Value   C CHF Yes 1   H HTN Yes 1   A2 Age >= 75 No,  (62 y.o.) 0   D DM No 0   S2 Prior Stroke/TIA No 0   V Vascular Disease No 0   A Age 74-69 No,  (62 y.o.) 0   Sc Sex male 0    [id number]  Score  2     NSTEMI. Troponin elevated 122> 280. Cardiology following.  Underwent Lexiscan stress test with global hypokinesia and left ventricular chamber dilation with a left ventricular ejection fraction of 28%. No plan for intervention per cardiology if chest pain-free. Likely outpatient work-up. Continue aspirin 81 mg daily, Lipitor 40 mg daily, Coreg 25 mg twice daily, Aldactone 12.5 mg daily. Chronic systolic and diastolic congestive heart failure. Echocardiogram reviewed with left ventricular ejection fraction 45 to 50% and grade 2 diastolic dysfunction. Continue Coreg 25 mg twice daily and Aldactone 12.5 mg daily. Little's esophagus without dyspepsia. Outpatient follow-up with PCP as well as GI. We will start patient on Protonix 40 mg daily. Hypertension. Continue Coreg 25 mg twice daily, diltiazem 120 mg daily, doxazosin 2 mg daily, hydralazine 50 mg 3 times daily, Aldactone 12.5 mg daily. Will need close outpatient follow-up as blood pressures have improved but still remain elevated. GUZMAN on CKD stage III. Nephrology following and case cardiac catheterization necessary. Baseline creatinine 1.5-1.9. Continue normal saline infusion 75 ml/hr. EtOH abuse. Counseled on cessation. Will monitor for signs of alcohol withdrawal.    Tobacco use disorder. Cessation encouraged. DVT prophylaxis: Heparin infusion will transition to Eliquis. GI prophylaxis: Protonix 40 mg daily.     Roya Mohamud DO  12/31/2022  7:27 AM

## 2022-12-31 NOTE — PROGRESS NOTES
Renal Progress Note    Patient :  Francine Silva; 59 y.o. MRN# [id number]  Location:    Attending:  Delfina Vargas DO  Admit Date:  2022   Hospital Day: 2    Subjective   Patient was seen and examined. Discussed case with nurse. Patient is going to follow-up outpatient with cardiology no plans for intervention inpatient. The reviewed stress test labs and vitals. Echo stress reviewed. No acute events overnight. Vital signs stable   Urine output over the last 24 hours documented as 300 mL. Labs pending this morning. Work-up:  Immunofixation pending. Urine creatinine 105.6 UPC 0.27. Objective     VS: BP (!) 166/103   Pulse 54   Temp 97.9 °F (36.6 °C) (Oral)   Resp 18   Ht 5' 10\" (1.778 m)   Wt 205 lb (93 kg)   SpO2 95%   BMI 29.41 kg/m²   MAXIMUM TEMPERATURE OVER 24HRS:  Temp (24hrs), Av.9 °F (36.6 °C), Min:97.8 °F (36.6 °C), Max:98 °F (36.7 °C)    24HR BLOOD PRESSURE RANGE:  Systolic (85PAP), YYN:106 , Min:144 , QXU:171   ; Diastolic (38OMO), ZSU:58, Min:86, Max:103    24HR INTAKE/OUTPUT:  No intake or output data in the 24 hours ending 22 0826  WEIGHT :Patient Vitals for the past 96 hrs (Last 3 readings):   Weight   22 2247 205 lb (93 kg)       Current Medications:     Scheduled Meds:    dilTIAZem  120 mg Oral Daily    carvedilol  25 mg Oral BID    pantoprazole  40 mg Oral QAM AC    aspirin  81 mg Oral Daily    doxazosin  2 mg Oral Daily    hydrALAZINE  50 mg Oral TID    spironolactone  12.5 mg Oral Daily    sodium chloride flush  5-40 mL IntraVENous 2 times per day    atorvastatin  40 mg Oral Daily     Continuous Infusions:    sodium chloride 75 mL/hr at 22 1244    sodium chloride      heparin (PORCINE) Infusion 12 Units/kg/hr (22 0123)       Physical Examination:     General:         AAO x 3,speaking in full sentences, no accessory muscle use.   Neck:              No JVD, supple  Chest:             Bilateral vesicular breath sounds, no rales or wheezes. Cardiac:          S1 S2 RR, no murmurs, gallops or rubs . Abdomen:      Soft, non-tender, no masses or organomegaly, BS audible. :                  No suprapubic or flank tenderness. Neuro:            AAO x 3, No FND. SKIN:              No rashes, good skin turgor. Extremities:   No edema, palpable peripheral pulses, no calf tenderness    Labs:       Recent Labs     12/28/22  2311   WBC 4.9   RBC 4.58   HGB 12.7*   HCT 39.3*   MCV 85.8   MCH 27.7   MCHC 32.3   RDW 13.4      MPV 12.0      BMP:   Recent Labs     12/28/22  2311 12/30/22  0245    131*   K 3.6* 3.7    103   CO2 23 22   BUN 26* 27*   CREATININE 2.25* 2.37*   GLUCOSE 112* 98   CALCIUM 8.6 8.2*      Magnesium:    Recent Labs     12/30/22  0245   MG 2.0     Albumin:    Recent Labs     12/30/22  0245   LABALBU 3.3*     PTH:     Lab Results   Component Value Date/Time    IPTH 160.3 12/15/2020 10:46 AM     Urinalysis/Chemistries:      Radiology:     CXR:     Assessment:     GUZMAN secondary to poor p.o. intake and intravascular volume depletion complicated by atrial fibrillation. Labs pending this am.  CKD stage IIIb with a new baseline creatinine about 2 mg/dl, likely secondary to hypertensive nephrosclerosis. patient states he has seen a nephrologist in the outpatient setting approximately 2 or 3 years ago on [address] but he cannot remember his name, and he only saw him once  nonischemic cardiomyopathy with an EF of 40%  Primary hypertension  New onset A. fib with RVR  Elevated troponin  Previous Little's esophagus with esophageal stricture  Alcohol use    Plan:   Labs pending. Immunofixation pending. Strict I&Os and daily weights          Nutrition   Renal Diet/TF      Electronically signed by Guy Tamayo CNP, APRN - CNP on 12/31/2022 at 8:26 AM   [facility].      Attending Physician Statement  I have discussed the care of Wyatt Hdez, including pertinent history and exam findings with the CNP. I have reviewed the key elements of all parts of the encounter with the CNP. I have seen and examined the patient. I agree with the assessment and plan and status of the problem list as documented. Addiitionally I recommend discontinue the IV fluids. In addition, the patient is to follow up with my partner, Dr. Bridger Allred, in the office within the next 3-4 weeks. In addition, the patient is cleared for discharge home from a nephrology standpoint. Tila Quispe M.D.   Nephrology Attending Physician  [facility]  12/31/2022    Tila Quispe MD

## 2023-01-02 LAB
EKG ATRIAL RATE: 59 BPM
EKG P AXIS: 50 DEGREES
EKG P-R INTERVAL: 142 MS
EKG Q-T INTERVAL: 476 MS
EKG QRS DURATION: 100 MS
EKG QTC CALCULATION (BAZETT): 471 MS
EKG R AXIS: -42 DEGREES
EKG T AXIS: -176 DEGREES
EKG VENTRICULAR RATE: 59 BPM

## 2023-01-02 PROCEDURE — 93010 ELECTROCARDIOGRAM REPORT: CPT | Performed by: INTERNAL MEDICINE

## 2023-01-02 NOTE — PROGRESS NOTES
CLINICAL PHARMACY NOTE: MEDS TO BEDS    Total # of Prescriptions Filled: 5   The following medications were delivered to the patient:  Pantoprazole  Eliquis  Carvedilol  Diltiazem  atorvastatin    Additional Documentation:

## 2023-01-03 LAB
ALBUMIN (CALCULATED): 3.8 G/DL (ref 3.2–5.2)
ALBUMIN PERCENT: 65 % (ref 45–65)
ALPHA 1 PERCENT: 2 % (ref 3–6)
ALPHA 2 PERCENT: 10 % (ref 6–13)
ALPHA-1-GLOBULIN: 0.1 G/DL (ref 0.1–0.4)
ALPHA-2-GLOBULIN: 0.6 G/DL (ref 0.5–0.9)
BETA GLOBULIN: 0.7 G/DL (ref 0.5–1.1)
BETA PERCENT: 12 % (ref 11–19)
GAMMA GLOBULIN %: 11 % (ref 9–20)
GAMMA GLOBULIN: 0.6 G/DL (ref 0.5–1.5)
P E INTERPRETATION, U: NORMAL
PATHOLOGIST: ABNORMAL
PATHOLOGIST: NORMAL
PROTEIN ELECTROPHORESIS, SERUM: ABNORMAL
SPECIMEN TYPE: NORMAL
TOTAL PROT. SUM,%: 100 % (ref 98–102)
TOTAL PROT. SUM: 5.8 G/DL (ref 6.3–8.2)
TOTAL PROTEIN: 5.8 G/DL (ref 6.4–8.3)
URINE TOTAL PROTEIN: 29 MG/DL

## 2023-01-04 RX ORDER — SPIRONOLACTONE 25 MG/1
12.5 TABLET ORAL DAILY
Qty: 30 TABLET | Refills: 3 | Status: SHIPPED | OUTPATIENT
Start: 2023-01-04

## 2023-01-04 NOTE — TELEPHONE ENCOUNTER
Patient use to [facility]. Patient has appt on 1/9 for hospital f/u from 12/28 due to mild heart attack.    Thank you

## 2023-01-04 NOTE — TELEPHONE ENCOUNTER
Last visit: 3/28/2022  Last Med refill: 9/21/2022  Does patient have enough medication for 72 hours: no    Next Visit Date:1/9/2023    Future Appointments   Date Time Provider Sylvia Apodaca   1/9/2023 11:40 AM [facility] Kenyon [place] Maintenance   Topic Date Due    Pneumococcal 0-64 years Vaccine (1 - PCV) Never done    DTaP/Tdap/Td vaccine (1 - Tdap) Never done    Shingles vaccine (1 of 2) Never done    Lipids  12/17/2021    COVID-19 Vaccine (4 - Booster for Pfizer series) 04/18/2022    Flu vaccine (1) Never done    Depression Screen  03/28/2023    GFR test (Diabetes, CKD 3-4, OR last GFR 15-59)  12/31/2023    Colorectal Cancer Screen  04/14/2026    Hepatitis C screen  Completed    HIV screen  Completed    Hepatitis A vaccine  Aged Out    Hib vaccine  Aged Out    Meningococcal (ACWY) vaccine  Aged Out       Hemoglobin A1C (%)   Date Value   12/17/2020 4.3   06/23/2020 4.4             ( goal A1C is < 7)   No results found for: LABMICR  LDL Cholesterol (mg/dL)   Date Value   12/17/2020 43   06/23/2020 88       (goal LDL is <100)   AST (U/L)   Date Value   12/30/2022 23     ALT (U/L)   Date Value   12/30/2022 11     BUN (mg/dL)   Date Value   12/31/2022 23 (H)     BP Readings from Last 3 Encounters:   12/31/22 (!) 149/103   03/28/22 130/88   06/29/21 (!) 163/92          (goal 120/80)    All Future Testing planned in [facility]  Lab Frequency Next Occurrence   ALT Once 09/30/2022   AST Once 09/30/2022   CBC with Auto Differential Once 09/30/2022   Hemoglobin A1C Once 09/30/2022   Lipid Panel Once 09/30/2022   Magnesium Once 09/30/2022   PSA Screening Once 09/30/2022   Renal Function Panel Once 09/30/2022   TSH with Reflex Once 09/30/2022               Patient Active Problem List:     HTN (hypertension), malignant     Tobacco abuse     Renal insufficiency     CKD (chronic kidney disease), stage III ([address]     New onset a-fib ([address]     Demand ischemia ([facility]     Alcohol consumption binge drinking     Chronic systolic (congestive) heart failure (HCC)     Nonischemic cardiomyopathy (HCC)     Little's esophagus without dysplasia

## 2023-01-09 NOTE — DISCHARGE SUMMARY
[facility]  Office: [address], DO, Lizette Lao, DO, Gregory Mendiola, DO, Samantha Emily Dejesus, DO, Funmi Hunt MD, Olvin Jung MD, Randolph Chatman MD, Yarely Aguirre MD,  Wendi Blake MD, Caprice Barr MD, Joseph Sheffield, DO, Lavon Donahue MD,  Law Boland MD, Lieutenant Robert MD, Ebony Gonzales DO, Pipo Jones MD, Zaida Oviedo MD, Nicanor Diaz DO, Wilma Zelaya MD, Luciano De La Cruz MD, Shawna Ram MD, Denton Drake MD, Hemal Fowler DO, Jamie Lund MD, Brandon Shields MD, Vu Salmeron, CNP,  Emir Heath, CNP, Peggy Howell, CNP, Richard Rodriguez, CNP,  Lluvia Florian, [facility], Christiano Montenegro, CNP, Kaye Sherman, CNP, David Mcdowell, CNP, Vanessa Chapa, CNP, Yessica Zaldivar, CNP, Connor Regan PA-C, Nik Avila, CNS, Efra Faith, CNP, Yuri Conklin, [facility]    Discharge Summary     Patient ID: Wyatt Hdez  :  1965   MRN: [id number]     ACCOUNT:  [de-identified]   Patient's PCP: No primary care provider on file. Admit Date: 2022   Discharge Date: 2022. Length of Stay: 2  Code Status:  Prior  Admitting Physician: Lieutenant Robert MD  Discharge Physician: Nicanor Diaz DO     Active Discharge Diagnoses:     Hospital Problem Lists:  Principal Problem:    New onset a-fib [facility])  Active Problems:    Demand ischemia ([facility]    Alcohol consumption binge drinking    Chronic systolic (congestive) heart failure ([facility]    Nonischemic cardiomyopathy ([facility]    Little's esophagus without dysplasia    HTN (hypertension), malignant    Tobacco abuse    CKD (chronic kidney disease), stage III ([facility]  Resolved Problems:    * No resolved hospital problems.  *      Admission Condition:  fair     Discharged Condition: good    Hospital Stay:     Hospital Course:      Mr. Wyatt Hdez is a 64-year-old male with a significant past medical history of hypertension, CKD stage III and nonischemic cardiomyopathy who initially presented to the emergency department with a chief complaint of sudden onset chest pain with dyspnea. Cardiology was consulted with plan for Lexiscan stress test and echocardiogram.  Nephrology was consulted as patient had GUZMAN on CKD stage III. Stress test demonstrated global hypokinesia and left ventricular chamber dilation with left ventricular ejection fraction of 28% and echocardiogram demonstrated left ventricular ejection fraction 45 to 50% with grade 2 diastolic dysfunction. Cardiology recommended outpatient follow-up and no plan for inpatient intervention. Heparin infusion was discontinued and patient was transitioned to Eliquis and told to follow-up with cardiology outpatient    Significant therapeutic interventions: None. Significant Diagnostic Studies:   Labs / Micro:  CBC:   Lab Results   Component Value Date/Time    WBC 3.9 12/31/2022 07:12 AM    RBC 4.01 12/31/2022 07:12 AM    HGB 11.2 12/31/2022 07:12 AM    HCT 35.6 12/31/2022 07:12 AM    MCV 88.8 12/31/2022 07:12 AM    MCH 27.9 12/31/2022 07:12 AM    MCHC 31.5 12/31/2022 07:12 AM    RDW 13.4 12/31/2022 07:12 AM     12/31/2022 07:12 AM     BMP:    Lab Results   Component Value Date/Time    GLUCOSE 99 12/31/2022 07:12 AM     12/31/2022 07:12 AM    K 3.6 12/31/2022 07:12 AM     12/31/2022 07:12 AM    CO2 23 12/31/2022 07:12 AM    ANIONGAP 8 12/31/2022 07:12 AM    BUN 23 12/31/2022 07:12 AM    CREATININE 2.05 12/31/2022 07:12 AM    BUNCRER NOT REPORTED 03/03/2021 10:37 AM    CALCIUM 8.1 12/31/2022 07:12 AM    LABGLOM 37 12/31/2022 07:12 AM    GFRAA 43 03/03/2021 10:37 AM    GFR      03/03/2021 10:37 AM    GFR NOT REPORTED 03/03/2021 10:37 AM     Radiology:  No results found.     Consultations:    Consults:     Final Specialist Recommendations/Findings:   IP CONSULT TO HOSPITALIST  IP CONSULT TO CARDIOLOGY  IP CONSULT TO CARDIOLOGY  IP CONSULT TO NEPHROLOGY      The patient was seen and examined on day of discharge and this discharge summary is in conjunction with any daily progress note from day of discharge. Discharge plan:     Disposition: Home    Physician Follow Up: [facility]  [address] [place] [address] [phone]  Follow up on 1/20/2023  Hospital follow up at 1:30 pm       Requiring Further Evaluation/Follow Up POST HOSPITALIZATION/Incidental Findings: None. Diet: cardiac diet    Activity: As tolerated    Instructions to Patient:   Follow-up with PCP and cardiology as instructed. Medications as instructed. Return to the emergency department immediately for any new or worsening concerns. Discharge Medications:      Medication List        START taking these medications      apixaban 5 MG Tabs tablet  Commonly known as: ELIQUIS  Take 1 tablet by mouth 2 times daily     dilTIAZem 120 MG extended release capsule  Commonly known as: CARDIZEM CD  Take 1 capsule by mouth daily     pantoprazole 40 MG tablet  Commonly known as: PROTONIX  Take 1 tablet by mouth every morning (before breakfast)            CHANGE how you take these medications      atorvastatin 40 MG tablet  Commonly known as: LIPITOR  Take 1 tablet by mouth daily  What changed:   medication strength  See the new instructions.      carvedilol 25 MG tablet  Commonly known as: Coreg  Take 1 tablet by mouth 2 times daily  What changed: how much to take            CONTINUE taking these medications      Adult Blood Pressure Cuff Lg Kit  Check blood pressure daily     albuterol sulfate  (90 Base) MCG/ACT inhaler  Commonly known as: Proventil HFA  Inhale 2 puffs into the lungs every 6 hours as needed for Wheezing     aspirin 81 MG EC tablet     doxazosin 2 MG tablet  Commonly known as: CARDURA  take 1 tablet by mouth once daily     hydrALAZINE 50 MG tablet  Commonly known as: APRESOLINE  Take 1 tablet by mouth 3 times daily     VITAMIN D (CHOLECALCIFEROL) PO STOP taking these medications      amLODIPine 10 MG tablet  Commonly known as: NORVASC               Where to Get Your Medications        These medications were sent to [facility] [address] [facility], [address] [place] [address]      Phone: [phone]   apixaban 5 MG Tabs tablet  atorvastatin 40 MG tablet  carvedilol 25 MG tablet  dilTIAZem 120 MG extended release capsule  pantoprazole 40 MG tablet         No discharge procedures on file. Time Spent on discharge is  31 mins in patient examination, evaluation, counseling as well as medication reconciliation, prescriptions for required medications, discharge plan and follow up. Electronically signed by   Brandon Arnold DO  1/9/2023  2:45 PM      Thank you Dr. Melody Shahid primary care provider on file. for the opportunity to be involved in this patient's care.

## 2023-01-11 ENCOUNTER — TELEPHONE (OUTPATIENT)
Dept: FAMILY MEDICINE CLINIC | Age: 58
End: 2023-01-11

## 2023-01-11 NOTE — TELEPHONE ENCOUNTER
----- Message from Chester Conner sent at 1/9/2023  1:56 PM EST -----  Subject: Hospital Follow Up    QUESTIONS  What hospital was the Patient Discharged from? [facility]  Date of Discharge? 2022-12-31  Discharge Location? Home  Reason for hospitalization as patient stated? A-FIB [facility])  What question does the patient have, if applicable? Roxanne Seymour called   on 01/09 @ 1:53 pm. He needs to be a new patient with the practice, but   also needs to have a hospital follow up appointment from his discharge on   12/21/2022 from [facility]; admitted for new onset a-fib. Please   call back. ---------------------------------------------------------------------------  --------------  Finis Lady INFO  What is the best way for the office to contact you? OK to leave message on   [employer] [facility],OK to respond with electronic message via [employer] portal (only   for patients who have registered [employer] account)  Preferred Call Back Phone Number? [phone]  ---------------------------------------------------------------------------  --------------  SCRIPT ANSWERS  Relationship to Patient? Self  Specialty Confirmation?  Primary Care

## 2023-01-23 ENCOUNTER — OFFICE VISIT (OUTPATIENT)
Dept: FAMILY MEDICINE CLINIC | Age: 58
End: 2023-01-23
Payer: COMMERCIAL

## 2023-01-23 VITALS
BODY MASS INDEX: 30.76 KG/M2 | SYSTOLIC BLOOD PRESSURE: 152 MMHG | OXYGEN SATURATION: 96 % | HEART RATE: 63 BPM | TEMPERATURE: 96.6 F | DIASTOLIC BLOOD PRESSURE: 90 MMHG | WEIGHT: 214.4 LBS

## 2023-01-23 DIAGNOSIS — Z13.220 SCREENING FOR LIPID DISORDERS: ICD-10-CM

## 2023-01-23 DIAGNOSIS — Z76.89 ENCOUNTER TO ESTABLISH CARE: Primary | ICD-10-CM

## 2023-01-23 DIAGNOSIS — Z12.5 SCREENING FOR PROSTATE CANCER: ICD-10-CM

## 2023-01-23 DIAGNOSIS — I48.91 NEW ONSET A-FIB (HCC): ICD-10-CM

## 2023-01-23 DIAGNOSIS — Z09 HOSPITAL DISCHARGE FOLLOW-UP: ICD-10-CM

## 2023-01-23 DIAGNOSIS — Z13.1 SCREENING FOR DIABETES MELLITUS: ICD-10-CM

## 2023-01-23 DIAGNOSIS — N18.32 STAGE 3B CHRONIC KIDNEY DISEASE (HCC): ICD-10-CM

## 2023-01-23 DIAGNOSIS — I24.8 DEMAND ISCHEMIA (HCC): ICD-10-CM

## 2023-01-23 DIAGNOSIS — R09.89 LEFT CAROTID BRUIT: ICD-10-CM

## 2023-01-23 DIAGNOSIS — I42.8 NONISCHEMIC CARDIOMYOPATHY (HCC): ICD-10-CM

## 2023-01-23 DIAGNOSIS — I50.22 CHRONIC SYSTOLIC (CONGESTIVE) HEART FAILURE (HCC): ICD-10-CM

## 2023-01-23 DIAGNOSIS — I10 HTN (HYPERTENSION), MALIGNANT: ICD-10-CM

## 2023-01-23 PROCEDURE — G8417 CALC BMI ABV UP PARAM F/U: HCPCS

## 2023-01-23 PROCEDURE — G8484 FLU IMMUNIZE NO ADMIN: HCPCS

## 2023-01-23 PROCEDURE — 99214 OFFICE O/P EST MOD 30 MIN: CPT

## 2023-01-23 PROCEDURE — 1111F DSCHRG MED/CURRENT MED MERGE: CPT

## 2023-01-23 PROCEDURE — G8427 DOCREV CUR MEDS BY ELIG CLIN: HCPCS

## 2023-01-23 PROCEDURE — 3080F DIAST BP >= 90 MM HG: CPT

## 2023-01-23 PROCEDURE — 3017F COLORECTAL CA SCREEN DOC REV: CPT

## 2023-01-23 PROCEDURE — 1036F TOBACCO NON-USER: CPT

## 2023-01-23 PROCEDURE — 3077F SYST BP >= 140 MM HG: CPT

## 2023-01-23 RX ORDER — BLOOD PRESSURE TEST KIT
1 KIT MISCELLANEOUS DAILY
Qty: 1 KIT | Refills: 0 | Status: SHIPPED | OUTPATIENT
Start: 2023-01-23

## 2023-01-23 ASSESSMENT — ENCOUNTER SYMPTOMS
EYE PAIN: 0
CONSTIPATION: 0
EYE DISCHARGE: 0
VOMITING: 0
COUGH: 0
BACK PAIN: 0
SHORTNESS OF BREATH: 0
WHEEZING: 0
ABDOMINAL PAIN: 0
CHEST TIGHTNESS: 0
DIARRHEA: 0
SORE THROAT: 0
NAUSEA: 0
COLOR CHANGE: 0

## 2023-01-23 ASSESSMENT — PATIENT HEALTH QUESTIONNAIRE - PHQ9
SUM OF ALL RESPONSES TO PHQ9 QUESTIONS 1 & 2: 0
SUM OF ALL RESPONSES TO PHQ QUESTIONS 1-9: 0
SUM OF ALL RESPONSES TO PHQ QUESTIONS 1-9: 0
2. FEELING DOWN, DEPRESSED OR HOPELESS: 0
1. LITTLE INTEREST OR PLEASURE IN DOING THINGS: 0
SUM OF ALL RESPONSES TO PHQ QUESTIONS 1-9: 0
SUM OF ALL RESPONSES TO PHQ QUESTIONS 1-9: 0

## 2023-01-23 NOTE — PATIENT INSTRUCTIONS
[address], MD   [address], [place], [address]    [facility] [address], [place]   [address] [place] [address],  [address]   [address]   [phone]

## 2023-01-23 NOTE — PROGRESS NOTES
Post-Discharge Transitional Care Follow Up      Iwona Manrique   YOB: 1965    Date of Office Visit:  1/23/2023  Date of Hospital Admission: 12/28/22  Date of Hospital Discharge: 12/31/22  Readmission Risk Score (high >=14%. Medium >=10%):Readmission Risk Score: 11.7      Care management risk score Rising risk (score 2-5) and Complex Care (Scores >=6): No Risk Score On File     Non face to face  following discharge, date last encounter closed (first attempt may have been earlier): *No documented post hospital discharge outreach found in the last 14 days     Call initiated 2 business days of discharge: *No response recorded in the last 14 days     Encounter to [address] [place] [address] discharge follow-up  -     MD DISCHARGE MEDS RECONCILED W/ CURRENT OUTPATIENT MED LIST    New onset a-fib ([address]  Chronic systolic (congestive) heart failure (HCC)  Nonischemic cardiomyopathy ([address]  Demand ischemia ([address]  HTN (hypertension), malignant  -pt following w/ cardiology (Jennifer/Nestor), has not seen since d/c d/t cardiology office cancelling appt  -encouraged to call for f/u, script for BP cuff given to begin monitoring at home   -continue meds per cardiology    -     Blood Pressure KIT; DAILY Starting Mon 1/23/2023, Disp-1 kit, R-0, Print  -     TSH with Reflex; Future  -     CBC with Auto Differential; Future  -     Comprehensive Metabolic Panel; Future    Stage 3b chronic kidney disease ([address]  -stable, follows w/ nephrology  Gabbi Shields)    Left carotid bruit  -noted on exam, doppler for eval  -asymptomatic    -     VL DUP CAROTID BILATERAL; Future    Screening for prostate cancer  -     PSA Screening; Future    Screening for lipid disorders  -     Lipid, Fasting; Future    Screening for diabetes mellitus  -     Hemoglobin A1C; Future    Medical Decision Making: moderate complexity  Return in 3 months (on 4/23/2023), or if symptoms worsen or fail to improve, for HTN.     On this date 1/23/2023 I have spent 30 minutes reviewing previous notes, test results and face to face with the patient discussing the diagnosis and importance of compliance with the treatment plan as well as documenting on the day of the visit. Subjective:   Pt here today to establish care and for hospital f/u, accompanied by wife  BP elevated, pt does not monitor at home, asymptomatic    Pt was told he has an enlarged heart years ago and has been evaluated by cardiology including diagnostic cath and stress test in the last 3 years. Pt was lifting a hospital bed he was moving into his mother's house and upon lifting he felt sudden CP that worsened, his wife ultimately encouraged him to go to ER. He was found to be in new onset Afib in [place] and was ultimately admitted. Since being d/c'd pt is doing well, has not been able to see cardiology d/t the office having to cancel his appt. Pt follows w/ nephrology as well, renal function stable on hospital labs    Willing to update screening labs        Inpatient course: Discharge summary reviewed- see chart. Interval history/Current status:     Patient Active Problem List   Diagnosis    HTN (hypertension), malignant    Tobacco abuse    Renal insufficiency    CKD (chronic kidney disease), stage III ([address]    New onset a-fib ([address]    Demand ischemia ([address]    Alcohol consumption binge drinking    Chronic systolic (congestive) heart failure ([address]    Nonischemic cardiomyopathy ([address]    Little's esophagus without dysplasia       Medications listed as ordered at the time of discharge from hospital     Medication List            Accurate as of January 23, 2023  2:25 PM. If you have any questions, ask your nurse or doctor. CHANGE how you take these medications      * Adult Blood Pressure Cuff Lg Kit  Check blood pressure daily  What changed: Another medication with the same name was added. Make sure you understand how and when to take each.   Changed by: Chad Lopez, CHUCHO - CNP     * Blood Pressure Kit  1 kit by Does not apply route daily  What changed: You were already taking a medication with the same name, and this prescription was added. Make sure you understand how and when to take each. Changed by: CHUCHO Aquino CNP           * This list has 2 medication(s) that are the same as other medications prescribed for you. Read the directions carefully, and ask your doctor or other care provider to review them with you.                 CONTINUE taking these medications      albuterol sulfate  (90 Base) MCG/ACT inhaler  Commonly known as: Proventil HFA  Inhale 2 puffs into the lungs every 6 hours as needed for Wheezing     apixaban 5 MG Tabs tablet  Commonly known as: ELIQUIS  Take 1 tablet by mouth 2 times daily     aspirin 81 MG EC tablet     atorvastatin 40 MG tablet  Commonly known as: LIPITOR  Take 1 tablet by mouth daily     carvedilol 25 MG tablet  Commonly known as: Coreg  Take 1 tablet by mouth 2 times daily     dilTIAZem 120 MG extended release capsule  Commonly known as: CARDIZEM CD  Take 1 capsule by mouth daily     doxazosin 2 MG tablet  Commonly known as: CARDURA  take 1 tablet by mouth once daily     hydrALAZINE 50 MG tablet  Commonly known as: APRESOLINE  Take 1 tablet by mouth 3 times daily     pantoprazole 40 MG tablet  Commonly known as: PROTONIX  Take 1 tablet by mouth every morning (before breakfast)     spironolactone 25 MG tablet  Commonly known as: Aldactone  Take 0.5 tablets by mouth daily     VITAMIN D (CHOLECALCIFEROL) PO               Where to Get Your Medications        You can get these medications from any pharmacy    Bring a paper prescription for each of these medications  Blood Pressure Kit          Medications marked \"taking\" at this time  Outpatient Medications Marked as Taking for the 1/23/23 encounter (Office Visit) with CHUCHO Aquino CNP   Medication Sig Dispense Refill    Blood Pressure KIT 1 kit by Does not apply route daily 1 kit 0 spironolactone (ALDACTONE) 25 MG tablet Take 0.5 tablets by mouth daily 30 tablet 3    apixaban (ELIQUIS) 5 MG TABS tablet Take 1 tablet by mouth 2 times daily 60 tablet 3    atorvastatin (LIPITOR) 40 MG tablet Take 1 tablet by mouth daily 30 tablet 3    carvedilol (COREG) 25 MG tablet Take 1 tablet by mouth 2 times daily 60 tablet 3    dilTIAZem (CARDIZEM CD) 120 MG extended release capsule Take 1 capsule by mouth daily 30 capsule 3    pantoprazole (PROTONIX) 40 MG tablet Take 1 tablet by mouth every morning (before breakfast) 30 tablet 3    doxazosin (CARDURA) 2 MG tablet take 1 tablet by mouth once daily 90 tablet 1    hydrALAZINE (APRESOLINE) 50 MG tablet Take 1 tablet by mouth 3 times daily 90 tablet 3    Blood Pressure Monitoring (ADULT BLOOD PRESSURE CUFF LG) KIT Check blood pressure daily 1 kit 0    VITAMIN D, CHOLECALCIFEROL, PO Take by mouth daily      aspirin 81 MG EC tablet Take 81 mg by mouth daily          Medications patient taking as of now reconciled against medications ordered at time of hospital discharge: Yes    Review of Systems   Constitutional:  Negative for activity change, appetite change, chills, fatigue, fever and unexpected weight change. HENT:  Negative for congestion, ear pain and sore throat. Eyes:  Negative for pain, discharge and visual disturbance. Respiratory:  Negative for cough, chest tightness, shortness of breath and wheezing. Cardiovascular:  Negative for chest pain, palpitations and leg swelling. Gastrointestinal:  Negative for abdominal pain, constipation, diarrhea, nausea and vomiting. Genitourinary:  Negative for difficulty urinating and dysuria. Musculoskeletal:  Negative for back pain, gait problem and neck pain. Skin:  Negative for color change, rash and wound. Neurological:  Negative for dizziness, seizures, syncope, weakness, light-headedness, numbness and headaches. Psychiatric/Behavioral:  Negative for confusion and sleep disturbance.     Objective:    BP (!) 152/90   Pulse 63   Temp (!) 96.6 °F (35.9 °C) (Temporal)   Wt 214 lb 6.4 oz (97.3 kg)   SpO2 96%   BMI 30.76 kg/m²   Physical Exam  Vitals and nursing note reviewed.   Constitutional:       General: He is not in acute distress.     Appearance: Normal appearance. He is well-developed. He is not ill-appearing, toxic-appearing or diaphoretic.   HENT:      Head: Normocephalic and atraumatic.      Right Ear: External ear normal.      Left Ear: External ear normal.      Nose: Nose normal.   Eyes:      General: No scleral icterus.        Right eye: No discharge.         Left eye: No discharge.      Conjunctiva/sclera: Conjunctivae normal.      Pupils: Pupils are equal, round, and reactive to light.   Neck:      Vascular: Carotid bruit present.      Trachea: No tracheal deviation.   Cardiovascular:      Rate and Rhythm: Normal rate and regular rhythm.      Pulses:           Carotid pulses are  on the left side with bruit.     Heart sounds: Normal heart sounds. No murmur heard.    No friction rub. No gallop.   Pulmonary:      Effort: Pulmonary effort is normal. No tachypnea, accessory muscle usage or respiratory distress.      Breath sounds: Normal breath sounds. No stridor. No decreased breath sounds, wheezing, rhonchi or rales.   Abdominal:      Palpations: Abdomen is soft.   Musculoskeletal:         General: No tenderness or deformity. Normal range of motion.      Cervical back: Normal range of motion and neck supple.   Skin:     General: Skin is warm and dry.      Coloration: Skin is not pale.      Findings: No erythema or rash.   Neurological:      Mental Status: He is alert and oriented to person, place, and time.      GCS: GCS eye subscore is 4. GCS verbal subscore is 5. GCS motor subscore is 6.      Gait: Gait normal.   Psychiatric:         Speech: Speech normal.         Behavior: Behavior normal.         Thought Content: Thought content normal.         Judgment: Judgment normal.  An electronic signature was used to authenticate this note.   --Evan King, APRLAKSHMI - CNP

## 2023-02-13 ENCOUNTER — TELEPHONE (OUTPATIENT)
Dept: FAMILY MEDICINE CLINIC | Age: 58
End: 2023-02-13

## 2023-02-13 ENCOUNTER — OFFICE VISIT (OUTPATIENT)
Dept: FAMILY MEDICINE CLINIC | Age: 58
End: 2023-02-13
Payer: COMMERCIAL

## 2023-02-13 VITALS
OXYGEN SATURATION: 97 % | HEART RATE: 55 BPM | BODY MASS INDEX: 31.22 KG/M2 | DIASTOLIC BLOOD PRESSURE: 90 MMHG | TEMPERATURE: 97.1 F | SYSTOLIC BLOOD PRESSURE: 160 MMHG | WEIGHT: 217.6 LBS

## 2023-02-13 DIAGNOSIS — S50.02XA CONTUSION OF LEFT ELBOW, INITIAL ENCOUNTER: Primary | ICD-10-CM

## 2023-02-13 DIAGNOSIS — I48.91 ATRIAL FIBRILLATION, UNSPECIFIED TYPE (HCC): ICD-10-CM

## 2023-02-13 DIAGNOSIS — N18.32 STAGE 3B CHRONIC KIDNEY DISEASE (HCC): ICD-10-CM

## 2023-02-13 DIAGNOSIS — I10 HTN (HYPERTENSION), MALIGNANT: ICD-10-CM

## 2023-02-13 PROCEDURE — 1036F TOBACCO NON-USER: CPT

## 2023-02-13 PROCEDURE — G8417 CALC BMI ABV UP PARAM F/U: HCPCS

## 2023-02-13 PROCEDURE — 3017F COLORECTAL CA SCREEN DOC REV: CPT

## 2023-02-13 PROCEDURE — G8484 FLU IMMUNIZE NO ADMIN: HCPCS

## 2023-02-13 PROCEDURE — 3077F SYST BP >= 140 MM HG: CPT

## 2023-02-13 PROCEDURE — 99213 OFFICE O/P EST LOW 20 MIN: CPT

## 2023-02-13 PROCEDURE — 3080F DIAST BP >= 90 MM HG: CPT

## 2023-02-13 PROCEDURE — G8427 DOCREV CUR MEDS BY ELIG CLIN: HCPCS

## 2023-02-13 SDOH — ECONOMIC STABILITY: FOOD INSECURITY: WITHIN THE PAST 12 MONTHS, YOU WORRIED THAT YOUR FOOD WOULD RUN OUT BEFORE YOU GOT MONEY TO BUY MORE.: NEVER TRUE

## 2023-02-13 SDOH — ECONOMIC STABILITY: FOOD INSECURITY: WITHIN THE PAST 12 MONTHS, THE FOOD YOU BOUGHT JUST DIDN'T LAST AND YOU DIDN'T HAVE MONEY TO GET MORE.: NEVER TRUE

## 2023-02-13 SDOH — ECONOMIC STABILITY: HOUSING INSECURITY
IN THE LAST 12 MONTHS, WAS THERE A TIME WHEN YOU DID NOT HAVE A STEADY PLACE TO SLEEP OR SLEPT IN A SHELTER (INCLUDING NOW)?: NO

## 2023-02-13 SDOH — ECONOMIC STABILITY: INCOME INSECURITY: HOW HARD IS IT FOR YOU TO PAY FOR THE VERY BASICS LIKE FOOD, HOUSING, MEDICAL CARE, AND HEATING?: NOT HARD AT ALL

## 2023-02-13 ASSESSMENT — ENCOUNTER SYMPTOMS
CHEST TIGHTNESS: 0
WHEEZING: 0
COLOR CHANGE: 1
NAUSEA: 0
BACK PAIN: 0
EYE PAIN: 0
DIARRHEA: 0
COUGH: 0
EYE DISCHARGE: 0
CONSTIPATION: 0
SHORTNESS OF BREATH: 0
SORE THROAT: 0
VOMITING: 0
ABDOMINAL PAIN: 0

## 2023-02-13 NOTE — TELEPHONE ENCOUNTER
Patient wife calling to find out if it is safe for patient to fly so soon after new onset of A-fib. She states that he does not have any nitro if he starts having chest pains. She states his bp is still in the 140's.

## 2023-02-13 NOTE — PROGRESS NOTES
Jacoby Lorenzana (:  1965) is a 59 y.o. male,Established patient, here for evaluation of the following chief complaint(s):  Bleeding/Bruising (Bruised arm thinks from blood thinners and swollen)          Subjective   SUBJECTIVE/OBJECTIVE:  Pt here today for eval of left arm bruising, accompanied by his wife  BP elevated, pt monitors at home and reports elevated there as well  Cardiology manages pt's BP, strongly encouraged him to call and get appt    Pt states he bumped his left elbow on the counter a few days ago and noticed significant bruising and swelling to it. This is improved over the last 3 days but wife wanted him to get it evaluated. There is soft tissue swelling and extensive bruising from left elbow into forearm. Pt has full ROM, denies tenderness, PMS intact. He was dx w/ new onset Afib and hospitalized last month, he takes daily Eliquis and this is likely what has cause the significant bruising. Review of Systems   Constitutional:  Negative for activity change, appetite change, chills, fatigue, fever and unexpected weight change. HENT:  Negative for congestion, ear pain and sore throat. Eyes:  Negative for pain, discharge and visual disturbance. Respiratory:  Negative for cough, chest tightness, shortness of breath and wheezing. Cardiovascular:  Negative for chest pain, palpitations and leg swelling. Gastrointestinal:  Negative for abdominal pain, constipation, diarrhea, nausea and vomiting. Genitourinary:  Negative for difficulty urinating and dysuria. Musculoskeletal:  Positive for joint swelling. Negative for back pain, gait problem and neck pain. Skin:  Positive for color change. Negative for rash and wound. Neurological:  Negative for dizziness, seizures, syncope, weakness, light-headedness, numbness and headaches. Psychiatric/Behavioral:  Negative for confusion. Objective   Physical Exam  Vitals and nursing note reviewed.    Constitutional: General: He is not in acute distress. Appearance: Normal appearance. He is well-developed. He is not ill-appearing, toxic-appearing or diaphoretic. HENT:      Head: Normocephalic and atraumatic. Right Ear: External ear normal.      Left Ear: External ear normal.      Nose: Nose normal.   Eyes:      General: No scleral icterus. Right eye: No discharge. Left eye: No discharge. Conjunctiva/sclera: Conjunctivae normal.      Pupils: Pupils are equal, round, and reactive to light. Neck:      Vascular: No carotid bruit. Trachea: No tracheal deviation. Cardiovascular:      Rate and Rhythm: Normal rate and regular rhythm. Heart sounds: Normal heart sounds. No murmur heard. No friction rub. No gallop. Pulmonary:      Effort: Pulmonary effort is normal. No tachypnea, accessory muscle usage or respiratory distress. Breath sounds: Normal breath sounds. No stridor. No decreased breath sounds, wheezing, rhonchi or rales. Abdominal:      Palpations: Abdomen is soft. Musculoskeletal:         General: No tenderness or deformity. Normal range of motion. Right elbow: Swelling present. No effusion. Normal range of motion. No tenderness. Left elbow: Normal.        Arms:       Cervical back: Normal range of motion and neck supple. Right lower leg: No edema. Left lower leg: No edema. Comments: Bruising in various stages of healing and swelling to left elbow/forearm   Skin:     General: Skin is warm and dry. Coloration: Skin is not pale. Findings: No erythema or rash. Neurological:      Mental Status: He is alert and oriented to person, place, and time. GCS: GCS eye subscore is 4. GCS verbal subscore is 5. GCS motor subscore is 6. Gait: Gait normal.   Psychiatric:         Speech: Speech normal.         Behavior: Behavior normal.         Thought Content:  Thought content normal.         Judgment: Judgment normal. ASSESSMENT/PLAN:  1. Contusion of left elbow, initial encounter  -related to Eliquis  -no pain, this is improving per pt  -given ACE wrap in office, encouraged RICE    2. HTN (hypertension), malignant  3. Atrial fibrillation, unspecified type ([facility]  -BP uncontrolled  -pt's wife states she has not yet called for him to see cardiology since last appt being cancelled  -encouraged to call for appt ASAP  -if cardiology wishes to no longer manage BP I will take over but pt needs to be seen prior to doing so    4. Stage 3b chronic kidney disease ([facility]  -encouraged no NSAIDs d/t anticoagulation and CKD  Return if symptoms worsen or fail to improve. An electronic signature was used to authenticate this note.     --CHUCHO Olivares - CNP

## 2023-03-07 ENCOUNTER — OFFICE VISIT (OUTPATIENT)
Dept: FAMILY MEDICINE CLINIC | Age: 58
End: 2023-03-07
Payer: COMMERCIAL

## 2023-03-07 VITALS
WEIGHT: 216 LBS | TEMPERATURE: 98.3 F | BODY MASS INDEX: 30.99 KG/M2 | DIASTOLIC BLOOD PRESSURE: 74 MMHG | HEART RATE: 65 BPM | SYSTOLIC BLOOD PRESSURE: 122 MMHG | OXYGEN SATURATION: 95 %

## 2023-03-07 DIAGNOSIS — R42 DIZZINESS: Primary | ICD-10-CM

## 2023-03-07 DIAGNOSIS — I50.22 CHRONIC SYSTOLIC (CONGESTIVE) HEART FAILURE (HCC): ICD-10-CM

## 2023-03-07 DIAGNOSIS — F43.0 STRESS REACTION: ICD-10-CM

## 2023-03-07 DIAGNOSIS — I42.8 NONISCHEMIC CARDIOMYOPATHY (HCC): ICD-10-CM

## 2023-03-07 DIAGNOSIS — I10 HTN (HYPERTENSION), MALIGNANT: ICD-10-CM

## 2023-03-07 DIAGNOSIS — R09.89 LEFT CAROTID BRUIT: ICD-10-CM

## 2023-03-07 PROCEDURE — 3017F COLORECTAL CA SCREEN DOC REV: CPT

## 2023-03-07 PROCEDURE — 99214 OFFICE O/P EST MOD 30 MIN: CPT

## 2023-03-07 PROCEDURE — 1036F TOBACCO NON-USER: CPT

## 2023-03-07 PROCEDURE — 3074F SYST BP LT 130 MM HG: CPT

## 2023-03-07 PROCEDURE — 3078F DIAST BP <80 MM HG: CPT

## 2023-03-07 PROCEDURE — G8417 CALC BMI ABV UP PARAM F/U: HCPCS

## 2023-03-07 PROCEDURE — G8427 DOCREV CUR MEDS BY ELIG CLIN: HCPCS

## 2023-03-07 PROCEDURE — G8484 FLU IMMUNIZE NO ADMIN: HCPCS

## 2023-03-07 ASSESSMENT — ENCOUNTER SYMPTOMS
DIARRHEA: 0
SORE THROAT: 0
VOMITING: 0
COLOR CHANGE: 0
SHORTNESS OF BREATH: 0
EYE PAIN: 0
CONSTIPATION: 0
CHEST TIGHTNESS: 1
WHEEZING: 0
EYE DISCHARGE: 0
NAUSEA: 0
ABDOMINAL PAIN: 0
BACK PAIN: 0
COUGH: 0

## 2023-03-07 NOTE — PROGRESS NOTES
Michaela Jo (:  1965) is a 59 y.o. male,Established patient, here for evaluation of the following chief complaint(s):  Dizziness (Dizziness from medication- pressure on chest) and Ear Fullness (Swooshing sound in ears)          Subjective   SUBJECTIVE/OBJECTIVE:  Pt here today for c/o dizziness and whooshing in ears  BP initially elevated, improved upon recheck    Pt presents today generally anxious, wife is present via Facetime d/t being acutely ill. He states he is having persistent dizziness, worse w/ position change since d/c from hospital on multiple new medications after new onset afib, newly dx CHF w/ cardiomyopathy. He denies that this is worsening, no new med changes since being home. Pt has been evaluated multiple times by provider and support is provided but he is strongly encouraged to get in w/ cardiology for eval as he has still not seen them since hospital d/c. He and wife state that they have an appointment scheduled 3/24. Pt recently also unexpectedly lost his mother and she was buried last week. He does endorse having a hard time w/ this, he has family support and is coping. He states he has chest pressure that will intermittently occur, this again is unchanged since being hospitalized, is not related to exertion or particular trigger. Pt strongly encouraged to seek emergent care if this continues or persists, he is hesitant to do so. Continued c/o whooshing to bilateral ears, we discussed the findings of a carotid bruit at [address] in January, he has not yet completed the ordered carotid doppler for eval.       Review of Systems   Constitutional:  Negative for activity change, appetite change, chills, diaphoresis, fatigue, fever and unexpected weight change. HENT:  Negative for congestion, ear pain and sore throat. Eyes:  Negative for pain, discharge and visual disturbance. Respiratory:  Positive for chest tightness. Negative for cough, shortness of breath and wheezing. Cardiovascular:  Negative for chest pain, palpitations and leg swelling. Gastrointestinal:  Negative for abdominal pain, constipation, diarrhea, nausea and vomiting. Genitourinary:  Negative for difficulty urinating and dysuria. Musculoskeletal:  Negative for back pain, gait problem and neck pain. Skin:  Negative for color change, rash and wound. Neurological:  Positive for dizziness. Negative for seizures, weakness, numbness and headaches. Psychiatric/Behavioral:  Negative for confusion and sleep disturbance. The patient is nervous/anxious. Objective   Physical Exam  Vitals and nursing note reviewed. Constitutional:       General: He is not in acute distress. Appearance: Normal appearance. He is well-developed. He is not ill-appearing, toxic-appearing or diaphoretic. HENT:      Head: Normocephalic and atraumatic. Right Ear: External ear normal.      Left Ear: External ear normal.      Nose: Nose normal.   Eyes:      General: No scleral icterus. Right eye: No discharge. Left eye: No discharge. Conjunctiva/sclera: Conjunctivae normal.      Pupils: Pupils are equal, round, and reactive to light. Neck:      Vascular: Carotid bruit present. Trachea: No tracheal deviation. Cardiovascular:      Rate and Rhythm: Regular rhythm. Bradycardia present. Pulses:           Carotid pulses are  on the left side with bruit. Heart sounds: Normal heart sounds. No murmur heard. No friction rub. No gallop. Pulmonary:      Effort: Pulmonary effort is normal. No tachypnea, accessory muscle usage or respiratory distress. Breath sounds: Normal breath sounds. No stridor. No decreased breath sounds, wheezing, rhonchi or rales. Abdominal:      Palpations: Abdomen is soft. Musculoskeletal:         General: No tenderness or deformity. Normal range of motion. Cervical back: Normal range of motion and neck supple. Right lower leg: No edema.       Left lower leg: No edema. Skin:     General: Skin is warm and dry. Coloration: Skin is not pale. Findings: No erythema or rash. Neurological:      Mental Status: He is alert and oriented to person, place, and time. GCS: GCS eye subscore is 4. GCS verbal subscore is 5. GCS motor subscore is 6. Gait: Gait normal.   Psychiatric:         Speech: Speech normal.         Behavior: Behavior normal.         Thought Content: Thought content normal.         Judgment: Judgment normal.               ASSESSMENT/PLAN:  1. Dizziness  2. Left carotid bruit  -likely med related but unable to r/o carotid stenosis  -encouraged pt to complete order for carotid doppler to eval    3. Stress reaction  -pt increasingly anxious in office  -recent loss of mother exacerbating, understandably  -has adequate support, denies needs at this time    4. Nonischemic cardiomyopathy ([facility]  5. Chronic systolic (congestive) heart failure (HCC)  6. HTN (hypertension), malignant  -pt strongly encouraged to keep upcoming cardiology appt and seek emergent eval if sx continue or worsen      Return if symptoms worsen or fail to improve, for keep upcoming appt. An electronic signature was used to authenticate this note.     --Twan Marquez, CHUCHO - CNP

## 2023-03-10 ENCOUNTER — HOSPITAL ENCOUNTER (OUTPATIENT)
Dept: VASCULAR LAB | Age: 58
Discharge: HOME | End: 2023-03-10
Payer: COMMERCIAL

## 2023-03-10 DIAGNOSIS — I65.22 LEFT CAROTID STENOSIS: Primary | ICD-10-CM

## 2023-03-10 DIAGNOSIS — R09.89 LEFT CAROTID BRUIT: ICD-10-CM

## 2023-03-10 PROCEDURE — 93880 EXTRACRANIAL BILAT STUDY: CPT

## 2023-03-14 ENCOUNTER — HOSPITAL ENCOUNTER (INPATIENT)
Age: 58
LOS: 2 days | Discharge: HOME | DRG: 253 | End: 2023-03-16
Attending: EMERGENCY MEDICINE | Admitting: INTERNAL MEDICINE
Payer: COMMERCIAL

## 2023-03-14 ENCOUNTER — APPOINTMENT (OUTPATIENT)
Dept: GENERAL RADIOLOGY | Age: 58
DRG: 253 | End: 2023-03-14
Payer: COMMERCIAL

## 2023-03-14 ENCOUNTER — INITIAL CONSULT (OUTPATIENT)
Dept: VASCULAR SURGERY | Age: 58
End: 2023-03-14
Payer: COMMERCIAL

## 2023-03-14 VITALS
SYSTOLIC BLOOD PRESSURE: 138 MMHG | OXYGEN SATURATION: 100 % | BODY MASS INDEX: 30.92 KG/M2 | TEMPERATURE: 98.2 F | DIASTOLIC BLOOD PRESSURE: 72 MMHG | RESPIRATION RATE: 20 BRPM | HEIGHT: 70 IN | HEART RATE: 63 BPM | WEIGHT: 216 LBS

## 2023-03-14 DIAGNOSIS — D64.9 ANEMIA, UNSPECIFIED TYPE: Primary | ICD-10-CM

## 2023-03-14 DIAGNOSIS — I65.23 ASYMPTOMATIC CAROTID ARTERY STENOSIS, BILATERAL: Primary | ICD-10-CM

## 2023-03-14 DIAGNOSIS — K92.2 UGIB (UPPER GASTROINTESTINAL BLEED): ICD-10-CM

## 2023-03-14 DIAGNOSIS — K92.1 MELENA: ICD-10-CM

## 2023-03-14 PROBLEM — I50.42 CHRONIC COMBINED SYSTOLIC AND DIASTOLIC CHF (CONGESTIVE HEART FAILURE) (HCC): Status: ACTIVE | Noted: 2023-03-14

## 2023-03-14 PROBLEM — E66.811 CLASS 1 OBESITY IN ADULT: Status: ACTIVE | Noted: 2023-03-14

## 2023-03-14 PROBLEM — E66.9 CLASS 1 OBESITY IN ADULT: Status: ACTIVE | Noted: 2023-03-14

## 2023-03-14 LAB
ALBUMIN SERPL-MCNC: 4 G/DL (ref 3.5–5.2)
ALBUMIN/GLOBULIN RATIO: 1.7 (ref 1–2.5)
ALP SERPL-CCNC: 53 U/L (ref 40–129)
ALT SERPL-CCNC: 7 U/L (ref 5–41)
ANION GAP SERPL CALCULATED.3IONS-SCNC: 9 MMOL/L (ref 9–17)
AST SERPL-CCNC: 11 U/L
BILIRUB SERPL-MCNC: 0.8 MG/DL (ref 0.3–1.2)
BNP SERPL-MCNC: 519 PG/ML
BUN SERPL-MCNC: 19 MG/DL (ref 6–20)
CALCIUM SERPL-MCNC: 8.4 MG/DL (ref 8.6–10.4)
CHLORIDE SERPL-SCNC: 105 MMOL/L (ref 98–107)
CO2 SERPL-SCNC: 24 MMOL/L (ref 20–31)
CREAT SERPL-MCNC: 2.27 MG/DL (ref 0.7–1.2)
GFR SERPL CREATININE-BSD FRML MDRD: 32 ML/MIN/1.73M2
GLUCOSE SERPL-MCNC: 79 MG/DL (ref 70–99)
HCT VFR BLD AUTO: 19.1 % (ref 40.7–50.3)
HCT VFR BLD AUTO: 20 % (ref 40.7–50.3)
HGB BLD-MCNC: 5.3 G/DL (ref 13–17)
HGB BLD-MCNC: 5.8 G/DL (ref 13–17)
MCH RBC QN AUTO: 21.2 PG (ref 25.2–33.5)
MCHC RBC AUTO-ENTMCNC: 27.7 G/DL (ref 28.4–34.8)
MCV RBC AUTO: 76.4 FL (ref 82.6–102.9)
NRBC AUTOMATED: 0.7 PER 100 WBC
PDW BLD-RTO: 16.8 % (ref 11.8–14.4)
PLATELET # BLD AUTO: 156 K/UL (ref 138–453)
PMV BLD AUTO: 12.6 FL (ref 8.1–13.5)
POTASSIUM SERPL-SCNC: 3.8 MMOL/L (ref 3.7–5.3)
PROT SERPL-MCNC: 6.4 G/DL (ref 6.4–8.3)
RBC # BLD: 2.5 M/UL (ref 4.21–5.77)
SARS-COV-2 RDRP RESP QL NAA+PROBE: NOT DETECTED
SODIUM SERPL-SCNC: 138 MMOL/L (ref 135–144)
SPECIMEN DESCRIPTION: NORMAL
TROPONIN I SERPL DL<=0.01 NG/ML-MCNC: 17 NG/L (ref 0–22)
TROPONIN I SERPL DL<=0.01 NG/ML-MCNC: 17 NG/L (ref 0–22)
WBC # BLD AUTO: 4.5 K/UL (ref 3.5–11.3)

## 2023-03-14 PROCEDURE — G8484 FLU IMMUNIZE NO ADMIN: HCPCS | Performed by: STUDENT IN AN ORGANIZED HEALTH CARE EDUCATION/TRAINING PROGRAM

## 2023-03-14 PROCEDURE — G8417 CALC BMI ABV UP PARAM F/U: HCPCS | Performed by: STUDENT IN AN ORGANIZED HEALTH CARE EDUCATION/TRAINING PROGRAM

## 2023-03-14 PROCEDURE — 99285 EMERGENCY DEPT VISIT HI MDM: CPT

## 2023-03-14 PROCEDURE — 71046 X-RAY EXAM CHEST 2 VIEWS: CPT

## 2023-03-14 PROCEDURE — 99204 OFFICE O/P NEW MOD 45 MIN: CPT | Performed by: STUDENT IN AN ORGANIZED HEALTH CARE EDUCATION/TRAINING PROGRAM

## 2023-03-14 PROCEDURE — 87635 SARS-COV-2 COVID-19 AMP PRB: CPT

## 2023-03-14 PROCEDURE — 86900 BLOOD TYPING SEROLOGIC ABO: CPT

## 2023-03-14 PROCEDURE — 30233N1 TRANSFUSION OF NONAUTOLOGOUS RED BLOOD CELLS INTO PERIPHERAL VEIN, PERCUTANEOUS APPROACH: ICD-10-PCS | Performed by: EMERGENCY MEDICINE

## 2023-03-14 PROCEDURE — 3075F SYST BP GE 130 - 139MM HG: CPT | Performed by: STUDENT IN AN ORGANIZED HEALTH CARE EDUCATION/TRAINING PROGRAM

## 2023-03-14 PROCEDURE — 86920 COMPATIBILITY TEST SPIN: CPT

## 2023-03-14 PROCEDURE — 80053 COMPREHEN METABOLIC PANEL: CPT

## 2023-03-14 PROCEDURE — 6360000002 HC RX W HCPCS: Performed by: NURSE PRACTITIONER

## 2023-03-14 PROCEDURE — 86850 RBC ANTIBODY SCREEN: CPT

## 2023-03-14 PROCEDURE — 85027 COMPLETE CBC AUTOMATED: CPT

## 2023-03-14 PROCEDURE — 85018 HEMOGLOBIN: CPT

## 2023-03-14 PROCEDURE — 2060000000 HC ICU INTERMEDIATE R&B

## 2023-03-14 PROCEDURE — 3017F COLORECTAL CA SCREEN DOC REV: CPT | Performed by: STUDENT IN AN ORGANIZED HEALTH CARE EDUCATION/TRAINING PROGRAM

## 2023-03-14 PROCEDURE — 93005 ELECTROCARDIOGRAM TRACING: CPT | Performed by: STUDENT IN AN ORGANIZED HEALTH CARE EDUCATION/TRAINING PROGRAM

## 2023-03-14 PROCEDURE — 83880 ASSAY OF NATRIURETIC PEPTIDE: CPT

## 2023-03-14 PROCEDURE — 84484 ASSAY OF TROPONIN QUANT: CPT

## 2023-03-14 PROCEDURE — 85014 HEMATOCRIT: CPT

## 2023-03-14 PROCEDURE — 86901 BLOOD TYPING SEROLOGIC RH(D): CPT

## 2023-03-14 PROCEDURE — 2580000003 HC RX 258: Performed by: NURSE PRACTITIONER

## 2023-03-14 PROCEDURE — G8427 DOCREV CUR MEDS BY ELIG CLIN: HCPCS | Performed by: STUDENT IN AN ORGANIZED HEALTH CARE EDUCATION/TRAINING PROGRAM

## 2023-03-14 PROCEDURE — C9113 INJ PANTOPRAZOLE SODIUM, VIA: HCPCS | Performed by: NURSE PRACTITIONER

## 2023-03-14 PROCEDURE — 99222 1ST HOSP IP/OBS MODERATE 55: CPT | Performed by: NURSE PRACTITIONER

## 2023-03-14 PROCEDURE — P9016 RBC LEUKOCYTES REDUCED: HCPCS

## 2023-03-14 PROCEDURE — 36430 TRANSFUSION BLD/BLD COMPNT: CPT

## 2023-03-14 PROCEDURE — 3078F DIAST BP <80 MM HG: CPT | Performed by: STUDENT IN AN ORGANIZED HEALTH CARE EDUCATION/TRAINING PROGRAM

## 2023-03-14 PROCEDURE — 1036F TOBACCO NON-USER: CPT | Performed by: STUDENT IN AN ORGANIZED HEALTH CARE EDUCATION/TRAINING PROGRAM

## 2023-03-14 RX ORDER — SODIUM CHLORIDE 0.9 % (FLUSH) 0.9 %
5-40 SYRINGE (ML) INJECTION EVERY 12 HOURS SCHEDULED
Status: DISCONTINUED | OUTPATIENT
Start: 2023-03-15 | End: 2023-03-16 | Stop reason: HOSPADM

## 2023-03-14 RX ORDER — DILTIAZEM HYDROCHLORIDE 120 MG/1
120 CAPSULE, COATED, EXTENDED RELEASE ORAL DAILY
Status: DISCONTINUED | OUTPATIENT
Start: 2023-03-15 | End: 2023-03-16 | Stop reason: HOSPADM

## 2023-03-14 RX ORDER — ACETAMINOPHEN 650 MG/1
650 SUPPOSITORY RECTAL EVERY 6 HOURS PRN
Status: DISCONTINUED | OUTPATIENT
Start: 2023-03-14 | End: 2023-03-16 | Stop reason: HOSPADM

## 2023-03-14 RX ORDER — HYDRALAZINE HYDROCHLORIDE 50 MG/1
50 TABLET, FILM COATED ORAL 3 TIMES DAILY
Status: DISCONTINUED | OUTPATIENT
Start: 2023-03-15 | End: 2023-03-15

## 2023-03-14 RX ORDER — SODIUM CHLORIDE 9 MG/ML
INJECTION, SOLUTION INTRAVENOUS PRN
Status: DISCONTINUED | OUTPATIENT
Start: 2023-03-14 | End: 2023-03-16 | Stop reason: HOSPADM

## 2023-03-14 RX ORDER — PANTOPRAZOLE SODIUM 40 MG/1
40 TABLET, DELAYED RELEASE ORAL
Status: CANCELLED | OUTPATIENT
Start: 2023-03-15

## 2023-03-14 RX ORDER — DOXAZOSIN 2 MG/1
2 TABLET ORAL DAILY
Status: DISCONTINUED | OUTPATIENT
Start: 2023-03-15 | End: 2023-03-16 | Stop reason: HOSPADM

## 2023-03-14 RX ORDER — ONDANSETRON 4 MG/1
4 TABLET, ORALLY DISINTEGRATING ORAL EVERY 8 HOURS PRN
Status: DISCONTINUED | OUTPATIENT
Start: 2023-03-14 | End: 2023-03-16 | Stop reason: HOSPADM

## 2023-03-14 RX ORDER — ACETAMINOPHEN 325 MG/1
650 TABLET ORAL EVERY 6 HOURS PRN
Status: DISCONTINUED | OUTPATIENT
Start: 2023-03-14 | End: 2023-03-16 | Stop reason: HOSPADM

## 2023-03-14 RX ORDER — SODIUM CHLORIDE 9 MG/ML
INJECTION, SOLUTION INTRAVENOUS CONTINUOUS
Status: DISCONTINUED | OUTPATIENT
Start: 2023-03-15 | End: 2023-03-15

## 2023-03-14 RX ORDER — CARVEDILOL 25 MG/1
25 TABLET ORAL 2 TIMES DAILY
Status: DISCONTINUED | OUTPATIENT
Start: 2023-03-15 | End: 2023-03-16 | Stop reason: HOSPADM

## 2023-03-14 RX ORDER — PANTOPRAZOLE SODIUM 40 MG/1
40 TABLET, DELAYED RELEASE ORAL ONCE
Status: DISCONTINUED | OUTPATIENT
Start: 2023-03-14 | End: 2023-03-14

## 2023-03-14 RX ORDER — ONDANSETRON 2 MG/ML
4 INJECTION INTRAMUSCULAR; INTRAVENOUS EVERY 6 HOURS PRN
Status: DISCONTINUED | OUTPATIENT
Start: 2023-03-14 | End: 2023-03-16 | Stop reason: HOSPADM

## 2023-03-14 RX ORDER — PANTOPRAZOLE SODIUM 40 MG/1
80 TABLET, DELAYED RELEASE ORAL ONCE
Status: DISCONTINUED | OUTPATIENT
Start: 2023-03-14 | End: 2023-03-14

## 2023-03-14 RX ORDER — ATORVASTATIN CALCIUM 40 MG/1
40 TABLET, FILM COATED ORAL DAILY
Status: DISCONTINUED | OUTPATIENT
Start: 2023-03-15 | End: 2023-03-16 | Stop reason: HOSPADM

## 2023-03-14 RX ORDER — SODIUM CHLORIDE 0.9 % (FLUSH) 0.9 %
5-40 SYRINGE (ML) INJECTION PRN
Status: DISCONTINUED | OUTPATIENT
Start: 2023-03-14 | End: 2023-03-16 | Stop reason: HOSPADM

## 2023-03-14 RX ORDER — SPIRONOLACTONE 25 MG/1
12.5 TABLET ORAL DAILY
Status: DISCONTINUED | OUTPATIENT
Start: 2023-03-15 | End: 2023-03-15

## 2023-03-14 RX ADMIN — PANTOPRAZOLE SODIUM 80 MG: 40 INJECTION, POWDER, FOR SOLUTION INTRAVENOUS at 19:47

## 2023-03-14 RX ADMIN — PANTOPRAZOLE SODIUM 8 MG/HR: 40 INJECTION, POWDER, FOR SOLUTION INTRAVENOUS at 20:16

## 2023-03-14 ASSESSMENT — ENCOUNTER SYMPTOMS
ABDOMINAL PAIN: 0
VOICE CHANGE: 0
COUGH: 0
ABDOMINAL DISTENTION: 0
TROUBLE SWALLOWING: 0
VOMITING: 0
EYE PAIN: 0
COLOR CHANGE: 0
CHEST TIGHTNESS: 0

## 2023-03-14 ASSESSMENT — PAIN DESCRIPTION - DESCRIPTORS: DESCRIPTORS: PRESSURE

## 2023-03-14 ASSESSMENT — PAIN DESCRIPTION - LOCATION: LOCATION: CHEST

## 2023-03-14 ASSESSMENT — PAIN SCALES - GENERAL: PAINLEVEL_OUTOF10: 5

## 2023-03-14 ASSESSMENT — PAIN - FUNCTIONAL ASSESSMENT: PAIN_FUNCTIONAL_ASSESSMENT: 0-10

## 2023-03-14 NOTE — PROGRESS NOTES
[facility]  [address] [facility] [address] [address]  Dept: [phone]     Patient: Rolly Claude  : 1965  MRN: [id number]  DOS: 3/14/2023    HPI:  Rolly Claude is a 59 y.o. male who comes to the office regarding for evaluation of carotid stenosis. He underwent a carotid duplex on 3/10/23. I reviewed these images myself and he has < 50% on the right and 50-69% on the left. No focal neurological deficits, vision issues or difficulty with speech. He does feel weak today and complaining of whooshing sound in ear. He was laying down in the exam room due to fatigue.     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 baseline creatinine 1.5-1.9    Enlarged heart      Family History   Problem Relation Age of Onset    Hypertension Mother     Hypertension Father     Other Father         enlarged heart      Social History     Socioeconomic History    Marital status:      Spouse name: Not on file    Number of children: Not on file    Years of education: Not on file    Highest education level: Not on file   Occupational History    Not on file   Tobacco Use    Smoking status: Former     Packs/day: 1.00     Types: Cigarettes     Quit date:      Years since quittin.1    Smokeless tobacco: Former    Tobacco comments:     1-2 per week   Vaping Use    Vaping Use: Never used   Substance and Sexual Activity    Alcohol use: Yes     Comment: socially    Drug use: No    Sexual activity: Not on file   Other Topics Concern    Not on file   Social History Narrative    Not on file     Social Determinants of Health     Financial Resource Strain: Low Risk     Difficulty of Paying Living Expenses: Not hard at all   Food Insecurity: No Food Insecurity    Worried About Running Out of Food in the Last Year: Never true    Ran Out of Food in the Last Year: Never true   Transportation Needs: No Transportation Needs    Lack of Transportation (Medical): No    Lack of Transportation (Non-Medical): No   Physical Activity: Not on file   Stress: Not on file   Social Connections: Not on file   Intimate Partner Violence: Not on file   Housing Stability: Unknown    Unable to Pay for Housing in the Last Year: Not on file    Number of Places Lived in the Last Year: Not on file    Unstable Housing in the Last Year: No      Past Surgical History:   Procedure Laterality Date    CARDIAC CATHETERIZATION Right 03/2021    no blockage, no stenting. COLONOSCOPY N/A 4/14/2021    COLONOSCOPY WITH BIOPSY performed by Salina Frnaco MD at [facility] [place] [facility] N/A 4/14/2021    EGD BIOPSY, ESOPHAGEAL DILATION performed by Salina Franco MD at [facility]      Review of Systems   Constitutional:  Positive for fatigue. Negative for activity change, fever and unexpected weight change. HENT:  Negative for trouble swallowing and voice change. Eyes:  Negative for pain and visual disturbance. Respiratory:  Negative for cough and chest tightness. Cardiovascular:  Negative for chest pain and palpitations. Gastrointestinal:  Negative for abdominal distention, abdominal pain and vomiting. Endocrine: Negative for cold intolerance and heat intolerance. Genitourinary:  Negative for dysuria, flank pain and hematuria. Musculoskeletal:  Negative for joint swelling and neck pain. Skin:  Negative for color change and rash. Allergic/Immunologic: Negative for immunocompromised state. Neurological:  Negative for syncope, speech difficulty, weakness, numbness and headaches. Hematological:  Negative for adenopathy. Psychiatric/Behavioral:  Negative for behavioral problems and suicidal ideas.       Vitals:    03/14/23 1425   BP: 138/72   Site: Right Upper Arm Position: Sitting   Cuff Size: Medium Adult   Pulse: 63   Resp: 20   Temp: 98.2 °F (36.8 °C)   TempSrc: Temporal   SpO2: 100%   Weight: 216 lb (98 kg)   Height: 5' 10\" (1.778 m)          Physical Exam  Constitutional:       General: He is not in acute distress. HENT:      Mouth/Throat:      Mouth: Mucous membranes are moist.      Pharynx: Oropharynx is clear. Eyes:      General: No scleral icterus. Extraocular Movements: Extraocular movements intact. Conjunctiva/sclera: Conjunctivae normal.   Cardiovascular:      Rate and Rhythm: Normal rate and regular rhythm. Heart sounds: No murmur heard. Pulmonary:      Effort: No respiratory distress. Breath sounds: No rales. Abdominal:      General: There is no distension. Palpations: There is no mass. Tenderness: There is no abdominal tenderness. There is no guarding. Musculoskeletal:      Cervical back: No rigidity or tenderness. Lymphadenopathy:      Cervical: No cervical adenopathy. Skin:     Coloration: Skin is not jaundiced. Findings: No rash. Neurological:      General: No focal deficit present. Mental Status: He is alert and oriented to person, place, and time. Cranial Nerves: No cranial nerve deficit. Comments: Moving all extremities to command with 5/5 strength, tongue is midline. Psychiatric:         Mood and Affect: Mood normal.       Assessment:  1. Asymptomatic carotid artery stenosis, bilateral      Plan:  Mr. Amreica Salmon has asymptomatic carotid stenosis 50-69% on the left. At this time there is no intervention needed until he is about 80%. Continue ASA/Statin. Continue smoking cessation. He will return in one year with carotid duplex. Due to his current state of fatigue and feeling unwell, I recommended he go get evaluated in the ED as he does have multiple co-morbidities and he has agreed to do so.     Electronically signed by:  Allie Oseguera MD

## 2023-03-14 NOTE — CARE COORDINATION
Case Management Assessment  Initial Evaluation    Date/Time of Evaluation: 3/14/2023 5:06 PM  Assessment Completed by: Juan Luis Combs RN    If patient is discharged prior to next notation, then this note serves as note for discharge by case management. Patient Name: Merrill Toure                   YOB: 1965  Diagnosis: No admission diagnoses are documented for this encounter. Date / Time: 3/14/2023  3:35 PM    Patient Admission Status: Emergency   Readmission Risk (Low < 19, Mod (19-27), High > 27): Readmission Risk Score: 11.7    Current PCP: CHUCHO Michaels CNP  PCP verified by CM? Yes    Chart Reviewed: Yes      History Provided by: Patient, Spouse  Patient Orientation: Alert and Oriented    Patient Cognition: Alert    Hospitalization in the last 30 days (Readmission):  No    If yes, Readmission Assessment in CM Navigator will be completed. Advance Directives:      Code Status: Prior   Patient's Primary Decision Maker is:        Discharge Planning:    Patient lives with: Spouse/Significant Other Type of Home: House  Primary Care Giver: Self  Patient Support Systems include: Spouse/Significant Other   Current Financial resources:    Current community resources:    Current services prior to admission: None            Current DME:              Type of Home Care services:  None    ADLS  Prior functional level: Independent in ADLs/IADLs  Current functional level: Independent in ADLs/IADLs    PT AM-PAC:   /24  OT AM-PAC:   /24    Family can provide assistance at DC: Would you like Case Management to discuss the discharge plan with any other family members/significant others, and if so, who?     Plans to Return to Present Housing: Yes  Other Identified Issues/Barriers to RETURNING to current housing: none  Potential Assistance needed at discharge: N/A            Potential DME:    Patient expects to discharge to: [address] [place] [address] for transportation at discharge: Financial    Payor: [facility]  [address] / Plan: [facility] [address] / Product Type: *No Product type* /     Does insurance require precert for SNF: Yes    Potential assistance Purchasing Medications: No  Meds-to-Beds request:        [facility] #[id number] Lyle Rosenbaum, [address] [place] [address] Carli Rubio [phone]  [address] [id number]  Phone: [phone] Fax: [phone]    [facility] [id number] - [address] [facility] [address] [phone] Erik Tee [phone]  [facility] [address]  Phone: [phone] Fax: [phone]      Notes:    Factors facilitating achievement of predicted outcomes:     Barriers to discharge: Additional Case Management Notes: home with wife    The Plan for Transition of Care is related to the following treatment goals of No admission diagnoses are documented for this encounter. IF APPLICABLE: The Patient and/or patient representative Stephanie Pool and his family were provided with a choice of provider and agrees with the discharge plan. Freedom of choice list with basic dialogue that supports the patient's individualized plan of care/goals and shares the quality data associated with the providers was provided to:     Patient Representative Name:       The Patient and/or Patient Representative Agree with the Discharge Plan?       Radha Lai RN  Case Management Department  Ph: [phone] Fax:

## 2023-03-14 NOTE — ED PROVIDER NOTES
[facility] ED  eMERGENCY dEPARTMENT eNCOUnter   Attending Attestation     Pt Name: Valery Suero  MRN: [id number]  Uzairgffrank 1965  Date of evaluation: 3/14/23       Valery Suero is a 59 y.o. male who presents with Dizziness, Chest Pain, and Headache      History: Patient presents with lightheadedness near syncope, chest pain and headache. Patient has had near syncope just prior to arrival when he was getting a blood draw. Patient recently diagnosed with A-fib. Patient had death in the family of his mother in between diagnosis of A-fib and current time. Patient's been unable to get around without severe fatigue. Patient's been moving from the couch to the bed every day. Exam: Heart rate and rhythm are regular. Lungs are clear to auscultation bilaterally. Abdomen is soft, nontender. Patient awake alert and acting appropriately. Patient with near syncope, recent A-fib, history of enlarged heart. Concern for CHF versus Takotsubo. Will plan for cardiac work-up, admission, this may represent anemia, versus ACS versus CHF. Will recommend patient get an echo cardiogram.  Also may represent symptomatic bradycardia. Patient says that he is concerned that carvedilol is worsening his symptoms. EKG shows sinus bradycardia with rate of 59 beats minute. Left axis. No ST elevation or depression. T waves are upright except in lead aVL, V5, V6 where there is inversion. Nonspecific EKG. I performed a history and physical examination of the patient and discussed management with the resident. I reviewed the residents note and agree with the documented findings and plan of care. Any areas of disagreement are noted on the chart. I was personally present for the key portions of any procedures. I have documented in the chart those procedures where I was not present during the key portions. I have personally reviewed all images and agree with the resident's interpretation.  I have reviewed the emergency nurses triage note. I agree with the chief complaint, past medical history, past surgical history, allergies, medications, social and family history as documented unless otherwise noted below. Documentation of the HPI, Physical Exam and Medical Decision Making performed by medical students or scribes is based on my personal performance of the HPI, PE and MDM. For Phys Assistant/ Nurse Practitioner cases/documentation I have had a face to face evaluation of this patient and have completed at least one if not all key elements of the E/M (history, physical exam, and MDM). Additional findings are as noted.    For APC cases I have personally evaluated and examined the patient in conjunction with the APC and agree with the treatment plan and disposition of the patient as recorded by the APC.    Madhav Ralph MD  Attending Emergency  Physician        Madhav Ralph MD  03/14/23 6167

## 2023-03-14 NOTE — ED PROVIDER NOTES
FACULTY SIGN-OUT  ADDENDUM       Patient: Calvin Sahu   MRN: [id number]  PCP:  Rena Leyden, APRN - CNP  Attestation  I was available and discussed any additional care issues that arose and coordinated the management plans with the resident(s) caring for the patient during my duty period. Any areas of disagreement with resident's documentation of care or procedures are noted on the chart. I was personally present for the key portions of any/all procedures during my duty period. I have documented in the chart those procedures where I was not present during the key portions. The patient's initial evaluation and plan have been discussed with the prior provider who initially evaluated the patient. Pertinent Comments:   The patient is a 59 y.o. male taken in signout with lightheadedness and near syncope with also recent diagnosis of atrial fibrillation but currently in sinus rhythm  We are awaiting work-up and likely admission    ED COURSE      The patient was given the following medications:  Orders Placed This Encounter   Medications    0.9 % sodium chloride infusion       RECENT VITALS:   BP: (!) 143/73  Heart Rate: 58  Resp: 16  Temp: 98.3 °F (36.8 °C) SpO2: 100 %    (Please note that portions of this note were completed with a voice recognition program.  Efforts were made to edit the dictations but occasionally words are mis-transcribed.)    MD Venkat Cheema  Attending Emergency Medicine Physician        Kendra Linares MD  03/14/23 [id number]

## 2023-03-14 NOTE — ED NOTES
Patient reports being sent from PCP for passing out at the lab office while trying to get bloodwork after visit for c/o dizziness, fatigue, congestion, and chest pressure with gradual increase in symptoms over the past 2 weeks.     Wife is also 7 days post diagnosis of Covid-19     iLnda Nicholson RN  03/14/23 1167

## 2023-03-14 NOTE — H&P
[facility]  Office: [address], DO, Gage Hickman, DO, Flex Jeronimo, DO, Bebo Finncathryn Dejesus, DO, Arlene Santana MD, Gracie Mercado MD, Froilan Booker MD, Amado Fishman MD,  Jenniffer Denver, MD, Jarad Damon MD, Cecile Nix, DO, Rolan Bowie MD,  Billy Morales MD, Mateo Taylor MD, Rakesh Pastor, DO, Edith Manuel MD, Lera Mcardle, MD, Mary Blanco, DO, Leodan Loja MD, Kavin Fernandez MD, Keith Lombard, MD, Cassy Tony MD, Nohemy Velarde MD, Sarahy Tidwell DO, Dora Montalvo MD, Rafi Erickson MD, Cayla Anne, CNP,  Pati Brumfield, CNP, Abbie Erazo, CNP, Nayeli Fuller, CNP,  Kala Gunter, NITA, Jaspreet Wood, CNP, Jason Hendrickson, CNP, Harris Godoy, CNP, Matt Dela Cruz, CNP, Alesha Fox, CNP, Elida Garcia PA-C, Pola Tillman, ANGELIQUE, Alvaro Gram, CNP, Shana Mike, CNP         [address]    HISTORY AND PHYSICAL EXAMINATION            Date:   3/14/2023  Patient name:  Maria Ines Cormier  Date of admission:  3/14/2023  3:35 PM  MRN:   [id number]  Account:  [de-identified]  YOB: 1965  PCP:    CHUCHO Salvador CNP  Room:   01/01  Code Status:    Prior    Chief Complaint:     Chief Complaint   Patient presents with    Dizziness    Chest Pain    Headache       History Obtained From:     patient, electronic medical record    History of Present Illness:     Maria Ines Cormier is a 59 y.o. Non- / non  male who presents with Dizziness, Chest Pain, and Headache   and is admitted to the hospital for the management of UGIB (upper gastrointestinal bleed). This is a 49-year-old male who was at a initial vascular outpatient appointment for evaluation of bilateral arterial stenosis follow-up on carotid duplex.   While at the appointment it was found that patient was profoundly fatigued, he was sent to the emergency department for evaluation where he was found to have a hemoglobin of 5.3 with microcytic hypochromic anemia as well. Patient states he noticed a change in his stool including melena but he states that this is stopped over the last few days. Significant to note is a recent Jim Breath new diagnosis of atrial fibrillation back in December 2022 for which she was placed on Eliquis. Other significant metabolic and hematologic abnormalities on presentation included a creatinine of 2.27, EGFR 32, calcium 8.4 with a corresponding albumin of 4.0, proBNP of 519,    Chest x-ray negative for acute process    On assessment patient is awake and alert sitting in room, he is hypertensive, heart rate in the 60s. Currently denying any chest pain, shortness of breath, nausea, vomiting, diarrhea, constipation, fever, chills, urinary symptoms or pain. Continues to endorse fatigue with dizziness states that if he were to get up and move around he would have FAJARDO. Patient states he had colonoscopy approximately 3 years ago with no abnormal findings. 2 units packed red blood cells with iron studies ordered in the emergency department with GI consult    Past Medical History:     Past Medical History:   Diagnosis Date    Atrial fibrillation ([facility] [address]     Benign essential HTN     CKD (chronic kidney disease), stage III ([address] 09/29/2020    Secondary to suspected ischemic nephrosclerosis baseline creatinine 1.5-1.9    Enlarged heart         Past Surgical History:     Past Surgical History:   Procedure Laterality Date    CARDIAC CATHETERIZATION Right 03/2021    no blockage, no stenting. COLONOSCOPY N/A 4/14/2021    COLONOSCOPY WITH BIOPSY performed by Clara Cruz MD at [address] [place] [address] N/A 4/14/2021    EGD BIOPSY, ESOPHAGEAL DILATION performed by Clara Cruz MD at [facility]        Medications Prior to Admission:     Prior to Admission medications    Medication Sig Start Date End Date Taking?  Authorizing Provider   Blood Pressure KIT 1 kit by Does not apply route daily 1/23/23   CHUCHO Osborne CNP   spironolactone (ALDACTONE) 25 MG tablet Take 0.5 tablets by mouth daily 1/4/23   CHUCHO Osborne CNP   apixaban (ELIQUIS) 5 MG TABS tablet Take 1 tablet by mouth 2 times daily 12/31/22   Katerine Fung, DO   atorvastatin (LIPITOR) 40 MG tablet Take 1 tablet by mouth daily 12/31/22   Katerine Fung, DO   carvedilol (COREG) 25 MG tablet Take 1 tablet by mouth 2 times daily 12/31/22   Katerine Fung, DO   dilTIAZem (CARDIZEM CD) 120 MG extended release capsule Take 1 capsule by mouth daily 1/1/23   Katerine Fung, DO   pantoprazole (PROTONIX) 40 MG tablet Take 1 tablet by mouth every morning (before breakfast) 1/1/23   Katerine Fung, DO   doxazosin (CARDURA) 2 MG tablet take 1 tablet by mouth once daily 12/13/21   Anup Ann, DO   hydrALAZINE (APRESOLINE) 50 MG tablet Take 1 tablet by mouth 3 times daily 9/21/21   Anup Ann, DO   albuterol sulfate HFA (PROVENTIL HFA) 108 (90 Base) MCG/ACT inhaler Inhale 2 puffs into the lungs every 6 hours as needed for Wheezing  Patient not taking: No sig reported 6/29/21   Anup Ann, DO   Blood Pressure Monitoring (ADULT BLOOD PRESSURE CUFF LG) KIT Check blood pressure daily 3/16/21   Anup Ann, DO   VITAMIN D, CHOLECALCIFEROL, PO Take by mouth daily    Historical Provider, MD   aspirin 81 MG EC tablet Take 81 mg by mouth daily    Historical Provider, MD        Allergies:     Patient has no known allergies. Social History:     Tobacco:    reports that he quit smoking about 14 months ago. His smoking use included cigarettes. He smoked an average of 1 pack per day. He has quit using smokeless tobacco.  Alcohol:      reports current alcohol use. Drug Use:  reports no history of drug use.     Family History:     Family History   Problem Relation Age of Onset    Hypertension Mother     Hypertension Father     Other Father         enlarged heart       Review of Systems:     Positive and Negative as described in HPI. CONSTITUTIONAL:  negative for fevers, chills, sweats, +fatigue, weight loss  HEENT:  negative for vision, hearing changes, runny nose, throat pain  RESPIRATORY:  negative for shortness of breath, cough, congestion, wheezing, + FAJARDO  CARDIOVASCULAR:  negative for chest pain, palpitations  GASTROINTESTINAL:  negative for nausea, vomiting, diarrhea, constipation, change in bowel habits, abdominal pain   GENITOURINARY:  negative for difficulty of urination, burning with urination, frequency   INTEGUMENT:  negative for rash, skin lesions, easy bruising   HEMATOLOGIC/LYMPHATIC:  negative for swelling/edema   ALLERGIC/IMMUNOLOGIC:  negative for urticaria , itching  ENDOCRINE:  negative increase in drinking, increase in urination, hot or cold intolerance  MUSCULOSKELETAL:  negative joint pains, muscle aches, swelling of joints  NEUROLOGICAL:  negative for headaches, +dizziness, lightheadedness, numbness, pain, tingling extremities  BEHAVIOR/PSYCH:  negative for depression, anxiety    Physical Exam:   BP (!) 147/93   Pulse 58   Temp 98.3 °F (36.8 °C) (Oral)   Resp 16   SpO2 99%   Temp (24hrs), Av.3 °F (36.8 °C), Min:98.2 °F (36.8 °C), Max:98.3 °F (36.8 °C)    No results for input(s): POCGLU in the last 72 hours.   No intake or output data in the 24 hours ending 23    General Appearance: alert, well appearing, and in no acute distress  Mental status: oriented to person, place, and time  Head: normocephalic, atraumatic  Eye: no icterus, redness, pupils equal and reactive, extraocular eye movements intact, conjunctiva clear  Ear: normal external ear, no discharge, hearing intact  Nose: no drainage noted  Mouth: mucous membranes moist  Neck: supple, no carotid bruits, thyroid not palpable  Lungs: Bilateral equal air entry, clear to ausculation, no wheezing, rales or rhonchi, normal effort  Cardiovascular: normal rate, regular rhythm, no murmur, gallop, rub  Abdomen: Soft, nontender, nondistended, normal bowel sounds, no hepatomegaly or splenomegaly  Neurologic: There are no new focal motor or sensory deficits, normal muscle tone and bulk, no abnormal sensation, normal speech, cranial nerves II through XII grossly intact  Skin: No gross lesions, rashes, bruising or bleeding on exposed skin area  Extremities: peripheral pulses palpable, no pedal edema or calf pain with palpation  Psych: normal affect    Investigations:      Laboratory Testing:  Recent Results (from the past 24 hour(s))   CBC    Collection Time: 03/14/23  4:10 PM   Result Value Ref Range    WBC 4.5 3.5 - 11.3 k/uL    RBC 2.50 (L) 4.21 - 5.77 m/uL    Hemoglobin 5.3 (LL) 13.0 - 17.0 g/dL    Hematocrit 19.1 (L) 40.7 - 50.3 %    MCV 76.4 (L) 82.6 - 102.9 fL    MCH 21.2 (L) 25.2 - 33.5 pg    MCHC 27.7 (L) 28.4 - 34.8 g/dL    RDW 16.8 (H) 11.8 - 14.4 %    Platelets 687 287 - 372 k/uL    MPV 12.6 8.1 - 13.5 fL    NRBC Automated 0.7 (H) 0.0 per 100 WBC   Comprehensive Metabolic Panel    Collection Time: 03/14/23  4:10 PM   Result Value Ref Range    Glucose 79 70 - 99 mg/dL    BUN 19 6 - 20 mg/dL    Creatinine 2.27 (H) 0.70 - 1.20 mg/dL    Est, Glom Filt Rate 32 (L) >60 mL/min/1.73m2    Calcium 8.4 (L) 8.6 - 10.4 mg/dL    Sodium 138 135 - 144 mmol/L    Potassium 3.8 3.7 - 5.3 mmol/L    Chloride 105 98 - 107 mmol/L    CO2 24 20 - 31 mmol/L    Anion Gap 9 9 - 17 mmol/L    Alkaline Phosphatase 53 40 - 129 U/L    ALT 7 5 - 41 U/L    AST 11 <40 U/L    Total Bilirubin 0.8 0.3 - 1.2 mg/dL    Total Protein 6.4 6.4 - 8.3 g/dL    Albumin 4.0 3.5 - 5.2 g/dL    Albumin/Globulin Ratio 1.7 1.0 - 2.5   Troponin    Collection Time: 03/14/23  4:10 PM   Result Value Ref Range    Troponin, High Sensitivity 17 0 - 22 ng/L   Brain Natriuretic Peptide    Collection Time: 03/14/23  4:10 PM   Result Value Ref Range    Pro- (H) <300 pg/mL   COVID-19, Rapid    Collection Time: 03/14/23  4:37 PM    Specimen: Nasopharyngeal Swab   Result Value Ref Range Specimen Description . NASOPHARYNGEAL SWAB     SARS-CoV-2, Rapid Not Detected Not Detected   TYPE AND SCREEN    Collection Time: 03/14/23  4:45 PM   Result Value Ref Range    Expiration Date 03/17/2023,2359     Arm Band Number [id number]     ABO/Rh O POSITIVE     Antibody Screen NEGATIVE     Unit Number [id number]     Product Code Leukocyte Reduced Red Cell     Unit Divison 00     Dispense Status ISSUED     Unit Issue Date/Time 331317143926     Product Code Blood Bank [id number]     Blood Bank Unit Type and Rh O POS     Blood Bank ISBT Product Blood Type 5100     Blood Bank Blood Product Expiration Date 709158631836     Transfusion Status OK TO TRANSFUSE     Crossmatch Result COMPATIBLE     Unit Number [id number]     Product Code Leukocyte Reduced Red Cell     Unit Divison 00     Dispense Status ALLOCATED     Transfusion Status OK TO TRANSFUSE     Crossmatch Result COMPATIBLE     Unit Number [id number]     Product Code Leukocyte Reduced Red Cell     Unit Divison 00     Dispense Status ALLOCATED     Transfusion Status OK TO TRANSFUSE     Crossmatch Result COMPATIBLE        Imaging/Diagnostics:  XR CHEST (2 VW)    Result Date: 3/14/2023  Continued marked cardial pericardial silhouette enlargement. No acute change when compared to the previous exam from 12/28/2022.        Assessment :      Hospital Problems             Last Modified POA    * (Principal) UGIB (upper gastrointestinal bleed) 3/14/2023 Yes    Atrial fibrillation ([address] 3/14/2023 Yes    Nonischemic cardiomyopathy ([address] 3/14/2023 Yes    Bilateral carotid artery stenosis 3/14/2023 Yes    Class 1 obesity in adult 3/14/2023 Yes    Chronic combined systolic and diastolic CHF (congestive heart failure) ([address] 3/14/2023 Yes    Acute anemia 3/14/2023 Yes    Tobacco abuse 3/14/2023 Yes    CKD (chronic kidney disease), stage III ([address] 3/14/2023 Yes    Overview Signed 9/29/2020  3:16 PM by Pravin Escobar MD     Secondary to suspected ischemic nephrosclerosis baseline creatinine 1.5-1.9            Plan:     Patient status inpatient in the Progressive Unit/Step down    Acute anemia: Likely upper GI bleed. GI consulted. Hemoglobin 5.1 on admission. 2 units packed red blood cells already ordered. Check iron studies. Discussed with GI.  PPI bolus, infusion, clear liquid diet, n.p.o. at midnight  History of EtOH consumption/Little's esophagus: No hematemesis per patient  Primary hypertension: Pressure on the higher side. Antihypertensives resumed. Monitor closely with GI bleed  CKD stage IIIb: Currently at baseline. Avoid nephrotoxic medication. Creatinine appears to be around 2.0-2.3  carotid arterial stenosis: Evaluated by vascular surgery today. Patient underwent carotid duplex on 3/10/2023 with less than 50% on the right and 50 to 69% on the left. Encouraged to continue ASA, statin  Chronic combined systolic and diastolic CHF: Without exacerbation. Continue beta-blocker,  Atrial fibrillation: Continue Cardizem, Eliquis placed on hold, rate currently controlled. A-fib is new for patient during his last inpatient hospital stay at the end of December 2022  Previous smoker: Continue albuterol  Class I obesity: BMI 30.9 on admission, lifestyle modification encouraged further discussion once acute issues have resolved  GI/DVT prophylaxis: Protonix, no full pharmacologic anticoagulation secondary to #1,start SCD cuffs    Consultations:   IP CONSULT TO HOSPITALIST  IP CONSULT TO GI     Patient is admitted as inpatient status because of co-morbidities listed above, severity of signs and symptoms as outlined, requirement for current medical therapies and most importantly because of direct risk to patient if care not provided in a hospital setting. Expected length of stay > 48 hours.     Burnett Osgood, APRN - NP  3/14/2023  6:44 PM    Copy sent to Dr. Bernabe Devi, APRN - CNP

## 2023-03-14 NOTE — ED PROVIDER NOTES
[facility] ED  Emergency Department Encounter  Emergency Medicine Resident     Pt Name:Tejas Prince  MRN: [id number]  [place] 1965  Date of evaluation: 3/14/23  PCP:  CHUCHO Harris CNP      CHIEF COMPLAINT       Chief Complaint   Patient presents with    Dizziness    Chest Pain    Headache       HISTORY OF PRESENT ILLNESS  (Location/Symptom, Timing/Onset, Context/Setting, Quality, Duration, Modifying Factors, Severity.)      Kalpesh Correa is a 59 y.o. male who presents with history of dyspnea on exertion, dizziness and lightheadedness. Recent work-up with vascular surgery today. Being assessed for carotid artery stenosis. Found to have 50 to 75% stenosis per report. No intervention planned. Was also getting basic labs as an outpatient. While he was getting labs he felt lightheaded like he was going to pass out. No chest pain currently. He does report a history that his mother  2 weeks prior he has been under a great deal of stress since that time. He does report these had diarrhea and that he has had some dark-colored stools. He also feels nauseous when he eats. PAST MEDICAL / SURGICAL / SOCIAL / FAMILY HISTORY      has a past medical history of Atrial fibrillation ([address] Benign essential HTN, CKD (chronic kidney disease), stage III ([address] and Enlarged heart.       has a past surgical history that includes Tonsillectomy; Cardiac catheterization (Right, 2021); Upper gastrointestinal endoscopy (N/A, 2021); and Colonoscopy (N/A, 2021).       Social History     Socioeconomic History    Marital status:      Spouse name: Not on file    Number of children: Not on file    Years of education: Not on file    Highest education level: Not on file   Occupational History    Not on file   Tobacco Use    Smoking status: Former     Packs/day: 1.00     Types: Cigarettes     Quit date:      Years since quittin.1    Smokeless tobacco: Former    Tobacco comments:     1-2 per week   Vaping Use    Vaping Use: Never used   Substance and Sexual Activity    Alcohol use: Yes     Comment: socially    Drug use: No    Sexual activity: Not on file   Other Topics Concern    Not on file   Social History Narrative    Not on file     Social Determinants of Health     Financial Resource Strain: Low Risk     Difficulty of Paying Living Expenses: Not hard at all   Food Insecurity: No Food Insecurity    Worried About [address] in the Last Year: Never true    [address] in the Last Year: Never true   Transportation Needs: No Transportation Needs    Lack of Transportation (Medical): No    Lack of Transportation (Non-Medical): No   Physical Activity: Not on file   Stress: Not on file   Social Connections: Not on file   Intimate Partner Violence: Not on file   Housing Stability: Unknown    Unable to Pay for Housing in the Last Year: Not on file    Number of Places Lived in the Last Year: Not on file    Unstable Housing in the Last Year: No       Family History   Problem Relation Age of Onset    Hypertension Mother     Hypertension Father     Other Father         enlarged heart       Allergies:  Patient has no known allergies. Home Medications:  Prior to Admission medications    Medication Sig Start Date End Date Taking?  Authorizing Provider   Blood Pressure KIT 1 kit by Does not apply route daily 1/23/23   Nilesh Back, APRN - CNP   spironolactone (ALDACTONE) 25 MG tablet Take 0.5 tablets by mouth daily 1/4/23   Nilesh Back, APRN - CNP   apixaban (ELIQUIS) 5 MG TABS tablet Take 1 tablet by mouth 2 times daily 12/31/22   Srini Old, DO   atorvastatin (LIPITOR) 40 MG tablet Take 1 tablet by mouth daily 12/31/22   Srini Old, DO   carvedilol (COREG) 25 MG tablet Take 1 tablet by mouth 2 times daily 12/31/22   Srini Old, DO   dilTIAZem (CARDIZEM CD) 120 MG extended release capsule Take 1 capsule by mouth daily 1/1/23   Srini Old, DO   pantoprazole (PROTONIX) 40 MG tablet Take 1 tablet by mouth every morning (before breakfast) 23   Barbara Balzarine, DO   doxazosin (CARDURA) 2 MG tablet take 1 tablet by mouth once daily 21   Vamsi Newby DO   hydrALAZINE (APRESOLINE) 50 MG tablet Take 1 tablet by mouth 3 times daily 21   Vamsi Newby DO   albuterol sulfate HFA (PROVENTIL HFA) 108 (90 Base) MCG/ACT inhaler Inhale 2 puffs into the lungs every 6 hours as needed for Wheezing  Patient not taking: No sig reported 21   Vamsi Newby DO   Blood Pressure Monitoring (ADULT BLOOD PRESSURE CUFF LG) KIT Check blood pressure daily 3/16/21   Vamsi Newby DO   VITAMIN D, CHOLECALCIFEROL, PO Take by mouth daily    Historical Provider, MD   aspirin 81 MG EC tablet Take 81 mg by mouth daily    Historical Provider, MD           REVIEW OF SYSTEMS       Review of Systems  See HPI  PHYSICAL EXAM      INITIAL VITALS:   BP (!) 143/73   Pulse 58   Temp 98.3 °F (36.8 °C) (Oral)   Resp 16   SpO2 100%     Physical Exam  Constitutional:       Appearance: Normal appearance. He is obese. HENT:      Head: Normocephalic and atraumatic. Cardiovascular:      Rate and Rhythm: Normal rate and regular rhythm. Pulses: Normal pulses. Heart sounds: No murmur heard. Abdominal:      Tenderness: There is abdominal tenderness. There is no guarding. Genitourinary:     Rectum: Guaiac result positive. Musculoskeletal:         General: Normal range of motion. Right lower leg: No edema. Neurological:      General: No focal deficit present. Mental Status: He is alert and oriented to person, place, and time. DDX/DIAGNOSTIC RESULTS / EMERGENCY DEPARTMENT COURSE / MDM     Medical Decision Making  This is a 59 male coming in today for evaluation dizziness lightheadedness and dyspnea on exertion. Recently admitted for A-fib RVR. Increase carvedilol, started on Eliquis. Now having dark stools. History of cardiomegaly. Mother  2 weeks ago. Concern for anemia secondary to GI bleed versus Takotsubo cardiomyopathy. Increased cardiac silhouette on chest x-ray. Hemoglobin 5.1. Rectal Hemoccult test positive. Given 80 mg of of Protonix. Transfuse 1 unit PRBC. Hemodynamically stable. [place] to medicine. Amount and/or Complexity of Data Reviewed  Labs: ordered. Decision-making details documented in ED Course. Radiology: ordered. ECG/medicine tests: ordered. Risk  Prescription drug management. Decision regarding hospitalization. EMERGENCY DEPARTMENT COURSE:    ED Course as of 03/14/23 1722   Tue Mar 14, 2023   1627 Hemoglobin Quant(!!): 5.3 [TJ]      ED Course User Index  [TJ] Daren Pitts MD       PROCEDURES:    Procedures    CONSULTS:  IP CONSULT TO HOSPITALIST        FINAL IMPRESSION      1. Anemia, unspecified type          DISPOSITION / PLAN     DISPOSITION Decision To Admit 03/14/2023 04:36:15 PM      PATIENT REFERRED TO:  No follow-up provider specified.     DISCHARGE MEDICATIONS:  New Prescriptions    No medications on file       Daren Pitts MD  Emergency Medicine Resident    (Please note that portions of thisnote were completed with a voice recognition program.  Efforts were made to edit the dictations but occasionally words are mis-transcribed.)       Daren Pitts MD  Resident  03/14/23 1723

## 2023-03-15 ENCOUNTER — ANESTHESIA EVENT (OUTPATIENT)
Dept: OPERATING ROOM | Age: 58
End: 2023-03-15
Payer: COMMERCIAL

## 2023-03-15 ENCOUNTER — ANESTHESIA (OUTPATIENT)
Dept: OPERATING ROOM | Age: 58
End: 2023-03-15
Payer: COMMERCIAL

## 2023-03-15 PROBLEM — D62 ACUTE POSTHEMORRHAGIC ANEMIA: Status: ACTIVE | Noted: 2023-03-15

## 2023-03-15 PROBLEM — K92.1 MELENA: Status: ACTIVE | Noted: 2023-03-15

## 2023-03-15 PROBLEM — K31.7 MULTIPLE GASTRIC POLYPS: Status: ACTIVE | Noted: 2023-03-15

## 2023-03-15 PROBLEM — K31.811 AVM (ARTERIOVENOUS MALFORMATION) OF DUODENUM, ACQUIRED WITH HEMORRHAGE: Status: ACTIVE | Noted: 2023-03-15

## 2023-03-15 LAB
ANION GAP SERPL CALCULATED.3IONS-SCNC: 10 MMOL/L (ref 9–17)
BUN SERPL-MCNC: 22 MG/DL (ref 6–20)
CALCIUM SERPL-MCNC: 8.4 MG/DL (ref 8.6–10.4)
CHLORIDE SERPL-SCNC: 107 MMOL/L (ref 98–107)
CO2 SERPL-SCNC: 23 MMOL/L (ref 20–31)
CREAT SERPL-MCNC: 2.27 MG/DL (ref 0.7–1.2)
EKG ATRIAL RATE: 58 BPM
EKG ATRIAL RATE: 59 BPM
EKG P AXIS: 35 DEGREES
EKG P AXIS: 49 DEGREES
EKG P-R INTERVAL: 126 MS
EKG P-R INTERVAL: 160 MS
EKG Q-T INTERVAL: 448 MS
EKG Q-T INTERVAL: 454 MS
EKG QRS DURATION: 90 MS
EKG QRS DURATION: 92 MS
EKG QTC CALCULATION (BAZETT): 439 MS
EKG QTC CALCULATION (BAZETT): 449 MS
EKG R AXIS: -22 DEGREES
EKG R AXIS: -32 DEGREES
EKG T AXIS: 113 DEGREES
EKG T AXIS: 93 DEGREES
EKG VENTRICULAR RATE: 58 BPM
EKG VENTRICULAR RATE: 59 BPM
FERRITIN SERPL-MCNC: 5 NG/ML (ref 30–400)
GFR SERPL CREATININE-BSD FRML MDRD: 32 ML/MIN/1.73M2
GLUCOSE SERPL-MCNC: 94 MG/DL (ref 70–99)
HCT VFR BLD AUTO: 22.7 % (ref 40.7–50.3)
HCT VFR BLD AUTO: 24.6 % (ref 40.7–50.3)
HCT VFR BLD AUTO: 25.2 % (ref 40.7–50.3)
HCT VFR BLD AUTO: 27 % (ref 40.7–50.3)
HGB BLD-MCNC: 7.2 G/DL (ref 13–17)
HGB BLD-MCNC: 7.3 G/DL (ref 13–17)
HGB BLD-MCNC: 7.4 G/DL (ref 13–17)
HGB BLD-MCNC: 7.7 G/DL (ref 13–17)
IRON SATURATION: 5 % (ref 20–55)
IRON SERPL-MCNC: 18 UG/DL (ref 59–158)
MCH RBC QN AUTO: 25.3 PG (ref 25.2–33.5)
MCHC RBC AUTO-ENTMCNC: 32.2 G/DL (ref 28.4–34.8)
MCV RBC AUTO: 78.5 FL (ref 82.6–102.9)
NRBC AUTOMATED: 1.1 PER 100 WBC
PARTIAL THROMBOPLASTIN TIME: 25.9 SEC (ref 20.5–30.5)
PDW BLD-RTO: 18.1 % (ref 11.8–14.4)
PLATELET # BLD AUTO: 468 K/UL (ref 138–453)
PMV BLD AUTO: 11.7 FL (ref 8.1–13.5)
POTASSIUM SERPL-SCNC: 4.3 MMOL/L (ref 3.7–5.3)
RBC # BLD: 2.89 M/UL (ref 4.21–5.77)
SODIUM SERPL-SCNC: 140 MMOL/L (ref 135–144)
TIBC SERPL-MCNC: 349 UG/DL (ref 250–450)
UNSATURATED IRON BINDING CAPACITY: 331 UG/DL (ref 112–347)
WBC # BLD AUTO: 4.4 K/UL (ref 3.5–11.3)

## 2023-03-15 PROCEDURE — 0W3P8ZZ CONTROL BLEEDING IN GASTROINTESTINAL TRACT, VIA NATURAL OR ARTIFICIAL OPENING ENDOSCOPIC: ICD-10-PCS | Performed by: INTERNAL MEDICINE

## 2023-03-15 PROCEDURE — 99232 SBSQ HOSP IP/OBS MODERATE 35: CPT | Performed by: INTERNAL MEDICINE

## 2023-03-15 PROCEDURE — 3609012400 HC EGD TRANSORAL BIOPSY SINGLE/MULTIPLE: Performed by: INTERNAL MEDICINE

## 2023-03-15 PROCEDURE — 82728 ASSAY OF FERRITIN: CPT

## 2023-03-15 PROCEDURE — 6370000000 HC RX 637 (ALT 250 FOR IP): Performed by: NURSE PRACTITIONER

## 2023-03-15 PROCEDURE — 6360000002 HC RX W HCPCS

## 2023-03-15 PROCEDURE — 83540 ASSAY OF IRON: CPT

## 2023-03-15 PROCEDURE — C9113 INJ PANTOPRAZOLE SODIUM, VIA: HCPCS | Performed by: INTERNAL MEDICINE

## 2023-03-15 PROCEDURE — 6370000000 HC RX 637 (ALT 250 FOR IP): Performed by: INTERNAL MEDICINE

## 2023-03-15 PROCEDURE — 2580000003 HC RX 258: Performed by: INTERNAL MEDICINE

## 2023-03-15 PROCEDURE — 80048 BASIC METABOLIC PNL TOTAL CA: CPT

## 2023-03-15 PROCEDURE — 83550 IRON BINDING TEST: CPT

## 2023-03-15 PROCEDURE — 6360000002 HC RX W HCPCS: Performed by: NURSE PRACTITIONER

## 2023-03-15 PROCEDURE — 36430 TRANSFUSION BLD/BLD COMPNT: CPT

## 2023-03-15 PROCEDURE — 0DB68ZX EXCISION OF STOMACH, VIA NATURAL OR ARTIFICIAL OPENING ENDOSCOPIC, DIAGNOSTIC: ICD-10-PCS | Performed by: INTERNAL MEDICINE

## 2023-03-15 PROCEDURE — 2500000003 HC RX 250 WO HCPCS

## 2023-03-15 PROCEDURE — C9113 INJ PANTOPRAZOLE SODIUM, VIA: HCPCS | Performed by: NURSE PRACTITIONER

## 2023-03-15 PROCEDURE — 85014 HEMATOCRIT: CPT

## 2023-03-15 PROCEDURE — 85730 THROMBOPLASTIN TIME PARTIAL: CPT

## 2023-03-15 PROCEDURE — 2580000003 HC RX 258

## 2023-03-15 PROCEDURE — 97162 PT EVAL MOD COMPLEX 30 MIN: CPT

## 2023-03-15 PROCEDURE — 3700000000 HC ANESTHESIA ATTENDED CARE: Performed by: INTERNAL MEDICINE

## 2023-03-15 PROCEDURE — 2709999900 HC NON-CHARGEABLE SUPPLY: Performed by: INTERNAL MEDICINE

## 2023-03-15 PROCEDURE — 93005 ELECTROCARDIOGRAM TRACING: CPT | Performed by: NURSE PRACTITIONER

## 2023-03-15 PROCEDURE — 0DB78ZX EXCISION OF STOMACH, PYLORUS, VIA NATURAL OR ARTIFICIAL OPENING ENDOSCOPIC, DIAGNOSTIC: ICD-10-PCS | Performed by: INTERNAL MEDICINE

## 2023-03-15 PROCEDURE — 2060000000 HC ICU INTERMEDIATE R&B

## 2023-03-15 PROCEDURE — 3700000001 HC ADD 15 MINUTES (ANESTHESIA): Performed by: INTERNAL MEDICINE

## 2023-03-15 PROCEDURE — 88305 TISSUE EXAM BY PATHOLOGIST: CPT

## 2023-03-15 PROCEDURE — 7100000000 HC PACU RECOVERY - FIRST 15 MIN: Performed by: INTERNAL MEDICINE

## 2023-03-15 PROCEDURE — 97166 OT EVAL MOD COMPLEX 45 MIN: CPT

## 2023-03-15 PROCEDURE — 2720000010 HC SURG SUPPLY STERILE: Performed by: INTERNAL MEDICINE

## 2023-03-15 PROCEDURE — 6360000002 HC RX W HCPCS: Performed by: INTERNAL MEDICINE

## 2023-03-15 PROCEDURE — 3609013000 HC EGD TRANSORAL CONTROL BLEEDING ANY METHOD: Performed by: INTERNAL MEDICINE

## 2023-03-15 PROCEDURE — 85018 HEMOGLOBIN: CPT

## 2023-03-15 PROCEDURE — 7100000001 HC PACU RECOVERY - ADDTL 15 MIN: Performed by: INTERNAL MEDICINE

## 2023-03-15 PROCEDURE — 93010 ELECTROCARDIOGRAM REPORT: CPT | Performed by: INTERNAL MEDICINE

## 2023-03-15 PROCEDURE — 2580000003 HC RX 258: Performed by: NURSE PRACTITIONER

## 2023-03-15 PROCEDURE — 85027 COMPLETE CBC AUTOMATED: CPT

## 2023-03-15 PROCEDURE — P9016 RBC LEUKOCYTES REDUCED: HCPCS

## 2023-03-15 PROCEDURE — 36415 COLL VENOUS BLD VENIPUNCTURE: CPT

## 2023-03-15 PROCEDURE — 97530 THERAPEUTIC ACTIVITIES: CPT

## 2023-03-15 RX ORDER — PROPOFOL 10 MG/ML
INJECTION, EMULSION INTRAVENOUS PRN
Status: DISCONTINUED | OUTPATIENT
Start: 2023-03-15 | End: 2023-03-15 | Stop reason: SDUPTHER

## 2023-03-15 RX ORDER — SODIUM CHLORIDE 0.9 % (FLUSH) 0.9 %
5-40 SYRINGE (ML) INJECTION PRN
Status: DISCONTINUED | OUTPATIENT
Start: 2023-03-15 | End: 2023-03-15

## 2023-03-15 RX ORDER — DEXAMETHASONE SODIUM PHOSPHATE 10 MG/ML
INJECTION INTRAMUSCULAR; INTRAVENOUS PRN
Status: DISCONTINUED | OUTPATIENT
Start: 2023-03-15 | End: 2023-03-15 | Stop reason: SDUPTHER

## 2023-03-15 RX ORDER — FENTANYL CITRATE 50 UG/ML
50 INJECTION, SOLUTION INTRAMUSCULAR; INTRAVENOUS EVERY 5 MIN PRN
Status: DISCONTINUED | OUTPATIENT
Start: 2023-03-15 | End: 2023-03-15

## 2023-03-15 RX ORDER — ONDANSETRON 2 MG/ML
INJECTION INTRAMUSCULAR; INTRAVENOUS PRN
Status: DISCONTINUED | OUTPATIENT
Start: 2023-03-15 | End: 2023-03-15 | Stop reason: SDUPTHER

## 2023-03-15 RX ORDER — SUCCINYLCHOLINE CHLORIDE 20 MG/ML
INJECTION INTRAMUSCULAR; INTRAVENOUS PRN
Status: DISCONTINUED | OUTPATIENT
Start: 2023-03-15 | End: 2023-03-15 | Stop reason: SDUPTHER

## 2023-03-15 RX ORDER — ENALAPRILAT 2.5 MG/2ML
1.25 INJECTION INTRAVENOUS ONCE
Status: DISCONTINUED | OUTPATIENT
Start: 2023-03-15 | End: 2023-03-15

## 2023-03-15 RX ORDER — GLYCOPYRROLATE 0.2 MG/ML
INJECTION INTRAMUSCULAR; INTRAVENOUS PRN
Status: DISCONTINUED | OUTPATIENT
Start: 2023-03-15 | End: 2023-03-15 | Stop reason: SDUPTHER

## 2023-03-15 RX ORDER — SODIUM CHLORIDE 9 MG/ML
INJECTION, SOLUTION INTRAVENOUS CONTINUOUS PRN
Status: DISCONTINUED | OUTPATIENT
Start: 2023-03-15 | End: 2023-03-15 | Stop reason: SDUPTHER

## 2023-03-15 RX ORDER — FENTANYL CITRATE 50 UG/ML
INJECTION, SOLUTION INTRAMUSCULAR; INTRAVENOUS PRN
Status: DISCONTINUED | OUTPATIENT
Start: 2023-03-15 | End: 2023-03-15 | Stop reason: SDUPTHER

## 2023-03-15 RX ORDER — FENTANYL CITRATE 50 UG/ML
25 INJECTION, SOLUTION INTRAMUSCULAR; INTRAVENOUS EVERY 5 MIN PRN
Status: DISCONTINUED | OUTPATIENT
Start: 2023-03-15 | End: 2023-03-15

## 2023-03-15 RX ORDER — EPHEDRINE SULFATE/0.9% NACL/PF 50 MG/5 ML
SYRINGE (ML) INTRAVENOUS PRN
Status: DISCONTINUED | OUTPATIENT
Start: 2023-03-15 | End: 2023-03-15 | Stop reason: SDUPTHER

## 2023-03-15 RX ORDER — LIDOCAINE HYDROCHLORIDE 10 MG/ML
INJECTION, SOLUTION EPIDURAL; INFILTRATION; INTRACAUDAL; PERINEURAL PRN
Status: DISCONTINUED | OUTPATIENT
Start: 2023-03-15 | End: 2023-03-15 | Stop reason: SDUPTHER

## 2023-03-15 RX ORDER — HYDRALAZINE HYDROCHLORIDE 50 MG/1
100 TABLET, FILM COATED ORAL 3 TIMES DAILY
Status: DISCONTINUED | OUTPATIENT
Start: 2023-03-15 | End: 2023-03-16

## 2023-03-15 RX ORDER — HYDRALAZINE HYDROCHLORIDE 20 MG/ML
10 INJECTION INTRAMUSCULAR; INTRAVENOUS ONCE
Status: COMPLETED | OUTPATIENT
Start: 2023-03-15 | End: 2023-03-15

## 2023-03-15 RX ORDER — SODIUM CHLORIDE 0.9 % (FLUSH) 0.9 %
5-40 SYRINGE (ML) INJECTION EVERY 12 HOURS SCHEDULED
Status: DISCONTINUED | OUTPATIENT
Start: 2023-03-15 | End: 2023-03-15

## 2023-03-15 RX ORDER — SPIRONOLACTONE 25 MG/1
25 TABLET ORAL DAILY
Status: DISCONTINUED | OUTPATIENT
Start: 2023-03-15 | End: 2023-03-16

## 2023-03-15 RX ORDER — SODIUM CHLORIDE 9 MG/ML
INJECTION, SOLUTION INTRAVENOUS PRN
Status: DISCONTINUED | OUTPATIENT
Start: 2023-03-15 | End: 2023-03-15

## 2023-03-15 RX ORDER — ONDANSETRON 2 MG/ML
4 INJECTION INTRAMUSCULAR; INTRAVENOUS
Status: DISCONTINUED | OUTPATIENT
Start: 2023-03-15 | End: 2023-03-15

## 2023-03-15 RX ADMIN — LIDOCAINE HYDROCHLORIDE 50 MG: 10 INJECTION, SOLUTION EPIDURAL; INFILTRATION; INTRACAUDAL; PERINEURAL at 14:08

## 2023-03-15 RX ADMIN — SODIUM CHLORIDE: 9 INJECTION, SOLUTION INTRAVENOUS at 14:01

## 2023-03-15 RX ADMIN — PHENYLEPHRINE HYDROCHLORIDE 300 MCG: 10 INJECTION INTRAVENOUS at 14:46

## 2023-03-15 RX ADMIN — PANTOPRAZOLE SODIUM 8 MG/HR: 40 INJECTION, POWDER, FOR SOLUTION INTRAVENOUS at 18:57

## 2023-03-15 RX ADMIN — PROPOFOL 50 MG: 10 INJECTION, EMULSION INTRAVENOUS at 14:11

## 2023-03-15 RX ADMIN — PROPOFOL 50 MG: 10 INJECTION, EMULSION INTRAVENOUS at 14:17

## 2023-03-15 RX ADMIN — SODIUM CHLORIDE: 9 INJECTION, SOLUTION INTRAVENOUS at 02:27

## 2023-03-15 RX ADMIN — HYDRALAZINE HYDROCHLORIDE 10 MG: 20 INJECTION INTRAMUSCULAR; INTRAVENOUS at 06:01

## 2023-03-15 RX ADMIN — DOXAZOSIN 2 MG: 2 TABLET ORAL at 08:48

## 2023-03-15 RX ADMIN — SODIUM CHLORIDE, PRESERVATIVE FREE 10 ML: 5 INJECTION INTRAVENOUS at 21:49

## 2023-03-15 RX ADMIN — PROPOFOL 50 MG: 10 INJECTION, EMULSION INTRAVENOUS at 14:14

## 2023-03-15 RX ADMIN — DILTIAZEM HYDROCHLORIDE 120 MG: 120 CAPSULE, COATED, EXTENDED RELEASE ORAL at 08:48

## 2023-03-15 RX ADMIN — DEXAMETHASONE SODIUM PHOSPHATE 10 MG: 10 INJECTION INTRAMUSCULAR; INTRAVENOUS at 14:47

## 2023-03-15 RX ADMIN — HYDRALAZINE HYDROCHLORIDE 10 MG: 20 INJECTION INTRAMUSCULAR; INTRAVENOUS at 04:21

## 2023-03-15 RX ADMIN — FENTANYL CITRATE 50 MCG: 50 INJECTION, SOLUTION INTRAMUSCULAR; INTRAVENOUS at 14:40

## 2023-03-15 RX ADMIN — PHENYLEPHRINE HYDROCHLORIDE 200 MCG: 10 INJECTION INTRAVENOUS at 14:41

## 2023-03-15 RX ADMIN — PHENYLEPHRINE HYDROCHLORIDE 200 MCG: 10 INJECTION INTRAVENOUS at 14:48

## 2023-03-15 RX ADMIN — Medication 10 MG: at 14:51

## 2023-03-15 RX ADMIN — Medication 100 MG: at 14:25

## 2023-03-15 RX ADMIN — PANTOPRAZOLE SODIUM 8 MG/HR: 40 INJECTION, POWDER, FOR SOLUTION INTRAVENOUS at 16:23

## 2023-03-15 RX ADMIN — PROPOFOL 50 MG: 10 INJECTION, EMULSION INTRAVENOUS at 14:08

## 2023-03-15 RX ADMIN — ONDANSETRON 4 MG: 2 INJECTION INTRAMUSCULAR; INTRAVENOUS at 14:53

## 2023-03-15 RX ADMIN — PANTOPRAZOLE SODIUM 8 MG/HR: 40 INJECTION, POWDER, FOR SOLUTION INTRAVENOUS at 05:29

## 2023-03-15 RX ADMIN — SPIRONOLACTONE 25 MG: 25 TABLET ORAL at 08:47

## 2023-03-15 RX ADMIN — DESMOPRESSIN ACETATE 40 MG: 0.2 TABLET ORAL at 08:54

## 2023-03-15 RX ADMIN — GLYCOPYRROLATE 0.2 MG: 0.2 INJECTION INTRAMUSCULAR; INTRAVENOUS at 14:41

## 2023-03-15 RX ADMIN — GLYCOPYRROLATE 0.2 MG: 0.2 INJECTION INTRAMUSCULAR; INTRAVENOUS at 14:08

## 2023-03-15 RX ADMIN — PROPOFOL 100 MG: 10 INJECTION, EMULSION INTRAVENOUS at 14:25

## 2023-03-15 RX ADMIN — Medication 10 MG: at 14:43

## 2023-03-15 RX ADMIN — SODIUM CHLORIDE, PRESERVATIVE FREE 10 ML: 5 INJECTION INTRAVENOUS at 08:48

## 2023-03-15 RX ADMIN — CARVEDILOL 25 MG: 25 TABLET, FILM COATED ORAL at 08:47

## 2023-03-15 RX ADMIN — HYDRALAZINE HYDROCHLORIDE 100 MG: 50 TABLET, FILM COATED ORAL at 08:48

## 2023-03-15 ASSESSMENT — ENCOUNTER SYMPTOMS
SHORTNESS OF BREATH: 0
APNEA: 0
SHORTNESS OF BREATH: 0
COUGH: 0
BACK PAIN: 0
WHEEZING: 0
STRIDOR: 0
VOMITING: 0
STRIDOR: 0
DIARRHEA: 0
BLOOD IN STOOL: 1
ANAL BLEEDING: 0

## 2023-03-15 ASSESSMENT — PAIN SCALES - WONG BAKER: WONGBAKER_NUMERICALRESPONSE: 0

## 2023-03-15 ASSESSMENT — LIFESTYLE VARIABLES: SMOKING_STATUS: 0

## 2023-03-15 NOTE — OP NOTE
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 Treated with bipolar cautery. Multiple sessile polyps likely fundic gland polyps in the gastric body and antrum. Biopsied. Procedure(s):  EGD CONTROL HEMORRHAGE  EGD BIOPSY    Surgeon(s):  Yassine Renae MD    Assistant:   First Assistant: Nolvia Pacheco RN    Anesthesia: *General*    Estimated Blood Loss (mL): Minimal    Complications: None    Specimens:   ID Type Source Tests Collected by Time Destination   A : gastric polyp bx Tissue Stomach SURGICAL PATHOLOGY Yassine Renae MD 3/15/2023 1453        Implants:  * No implants in log *      Drains: * No LDAs found *    Findings:   LA grade B reflux esophagitis at the GE junction  Multiple sessile polyps measuring from 3 to 6 mm in size were found in the gastric body and antrum. Biopsies were performed for histology and H. pylori testing. A 3 mm bleeding AVM was found in the second portion of the duodenum. This was treated with bipolar cautery and completely obliterated. There was no bleeding at the end of the procedure. The remainder of the examined duodenum appeared normal.    Recommendations:  Continue IV pantoprazole infusion for 24 hours then oral twice daily for 6 weeks then once daily thereafter. Start Carafate 1 g 4 times daily for 4 weeks. Resume anticoagulation after 7 days. Monitor clinical course for the next 24 hours. If no further signs of bleeding patient can be discharged home and followed up in the GI clinic. Will need a colonoscopy as outpatient for screening. Start full liquid diet today and advance as tolerated.       Detailed Description of Procedure:   Informed consent was obtained from the patient after explanation of the procedure including indications, description of the procedure,  benefits and possible risks and complications of the procedure, and alternatives. Questions were answered. The patient's history was reviewed and a directed physical examination was performed prior to the procedure. Patient was monitored throughout the procedure with pulse oximetry and periodic assessment of vital signs. Patient was sedated as noted above. With the patient in the left lateral decubitus position, the Olympus videoendoscope was placed in the patient's mouth and under direct visualization passed into the esophagus. Visualization of the esophagus, stomach, and duodenum was performed during both introduction and withdrawal of the endoscope and retroflexed view of the proximal stomach was obtained. The scope was passed to the 3rd portion of the duodenum. The patient tolerated the procedure well and was taken to the recovery area in good condition. The patient  was taken to the recovery area in good condition.      Electronically signed by Jason Pérez MD on 3/15/2023 at 3:10 PM

## 2023-03-15 NOTE — PROGRESS NOTES
[facility]  Office: [address], DO, Ashtyn Sep, DO, Marcelo Heart, DO, Vanessa Forbes Blood, DO, Keerthi Flanagan MD, Edwar Varela MD, Bruno Lemus MD, Candace Marie MD,  Musa Roy MD, Maria Victoria Robles MD, Lyndsey Harrington, DO, Cameron Patiño MD,  Jazz Hendrix MD, Larry Elena MD, Bing Boxer, DO, Sandeep Mars MD, Jennifer Wheeler MD, Geovany Butler, DO, Eleuterio Booker MD, Roxanne Vizcarra MD, Roverto Lynne MD, Tomi Torres MD, Paulo Alejandrar, MD, Dm Kate DO, Panfilo Bahena MD, Griffin Lee MD, Quirino Darby, CNP,  Nasir Metcalf, CNP, Martina Nazario, CNP, Eloisa He, CNP,  Dheeraj Thompson, [facility], Valente Dela Cruz, CNP, Marii Braden, CNP, Edinson Braswell, CNP, Carlos Eugene, CNP, Elpidio Schwartz, CNP, Lizbeth Geronimor, PAAnthonyC, Sergio Rahman, [facility], Nirali Santamaria, CNP, Lee Milner, CNP         Yaquelin Wilcox [address]    Progress Note    3/15/2023    9:11 AM    Name:   Tanya Summers  MRN:     [id number]     Acct:      [de-identified]   Room:   2003/2003-01  IP Day:  1  Admit Date:  3/14/2023  3:35 PM    PCP:   CHUCHO Nunez CNP  Code Status:  Full Code    Subjective:     C/C:   Chief Complaint   Patient presents with    Dizziness    Chest Pain    Headache     Interval History Status: not changed. Doing well this morning, hypertensive given few prn antihypertensives. Will increase hydralazine today. EGD this afternoon     Brief History: This is a 68-year-old male who was at a initial vascular outpatient appointment for evaluation of bilateral arterial stenosis follow-up on carotid duplex. While at the appointment it was found that patient was profoundly fatigued, he was sent to the emergency department for evaluation where he was found to have a hemoglobin of 5.3 with microcytic hypochromic anemia as well.   Patient states he noticed a change in his stool including melena but he states that this is stopped over the last few days. Significant to note is a recent Briana Fan new diagnosis of atrial fibrillation back in December 2022 for which he was placed on Eliquis. Review of Systems:     Review of Systems   Constitutional:  Negative for appetite change and chills. HENT:  Negative for congestion. Respiratory:  Negative for apnea, cough, shortness of breath, wheezing and stridor. Cardiovascular:  Negative for chest pain, palpitations and leg swelling. Gastrointestinal:  Positive for blood in stool. Negative for anal bleeding, diarrhea and vomiting. Endocrine: Negative for polydipsia, polyphagia and polyuria. Genitourinary:  Negative for dysuria. Musculoskeletal:  Negative for arthralgias and back pain. Neurological:  Negative for tremors, seizures, speech difficulty, weakness and numbness. Medications: Allergies:  No Known Allergies    Current Meds:   Scheduled Meds:    spironolactone  25 mg Oral Daily    hydrALAZINE  100 mg Oral TID    atorvastatin  40 mg Oral Daily    carvedilol  25 mg Oral BID    dilTIAZem  120 mg Oral Daily    doxazosin  2 mg Oral Daily    sodium chloride flush  5-40 mL IntraVENous 2 times per day     Continuous Infusions:    sodium chloride      sodium chloride      sodium chloride      pantoprazole 8 mg/hr (03/15/23 0529)     PRN Meds: sodium chloride, sodium chloride, sodium chloride flush, sodium chloride, ondansetron **OR** ondansetron, acetaminophen **OR** acetaminophen    Data:     Past Medical History:   has a past medical history of Atrial fibrillation ([facility] [address] Benign essential HTN, CKD (chronic kidney disease), stage III ([facility] [address] and Enlarged heart. Social History:   reports that he quit smoking about 14 months ago. His smoking use included cigarettes. He smoked an average of 1 pack per day. He has quit using smokeless tobacco. He reports current alcohol use. He reports that he does not use drugs.      Family History:   Family History   Problem Relation Age of Onset    Hypertension Mother     Hypertension Father     Other Father         enlarged heart       Vitals:  BP (!) 181/100   Pulse 65   Temp 98.8 °F (37.1 °C) (Oral)   Resp 16   Ht 5' 10\" (1.778 m)   Wt 216 lb 4.3 oz (98.1 kg)   SpO2 98%   BMI 31.03 kg/m²   Temp (24hrs), Av.5 °F (36.9 °C), Min:98.1 °F (36.7 °C), Max:98.9 °F (37.2 °C)    No results for input(s): POCGLU in the last 72 hours. I/O (24Hr): Intake/Output Summary (Last 24 hours) at 3/15/2023 0911  Last data filed at 3/15/2023 0630  Gross per 24 hour   Intake 522.25 ml   Output 540 ml   Net -17.75 ml       Labs:  Hematology:  Recent Labs     23  16123  2217 03/15/23  0447 03/15/23  0804   WBC 4.5  --   --  4.4   RBC 2.50*  --   --  2.89*   HGB 5.3* 5.8* 7.2* 7.3*   HCT 19.1* 20.0* 25.2* 22.7*   MCV 76.4*  --   --  78.5*   MCH 21.2*  --   --  25.3   MCHC 27.7*  --   --  32.2   RDW 16.8*  --   --  18.1*     --   --  468*   MPV 12.6  --   --  11.7     Chemistry:  Recent Labs     23  1610 23  1911 03/15/23  0447     --  140   K 3.8  --  4.3     --  107   CO2 24  --  23   GLUCOSE 79  --  94   BUN 19  --  22*   CREATININE 2.27*  --  2.27*   ANIONGAP 9  --  10   LABGLOM 32*  --  32*   CALCIUM 8.4*  --  8.4*   PROBNP 519*  --   --    TROPHS 17 17  --      Recent Labs     23  1610   PROT 6.4   LABALBU 4.0   AST 11   ALT 7   ALKPHOS 53   BILITOT 0.8     ABG:No results found for: POCPH, PHART, PH, POCPCO2, EHW0WRQ, PCO2, POCPO2, PO2ART, PO2, POCHCO3, IDZ4FSC, HCO3, NBEA, PBEA, BEART, BE, THGBART, THB, MNM1URV, YHSA9GHM, V5EZVVTK, O2SAT, FIO2  No results found for: SPECIAL  No results found for: CULTURE    Radiology:  XR CHEST (2 VW)    Result Date: 3/14/2023  Continued marked cardial pericardial silhouette enlargement. No acute change when compared to the previous exam from 2022.        Physical Examination:        Physical Exam  Constitutional:       General: He is not in acute distress. Appearance: He is not toxic-appearing. HENT:      Head: Normocephalic and atraumatic. Cardiovascular:      Heart sounds: No murmur heard. No friction rub. No gallop. Pulmonary:      Effort: No respiratory distress. Breath sounds: No stridor. No wheezing or rhonchi. Abdominal:      General: There is no distension. Palpations: There is no mass. Tenderness: There is no abdominal tenderness. Hernia: No hernia is present. Musculoskeletal:         General: No swelling, tenderness, deformity or signs of injury. Cervical back: No rigidity. Lymphadenopathy:      Cervical: No cervical adenopathy. Skin:     Coloration: Skin is not jaundiced or pale. Findings: No bruising or erythema. Neurological:      General: No focal deficit present. Mental Status: He is alert. Mental status is at baseline. Assessment:        Hospital Problems             Last Modified POA    * (Principal) UGIB (upper gastrointestinal bleed) 3/14/2023 Yes    Atrial fibrillation ([address] 3/14/2023 Yes    Nonischemic cardiomyopathy ([address] 3/14/2023 Yes    Bilateral carotid artery stenosis 3/14/2023 Yes    Class 1 obesity in adult 3/14/2023 Yes    Chronic combined systolic and diastolic CHF (congestive heart failure) ([address] 3/14/2023 Yes    Acute anemia 3/14/2023 Yes    Tobacco abuse 3/14/2023 Yes    CKD (chronic kidney disease), stage III ([address] 3/14/2023 Yes    Overview Signed 9/29/2020  3:16 PM by Garrison Sharp MD     Secondary to suspected ischemic nephrosclerosis baseline creatinine 1.5-1.9            Plan:        Suspected UGI Bleed  GI consulted, plan for EGD this afternoon   NPO except meds   Q8 hour CBC, hold eliquis. Awaiting recommendations from GI on re-initiating. Check Iron studies   Atrial Fibrillation  Hold eliquis  Continue cardizem. Somewhat bradycardiac, will continue to monitor for now  Hypertensive Urgency   Patient on aldactone hydralazine, coreg.    May trial nifedipine if hydralazine does not improve BP.    Monitor next 24 hours, increased hydralazine to 100mg q8 hours for now   Obesity   Bilateral Carotid Stenosis   CKD     Christiana Tavares DO  3/15/2023  9:11 AM

## 2023-03-15 NOTE — PROGRESS NOTES
Physical Therapy  [facility]  Physical Therapy Initial Assessment    Name: Tyra Dupree  : 1965  MRN: [id number]  Date of Service: 3/15/2023    Discharge Recommendations: Further therapy recommended at discharge. Chief Complaint   Patient presents with    Dizziness    Chest Pain    Headache     This is a 59 y.o. male with PMH including HTN, A-fib on Eliquis, CKD stage IIIb, Hx of Little's esophagus, grade 2 left ventricular diastolic dysfunction, nonischemic cardiomyopathy and nonsignificant carotid stenosis, sent to ER from vascular surgery clinic where he was seen for a follow-up appointment of carotid Dopplers and was found to be very fatigued. PT Equipment Recommendations  Equipment Needed: No      Patient Diagnosis(es): The encounter diagnosis was Anemia, unspecified type. Past Medical History:  has a past medical history of Atrial fibrillation ([facility] [address] Benign essential HTN, CKD (chronic kidney disease), stage III ([address] and Enlarged heart. Past Surgical History:  has a past surgical history that includes Tonsillectomy; Cardiac catheterization (Right, 2021); Upper gastrointestinal endoscopy (N/A, 2021); and Colonoscopy (N/A, 2021). Assessment   Body Structures, Functions, Activity Limitations Requiring Skilled Therapeutic Intervention: Decreased functional mobility ; Decreased ADL status; Decreased body mechanics; Decreased endurance;Decreased strength;Decreased balance;Decreased coordination;Decreased high-level IADLs  Assessment: Pt completes STS with no AD and CGA x2 reps. In standing, pt c/o significant dizziness. BP in sitting 127/74 mm hg, and 105/61 mm hg in standing. NIKOLAY ambulation d/t persistent dizziness. Pt currently is a very high fall risk d/t decreased balance and endurance, benefitting from 24 hr support for functional mobility. pt would benefit from continued therapy to promote endurance, balance, and strengthening.   Therapy Prognosis: Good  Decision Making: Medium Complexity  Barriers to Learning: none  Requires PT Follow-Up: Yes  Activity Tolerance  Activity Tolerance: Patient limited by fatigue;Patient limited by endurance (limited d/t persistent dizziness)     Plan   Physcial Therapy Plan  General Plan:  (5-6x)  Current Treatment Recommendations: Strengthening, Balance training, Functional mobility training, Transfer training, ADL/Self-care training, IADL training, Stair training, Gait training, Endurance training, Neuromuscular re-education, Equipment evaluation, education, & procurement, Therapeutic activities, Home exercise program, Safety education & training, Patient/Caregiver education & training  Safety Devices  Type of Devices: All fall risk precautions in place, Call light within reach, Left in bed, Gait belt, Nurse notified     Restrictions  Restrictions/Precautions  Restrictions/Precautions: Bedrest with Bathroom Privileges  Required Braces or Orthoses?: No     Subjective   General  Patient assessed for rehabilitation services?: Yes  Response To Previous Treatment: Not applicable  Family / Caregiver Present: No  Follows Commands: Within Functional Limits  Subjective  Subjective: RN and pt agreeable to PT. pt agreeable and pleasant. Pt supine in bed at start of session, c/o no pain.          Social/Functional History  Social/Functional History  Lives With: Spouse  Type of Home: House  Home Layout: Two level, Bed/Bath upstairs  Home Access: Stairs to enter with rails  Entrance Stairs - Number of Steps: 5-6  Entrance Stairs - Rails: Both  Bathroom Shower/Tub: Tub/Shower unit  Bathroom Toilet: Handicap height  Home Equipment:  (no DME at baseline)  Receives Help From: Family  ADL Assistance: Independent  Homemaking Assistance: Independent  Homemaking Responsibilities: Yes  Ambulation Assistance: Independent  Transfer Assistance: Independent  Active : No  Patient's  Info: wife drives  Mode of Transportation: [facility]  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  Leisure & Hobbies: chess, friends, family  Vision/Hearing  Vision  Vision: Impaired  Vision Exceptions: Wears glasses for reading  Hearing  Hearing: Exceptions to [facility]  Hearing Exceptions: Hard of hearing/hearing concerns (L worse than R)    Cognition   Orientation  Overall Orientation Status: Within Functional Limits  Cognition  Overall Cognitive Status: WFL             Gross Assessment  Sensation: Intact (pt denies n/t)     AROM RLE (degrees)  RLE AROM: WFL  AROM LLE (degrees)  LLE AROM : WFL  AROM RUE (degrees)  RUE AROM : WFL  AROM LUE (degrees)  LUE AROM : WFL  Strength RLE  Strength RLE: WFL  Strength LLE  Strength LLE: WFL  Strength RUE  Strength RUE: WFL  Strength LUE  Strength LUE: WFL           Bed mobility  Supine to Sit: Stand by assistance  Sit to Supine: Stand by assistance  Scooting: Stand by assistance  Bed Mobility Comments: HOB elevated  Transfers  Sit to Stand: Contact guard assistance  Stand to Sit: Contact guard assistance  Comment: no AD used. Transfers performed x2. Pt c/o dizziness in standing. In sitting /74 mm hg, standing /61. Pt very limited this session d/t dizziness.   Ambulation  More Ambulation?: No (not assessed d/t persistent dizziness)  Stairs/Curb  Stairs?: No     Balance  Posture: Good  Sitting - Static: Good  Sitting - Dynamic: Good  Standing - Static: Fair  Standing - Dynamic: Fair  Comments: Assessed with no AD                                                        AM-PAC Score  AM-PAC Inpatient Mobility Raw Score : 16 (03/15/23 1210)  AM-PAC Inpatient T-Scale Score : 40.78 (03/15/23 1210)  Mobility Inpatient CMS 0-100% Score: 54.16 (03/15/23 1210)  Mobility Inpatient CMS G-Code Modifier : CK (03/15/23 1210)               Goals  Short Term Goals  Time Frame for Short Term Goals: 14 visits  Short Term Goal 1: Complete transfers independently  Short Term Goal 2: Complete 300 ft of gait independently  Short Term Goal 3: Complete 6 steps with on HR independently  Short Term Goal 4: Participate in 30 minutes of therapy to promote endurance       Education  Patient Education  Education Given To: Patient  Education Provided: Role of Therapy;Plan of Care  Education Method: Demonstration;Verbal  Barriers to Learning: None  Education Outcome: Verbalized understanding;Demonstrated understanding      Therapy Time   Individual Concurrent Group Co-treatment   Time In 1038         Time Out 1053         Minutes 15         Timed Code Treatment Minutes: [address] [facility], PT

## 2023-03-15 NOTE — PROGRESS NOTES
Occupational Therapy  Facility/Department: [email or web address]- [facility]  Occupational Therapy Initial Assessment    Name: Rock Yan  : 1965  MRN: [id number]  Date of Service: 3/15/2023    Discharge Recommendations:   No occupational therapy recommended at discharge      Patient Diagnosis(es): The encounter diagnosis was Anemia, unspecified type. Past Medical History:  has a past medical history of Atrial fibrillation ([facility] [address] Benign essential HTN, CKD (chronic kidney disease), stage III ([facility] [address] and Enlarged heart. Past Surgical History:  has a past surgical history that includes Tonsillectomy; Cardiac catheterization (Right, 2021); Upper gastrointestinal endoscopy (N/A, 2021); and Colonoscopy (N/A, 2021). Assessment   Performance deficits / Impairments: Decreased functional mobility ; Decreased ADL status; Decreased endurance;Decreased high-level IADLs  Assessment: pt limited by dizziness on this date, impacting ability to participate in ADLs and functional mobility. pt would benefit from continued acute OT in order to increase safety and independence. Prognosis: Good  Decision Making: Low Complexity  REQUIRES OT FOLLOW-UP: Yes  Activity Tolerance  Activity Tolerance: Patient limited by fatigue;Treatment limited secondary to medical complications (free text)  Activity Tolerance Comments: limited by dizziness        Plan   Occupational Therapy Plan  Times Per Week: 1-3x/wk     Restrictions  Restrictions/Precautions  Restrictions/Precautions: Bedrest with Bathroom Privileges  Required Braces or Orthoses?: No    Subjective   General  Patient assessed for rehabilitation services?: Yes  Family / Caregiver Present: No  General Comment  Comments: RN okelina for therapy this morning. pt agreeable to participate in session and cooperative throughout.  pt denied pain     Social/Functional History  Social/Functional History  Lives With: Spouse  Type of Home: House  Home Layout: Two level, Bed/Bath upstairs  Home Access: Stairs to enter with rails  Entrance Stairs - Number of Steps: 5-6  Entrance Stairs - Rails: Both  Bathroom Shower/Tub: Tub/Shower unit  Bathroom Toilet: Handicap height  Home Equipment:  (no DME at baseline)  Receives Help From: Family  ADL Assistance: Independent  Homemaking Assistance: Independent  Homemaking Responsibilities: Yes  Ambulation Assistance: Independent  Transfer Assistance: Independent  Active : No  Patient's  Info: wife drives  Mode of Transportation: SUV  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  Leisure & Hobbies: chess, friends, family       Objective                Safety Devices  Type of Devices: Call light within reach; Left in bed;Gait belt  Restraints  Restraints Initially in Place: No    Bed Mobility Training  Bed Mobility Training: Yes  Overall Level of Assistance: Stand-by assistance (pt completed 2x d/t needing to lay down from dizziness)  Supine to Sit: Stand-by assistance  Sit to Supine: Stand-by assistance  Scooting: Stand-by assistance    Transfer Training  Transfer Training: Yes  Overall Level of Assistance: Contact-guard assistance  Sit to Stand: Contact-guard assistance  Stand to Sit: Contact-guard assistance    Balance  Sitting: Intact (~8 minutes total on EOB)  Standing: Intact (~3 minutes total. pt completed sit>stand transfer 2x and took steps at edge of bed with CGA. pt reported dizziness on feet. pt unable to tolerate standing long enough for BP to be taken fully in standing. Orthostatic taken: supine: 125/60, Sittin/74, Standin/61)    Gait  Overall Level of Assistance: Contact-guard assistance        AROM: Within functional limits  Strength:  Within functional limits  Coordination: Within functional limits  Tone: Normal  Sensation: Intact    ADL  Feeding: Independent  Grooming: Independent  UE Bathing: Supervision  LE Bathing: Contact guard assistance  UE Dressing: Supervision  LE Dressing: Contact guard assistance  Toileting: Contact guard assistance             Vision  Vision: Impaired  Vision Exceptions: Wears glasses for reading  Hearing  Hearing: Exceptions to [facility] (L worse than R)  Hearing Exceptions: Hard of hearing/hearing concerns    Cognition  Overall Cognitive Status: WFL  Orientation  Overall Orientation Status: Within Functional Limits                    Education Given To: Patient  Education Provided: Role of Therapy;Plan of Care;Transfer Training  Education Method: Verbal  Barriers to Learning: None  Education Outcome: Verbalized understanding    LUE AROM (degrees)  LUE AROM : WFL  Left Hand AROM (degrees)  Left Hand AROM: WFL  RUE AROM (degrees)  RUE AROM : WFL  Right Hand AROM (degrees)  Right Hand AROM: WFL                                                                   AM-PAC Score        AM-PAC Inpatient Daily Activity Raw Score: 20 (03/15/23 1216)  AM-PAC Inpatient ADL T-Scale Score : 42.03 (03/15/23 1216)  ADL Inpatient CMS 0-100% Score: 38.32 (03/15/23 1216)  ADL Inpatient CMS G-Code Modifier : CJ (03/15/23 1216)           Goals  Short Term Goals  Time Frame for Short Term Goals: pt will, by discharge  Short Term Goal 1: complete LB ADLs and toileting tasks with supervision and set up  Short Term Goal 2: complete UB ADLs with mod I  Short Term Goal 3: increase activity tolerance to 15+ minutes in order to participate in daily tasks  Short Term Goal 4: dem supervision during functional transfers/functional mobility  Short Term Goal 5: dem ~6 minutes dynamic standing tolerance with supervision in order to complete functional tasks       Therapy Time   Individual Concurrent Group Co-treatment   Time In 1038         Time Out 1054         Minutes 16      Co-eval with PT            DOMINIK Ballesteros/NAY

## 2023-03-15 NOTE — PROGRESS NOTES
Patient admit to Car2 from ED. [place] to room, unit and call light. Assessment performed. Patient on telemetry. Will continue to monitor.

## 2023-03-15 NOTE — ANESTHESIA PRE PROCEDURE
Department of Anesthesiology  Preprocedure Note       Name:  Jose Roberto Erickson   Age:  59 y.o.  :  1965                                          MRN:  [id number]         Date:  3/15/2023      Surgeon: Tucker Harris):  Hood Sullivan MD    Procedure: Procedure(s):  EGD ESOPHAGOGASTRODUODENOSCOPY    Medications prior to admission:   Prior to Admission medications    Medication Sig Start Date End Date Taking?  Authorizing Provider   Blood Pressure KIT 1 kit by Does not apply route daily 23   CHUCHO Thurman CNP   spironolactone (ALDACTONE) 25 MG tablet Take 0.5 tablets by mouth daily  Patient not taking: No sig reported 23   CHUCHO Thurman CNP   apixaban (ELIQUIS) 5 MG TABS tablet Take 1 tablet by mouth 2 times daily 22   Jeanette Goad, DO   atorvastatin (LIPITOR) 40 MG tablet Take 1 tablet by mouth daily 22   Jeanette Goad, DO   carvedilol (COREG) 25 MG tablet Take 1 tablet by mouth 2 times daily 22   Jeanette Goad, DO   dilTIAZem (CARDIZEM CD) 120 MG extended release capsule Take 1 capsule by mouth daily 23   Jeanette Goad, DO   pantoprazole (PROTONIX) 40 MG tablet Take 1 tablet by mouth every morning (before breakfast)  Patient not taking: Reported on 3/15/2023 1/1/23   Jeanette Goad, DO   doxazosin (CARDURA) 2 MG tablet take 1 tablet by mouth once daily 21   Angie Molina DO   hydrALAZINE (APRESOLINE) 50 MG tablet Take 1 tablet by mouth 3 times daily 21   Angie Molina DO   albuterol sulfate HFA (PROVENTIL HFA) 108 (90 Base) MCG/ACT inhaler Inhale 2 puffs into the lungs every 6 hours as needed for Wheezing  Patient not taking: No sig reported 21   Angie Molina DO   Blood Pressure Monitoring (ADULT BLOOD PRESSURE CUFF LG) KIT Check blood pressure daily 3/16/21   Angie Molina DO   VITAMIN D, CHOLECALCIFEROL, PO Take by mouth daily    Historical Provider, MD   aspirin 81 MG EC tablet Take 81 mg by mouth daily    Historical Provider, MD Current medications:    Current Facility-Administered Medications   Medication Dose Route Frequency Provider Last Rate Last Admin    [MAR Hold] spironolactone (ALDACTONE) tablet 25 mg  25 mg Oral Daily Teddy Hoffmann, DO   25 mg at 03/15/23 0847    [MAR Hold] hydrALAZINE (APRESOLINE) tablet 100 mg  100 mg Oral TID Teddy Hoffmann, DO   100 mg at 03/15/23 0848    [MAR Hold] 0.9 % sodium chloride infusion   IntraVENous PRN Cadence Kwok MD        [facility] Hold] 0.9 % sodium chloride infusion   IntraVENous PRN [place] Epp, APRN - NP        [facility] Hold] atorvastatin (LIPITOR) tablet 40 mg  40 mg Oral Daily [place] Epp, APRN - NP   40 mg at 03/15/23 0854    [MAR Hold] carvedilol (COREG) tablet 25 mg  25 mg Oral BID [place] Epp, APRN - NP   25 mg at 03/15/23 0847    [MAR Hold] dilTIAZem (CARDIZEM CD) extended release capsule 120 mg  120 mg Oral Daily Doug Epp, APRN - NP   120 mg at 03/15/23 0848    [MAR Hold] doxazosin (CARDURA) tablet 2 mg  2 mg Oral Daily [place] Epp, APRN - NP   2 mg at 03/15/23 0848    [MAR Hold] sodium chloride flush 0.9 % injection 5-40 mL  5-40 mL IntraVENous 2 times per day [place] Epp, APRN - NP   10 mL at 03/15/23 0848    [MAR Hold] sodium chloride flush 0.9 % injection 5-40 mL  5-40 mL IntraVENous PRN Doug Epp, APRN - NP        [facility] Hold] 0.9 % sodium chloride infusion   IntraVENous PRN [place] Epp, APRN - NP        [facility] Hold] ondansetron (ZOFRAN-ODT) disintegrating tablet 4 mg  4 mg Oral Q8H PRN Doug Epp, APRN - NP        Or   [facility] Hold] ondansetron [facility]) injection 4 mg  4 mg IntraVENous Q6H PRN [place] Epp, APRN - NP        [facility] acetaminophen (TYLENOL) tablet 650 mg  650 mg Oral Q6H PRN [place] Epp, APRN - NP        Or   [facility] Hold] acetaminophen (TYLENOL) suppository 650 mg  650 mg Rectal Q6H PRN Doug Martins, APRN - NP        [facility] Hold] pantoprazole (PROTONIX) 80 mg in sodium chloride 0.9 % 100 mL infusion  8 mg/hr IntraVENous Continuous CHUCHO Barakat - NP 10 mL/hr at 03/15/23 0529 8 mg/hr at 03/15/23 0977       Allergies:  No Known Allergies    Problem List:    Patient Active Problem List   Diagnosis Code    HTN (hypertension), malignant I10    Tobacco abuse Z72.0    Renal insufficiency N28.9    CKD (chronic kidney disease), stage III (HCC) N18.30    Atrial fibrillation ([facility] I48.91    Demand ischemia (HCC) I24.8    Alcohol consumption binge drinking F10.10    Chronic systolic (congestive) heart failure (HCC) I50.22    Nonischemic cardiomyopathy (HCC) I42.8    Little's esophagus without dysplasia K22.70    UGIB (upper gastrointestinal bleed) K92.2    Bilateral carotid artery stenosis I65.23    Class 1 obesity in adult E66.9    Chronic combined systolic and diastolic CHF (congestive heart failure) (HCC) I50.42    Acute anemia D64.9       Past Medical History:        Diagnosis Date    Atrial fibrillation (HCC)     Benign essential HTN     CKD (chronic kidney disease), stage III ([facility] 2020    Secondary to suspected ischemic nephrosclerosis baseline creatinine 1.5-1.9    Enlarged heart        Past Surgical History:        Procedure Laterality Date    COLONOSCOPY N/A 2021    COLONOSCOPY WITH BIOPSY performed by Virginia Young MD at [address] [place] [address] N/A 2021    EGD BIOPSY, ESOPHAGEAL DILATION performed by Virginia Young MD at [facility] ENDO       Social History:    Social History     Tobacco Use    Smoking status: Former     Packs/day: 1.00     Types: Cigarettes     Quit date:      Years since quittin.2    Smokeless tobacco: Former    Tobacco comments:     1-2 per week   Substance Use Topics    Alcohol use: Yes     Comment: socially                                Counseling given: Not Answered  Tobacco comments: 1-2 per week      Vital Signs (Current):   Vitals:    03/15/23 0538 03/15/23 0600 03/15/23 0630 03/15/23 0730   BP:  (!) 188/97 (!) 181/100 (!) 141/77   Pulse:  52 65 65   Resp:  18 16 Temp:    98.2 °F (36.8 °C)   TempSrc:    Oral   SpO2:       Weight: 216 lb 4.3 oz (98.1 kg)      Height:                                                  BP Readings from Last 3 Encounters:   03/15/23 (!) 141/77   03/14/23 138/72   03/07/23 122/74       NPO Status: Time of last liquid consumption: 1130                        Time of last solid consumption: 1130                        Date of last liquid consumption: 03/14/23                        Date of last solid food consumption: 03/14/23    BMI:   Wt Readings from Last 3 Encounters:   03/15/23 216 lb 4.3 oz (98.1 kg)   03/14/23 216 lb (98 kg)   03/07/23 216 lb (98 kg)     Body mass index is 31.03 kg/m². CBC:   Lab Results   Component Value Date/Time    WBC 4.4 03/15/2023 08:04 AM    RBC 2.89 03/15/2023 08:04 AM    HGB 7.4 03/15/2023 10:07 AM    HCT 24.6 03/15/2023 10:07 AM    MCV 78.5 03/15/2023 08:04 AM    RDW 18.1 03/15/2023 08:04 AM     03/15/2023 08:04 AM       CMP:   Lab Results   Component Value Date/Time     03/15/2023 04:47 AM    K 4.3 03/15/2023 04:47 AM     03/15/2023 04:47 AM    CO2 23 03/15/2023 04:47 AM    BUN 22 03/15/2023 04:47 AM    CREATININE 2.27 03/15/2023 04:47 AM    GFRAA 43 03/03/2021 10:37 AM    LABGLOM 32 03/15/2023 04:47 AM    GLUCOSE 94 03/15/2023 04:47 AM    PROT 6.4 03/14/2023 04:10 PM    CALCIUM 8.4 03/15/2023 04:47 AM    BILITOT 0.8 03/14/2023 04:10 PM    ALKPHOS 53 03/14/2023 04:10 PM    AST 11 03/14/2023 04:10 PM    ALT 7 03/14/2023 04:10 PM       POC Tests: No results for input(s): POCGLU, POCNA, POCK, POCCL, POCBUN, POCHEMO, POCHCT in the last 72 hours. Coags:   Lab Results   Component Value Date/Time    PROTIME 10.5 12/30/2022 02:45 AM    INR 1.0 12/30/2022 02:45 AM    APTT 25.9 03/15/2023 08:04 AM       HCG (If Applicable): No results found for: PREGTESTUR, PREGSERUM, HCG, HCGQUANT     ABGs: No results found for: PHART, PO2ART, OAJ5ADF, WGS1RBS, BEART, P3GKJWIY     Type & Screen (If Applicable):   No results found for: Adelita Hirsch    Drug/Infectious Status (If Applicable):  Lab Results   Component Value Date/Time    HEPCAB NONREACTIVE 06/23/2020 09:21 AM       COVID-19 Screening (If Applicable):   Lab Results   Component Value Date/Time    COVID19 Not Detected 03/14/2023 04:37 PM    COVID19 Not Detected 04/10/2021 09:50 AM       EKG 3/15/2023  Sinus bradycardia  Left axis deviation  Nonspecific T wave abnormality  Abnormal ECG  When compared with ECG of 14-MAR-2023 15:47,  No significant change was found    TTE 12/2022  Left ventricle is normal in size. Global left ventricular systolic function  is low normal.  Visual LVEF 45-50%, with abnormal global L. strain of -10.2 %. There is  apical sparing pattern, concern for infiltrative cardiomyopathies. Severe left ventricular hypertrophy. Grade II (moderate) left ventricular diastolic dysfunction. Left atrium is mildly dilated. Right atrium is mildly dilated . Mildly dilated right ventricular cavity. Right ventricular function appears normal .  Normal mitral valve structure. Mild mitral regurgitation. No mitral stenosis. Normal tricuspid valve leaflets. Mild tricuspid regurgitation. No tricuspid stenosis. Estimated right ventricular systolic pressure is 26 mmHg. Anesthesia Evaluation  Patient summary reviewed and Nursing notes reviewed no history of anesthetic complications:   Airway: Mallampati: III  TM distance: >3 FB   Neck ROM: full  Mouth opening: > = 3 FB   Dental:    (+) other, upper dentures and partials      Pulmonary:Negative Pulmonary ROS and normal exam  breath sounds clear to auscultation      (-) pneumonia, COPD, asthma, shortness of breath, recent URI, sleep apnea, rhonchi, wheezes, rales, stridor, not a current smoker and no decreased breath sounds          Patient smoked on day of surgery.                  Cardiovascular:  Exercise tolerance: good (>4 METS),   (+) hypertension: no interval change, dysrhythmias: atrial fibrillation, (-) pacemaker, valvular problems/murmurs, past MI, CAD, CABG/stent,  angina,  CHF, orthopnea, PND,  FAJARDO, murmur, weak pulses,  friction rub, systolic click, carotid bruit,  JVD, peripheral edema and no hyperlipidemia      Rhythm: regular  Rate: normal           Beta Blocker:  Dose within 24 Hrs         Neuro/Psych:   Negative Neuro/Psych ROS     (-) seizures, neuromuscular disease, TIA, CVA, headaches, psychiatric history and depression/anxiety            GI/Hepatic/Renal: Neg GI/Hepatic/Renal ROS  (+) renal disease: CRI,      (-) hiatal hernia, GERD, PUD, hepatitis, liver disease, bowel prep and no morbid obesity      ROS comment: GI bleed. Endo/Other: Negative Endo/Other ROS   (+) no malignancy/cancer. (-) diabetes mellitus, hypothyroidism, hyperthyroidism, blood dyscrasia, arthritis, no electrolyte abnormalities, no malignancy/cancer               Abdominal:   (+) obese,           Vascular:   + PVD, aortic or cerebral, .  - DVT and PE. Other Findings: Upper partial            Anesthesia Plan      MAC     ASA 3       Induction: intravenous. MIPS: Postoperative opioids intended and Prophylactic antiemetics administered. Anesthetic plan and risks discussed with patient. Plan discussed with CRNA.                         Eloy Hess MD   3/15/2023

## 2023-03-15 NOTE — ED NOTES
TRANSFER - OUT REPORT:    Verbal report given to CHESTER Solis on Lona Wiley  being transferred to [facility] for change in patient condition (abnormal hemoglobin)       Report consisted of patient's Situation, Background, Assessment and   Recommendations(SBAR). -Pt has second unit of blood infusing, had additional 1 unit ordered. Call blood bank for blood.   -Will need H&H after transfusion is completed   -Home medications held until transfusion is completed   -AxO x4, RA   -Ambulatory, NPO now   -Scope planned for AM by GI         Information from the following report(s) Nurse Handoff Report was reviewed with the receiving nurse. [place] Assessment: No data recorded  Lines:   Peripheral IV 03/14/23 Left Antecubital (Active)   Site Assessment Clean, dry & intact 03/14/23 1552   Line Status Blood return noted;Normal saline locked;Specimen collected 03/14/23 1552   Phlebitis Assessment No symptoms 03/14/23 1552   Infiltration Assessment 0 03/14/23 1552       Peripheral IV 03/14/23 Left; Anterior Wrist (Active)   Site Assessment Clean, dry & intact 03/14/23 1947   Line Status Blood return noted;Normal saline locked 03/14/23 1947   Phlebitis Assessment No symptoms 03/14/23 1947   Infiltration Assessment 0 03/14/23 1947        Opportunity for questions and clarification was provided.       Patient transported with:  Registered Nurse via [facility] on [address], [facility]  03/15/23 9075

## 2023-03-15 NOTE — PROGRESS NOTES
Physician Progress Note      PATIENT:               Wale Mendoza  CSN #:                  [id number]  :                       1965  ADMIT DATE:       3/14/2023 3:35 PM  [address] [place] DATE:  Griffin Barksdale  PROVIDER #:        Lennox Flock Lucas DO          QUERY TEXT:    Pt admitted with Melena. Pt noted to have Creatinine 2.27 with baseline   1.5-1.9 on CKD Stage 3 . If possible, please document in the progress notes   and discharge summary if you are evaluating and/or treating any of the   following: The medical record reflects the following:  Risk Factors: CKD 3, HTN with urgency on admission  Clinical Indicators: Creatinine 2.27, BUN 19. H/P CKD  stage III Secondary to   suspected ischemic nephrosclerosis baseline creatinine 1.5-1.9  Treatment: EGD 3/15 pending, labs, Monitor, GI Consult, CXR    Defined by Kidney Disease Improving Global Outcomes (KDIGO) clinical practice   guideline for acute kidney injury:  -Increase in SCr by greater than or equal to 0.3 mg/dl within 48 hours; or  -Increase or decrease in SCr to greater than or equal to 1.5 times baseline,   which is known or presumed to have occurred within the prior 7 days; or  -Urine volume < 0.5ml/kg/h for 6 hours    Any questions please contact:  Leena Burgos RN,BSN,[email or web address]  [phone]-Between the hours of 6:30am-3pm.  Options provided:  -- Acute kidney failure  -- Acute kidney failure with acute tubular necrosis  -- Acute kidney injury  -- Other - I will add my own diagnosis  -- Disagree - Not applicable / Not valid  -- Disagree - Clinically unable to determine / Unknown  -- Refer to Clinical Documentation Reviewer    PROVIDER RESPONSE TEXT:    This patient has an Acute kidney injury. Query created by:  Parminder Santiago on 3/15/2023 9:49 AM      Electronically signed by:  Ras Lopez DO 3/15/2023 9:55 AM

## 2023-03-15 NOTE — CONSULTS
[facility]  Consultation Note     .    REASON FOR CONSULTATION:  GI bleed      HISTORY OF PRESENT ILLNESS:     This is a 57 y.o. male with PMH including HTN, A-fib on Eliquis, CKD stage IIIb, Hx of Little's esophagus, grade 2 left ventricular diastolic dysfunction, nonischemic cardiomyopathy and nonsignificant carotid stenosis, sent to ER from vascular surgery clinic where he was seen for a follow-up appointment of carotid Dopplers and was found to be very fatigued.    Patient had complaints of dizziness, lightheadedness, dyspnea on exertion and extreme fatigue for the last few weeks.  On initial work-up his hemoglobin was found to be significantly low from his baseline 5.3.  He was admitted for the work-up of upper GI bleed and symptomatic anemia.  He has been transfused 2 units PRBC since admission.    GI consulted concern of GI bleeding causing symptomatic anemia.  Patient says that he has been experiencing extreme fatigue for the last 2 to 3 weeks.  He also feels occasional dizziness and lightheadedness.  He had 1 episode of melena about 2 days ago.  He denies nausea, vomiting and abdominal pain.  He was able to tolerate oral intake with no concerns of nausea or abdominal pain post meals.  Per patient he usually has regular bowel movements with the exception of that one episode of melena 2 days ago.  He denies fevers or chills.    He has history of Little esophagus per EGD in 2021 at which time he was seen by GI and was prescribed Protonix.  He has not been taking any Protonix or other acid reducers recently.  He denies use of NSAIDs, alcohol.  He also denies recent smoking and use of illicit drugs.  He is a former smoker and quit few months ago.    Currently patient is hemodynamically stable and not in apparent distress.  Patient's hemoglobin is currently above 7 after receiving 2 units PRBC since admission.  He denies active symptoms.  He has been n.p.o. overnight with exception of sips  of water with his antihypertensive meds this morning. Summary of abnormal labs completed at this time:    Metabolic: BUN 22, Creat 9.31  Hematology: Hb 5.3 > 5.8 > 7.2 > 7.3 (baseline 11-12), WBC 4.4, Plt 468  Liver profile: Unremarkable    On physician exam:  Awake, alert and oriented x4. Abdomen soft, nondistended and nontender. No significant auscultation findings and no lower extremity edema noticed. Previous GI history:   EGD 04/14/2021 by Dr. Daniel Herron - showed irregular Z-line with the start of Little's esophagus, biopsies were negative. Gastritis and normal duodenum. Colonoscopy 04/14/2021 by Dr. Daniel Herron - showed normal 10-minute ileum, cecum, ascending colon, transverse colon. 4 mm polyp in sigmoid colon removed biopsy negative and hemorrhoids noticed in rectum/anus. Past Medical/Social/Family History:  Past Medical History:   Diagnosis Date    Atrial fibrillation ([facility] [address]     Benign essential HTN     CKD (chronic kidney disease), stage III ([facility] [address] 09/29/2020    Secondary to suspected ischemic nephrosclerosis baseline creatinine 1.5-1.9    Enlarged heart      Past Surgical History:   Procedure Laterality Date    CARDIAC CATHETERIZATION Right 03/2021    no blockage, no stenting. COLONOSCOPY N/A 4/14/2021    COLONOSCOPY WITH BIOPSY performed by Todd Lobato MD at [address] [place] [address] N/A 4/14/2021    EGD BIOPSY, ESOPHAGEAL DILATION performed by Todd Lobato MD at [facility]     Family History   Problem Relation Age of Onset    Hypertension Mother     Hypertension Father     Other Father         enlarged heart     Previous records/history/ and notes were reviewed    Allergies:  No Known Allergies    Home medications:  Prior to Admission medications    Medication Sig Start Date End Date Taking?  Authorizing Provider   Blood Pressure KIT 1 kit by Does not apply route daily 1/23/23   CHUCHO Morgan - CNP   spironolactone (ALDACTONE) 25 MG tablet Take 0.5 tablets by mouth daily  Patient not taking: Reported on 3/15/2023 1/4/23   CHUCHO Sommer - CNP   apixaban (ELIQUIS) 5 MG TABS tablet Take 1 tablet by mouth 2 times daily 12/31/22   Stone Perez,    atorvastatin (LIPITOR) 40 MG tablet Take 1 tablet by mouth daily 12/31/22   Stone Perez, DO   carvedilol (COREG) 25 MG tablet Take 1 tablet by mouth 2 times daily 12/31/22   Stone Perez, DO   dilTIAZem (CARDIZEM CD) 120 MG extended release capsule Take 1 capsule by mouth daily 1/1/23   Stone Perez,    pantoprazole (PROTONIX) 40 MG tablet Take 1 tablet by mouth every morning (before breakfast)  Patient not taking: Reported on 3/15/2023 1/1/23   Stone Perez,    doxazosin (CARDURA) 2 MG tablet take 1 tablet by mouth once daily 12/13/21   Surekha Conner DO   hydrALAZINE (APRESOLINE) 50 MG tablet Take 1 tablet by mouth 3 times daily 9/21/21   Surekha Conner DO   albuterol sulfate HFA (PROVENTIL HFA) 108 (90 Base) MCG/ACT inhaler Inhale 2 puffs into the lungs every 6 hours as needed for Wheezing  Patient not taking: No sig reported 6/29/21   Surekha Conner DO   Blood Pressure Monitoring (ADULT BLOOD PRESSURE CUFF LG) KIT Check blood pressure daily 3/16/21   Surekha Conner DO   VITAMIN D, CHOLECALCIFEROL, PO Take by mouth daily    Historical Provider, MD   aspirin 81 MG EC tablet Take 81 mg by mouth daily    Historical Provider, MD     .  Current Medications:  Scheduled Meds:   spironolactone  25 mg Oral Daily    hydrALAZINE  100 mg Oral TID    atorvastatin  40 mg Oral Daily    carvedilol  25 mg Oral BID    dilTIAZem  120 mg Oral Daily    doxazosin  2 mg Oral Daily    sodium chloride flush  5-40 mL IntraVENous 2 times per day     . Continuous Infusions:   sodium chloride      sodium chloride      sodium chloride      pantoprazole 8 mg/hr (03/15/23 0529)     .   PRN Meds:sodium chloride, sodium chloride, sodium chloride flush, sodium chloride, ondansetron **OR** ondansetron, acetaminophen **OR** acetaminophen    REVIEW OF SYSTEMS:     Constitutional: No fever, no chills, no lethargy, no weakness, no weight loss  HEENT:  No headache, otalgia, itchy eyes, nasal discharge or sore throat. Cardiac:  No chest pain, dyspnea, orthopnea or PND. Chest:   No cough, phlegm or wheezing. Abdomen:  No abdominal pain, nausea, vomiting, constipation, diarrhea, hematochezia/melena  Neuro:  No focal weakness, abnormal movements or seizure like activity. Skin:   No rashes, no itching. :   No hematuria, no pyuria, no dysuria, no flank pain. Extremities:  No swelling or joint pains. ROS was otherwise negative except as mentioned in the [address] PHYSICAL EXAM:    BP (!) 181/100   Pulse 65   Temp 98.8 °F (37.1 °C) (Oral)   Resp 16   Ht 5' 10\" (1.778 m)   Wt 216 lb 4.3 oz (98.1 kg)   SpO2 98%   BMI 31.03 kg/m²   . TMAX[24]    General: Well developed, Well nourished, No apparent distress  Head:  Normocephalic, Atraumatic  EENT: EOMI, Sclera not icteric, Oropharynx moist  Neck:  Supple, Trachea midline  Lungs:CTA Bilaterally  Heart: RRR, No murmur, No rub, No gallop, PMI nondisplaced. Abdomen:Soft, Non tender, Not distended, BS WNL,  No masses. No hepatomegalia   Ext:No clubbing. No cyanosis. No edema. Skin: No rashes. No jaundice. No stigmata of liver disease. Neuro:  A&O x Three, No focal neurological deficits    Imaging:       Hemotological labs: Anemia studies:  No results for input(s): LABIRON, TIBC, FERRITIN, [id number], FOLATE, OCCULTBLD in the last 72 hours. CBC:  Recent Labs     03/14/23  1610 03/14/23  2217 03/15/23  0447 03/15/23  0804   WBC 4.5  --   --  4.4   HGB 5.3* 5.8* 7.2* 7.3*   MCV 76.4*  --   --  78.5*   RDW 16.8*  --   --  18.1*     --   --  468*       PT/INR:  No results for input(s): PROTIME, INR in the last 72 hours.     BMP:  Recent Labs     03/14/23  1610 03/15/23  0447    140   K 3.8 4.3    107   CO2 24 23   BUN 19 22*   CREATININE 2.27* 2.27*   GLUCOSE 79 94 CALCIUM 8.4* 8.4*       Liver work up:  Hepatitis Functional Panel:  Recent Labs     03/14/23  1610   ALKPHOS 53   ALT 7   AST 11   PROT 6.4   BILITOT 0.8   LABALBU 4.0       Amylase/Lipase/Ammonia:  No results for input(s): AMYLASE, LIPASE, AMMONIA in the last 72 hours. Acute Hepatitis Panel:  Lab Results   Component Value Date/Time    HEPCAB NONREACTIVE 06/23/2020 09:21 AM            Principal Problem:    UGIB (upper gastrointestinal bleed)  Active Problems:    Atrial fibrillation (HCC)    Nonischemic cardiomyopathy (HCC)    Bilateral carotid artery stenosis    Class 1 obesity in adult    Chronic combined systolic and diastolic CHF (congestive heart failure) (HCC)    Acute anemia    Tobacco abuse    CKD (chronic kidney disease), stage III ([facility] [address]  Resolved Problems:    * No resolved hospital problems. *       Impression:  Acute symptomatic anemia concerning for UGIB -significant drop in hemoglobin from baseline s/p 2 units PRBC with episode of melena. Melena - most likely secondary to esophagitis versus gastritis versus gastric/duodenal ulceration  Hx of Little esophagus and gastritis - not on PPI therapy recently  Paroxysmal A-fib - on Eliquis, currently held due to GI bleed  Essential HTN - uncontrolled continue to missed antihypertensive doses secondary to being n.p.o. Nonischemic CMP with grade 2 diastolic dysfunction - on aspirin, statin and beta-blocker at home. Aspirin currently held due to concern of bleed. GUZMAN on CKD stage IIIb - GUZMAN most likely secondary to acute anemia and dehydration. Former smoker -quit few months ago  Hx of alcohol abuse -binge drinking in the past      Recommendations and plan:  Keep patient n.p.o. as we plan for EGD this afternoon. Patient agrees to EGD, questions answered. Agree with holding antiplatelets and anticoagulants and active bleeding ruled out and hemoglobin stable. Continue Protonix infusion.   Monitor H&H and transfuse if hemoglobin below 7  Monitor BMP.  Further recommendations and plan of care to follow in Op note. This plan was formulated in collaboration with Dr. Cassandria Claude, MD      Electronically signed by:  Reji Duong MD   Resident Internal Medicine, PGY-1  [facility], [facility]. [facility]  [phone]  3/15/2023    8:56 AM     This note was created with the assistance of a speech-recognition program.  Although the intention is to generate a document that actually reflects the content of the visit, no guarantees can be provided that every mistake has been identified and corrected by editing.

## 2023-03-15 NOTE — PROGRESS NOTES
Blood pressure post transfusion 170/100, rechecked manual pressure with BP:180/100. Informed on-call primary team,ordered to stopped IV fluids and to give IV hydralazine 10mg.

## 2023-03-15 NOTE — ED NOTES
ED to inpatient nurses report    Chief Complaint   Patient presents with    Dizziness    Chest Pain    Headache      Present to ED from home with wife  LOC: alert and orientated to name, place, date  Vital signs   Vitals:    03/14/23 1900 03/14/23 1905 03/14/23 1910 03/14/23 1952   BP: (!) 157/95 (!) 128/113 (!) 171/91 (!) 160/82   Pulse: 76 73  63   Resp: 16 16 16 16   Temp: 98.5 °F (36.9 °C) 98.6 °F (37 °C) 98.3 °F (36.8 °C)    TempSrc: Oral Oral Oral    SpO2: 98% 98%  95%      Oxygen Baseline RA    Current needs required none   LDAs:   Peripheral IV 03/14/23 Left Antecubital (Active)   Site Assessment Clean, dry & intact 03/14/23 1552   Line Status Blood return noted;Normal saline locked;Specimen collected 03/14/23 1552   Phlebitis Assessment No symptoms 03/14/23 1552   Infiltration Assessment 0 03/14/23 1552       Peripheral IV 03/14/23 Left; Anterior Wrist (Active)   Site Assessment Clean, dry & intact 03/14/23 1947   Line Status Blood return noted;Normal saline locked 03/14/23 1947   Phlebitis Assessment No symptoms 03/14/23 1947   Infiltration Assessment 0 03/14/23 1947     Mobility: Independent  Pending ED orders: n/a  Present condition: stable  Code Status: [unfilled]   Consults:  [x]  Hospitalist  Completed  [] yes [] no  [x]  Medicine  Completed  [] yes [] No  []  Cardiology  Completed  [] yes [] No  []  GI   Completed  [] yes [] No  []  Neurology  Completed  [] yes [] No  []  Nephrology Completed  [] yes [] No  []  Vascular  Completed  [] yes [] No   []  Surgery  Completed  [] yes [] No   []  Urology  Completed  [] yes [] No   []  Plastics  Completed  [] yes [] No   []  ENT  Completed  [] yes [] No   []  Other   Completed  [] yes [] No  Pertinent event(s) needs 2 units RBCs  Pertinent event(s)   Electronically signed by Samantha Medina RN on 3/14/2023 at 8:03 PM     Samantha Medina RN  03/14/23 2005       Laquita Lai RN  03/14/23 2005

## 2023-03-16 VITALS
BODY MASS INDEX: 30.78 KG/M2 | DIASTOLIC BLOOD PRESSURE: 76 MMHG | SYSTOLIC BLOOD PRESSURE: 129 MMHG | HEART RATE: 65 BPM | TEMPERATURE: 98.2 F | WEIGHT: 215 LBS | HEIGHT: 70 IN | RESPIRATION RATE: 16 BRPM | OXYGEN SATURATION: 93 %

## 2023-03-16 LAB
ANION GAP SERPL CALCULATED.3IONS-SCNC: 10 MMOL/L (ref 9–17)
BUN SERPL-MCNC: 25 MG/DL (ref 6–20)
CALCIUM SERPL-MCNC: 8.4 MG/DL (ref 8.6–10.4)
CHLORIDE SERPL-SCNC: 102 MMOL/L (ref 98–107)
CO2 SERPL-SCNC: 21 MMOL/L (ref 20–31)
CREAT SERPL-MCNC: 2.4 MG/DL (ref 0.7–1.2)
GFR SERPL CREATININE-BSD FRML MDRD: 30 ML/MIN/1.73M2
GLUCOSE SERPL-MCNC: 132 MG/DL (ref 70–99)
HCT VFR BLD AUTO: 24.3 % (ref 40.7–50.3)
HCT VFR BLD AUTO: 24.6 % (ref 40.7–50.3)
HGB BLD-MCNC: 7.1 G/DL (ref 13–17)
HGB BLD-MCNC: 7.3 G/DL (ref 13–17)
MCH RBC QN AUTO: 23.1 PG (ref 25.2–33.5)
MCH RBC QN AUTO: 23.2 PG (ref 25.2–33.5)
MCHC RBC AUTO-ENTMCNC: 29.2 G/DL (ref 28.4–34.8)
MCHC RBC AUTO-ENTMCNC: 29.7 G/DL (ref 28.4–34.8)
MCV RBC AUTO: 78.1 FL (ref 82.6–102.9)
MCV RBC AUTO: 78.9 FL (ref 82.6–102.9)
NRBC AUTOMATED: 0 PER 100 WBC
NRBC AUTOMATED: 0.3 PER 100 WBC
PDW BLD-RTO: 17.2 % (ref 11.8–14.4)
PDW BLD-RTO: 17.2 % (ref 11.8–14.4)
PLATELET # BLD AUTO: ABNORMAL K/UL (ref 138–453)
PLATELET # BLD AUTO: ABNORMAL K/UL (ref 138–453)
PLATELET, FLUORESCENCE: 137 K/UL (ref 138–453)
PLATELET, FLUORESCENCE: 140 K/UL (ref 138–453)
PLATELET, IMMATURE FRACTION: 6 % (ref 1.1–10.3)
PLATELET, IMMATURE FRACTION: 6.7 % (ref 1.1–10.3)
POTASSIUM SERPL-SCNC: 4.8 MMOL/L (ref 3.7–5.3)
RBC # BLD: 3.08 M/UL (ref 4.21–5.77)
RBC # BLD: 3.15 M/UL (ref 4.21–5.77)
SODIUM SERPL-SCNC: 133 MMOL/L (ref 135–144)
WBC # BLD AUTO: 10 K/UL (ref 3.5–11.3)
WBC # BLD AUTO: 12.4 K/UL (ref 3.5–11.3)

## 2023-03-16 PROCEDURE — 97110 THERAPEUTIC EXERCISES: CPT

## 2023-03-16 PROCEDURE — 6370000000 HC RX 637 (ALT 250 FOR IP): Performed by: INTERNAL MEDICINE

## 2023-03-16 PROCEDURE — 97116 GAIT TRAINING THERAPY: CPT

## 2023-03-16 PROCEDURE — 2580000003 HC RX 258: Performed by: INTERNAL MEDICINE

## 2023-03-16 PROCEDURE — 36415 COLL VENOUS BLD VENIPUNCTURE: CPT

## 2023-03-16 PROCEDURE — C9113 INJ PANTOPRAZOLE SODIUM, VIA: HCPCS | Performed by: INTERNAL MEDICINE

## 2023-03-16 PROCEDURE — 6360000002 HC RX W HCPCS: Performed by: INTERNAL MEDICINE

## 2023-03-16 PROCEDURE — 99232 SBSQ HOSP IP/OBS MODERATE 35: CPT | Performed by: INTERNAL MEDICINE

## 2023-03-16 PROCEDURE — 94761 N-INVAS EAR/PLS OXIMETRY MLT: CPT

## 2023-03-16 PROCEDURE — 80048 BASIC METABOLIC PNL TOTAL CA: CPT

## 2023-03-16 PROCEDURE — 85055 RETICULATED PLATELET ASSAY: CPT

## 2023-03-16 PROCEDURE — 85027 COMPLETE CBC AUTOMATED: CPT

## 2023-03-16 RX ORDER — SPIRONOLACTONE 25 MG/1
12.5 TABLET ORAL DAILY
Qty: 30 TABLET | Refills: 3 | Status: SHIPPED | OUTPATIENT
Start: 2023-03-17

## 2023-03-16 RX ORDER — SENNA PLUS 8.6 MG/1
1 TABLET ORAL 2 TIMES DAILY
Qty: 60 TABLET | Refills: 11 | Status: SHIPPED | OUTPATIENT
Start: 2023-03-16 | End: 2024-03-15

## 2023-03-16 RX ORDER — SPIRONOLACTONE 25 MG/1
25 TABLET ORAL DAILY
Qty: 30 TABLET | Refills: 3 | Status: SHIPPED | OUTPATIENT
Start: 2023-03-17 | End: 2023-03-16 | Stop reason: HOSPADM

## 2023-03-16 RX ORDER — HYDRALAZINE HYDROCHLORIDE 50 MG/1
50 TABLET, FILM COATED ORAL 3 TIMES DAILY
Status: DISCONTINUED | OUTPATIENT
Start: 2023-03-16 | End: 2023-03-16 | Stop reason: HOSPADM

## 2023-03-16 RX ORDER — SUCRALFATE 1 G/1
1 TABLET ORAL 4 TIMES DAILY
Qty: 120 TABLET | Refills: 3 | Status: SHIPPED | OUTPATIENT
Start: 2023-03-16

## 2023-03-16 RX ORDER — PANTOPRAZOLE SODIUM 40 MG/1
40 TABLET, DELAYED RELEASE ORAL
Status: DISCONTINUED | OUTPATIENT
Start: 2023-03-16 | End: 2023-03-16 | Stop reason: HOSPADM

## 2023-03-16 RX ORDER — SUCRALFATE 1 G/1
1 TABLET ORAL EVERY 6 HOURS SCHEDULED
Status: DISCONTINUED | OUTPATIENT
Start: 2023-03-16 | End: 2023-03-16 | Stop reason: HOSPADM

## 2023-03-16 RX ORDER — HYDRALAZINE HYDROCHLORIDE 100 MG/1
100 TABLET, FILM COATED ORAL 3 TIMES DAILY
Qty: 90 TABLET | Refills: 3 | Status: SHIPPED | OUTPATIENT
Start: 2023-03-16 | End: 2023-03-16 | Stop reason: HOSPADM

## 2023-03-16 RX ORDER — POLYETHYLENE GLYCOL 3350 17 G/17G
17 POWDER, FOR SOLUTION ORAL DAILY
Qty: 510 G | Refills: 0 | Status: SHIPPED | OUTPATIENT
Start: 2023-03-16 | End: 2023-04-15

## 2023-03-16 RX ORDER — FERROUS SULFATE 325(65) MG
325 TABLET ORAL 2 TIMES DAILY
Qty: 180 TABLET | Refills: 1 | Status: SHIPPED | OUTPATIENT
Start: 2023-03-16

## 2023-03-16 RX ORDER — SPIRONOLACTONE 25 MG/1
12.5 TABLET ORAL DAILY
Status: DISCONTINUED | OUTPATIENT
Start: 2023-03-17 | End: 2023-03-16 | Stop reason: HOSPADM

## 2023-03-16 RX ORDER — PANTOPRAZOLE SODIUM 40 MG/1
40 TABLET, DELAYED RELEASE ORAL
Qty: 30 TABLET | Refills: 1 | Status: SHIPPED | OUTPATIENT
Start: 2023-03-16

## 2023-03-16 RX ORDER — HYDRALAZINE HYDROCHLORIDE 50 MG/1
50 TABLET, FILM COATED ORAL 3 TIMES DAILY
Qty: 90 TABLET | Refills: 3 | Status: SHIPPED | OUTPATIENT
Start: 2023-03-16

## 2023-03-16 RX ADMIN — SUCRALFATE 1 G: 1 TABLET ORAL at 10:13

## 2023-03-16 RX ADMIN — SUCRALFATE 1 G: 1 TABLET ORAL at 12:04

## 2023-03-16 RX ADMIN — SUCRALFATE 1 G: 1 TABLET ORAL at 17:47

## 2023-03-16 RX ADMIN — PANTOPRAZOLE SODIUM 40 MG: 40 TABLET, DELAYED RELEASE ORAL at 08:52

## 2023-03-16 RX ADMIN — HYDRALAZINE HYDROCHLORIDE 100 MG: 50 TABLET, FILM COATED ORAL at 08:51

## 2023-03-16 RX ADMIN — CARVEDILOL 25 MG: 25 TABLET, FILM COATED ORAL at 08:52

## 2023-03-16 RX ADMIN — DILTIAZEM HYDROCHLORIDE 120 MG: 120 CAPSULE, COATED, EXTENDED RELEASE ORAL at 08:52

## 2023-03-16 RX ADMIN — IRON SUCROSE 200 MG: 20 INJECTION, SOLUTION INTRAVENOUS at 09:02

## 2023-03-16 RX ADMIN — SPIRONOLACTONE 25 MG: 25 TABLET ORAL at 08:51

## 2023-03-16 RX ADMIN — HYDRALAZINE HYDROCHLORIDE 50 MG: 50 TABLET, FILM COATED ORAL at 14:15

## 2023-03-16 RX ADMIN — PANTOPRAZOLE SODIUM 8 MG/HR: 40 INJECTION, POWDER, FOR SOLUTION INTRAVENOUS at 04:41

## 2023-03-16 RX ADMIN — DOXAZOSIN 2 MG: 2 TABLET ORAL at 08:51

## 2023-03-16 RX ADMIN — DESMOPRESSIN ACETATE 40 MG: 0.2 TABLET ORAL at 08:52

## 2023-03-16 RX ADMIN — PANTOPRAZOLE SODIUM 40 MG: 40 TABLET, DELAYED RELEASE ORAL at 17:47

## 2023-03-16 ASSESSMENT — ENCOUNTER SYMPTOMS
SHORTNESS OF BREATH: 0
VOMITING: 0
BLOOD IN STOOL: 1
DIARRHEA: 0
BACK PAIN: 0
COUGH: 0
STRIDOR: 0
APNEA: 0
WHEEZING: 0
ANAL BLEEDING: 0

## 2023-03-16 NOTE — PROGRESS NOTES
Patient was given discharge instructions and verbalized understanding. Patient left with all personal belongings including meds to beds. Patient was taken off the floor by wheelchair. Patient was discharged to home with wife.

## 2023-03-16 NOTE — PROGRESS NOTES
[facility]  Office: [address], DO, Shy Israel, DO, Kirk Moya, DO, Nadia Rodriguez Blood, DO, Mei Mckeon MD, Sherman Cherry MD, Nohemy Garcia MD, Tere Page MD,  Baljit Giordano MD, Brendan Palma MD, Christiana Tavares, DO, Rosanna Conn MD,  Ayaz Mckeon MD, Nimo Romero MD, Dori Oliver, DO, Renetta Washburn MD, Alina Ohara MD, Ace Gallardo, DO, Cameron Villatoro MD, Aury Rodriguez MD, Rafa Chacon MD, Rand Lomeli MD, Luis Eduardo St MD, Mila Daley, DO, Janeth Powell MD, Chani Armstrong MD, Preet Weldon, Meredith Shoemaker, CNP, Kait Barillas, CNP, Chase Walsh, CNP,  [facility], [facility], Erica Redmond, CNP, Cielo Cuevas, CNP, Thuy Martin, CNP, Eriberto Zelaya, CNP, Lizbeth Kohler, CNP, LORRAINE SneedC, Cynthia Estrada, CNS, Janak Fernández, CNP, Luiz Monique, CNP         [address]    Progress Note    3/16/2023    10:33 AM    Name:   Penelope Urena  MRN:     [id number]     Acct:      [de-identified]   Room:   2003/2003-01  IP Day:  2  Admit Date:  3/14/2023  3:35 PM    PCP:   CHUCHO Perez CNP  Code Status:  Full Code    Subjective:     C/C:   Chief Complaint   Patient presents with    Dizziness    Chest Pain    Headache     Interval History Status: not changed. Patient seen and examined, no issues overnight. Started on Protonix 40 mg twice daily this morning, diet was recommended soft by gastroenterology. Discussed discharge plan, CBC stable this morning    Brief History: This is a 57-year-old male who was at a initial vascular outpatient appointment for evaluation of bilateral arterial stenosis follow-up on carotid duplex. While at the appointment it was found that patient was profoundly fatigued, he was sent to the emergency department for evaluation where he was found to have a hemoglobin of 5.3 with microcytic hypochromic anemia as well.   Patient states he noticed a change in his stool including melena but he states that this is stopped over the last few days. Significant to note is a recent Jamie De La Paz new diagnosis of atrial fibrillation back in December 2022 for which he was placed on Eliquis. Review of Systems:     Review of Systems   Constitutional:  Negative for appetite change and chills. HENT:  Negative for congestion. Respiratory:  Negative for apnea, cough, shortness of breath, wheezing and stridor. Cardiovascular:  Negative for chest pain, palpitations and leg swelling. Gastrointestinal:  Positive for blood in stool. Negative for anal bleeding, diarrhea and vomiting. Endocrine: Negative for polydipsia, polyphagia and polyuria. Genitourinary:  Negative for dysuria. Musculoskeletal:  Negative for arthralgias and back pain. Neurological:  Negative for tremors, seizures, speech difficulty, weakness and numbness. Medications: Allergies:  No Known Allergies    Current Meds:   Scheduled Meds:    iron sucrose  200 mg IntraVENous Q24H    pantoprazole  40 mg Oral BID AC    sucralfate  1 g Oral 4 times per day    spironolactone  25 mg Oral Daily    hydrALAZINE  100 mg Oral TID    atorvastatin  40 mg Oral Daily    carvedilol  25 mg Oral BID    dilTIAZem  120 mg Oral Daily    doxazosin  2 mg Oral Daily    sodium chloride flush  5-40 mL IntraVENous 2 times per day     Continuous Infusions:    sodium chloride      sodium chloride      sodium chloride       PRN Meds: sodium chloride, sodium chloride, sodium chloride flush, sodium chloride, ondansetron **OR** ondansetron, acetaminophen **OR** acetaminophen    Data:     Past Medical History:   has a past medical history of Atrial fibrillation ([facility] [address] Benign essential HTN, CKD (chronic kidney disease), stage III ([facility] [address] and Enlarged heart. Social History:   reports that he quit smoking about 14 months ago. His smoking use included cigarettes. He smoked an average of 1 pack per day.  He has quit using smokeless tobacco. He reports current alcohol use. He reports that he does not use drugs. Family History:   Family History   Problem Relation Age of Onset    Hypertension Mother     Hypertension Father     Other Father         enlarged heart       Vitals:  /79   Pulse 66   Temp 97.2 °F (36.2 °C) (Oral)   Resp 15   Ht 5' 10\" (1.778 m)   Wt 215 lb (97.5 kg)   SpO2 96%   BMI 30.85 kg/m²   Temp (24hrs), Av.5 °F (36.4 °C), Min:96.8 °F (36 °C), Max:98.6 °F (37 °C)    No results for input(s): POCGLU in the last 72 hours. I/O (24Hr): Intake/Output Summary (Last 24 hours) at 3/16/2023 1033  Last data filed at 3/16/2023 1017  Gross per 24 hour   Intake 500 ml   Output 600 ml   Net -100 ml         Labs:  Hematology:  Recent Labs     23  1610 23  2217 03/15/23  0804 03/15/23  1007 03/15/23  1814 23  0239 23  1005   WBC 4.5  --  4.4  --   --  12.4* 10.0   RBC 2.50*  --  2.89*  --   --  3.08* 3.15*   HGB 5.3*   < > 7.3*   < > 7.7* 7.1* 7.3*   HCT 19.1*   < > 22.7*   < > 27.0* 24.3* 24.6*   MCV 76.4*  --  78.5*  --   --  78.9* 78.1*   MCH 21.2*  --  25.3  --   --  23.1* 23.2*   MCHC 27.7*  --  32.2  --   --  29.2 29.7   RDW 16.8*  --  18.1*  --   --  17.2* 17.2*     --  468*  --   --  See Reflexed IPF Result See Reflexed IPF Result   MPV 12.6  --  11.7  --   --   --   --     < > = values in this interval not displayed.        Chemistry:  Recent Labs     23  1610 23  1911 03/15/23  0447 23  0239     --  140 133*   K 3.8  --  4.3 4.8     --  107 102   CO2 24  --  23 21   GLUCOSE 79  --  94 132*   BUN 19  --  22* 25*   CREATININE 2.27*  --  2.27* 2.40*   ANIONGAP 9  --  10 10   LABGLOM 32*  --  32* 30*   CALCIUM 8.4*  --  8.4* 8.4*   PROBNP 519*  --   --   --    TROPHS 17 17  --   --        Recent Labs     23  1610   PROT 6.4   LABALBU 4.0   AST 11   ALT 7   ALKPHOS 53   BILITOT 0.8       ABG:No results found for: POCPH, PHART, PH, POCPCO2, BQD8DIO, PCO2, POCPO2, PO2ART, PO2, POCHCO3, BUL7HUT, HCO3, NBEA, PBEA, BEART, BE, THGBART, THB, DVK5MCY, YLGI0IYA, T8MTDUPW, O2SAT, FIO2  No results found for: SPECIAL  No results found for: CULTURE    Radiology:  XR CHEST (2 VW)    Result Date: 3/14/2023  Continued marked cardial pericardial silhouette enlargement. No acute change when compared to the previous exam from 12/28/2022. Physical Examination:        Physical Exam  Constitutional:       General: He is not in acute distress. Appearance: He is not toxic-appearing. HENT:      Head: Normocephalic and atraumatic. Cardiovascular:      Heart sounds: No murmur heard. No friction rub. No gallop. Pulmonary:      Effort: No respiratory distress. Breath sounds: No stridor. No wheezing or rhonchi. Abdominal:      General: There is no distension. Palpations: There is no mass. Tenderness: There is no abdominal tenderness. Hernia: No hernia is present. Musculoskeletal:         General: No swelling, tenderness, deformity or signs of injury. Cervical back: No rigidity. Lymphadenopathy:      Cervical: No cervical adenopathy. Skin:     Coloration: Skin is not jaundiced or pale. Findings: No bruising or erythema. Neurological:      General: No focal deficit present. Mental Status: He is alert. Mental status is at baseline.        Assessment:        Hospital Problems             Last Modified POA    * (Principal) UGIB (upper gastrointestinal bleed) 3/14/2023 Yes    Atrial fibrillation ([address] 3/14/2023 Yes    Nonischemic cardiomyopathy ([address] 3/14/2023 Yes    Bilateral carotid artery stenosis 3/14/2023 Yes    Class 1 obesity in adult 3/14/2023 Yes    Chronic combined systolic and diastolic CHF (congestive heart failure) ([address] 3/14/2023 Yes    Acute anemia 3/14/2023 Yes    Melena 3/15/2023 Yes    Acute posthemorrhagic anemia 3/15/2023 Yes    Multiple gastric polyps 3/15/2023 Yes    AVM (arteriovenous malformation) of duodenum, acquired with hemorrhage 3/15/2023 Yes    Tobacco abuse 3/14/2023 Yes    CKD (chronic kidney disease), stage III ([facility] [address] 3/14/2023 Yes    Overview Signed 9/29/2020  3:16 PM by Anya Ervin MD     Secondary to suspected ischemic nephrosclerosis baseline creatinine 1.5-1.9          Plan:        Suspected UGI Bleed  Went EGD yesterday showing grade B reflux esophagitis, multiple sessile polyps that were taken for biopsy. AVM, 3 mm was found to be bleeding in the duodenum. Patient was recommended PPI for 6 weeks, Carafate, follow-up with GI outpatient for colonoscopy, screening  Can restart anticoagulation in 1 week  Atrial Fibrillation  Hold anticoagulation for another 6 days, restart on 3/22/2023  Recheck CBC outpatient with primary care physician  Hypertensive Urgency   Blood pressure controlled, continue current medications, continue outpatient and follow-up with primary care physician  Obesity   Bilateral Carotid Stenosis   CKD     Patient otherwise stable for discharge, patient needs to eat diet that was advanced this morning.   If able to tolerate diet with no issue can discharge home to follow-up with consultants outpatient    Des Medina DO  3/16/2023  10:33 AM

## 2023-03-16 NOTE — PROGRESS NOTES
Physical Therapy  Facility/Department: [facility] CAR 2- STEPDOWN  Physical Therapy Daily Treatment Note    Name: Calvin January  : 1965  MRN: [id number]  Date of Service: 3/16/2023    Discharge Recommendations:  Patient would benefit from continued therapy after discharge   PT Equipment Recommendations  Equipment Needed: No      Patient Diagnosis(es): The primary encounter diagnosis was Anemia, unspecified type. A diagnosis of Melena was also pertinent to this visit. Past Medical History:  has a past medical history of Atrial fibrillation ([facility] [address] Benign essential HTN, CKD (chronic kidney disease), stage III ([facility] [address] and Enlarged heart. Past Surgical History:  has a past surgical history that includes Tonsillectomy; Upper gastrointestinal endoscopy (N/A, 2021); Colonoscopy (N/A, 2021); Esophagogastroduodenoscopy (N/A, 03/15/2023); Upper gastrointestinal endoscopy (3/15/2023); and Upper gastrointestinal endoscopy (3/15/2023). Assessment   Body Structures, Functions, Activity Limitations Requiring Skilled Therapeutic Intervention: Decreased functional mobility ; Decreased ADL status; Decreased body mechanics; Decreased endurance;Decreased strength;Decreased balance;Decreased coordination;Decreased high-level IADLs  Assessment: Pt ambulated 500ft with no AD and SBA, steady with no LOB. Pt should be safe to return to prior living arrangements pending stair performance. Recommending continued PT to address stairs and balance deficits.   Therapy Prognosis: Good  Requires PT Follow-Up: Yes  Activity Tolerance  Activity Tolerance: Patient tolerated treatment well     Plan   Physcial Therapy Plan  General Plan:  (5-6x/week)  Current Treatment Recommendations: Strengthening, Balance training, Functional mobility training, Transfer training, ADL/Self-care training, IADL training, Stair training, Gait training, Endurance training, Neuromuscular re-education, Equipment evaluation, education, & procurement, Therapeutic activities, Home exercise program, Safety education & training, Patient/Caregiver education & training  Safety Devices  Type of Devices: Call light within reach, Left in bed, Gait belt (Pt left sitting EOB)  Restraints  Restraints Initially in Place: No     Restrictions  Restrictions/Precautions  Restrictions/Precautions: Bedrest with Bathroom Privileges  Required Braces or Orthoses?: No     Subjective   General  Chart Reviewed: Yes  Patient assessed for rehabilitation services?: Yes  Response To Previous Treatment: Patient with no complaints from previous session. Family / Caregiver Present: Yes (wife)  Follows Commands: Within Functional Limits  General Comment  Comments: Pt retired to seated EOB at end of session with call light in reach and wife present in room. Subjective  Subjective: Pt and RN agreeable to PT this afternoon. Pt supine in bed upon arrival with no c/o pain. Pt pleasant and cooperative throughout session. Cognition   Orientation  Overall Orientation Status: Within Functional Limits  Cognition  Overall Cognitive Status: WFL     Objective   Bed mobility  Supine to Sit: Stand by assistance  Sit to Supine: Unable to assess (Pt left sitting EOB)  Scooting: Stand by assistance  Bed Mobility Comments: HOB elevated  Transfers  Sit to Stand: Stand by assistance  Stand to Sit: Stand by assistance  Comment: no AD used for transfers. Ambulation  Surface: Level tile  Device: No Device  Assistance: Stand by assistance  Quality of Gait: slow and steady  Gait Deviations: Slow Karely;Decreased step length;Decreased step height  Distance: 500ft  Comments: steady with no noted LOB.   More Ambulation?: No  Stairs/Curb  Stairs?: No     Balance  Posture: Good  Sitting - Static: Good  Sitting - Dynamic: Good  Standing - Static: Fair  Standing - Dynamic: Fair  Comments: Assessed with no AD    Exercise Treatment:  Seated LE exercise program: Long Arc Quads, hip abduction/adduction, heel/toe raises, and marches. Reps: x10 BLE  Comments: Pt performed while seated EOB.        AM-PAC Score  AM-PAC Inpatient Mobility Raw Score : 22 (03/16/23 1529)  AM-PAC Inpatient T-Scale Score : 53.28 (03/16/23 1529)  Mobility Inpatient CMS 0-100% Score: 20.91 (03/16/23 1529)  Mobility Inpatient CMS G-Code Modifier : CJ (03/16/23 1529)      Goals  Short Term Goals  Time Frame for Short Term Goals: 14 visits  Short Term Goal 1: Complete transfers independently  Short Term Goal 2: Complete 300 ft of gait independently  Short Term Goal 3: Complete 6 steps with on HR independently  Short Term Goal 4: Participate in 30 minutes of therapy to promote endurance       Education  Patient Education  Education Given To: Patient  Education Provided: Role of Therapy;Plan of Care;Transfer Training  Education Method: Demonstration;Verbal  Barriers to Learning: None  Education Outcome: Verbalized understanding;Demonstrated understanding      Therapy Time   Individual Concurrent Group Co-treatment   Time In 1432         Time Out 1500         Minutes 28         Timed Code Treatment Minutes: 23 Minutes       Ursula Diaz PTA

## 2023-03-16 NOTE — PROGRESS NOTES
Writer in to give patient night time medications (Coreg and Hydralazine). Patient refused to take them due to his throat being sore. He stated he is having a hard time swallowing due to it being painful for him. Writer messaged on call NP. No new orders given. Will continue plan of care.

## 2023-03-16 NOTE — DISCHARGE SUMMARY
[facility]  Office: [address], DO, Jane Crow DO, Rubi Thompson DO, [facility], DO, Maria Teresa Green MD, Dena Chaves MD, Cecily Benz MD, Manfred Chan MD,  Alessia Prescott MD, Gissell Borden MD, Loyd Jones DO, Fadia Mchugh MD,  Bobby Renae, DO, Chato Rodriguez MD, Marina Pappas MD, Bartolome Merlos DO, Joe Treviño MD, Sandeep Clark MD, Peterson Wen DO, Juany Gibbs MD, Garfield Su MD, Barb Hussein MD, Tong Flowers MD, Anthony Ling MD, David Mcnair DO, Shandra Rolon MD, Devang Ortiz MD, Tia Betancur CNP,  Neida Robbins, CNP, Catie Clay, CNP, Brandan Welch, CNP,  Burnett Osgood, [facility], Rosa Heredia, CNP, Mirta Safe, CNP, Adeola Wright CNP, Marcelo Adhikari, CNP, Grace Zepeda, [facility], LORRAINE RiversC, Oralia Garcia, [facility], Kylee Zapata, [facility], [facility],  [facility]    Discharge Summary     Patient ID: Kalpesh Correa  :  1965   MRN: [id number]     ACCOUNT:  [de-identified]   Patient's PCP: CHUCHO Harris CNP  Admit Date: 3/14/2023   Discharge Date: 3/16/2023     Length of Stay: 2  Code Status:  Full Code  Admitting Physician: Loyd Jones DO  Discharge Physician: Loyd Jones DO     Active Discharge Diagnoses:     Hospital Problem Lists:  Principal Problem:    UGIB (upper gastrointestinal bleed)  Active Problems:    Atrial fibrillation ([facility] [address]    Nonischemic cardiomyopathy ([facility] [address]    Bilateral carotid artery stenosis    Class 1 obesity in adult    Chronic combined systolic and diastolic CHF (congestive heart failure) (HCC)    Acute anemia    Melena    Acute posthemorrhagic anemia    Multiple gastric polyps    AVM (arteriovenous malformation) of duodenum, acquired with hemorrhage    Tobacco abuse    CKD (chronic kidney disease), stage III ([address]  Resolved Problems:    * No resolved hospital problems.  *      Admission Condition:  good     Discharged Condition: good    Hospital Stay:     Hospital Course:  is is a 49-year-old male who was at a initial vascular outpatient appointment for evaluation of bilateral arterial stenosis follow-up on carotid duplex. While at the appointment it was found that patient was profoundly fatigued, he was sent to the emergency department for evaluation where he was found to have a hemoglobin of 5.3 with microcytic hypochromic anemia as well. Patient states he noticed a change in his stool including melena but he states that this is stopped over the last few days. Significant to note is a recent Dayla Ree new diagnosis of atrial fibrillation back in December 2022 for which he was placed on Eliquis. Patient was admitted to enterology, underwent EGD on 3/30/2023. He was to have greatly diastology normal.  Polyps were removed regularly revealing AVM. Therefore the patient has been addressed by gastroenterology and recommended Protonix for 6 weeks and Carafate for today. Patient was recommended holding anticoagulation for 7 days the patient was in the hospital overnight to address nausea. hemoglobin remained stable, and patient was discharged continued outpatient. He recommended screening colonoscopy outpatient and follow-up with gastroenterology      Significant therapeutic interventions:   EGD 3/15/23  Findings:   LA grade B reflux esophagitis at the GE junction  Multiple sessile polyps measuring from 3 to 6 mm in size were found in the gastric body and antrum. Biopsies were performed for histology and H. pylori testing. A 3 mm bleeding AVM was found in the second portion of the duodenum. This was treated with bipolar cautery and completely obliterated. There was no bleeding at the end of the procedure. The remainder of the examined duodenum appeared normal.     Recommendations:  Continue IV pantoprazole infusion for 24 hours then oral twice daily for 6 weeks then once daily thereafter.   Start Carafate 1 g 4 times daily for 4 weeks. Resume anticoagulation after 7 days. Monitor clinical course for the next 24 hours. If no further signs of bleeding patient can be discharged home and followed up in the GI clinic. Will need a colonoscopy as outpatient for screening. Start full liquid diet today and advance as tolerated.       Significant Diagnostic Studies:   Labs / Micro:  CBC:   Lab Results   Component Value Date/Time    WBC 10.0 03/16/2023 10:05 AM    RBC 3.15 03/16/2023 10:05 AM    HGB 7.3 03/16/2023 10:05 AM    HCT 24.6 03/16/2023 10:05 AM    MCV 78.1 03/16/2023 10:05 AM    MCH 23.2 03/16/2023 10:05 AM    MCHC 29.7 03/16/2023 10:05 AM    RDW 17.2 03/16/2023 10:05 AM    PLT See Reflexed IPF Result 03/16/2023 10:05 AM     BMP:    Lab Results   Component Value Date/Time    GLUCOSE 132 03/16/2023 02:39 AM     03/16/2023 02:39 AM    K 4.8 03/16/2023 02:39 AM     03/16/2023 02:39 AM    CO2 21 03/16/2023 02:39 AM    ANIONGAP 10 03/16/2023 02:39 AM    BUN 25 03/16/2023 02:39 AM    CREATININE 2.40 03/16/2023 02:39 AM    BUNCRER NOT REPORTED 03/03/2021 10:37 AM    CALCIUM 8.4 03/16/2023 02:39 AM    LABGLOM 30 03/16/2023 02:39 AM    GFRAA 43 03/03/2021 10:37 AM    GFR      03/03/2021 10:37 AM    GFR NOT REPORTED 03/03/2021 10:37 AM     HFP:    Lab Results   Component Value Date/Time    PROT 6.4 03/14/2023 04:10 PM     CMP:    Lab Results   Component Value Date/Time    GLUCOSE 132 03/16/2023 02:39 AM     03/16/2023 02:39 AM    K 4.8 03/16/2023 02:39 AM     03/16/2023 02:39 AM    CO2 21 03/16/2023 02:39 AM    BUN 25 03/16/2023 02:39 AM    CREATININE 2.40 03/16/2023 02:39 AM    ANIONGAP 10 03/16/2023 02:39 AM    ALKPHOS 53 03/14/2023 04:10 PM    ALT 7 03/14/2023 04:10 PM    AST 11 03/14/2023 04:10 PM    BILITOT 0.8 03/14/2023 04:10 PM    LABALBU 4.0 03/14/2023 04:10 PM    ALBUMIN 1.7 03/14/2023 04:10 PM    LABGLOM 30 03/16/2023 02:39 AM    GFRAA 43 03/03/2021 10:37 AM    GFR      03/03/2021 10:37 AM    GFR NOT REPORTED 03/03/2021 10:37 AM    PROT 6.4 03/14/2023 04:10 PM    CALCIUM 8.4 03/16/2023 02:39 AM     PT/INR:    Lab Results   Component Value Date/Time    PROTIME 10.5 12/30/2022 02:45 AM    INR 1.0 12/30/2022 02:45 AM     PTT:   Lab Results   Component Value Date/Time    APTT 25.9 03/15/2023 08:04 AM     FLP:    Lab Results   Component Value Date/Time    CHOL 130 12/17/2020 01:39 PM    TRIG 172 12/17/2020 01:39 PM    HDL 53 12/17/2020 01:39 PM     U/A:  No results found for: COLORU, TURBIDITY, SPECGRAV, HGBUR, PHUR, PROTEINU, GLUCOSEU, KETUA, BILIRUBINUR, UROBILINOGEN, NITRU, LEUKOCYTESUR  TSH:    Lab Results   Component Value Date/Time    TSH 0.84 12/30/2022 02:45 AM        Radiology:  XR CHEST (2 VW)    Result Date: 3/14/2023  Continued marked cardial pericardial silhouette enlargement.  No acute change when compared to the previous exam from 12/28/2022.       Consultations:    Consults:     Final Specialist Recommendations/Findings:   IP CONSULT TO HOSPITALIST  IP CONSULT TO GI      The patient was seen and examined on day of discharge and this discharge summary is in conjunction with any daily progress note from day of discharge.    Discharge plan:     Disposition: Home    Physician Follow Up:     CHUCHO Abarca - CNP  [address]  [facility] [address] [id number]  [phone]    Schedule an appointment as soon as possible for a visit in 1 week(s)  hospital followup    Peter Izquierdo MD  [address] [facility] [address]  [facility] [id number] [phone]    Schedule an appointment as soon as possible for a visit in 1 week(s)  hospital followup       Requiring Further Evaluation/Follow Up POST HOSPITALIZATION/Incidental Findings: none    Diet: regular diet    Activity: As tolerated    Instructions to Patient: see pcp outpatient.     Discharge Medications:      Medication List        START taking these medications      ferrous sulfate 325 (65 Fe) MG tablet  Commonly known as: IRON 325  Take 1 tablet by mouth 2  times daily     polyethylene glycol 17 GM/SCOOP powder  Commonly known as: GLYCOLAX  Take 17 g by mouth daily     senna 8.6 MG tablet  Commonly known as: Senokot  Take 1 tablet by mouth 2 times daily     sucralfate 1 GM tablet  Commonly known as: CARAFATE  Take 1 tablet by mouth 4 times daily            CHANGE how you take these medications      pantoprazole 40 MG tablet  Commonly known as: PROTONIX  Take 1 tablet by mouth 2 times daily (before meals)  What changed: when to take this            CONTINUE taking these medications      * Adult Blood Pressure Cuff Lg Kit  Check blood pressure daily     * Blood Pressure Kit  1 kit by Does not apply route daily     aspirin 81 MG EC tablet     atorvastatin 40 MG tablet  Commonly known as: LIPITOR  Take 1 tablet by mouth daily     carvedilol 25 MG tablet  Commonly known as: Coreg  Take 1 tablet by mouth 2 times daily     dilTIAZem 120 MG extended release capsule  Commonly known as: CARDIZEM CD  Take 1 capsule by mouth daily     doxazosin 2 MG tablet  Commonly known as: CARDURA  take 1 tablet by mouth once daily     hydrALAZINE 50 MG tablet  Commonly known as: APRESOLINE  Take 1 tablet by mouth 3 times daily     spironolactone 25 MG tablet  Commonly known as: Aldactone  Take 0.5 tablets by mouth daily  Start taking on: March 17, 2023     VITAMIN D (CHOLECALCIFEROL) PO           * This list has 2 medication(s) that are the same as other medications prescribed for you. Read the directions carefully, and ask your doctor or other care provider to review them with you.                 STOP taking these medications      albuterol sulfate  (90 Base) MCG/ACT inhaler  Commonly known as: Proventil HFA     apixaban 5 MG Tabs tablet  Commonly known as: ELIQUIS               Where to Get Your Medications        These medications were sent to [facility] [address] [facility], [address] [facility]  [address], [address] [address]      Phone: [phone]   ferrous sulfate 325 (65 Fe) MG tablet  hydrALAZINE 50 MG tablet  pantoprazole 40 MG tablet  polyethylene glycol 17 GM/SCOOP powder  senna 8.6 MG tablet  spironolactone 25 MG tablet  sucralfate 1 GM tablet         No discharge procedures on file.    Time Spent on discharge is  40 mins in patient examination, evaluation, counseling as well as medication reconciliation, prescriptions for required medications, discharge plan and follow up.    Electronically signed by   Fab Lucas DO  3/16/2023  3:58 PM      Thank you Dr. Chlely Hess, APRN - CNP for the opportunity to be involved in this patient's care.

## 2023-03-16 NOTE — PLAN OF CARE
Problem: Discharge Planning  Goal: Discharge to home or other facility with appropriate resources  3/16/2023 1757 by Elisha Cha RN  Outcome: Adequate for Discharge  3/16/2023 0420 by Kedar Long RN  Outcome: Progressing     Problem: Pain  Goal: Verbalizes/displays adequate comfort level or baseline comfort level  3/16/2023 1757 by Elisha Cha RN  Outcome: Adequate for Discharge  3/16/2023 0420 by Kedar Long RN  Outcome: Progressing     Problem: ABCDS Injury Assessment  Goal: Absence of physical injury  3/16/2023 1757 by Elisha Cha RN  Outcome: Adequate for Discharge  3/16/2023 0420 by Kedar Long RN  Outcome: Progressing     Problem: Chronic Conditions and Co-morbidities  Goal: Patient's chronic conditions and co-morbidity symptoms are monitored and maintained or improved  3/16/2023 1757 by Elisha Cha RN  Outcome: Adequate for Discharge  3/16/2023 0420 by Kedar Long RN  Outcome: Progressing     Problem: Safety - Adult  Goal: Free from fall injury  3/16/2023 1757 by Elisha Cha RN  Outcome: Adequate for Discharge  3/16/2023 0420 by Kedar Long RN  Outcome: Progressing

## 2023-03-16 NOTE — DISCHARGE INSTRUCTIONS
Restart Eliquis 3/22/2023  Recheck CBC with primary care physician in 1 week  Continue taking Protonix for 6 weeks total  Follow-up with gastroenterology outpatient for screening colonoscopy  Take miralax or shelley one day, if no effect then use both daily until you have a BM.

## 2023-03-16 NOTE — ANESTHESIA POSTPROCEDURE EVALUATION
Department of Anesthesiology  Postprocedure Note    Patient: Penelope Urena  MRN: [id number]  YOB: 1965  Date of evaluation: 3/16/2023      Procedure Summary     Date: 03/15/23 Room / Location: [address]    Anesthesia Start: 1402 Anesthesia Stop: 9439    Procedures:       EGD CONTROL HEMORRHAGE      EGD BIOPSY Diagnosis:       Melena      (MELENA)    Surgeons: Bambi Wheeler MD Responsible Provider: Robin Altamirano MD    Anesthesia Type: MAC ASA Status: 3          Anesthesia Type: No value filed.     Eduardo Phase I: Eduardo Score: 8    Eduardo Phase II:        Anesthesia Post Evaluation    Patient location during evaluation: PACU  Patient participation: complete - patient participated  Level of consciousness: awake and alert  Pain score: 0  Airway patency: patent  Nausea & Vomiting: no nausea and no vomiting  Complications: no  Cardiovascular status: hemodynamically stable  Respiratory status: acceptable  Hydration status: euvolemic

## 2023-03-16 NOTE — PROGRESS NOTES
[facility]. [facility]   Gastroenterology Progress Note    Calvin Sahu is a 59 y.o. male patient. Hospitalization Day:2      Chief consult reason:   BIB    Subjective:  Pt seen and examined. No acute events overnight. Patient had endoscopy yesterday that showed LA grade B reflux esophagitis, AVM in duodenum treated with cautery  No further episodes of rectal bleeding overnight. Hemoglobin remained stable at 7.3 g/dL  Biopsies pending    3/15/2023 EGD per Dr. Tony Pro due to anemia and melena:  Findings:   LA grade B reflux esophagitis at the GE junction  Multiple sessile polyps measuring from 3 to 6 mm in size were found in the gastric body and antrum. Biopsies were performed for histology and H. pylori testing. A 3 mm bleeding AVM was found in the second portion of the duodenum. This was treated with bipolar cautery and completely obliterated. There was no bleeding at the end of the procedure. The remainder of the examined duodenum appeared normal.     Recommendations:  Continue IV pantoprazole infusion for 24 hours then oral twice daily for 6 weeks then once daily thereafter. Start Carafate 1 g 4 times daily for 4 weeks. Resume anticoagulation after 7 days. Monitor clinical course for the next 24 hours. If no further signs of bleeding patient can be discharged home and followed up in the GI clinic. Will need a colonoscopy as outpatient for screening. Start full liquid diet today and advance as tolerated. VITALS:  BP (!) 144/104   Pulse 91   Temp 98.6 °F (37 °C) (Oral)   Resp 16   Ht 5' 10\" (1.778 m)   Wt 215 lb (97.5 kg)   SpO2 97%   BMI 30.85 kg/m²   TEMPERATURE:  Current - Temp: 98.6 °F (37 °C);  Max - Temp  Av.6 °F (36.4 °C)  Min: 96.8 °F (36 °C)  Max: 98.6 °F (37 °C)    Physical Assessment:  General appearance:  alert, cooperative and no distress  Mental Status:  oriented to person, place and time and normal affect  Lungs:  clear to auscultation bilaterally, normal effort  Heart:  regular rate and rhythm, no murmur  Abdomen:  soft, nontender, nondistended, normal bowel sounds, no masses, hepatomegaly, splenomegaly  Extremities:  no edema, redness, tenderness in the calves  Skin:  no gross lesions, rashes, induration    Data Review:    Labs and Imaging:     CBC:  Recent Labs     03/14/23  1610 03/14/23  2217 03/15/23  0804 03/15/23  1007 03/15/23  1814 03/16/23  0239 03/16/23  1005   WBC 4.5  --  4.4  --   --  12.4* 10.0   HGB 5.3*   < > 7.3* 7.4* 7.7* 7.1* 7.3*   MCV 76.4*  --  78.5*  --   --  78.9* 78.1*   RDW 16.8*  --  18.1*  --   --  17.2* 17.2*     --  468*  --   --  See Reflexed IPF Result See Reflexed IPF Result    < > = values in this interval not displayed. ANEMIA STUDIES:  Recent Labs     03/15/23  0447   LABIRON 5*   TIBC 349   FERRITIN 5*       BMP:  Recent Labs     03/14/23  1610 03/15/23  0447 03/16/23  0239    140 133*   K 3.8 4.3 4.8    107 102   CO2 24 23 21   BUN 19 22* 25*   CREATININE 2.27* 2.27* 2.40*   GLUCOSE 79 94 132*   CALCIUM 8.4* 8.4* 8.4*       LFTS:  Recent Labs     03/14/23  1610   ALKPHOS 53   ALT 7   AST 11   BILITOT 0.8   LABALBU 4.0       Amylase/Lipase and Ammonia:  No results for input(s): AMYLASE, LIPASE, AMMONIA in the last 72 hours. Acute Hepatitis Panel:  Lab Results   Component Value Date/Time    HEPCAB NONREACTIVE 06/23/2020 09:21 AM       HCV Genotype:  No results found for: HEPATITISCGENOTYPE    HCV Quantitative:  No results found for: HCVQNT    LIVER WORK UP:    AFP  No results found for: AFP    Alpha 1 antitrypsin   No results found for: A1A    SANTI  Lab Results   Component Value Date/Time    SANTI NEGATIVE 12/15/2020 10:46 AM       AMA  No results found for: MITOAB    ASMA  No results found for: SMOOTHMUSCAB    PT/INR  No results for input(s): PROTIME, INR in the last 72 hours. Cancer Markers:  CEA:  No results for input(s): CEA in the last 72 hours.   Ca 125:  No results for input(s):  in the last 72 hours. Ca 19-9:   Invalid input(s):   AFP: No results for input(s): AFP in the last 72 hours. Lactic acid:Invalid input(s): LACTIC ACID    Radiology Review:    No results found. Principal Problem:    UGIB (upper gastrointestinal bleed)  Active Problems:    Atrial fibrillation (HCC)    Nonischemic cardiomyopathy (HCC)    Bilateral carotid artery stenosis    Class 1 obesity in adult    Chronic combined systolic and diastolic CHF (congestive heart failure) (HCC)    Acute anemia    Melena    Acute posthemorrhagic anemia    Multiple gastric polyps    AVM (arteriovenous malformation) of duodenum, acquired with hemorrhage    Tobacco abuse    CKD (chronic kidney disease), stage III ([facility] [address]  Resolved Problems:    * No resolved hospital problems. *       GI Impression:    GI bleed/symptomatic anemia s/p EGD that showed LA grade B esophagitis and one 3 mm AVM in duodenum treated with cautery.   -No further episodes of bleeding, hemoglobin has been stable    Plan and Recommendations:    Soft diet as tolerated  Protonix 40 mg twice daily for 6 weeks then once daily thereafter  Carafate 1 g 4 times daily x4 weeks  Resume anticoagulation after 6 days  Patient will need a screening colonoscopy as outpatient  Patient will need to follow-up in office with Dr. Bertram Reese in 1 month and to be scheduled for outpatient colonoscopy. If no signs of bleeding, she can tolerate diet, and hemoglobin remained stable no objection to DC from GI perspective  GI will sign off      This plan was formulated in collaboration with Dr. Kemal Chaney MD    Thank you for allowing me to participate in the care of your patient. Please feel free to contact me with any questions or concerns.      [address] [facility]   [address]   [phone]  3/16/2023  12:45 PM    Estimated time of 30 mins reviewing chart, assessing patient and formulating plan of care    This note was created with the assistance of a speech-recognition program.  Although the intention is to generate a document that actually reflects the content of the visit, no guarantees can be provided that every mistake has been identified and corrected by editing.

## 2023-03-16 NOTE — CARE COORDINATION
Met with patient and wife to discuss transitional planning. Patient is returning home independently.  He denies needs and has transportation home    Discharge [facility] Case Management Department  Written by: Saúl Friend RN    Patient Name: Sebastian Durand  Attending Provider: Teddy Hoffmann DO  Admit Date: 3/14/2023  3:35 PM  MRN: [id number]  Account: [de-identified]                     : 1965  Discharge Date: 3/16/2023      Disposition: home    Saúl Friend RN

## 2023-03-16 NOTE — PROGRESS NOTES
GASTROENTEROLOGY PROGRESS NOTE      3/16/2023        SUBJECTIVE:  76-year-old male with history of chronic A-fib on Eliquis who presented with episodes of melena and acute severe anemia. 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 This was treated with bipolar cautery. Patient's Eliquis is on hold. He denies any nausea vomiting. Tolerating liquid diet. Denies any further episodes of melena or hematochezia. Hemoglobin and hemodynamics are stable. EGD 3/15/23  Findings:   LA grade B reflux esophagitis at the GE junction  Multiple sessile polyps measuring from 3 to 6 mm in size were found in the gastric body and antrum. Biopsies were performed for histology and H. pylori testing. A 3 mm bleeding AVM was found in the second portion of the duodenum. This was treated with bipolar cautery and completely obliterated. There was no bleeding at the end of the procedure. The remainder of the examined duodenum appeared normal.                                           REVIEW OF SYSTEMS:  A 12 system review was performed and pertinent positives and negatives are documented in the HPI, rest were otherwise negative. PHYSICAL EXAM:    BP (!) 144/104   Pulse 91   Temp 98.6 °F (37 °C) (Oral)   Resp 16   Ht 5' 10\" (1.778 m)   Wt 215 lb (97.5 kg)   SpO2 97%   BMI 30.85 kg/m²   General appearance: Alert, No acute distress  Lungs: Clear bilaterally, nonlabored breathing without use of accessory muscles. Heart:S1S2 - regular rhythm  Abdomen: Soft, ND +BS, no masses, tender to palpation in epigastric region  Skin:  No jaundice, no stigmata of chronic liver disease. Neuro: Awake, alert, follows requests. PERRLA. No focal deficits.        Lab and Imaging Review   Data Review:    Labs and Imaging:     CBC:  Recent Labs     03/14/23  1610 03/14/23  2217 03/15/23  0804 03/15/23  1007 03/15/23  1814 03/16/23  0239 03/16/23  1005   WBC 4.5  --  4.4  --   --  12.4* 10.0   HGB 5.3*   < > 7.3* 7.4* 7.7* 7.1* 7.3*   MCV 76.4*  --  78.5*  --   --  78.9* 78.1*   RDW 16.8*  --  18.1*  --   --  17.2* 17.2*     --  468*  --   --  See Reflexed IPF Result See Reflexed IPF Result    < > = values in this interval not displayed. ANEMIA STUDIES:  Recent Labs     03/15/23  0447   LABIRON 5*   TIBC 349   FERRITIN 5*       BMP:  Recent Labs     03/14/23  1610 03/15/23  0447 03/16/23  0239    140 133*   K 3.8 4.3 4.8    107 102   CO2 24 23 21   BUN 19 22* 25*   CREATININE 2.27* 2.27* 2.40*   GLUCOSE 79 94 132*   CALCIUM 8.4* 8.4* 8.4*       LFTS:  Recent Labs     03/14/23  1610   ALKPHOS 53   ALT 7   AST 11   BILITOT 0.8   LABALBU 4.0       Amylase/Lipase and Ammonia:  No results for input(s): AMYLASE, LIPASE, AMMONIA in the last 72 hours. Acute Hepatitis Panel:  Lab Results   Component Value Date/Time    HEPCAB NONREACTIVE 06/23/2020 09:21 AM       HCV Genotype:  No results found for: HEPATITISCGENOTYPE    HCV Quantitative:  No results found for: HCVQNT    LIVER WORK UP:    AFP  No results found for: AFP    Alpha 1 antitrypsin   No results found for: A1A    Anti - Liver/Kidney Ab  No results found for: LIVER-KIDNEYMICROSOMALAB    SANTI  Lab Results   Component Value Date/Time    SANTI NEGATIVE 12/15/2020 10:46 AM       AMA  No results found for: MITOAB    ASMA  No results found for: SMOOTHMUSCAB    Ceruloplasmin  No results found for: CERULOPLSM    Celiac panel  No results found for: TISSTRNTIIGG, TTGIGA, IGA    PT/INR  No results for input(s): PROTIME, INR in the last 72 hours. Cancer Markers:  CEA:  No results for input(s): CEA in the last 72 hours. Ca 125:  No results for input(s):  in the last 72 hours. Ca 19-9:   Invalid input(s):   AFP: No results for input(s): AFP in the last 72 hours. Lactic acid:Invalid input(s): LACTIC ACID    Radiology Review:    No results found.         IMPRESSION:  Bleeding AVM in the duodenum s/p EGD with Endo-therapy  Chronic A-fib on Eliquis. Now on hold  Moderate esophagitis  Gastric polyps. Biopsied      RECOMMENDATIONS:   Continue IV pantoprazole infusion for today until tomorrow, then oral twice daily for 6 weeks then once daily thereafter. Start Carafate 1 g 4 times daily for 4 weeks. Advance as tolerated. Resume anticoagulation after 7 days. Monitor clinical course for the next 24 hours. If no further signs of bleeding patient can be discharged home and followed up in the GI clinic. Will need a colonoscopy as outpatient for screening. Once tolerating diet, patient can be discharged home in the next 24 hours.       Electronically signed by Laurie Devi MD on 3/16/2023 at 12:48 PM

## 2023-03-17 LAB
ABO/RH: NORMAL
ANTIBODY SCREEN: NEGATIVE
ARM BAND NUMBER: NORMAL
BLD PROD TYP BPU: NORMAL
BLOOD BANK BLOOD PRODUCT EXPIRATION DATE: NORMAL
BLOOD BANK BLOOD PRODUCT EXPIRATION DATE: NORMAL
BLOOD BANK ISBT PRODUCT BLOOD TYPE: 5100
BLOOD BANK ISBT PRODUCT BLOOD TYPE: 5100
BLOOD BANK PRODUCT CODE: NORMAL
BLOOD BANK PRODUCT CODE: NORMAL
BLOOD BANK UNIT TYPE AND RH: NORMAL
BLOOD BANK UNIT TYPE AND RH: NORMAL
BPU ID: NORMAL
CROSSMATCH RESULT: NORMAL
DISPENSE STATUS BLOOD BANK: NORMAL
EXPIRATION DATE: NORMAL
SURGICAL PATHOLOGY REPORT: NORMAL
TRANSFUSION STATUS: NORMAL
UNIT DIVISION: 0
UNIT ISSUE DATE/TIME: NORMAL
UNIT ISSUE DATE/TIME: NORMAL

## 2023-03-17 NOTE — PROGRESS NOTES
CLINICAL PHARMACY NOTE: MEDS TO BEDS    Total # of Prescriptions Filled: 7   The following medications were delivered to the patient:  Ferosul  Hydralazine  Pantoprazole  Sucralfate  Senna  Miralax  eliquis    Additional Documentation:

## 2023-03-20 ENCOUNTER — HOSPITAL ENCOUNTER (OUTPATIENT)
Age: 58
Setting detail: SPECIMEN
Discharge: HOME | End: 2023-03-20

## 2023-03-20 DIAGNOSIS — K92.2 UGIB (UPPER GASTROINTESTINAL BLEED): ICD-10-CM

## 2023-03-20 LAB
HCT VFR BLD AUTO: 29.6 % (ref 40.7–50.3)
HGB BLD-MCNC: 8.1 G/DL (ref 13–17)
MCH RBC QN AUTO: 22.8 PG (ref 25.2–33.5)
MCHC RBC AUTO-ENTMCNC: 27.4 G/DL (ref 28.4–34.8)
MCV RBC AUTO: 83.1 FL (ref 82.6–102.9)
NRBC AUTOMATED: 0.5 PER 100 WBC
PDW BLD-RTO: 19.1 % (ref 11.8–14.4)
PLATELET # BLD AUTO: 162 K/UL (ref 138–453)
PMV BLD AUTO: 11.6 FL (ref 8.1–13.5)
RBC # BLD: 3.56 M/UL (ref 4.21–5.77)
WBC # BLD AUTO: 4.3 K/UL (ref 3.5–11.3)

## 2023-03-24 ENCOUNTER — OFFICE VISIT (OUTPATIENT)
Dept: FAMILY MEDICINE CLINIC | Age: 58
End: 2023-03-24

## 2023-03-24 ENCOUNTER — TELEPHONE (OUTPATIENT)
Dept: GASTROENTEROLOGY | Age: 58
End: 2023-03-24

## 2023-03-24 VITALS
WEIGHT: 211.4 LBS | DIASTOLIC BLOOD PRESSURE: 100 MMHG | OXYGEN SATURATION: 97 % | TEMPERATURE: 98.3 F | HEART RATE: 67 BPM | SYSTOLIC BLOOD PRESSURE: 180 MMHG | BODY MASS INDEX: 30.33 KG/M2

## 2023-03-24 DIAGNOSIS — Z09 HOSPITAL DISCHARGE FOLLOW-UP: Primary | ICD-10-CM

## 2023-03-24 DIAGNOSIS — I10 HTN (HYPERTENSION), MALIGNANT: ICD-10-CM

## 2023-03-24 DIAGNOSIS — K22.70 BARRETT'S ESOPHAGUS WITHOUT DYSPLASIA: ICD-10-CM

## 2023-03-24 DIAGNOSIS — K31.811 AVM (ARTERIOVENOUS MALFORMATION) OF DUODENUM, ACQUIRED WITH HEMORRHAGE: ICD-10-CM

## 2023-03-24 DIAGNOSIS — N18.32 STAGE 3B CHRONIC KIDNEY DISEASE (HCC): ICD-10-CM

## 2023-03-24 DIAGNOSIS — D62 ACUTE POSTHEMORRHAGIC ANEMIA: ICD-10-CM

## 2023-03-24 DIAGNOSIS — I48.20 CHRONIC ATRIAL FIBRILLATION (HCC): ICD-10-CM

## 2023-03-24 DIAGNOSIS — K92.2 UGIB (UPPER GASTROINTESTINAL BLEED): ICD-10-CM

## 2023-03-24 RX ORDER — LOSARTAN POTASSIUM 50 MG/1
50 TABLET ORAL DAILY
Qty: 30 TABLET | Refills: 0 | Status: SHIPPED | OUTPATIENT
Start: 2023-03-24

## 2023-03-24 ASSESSMENT — ENCOUNTER SYMPTOMS
ABDOMINAL PAIN: 0
COUGH: 0
COLOR CHANGE: 0
BACK PAIN: 0
WHEEZING: 0
VOMITING: 0
SORE THROAT: 0
DIARRHEA: 0
SHORTNESS OF BREATH: 0
EYE PAIN: 0
CHEST TIGHTNESS: 0
EYE DISCHARGE: 0
CONSTIPATION: 0
NAUSEA: 0

## 2023-03-24 NOTE — PATIENT INSTRUCTIONS
Redd De La Rosa MD  [facility] [address]  [phone]     Schedule an appointment as soon as possible for a visit in 1 week(s)  hospital follow up    [facility]  [address] [place] [id number]  [phone]  Follow up on 1/20/2023  Hospital follow up at 1:30 pm

## 2023-03-24 NOTE — PROGRESS NOTES
Cervical back: Normal range of motion and neck supple. Right lower leg: No edema. Left lower leg: No edema. Skin:     General: Skin is warm and dry. Coloration: Skin is not pale. Findings: No erythema or rash. Neurological:      Mental Status: He is alert and oriented to person, place, and time. GCS: GCS eye subscore is 4. GCS verbal subscore is 5. GCS motor subscore is 6. Gait: Gait normal.   Psychiatric:         Speech: Speech normal.         Behavior: Behavior normal.         Thought Content: Thought content normal.         Judgment: Judgment normal.       An electronic signature was used to authenticate this note.   --Susana Rocha, CHUCHO - CNP

## 2023-03-28 ENCOUNTER — HOSPITAL ENCOUNTER (OUTPATIENT)
Age: 58
Setting detail: SPECIMEN
Discharge: HOME | End: 2023-03-28

## 2023-03-28 DIAGNOSIS — N18.32 STAGE 3B CHRONIC KIDNEY DISEASE (HCC): ICD-10-CM

## 2023-03-28 DIAGNOSIS — D62 ACUTE POSTHEMORRHAGIC ANEMIA: ICD-10-CM

## 2023-03-28 LAB
ABSOLUTE EOS #: 0.06 K/UL (ref 0–0.4)
ABSOLUTE IMMATURE GRANULOCYTE: 0 K/UL (ref 0–0.3)
ABSOLUTE LYMPH #: 0.46 K/UL (ref 1–4.8)
ABSOLUTE MONO #: 0.29 K/UL (ref 0.1–0.8)
ALBUMIN SERPL-MCNC: 4 G/DL (ref 3.5–5.2)
ALBUMIN/GLOBULIN RATIO: 1.7 (ref 1–2.5)
ALP SERPL-CCNC: 61 U/L (ref 40–129)
ALT SERPL-CCNC: 10 U/L (ref 5–41)
ANION GAP SERPL CALCULATED.3IONS-SCNC: 12 MMOL/L (ref 9–17)
AST SERPL-CCNC: 13 U/L
BASOPHILS # BLD: 0 % (ref 0–2)
BASOPHILS ABSOLUTE: 0 K/UL (ref 0–0.2)
BILIRUB SERPL-MCNC: 0.5 MG/DL (ref 0.3–1.2)
BUN SERPL-MCNC: 24 MG/DL (ref 6–20)
CALCIUM SERPL-MCNC: 8.8 MG/DL (ref 8.6–10.4)
CHLORIDE SERPL-SCNC: 106 MMOL/L (ref 98–107)
CO2 SERPL-SCNC: 24 MMOL/L (ref 20–31)
CREAT SERPL-MCNC: 2.37 MG/DL (ref 0.7–1.2)
EOSINOPHILS RELATIVE PERCENT: 1 % (ref 1–4)
GFR SERPL CREATININE-BSD FRML MDRD: 31 ML/MIN/1.73M2
GLUCOSE SERPL-MCNC: 76 MG/DL (ref 70–99)
HCT VFR BLD AUTO: 31.6 % (ref 40.7–50.3)
HGB BLD-MCNC: 8.7 G/DL (ref 13–17)
IMMATURE GRANULOCYTES: 0 %
LYMPHOCYTES # BLD: 8 % (ref 24–44)
MCH RBC QN AUTO: 22.9 PG (ref 25.2–33.5)
MCHC RBC AUTO-ENTMCNC: 27.5 G/DL (ref 28.4–34.8)
MCV RBC AUTO: 83.2 FL (ref 82.6–102.9)
MONOCYTES # BLD: 5 % (ref 1–7)
MORPHOLOGY: ABNORMAL
NRBC AUTOMATED: 0 PER 100 WBC
PDW BLD-RTO: 19.9 % (ref 11.8–14.4)
PLATELET # BLD AUTO: 326 K/UL (ref 138–453)
PMV BLD AUTO: 10.9 FL (ref 8.1–13.5)
POTASSIUM SERPL-SCNC: 4.2 MMOL/L (ref 3.7–5.3)
PROT SERPL-MCNC: 6.4 G/DL (ref 6.4–8.3)
RBC # BLD: 3.8 M/UL (ref 4.21–5.77)
SEG NEUTROPHILS: 86 % (ref 36–66)
SEGMENTED NEUTROPHILS ABSOLUTE COUNT: 4.89 K/UL (ref 1.8–7.7)
SODIUM SERPL-SCNC: 142 MMOL/L (ref 135–144)
WBC # BLD AUTO: 5.7 K/UL (ref 3.5–11.3)

## 2023-04-19 ENCOUNTER — TELEPHONE (OUTPATIENT)
Dept: FAMILY MEDICINE CLINIC | Age: 58
End: 2023-04-19

## 2023-04-19 NOTE — TELEPHONE ENCOUNTER
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 Now is coughing and congested. Wife asking what he take due to recent heart issues. I advised her to call cardiologist but told her I would send a message to ask you as well.  Please advise

## 2023-04-24 ENCOUNTER — OFFICE VISIT (OUTPATIENT)
Dept: FAMILY MEDICINE CLINIC | Age: 58
End: 2023-04-24
Payer: COMMERCIAL

## 2023-04-24 VITALS
TEMPERATURE: 97.4 F | SYSTOLIC BLOOD PRESSURE: 138 MMHG | HEART RATE: 66 BPM | OXYGEN SATURATION: 94 % | DIASTOLIC BLOOD PRESSURE: 72 MMHG

## 2023-04-24 DIAGNOSIS — I10 HTN (HYPERTENSION), MALIGNANT: ICD-10-CM

## 2023-04-24 DIAGNOSIS — H66.001 NON-RECURRENT ACUTE SUPPURATIVE OTITIS MEDIA OF RIGHT EAR WITHOUT SPONTANEOUS RUPTURE OF TYMPANIC MEMBRANE: Primary | ICD-10-CM

## 2023-04-24 DIAGNOSIS — R05.1 ACUTE COUGH: ICD-10-CM

## 2023-04-24 PROCEDURE — G8427 DOCREV CUR MEDS BY ELIG CLIN: HCPCS

## 2023-04-24 PROCEDURE — G8417 CALC BMI ABV UP PARAM F/U: HCPCS

## 2023-04-24 PROCEDURE — 3075F SYST BP GE 130 - 139MM HG: CPT

## 2023-04-24 PROCEDURE — 1036F TOBACCO NON-USER: CPT

## 2023-04-24 PROCEDURE — 3017F COLORECTAL CA SCREEN DOC REV: CPT

## 2023-04-24 PROCEDURE — 3078F DIAST BP <80 MM HG: CPT

## 2023-04-24 PROCEDURE — 99213 OFFICE O/P EST LOW 20 MIN: CPT

## 2023-04-24 RX ORDER — BENZONATATE 100 MG/1
100-200 CAPSULE ORAL 3 TIMES DAILY PRN
Qty: 60 CAPSULE | Refills: 0 | Status: SHIPPED | OUTPATIENT
Start: 2023-04-24 | End: 2023-05-04

## 2023-04-24 RX ORDER — AMOXICILLIN AND CLAVULANATE POTASSIUM 875; 125 MG/1; MG/1
1 TABLET, FILM COATED ORAL 2 TIMES DAILY
Qty: 20 TABLET | Refills: 0 | Status: SHIPPED | OUTPATIENT
Start: 2023-04-24 | End: 2023-05-04

## 2023-04-24 ASSESSMENT — ENCOUNTER SYMPTOMS
EYE PAIN: 0
COUGH: 0
DIARRHEA: 0
VOMITING: 0
EYE DISCHARGE: 0
SHORTNESS OF BREATH: 0
WHEEZING: 0
CONSTIPATION: 0
BACK PAIN: 0
SORE THROAT: 1
COLOR CHANGE: 0
NAUSEA: 0
TROUBLE SWALLOWING: 0
ABDOMINAL PAIN: 0

## 2023-04-24 NOTE — PROGRESS NOTES
Rolly Claude (:  1965) is a 59 y.o. male,Established patient, here for evaluation of the following chief complaint(s):  Cough (Neg test for covid on wednesday- sx started monday), Congestion (sneezing), and Muscle Pain          Subjective   SUBJECTIVE/OBJECTIVE:  Pt here today for sick visit  VSS    Pt states he has had a dry cough x1 week, was evaluated at [facility] and tested for COVID 5 days ago w/ negative result. He had a fever of 102F 1 week ago and on/off chills. Denies n/v/d, he has right ear pain and a sore throat. Review of Systems   Constitutional:  Positive for chills and fever. Negative for activity change, appetite change, fatigue and unexpected weight change. HENT:  Positive for congestion, ear pain, postnasal drip and sore throat. Negative for hearing loss and trouble swallowing. Eyes:  Negative for pain, discharge and visual disturbance. Respiratory:  Negative for cough, shortness of breath and wheezing. Cardiovascular:  Negative for chest pain, palpitations and leg swelling. Gastrointestinal:  Negative for abdominal pain, constipation, diarrhea, nausea and vomiting. Genitourinary:  Negative for difficulty urinating and dysuria. Musculoskeletal:  Negative for back pain, gait problem and neck pain. Skin:  Negative for color change, rash and wound. Neurological:  Negative for dizziness, seizures, weakness, numbness and headaches. Psychiatric/Behavioral:  Negative for confusion. Objective   Physical Exam  Vitals and nursing note reviewed. Constitutional:       General: He is not in acute distress. Appearance: Normal appearance. He is well-developed. He is not ill-appearing, toxic-appearing or diaphoretic. HENT:      Head: Normocephalic and atraumatic. Right Ear: External ear normal. Swelling and tenderness present. Tympanic membrane is erythematous.       Left Ear: Hearing, tympanic membrane, ear canal and external ear normal.      Nose:

## 2023-05-01 RX ORDER — PANTOPRAZOLE SODIUM 40 MG/1
40 TABLET, DELAYED RELEASE ORAL
Qty: 30 TABLET | Refills: 1 | OUTPATIENT
Start: 2023-05-01

## 2023-05-01 NOTE — TELEPHONE ENCOUNTER
PATIENT IS ASKING IF HE CAN GET A 2 MONTH SUPPLY OF EACH MEDICATION      Leon Chase is calling to request a refill on the following medication(s):    Medication Request:  Requested Prescriptions     Pending Prescriptions Disp Refills    apixaban (ELIQUIS) 5 MG TABS tablet 60 tablet 0     Sig: Take 1 tablet by mouth 2 times daily    pantoprazole (PROTONIX) 40 MG tablet 30 tablet 1     Sig: Take 1 tablet by mouth 2 times daily (before meals)       Last Visit Date (If Applicable):  2/88/9499    Next Visit Date:    7/11/2023

## 2023-06-05 DIAGNOSIS — N52.9 ERECTILE DYSFUNCTION, UNSPECIFIED ERECTILE DYSFUNCTION TYPE: ICD-10-CM

## 2023-06-05 RX ORDER — TADALAFIL 5 MG/1
TABLET ORAL
Qty: 30 TABLET | Refills: 1 | Status: SHIPPED | OUTPATIENT
Start: 2023-06-05

## 2023-06-05 NOTE — TELEPHONE ENCOUNTER
Maria Ines Cormier is calling to request a refill on the following medication(s):    Medication Request:  Requested Prescriptions     Pending Prescriptions Disp Refills    tadalafil (CIALIS) 5 MG tablet [Pharmacy Med Name: TADALAFIL 5 MG TABLET] 30 tablet 1     Sig: take 1 tablet by mouth once daily       Last Visit Date (If Applicable):  2/45/1426    Next Visit Date:    7/11/2023

## 2023-08-01 ENCOUNTER — OFFICE VISIT (OUTPATIENT)
Dept: FAMILY MEDICINE CLINIC | Age: 58
End: 2023-08-01
Payer: COMMERCIAL

## 2023-08-01 ENCOUNTER — HOSPITAL ENCOUNTER (OUTPATIENT)
Age: 58
Setting detail: SPECIMEN
Discharge: HOME | End: 2023-08-01

## 2023-08-01 VITALS
BODY MASS INDEX: 33.29 KG/M2 | SYSTOLIC BLOOD PRESSURE: 182 MMHG | DIASTOLIC BLOOD PRESSURE: 112 MMHG | TEMPERATURE: 97.5 F | HEART RATE: 73 BPM | OXYGEN SATURATION: 95 % | WEIGHT: 232 LBS

## 2023-08-01 DIAGNOSIS — I50.42 CHRONIC COMBINED SYSTOLIC AND DIASTOLIC CHF (CONGESTIVE HEART FAILURE) (HCC): ICD-10-CM

## 2023-08-01 DIAGNOSIS — E78.5 DYSLIPIDEMIA: ICD-10-CM

## 2023-08-01 DIAGNOSIS — I48.0 PAROXYSMAL ATRIAL FIBRILLATION (HCC): ICD-10-CM

## 2023-08-01 DIAGNOSIS — N52.9 ERECTILE DYSFUNCTION, UNSPECIFIED ERECTILE DYSFUNCTION TYPE: ICD-10-CM

## 2023-08-01 DIAGNOSIS — I42.8 NONISCHEMIC CARDIOMYOPATHY (HCC): ICD-10-CM

## 2023-08-01 DIAGNOSIS — I10 HTN (HYPERTENSION), MALIGNANT: Primary | ICD-10-CM

## 2023-08-01 LAB — BNP SERPL-MCNC: 640 PG/ML

## 2023-08-01 PROCEDURE — 3078F DIAST BP <80 MM HG: CPT

## 2023-08-01 PROCEDURE — 99214 OFFICE O/P EST MOD 30 MIN: CPT

## 2023-08-01 PROCEDURE — 3074F SYST BP LT 130 MM HG: CPT

## 2023-08-01 PROCEDURE — 3017F COLORECTAL CA SCREEN DOC REV: CPT

## 2023-08-01 PROCEDURE — G8417 CALC BMI ABV UP PARAM F/U: HCPCS

## 2023-08-01 PROCEDURE — 1036F TOBACCO NON-USER: CPT

## 2023-08-01 PROCEDURE — G8427 DOCREV CUR MEDS BY ELIG CLIN: HCPCS

## 2023-08-01 RX ORDER — ATORVASTATIN CALCIUM 40 MG/1
40 TABLET, FILM COATED ORAL DAILY
Qty: 30 TABLET | Refills: 0 | Status: SHIPPED | OUTPATIENT
Start: 2023-08-01

## 2023-08-01 RX ORDER — LIDOCAINE 40 MG/G
1 CREAM TOPICAL PRN
COMMUNITY
Start: 2023-04-19

## 2023-08-01 RX ORDER — TADALAFIL 10 MG/1
10 TABLET ORAL DAILY PRN
Qty: 30 TABLET | Refills: 1 | Status: SHIPPED | OUTPATIENT
Start: 2023-08-01

## 2023-08-01 RX ORDER — CARVEDILOL 25 MG/1
25 TABLET ORAL 2 TIMES DAILY
Qty: 60 TABLET | Refills: 0 | Status: SHIPPED | OUTPATIENT
Start: 2023-08-01

## 2023-08-01 RX ORDER — SPIRONOLACTONE 25 MG/1
12.5 TABLET ORAL DAILY
Qty: 30 TABLET | Refills: 0 | Status: SHIPPED | OUTPATIENT
Start: 2023-08-01

## 2023-08-01 SDOH — ECONOMIC STABILITY: FOOD INSECURITY: WITHIN THE PAST 12 MONTHS, THE FOOD YOU BOUGHT JUST DIDN'T LAST AND YOU DIDN'T HAVE MONEY TO GET MORE.: NEVER TRUE

## 2023-08-01 SDOH — ECONOMIC STABILITY: FOOD INSECURITY: WITHIN THE PAST 12 MONTHS, YOU WORRIED THAT YOUR FOOD WOULD RUN OUT BEFORE YOU GOT MONEY TO BUY MORE.: NEVER TRUE

## 2023-08-01 SDOH — ECONOMIC STABILITY: INCOME INSECURITY: HOW HARD IS IT FOR YOU TO PAY FOR THE VERY BASICS LIKE FOOD, HOUSING, MEDICAL CARE, AND HEATING?: NOT HARD AT ALL

## 2023-08-01 ASSESSMENT — PATIENT HEALTH QUESTIONNAIRE - PHQ9
SUM OF ALL RESPONSES TO PHQ QUESTIONS 1-9: 0
SUM OF ALL RESPONSES TO PHQ9 QUESTIONS 1 & 2: 0
SUM OF ALL RESPONSES TO PHQ QUESTIONS 1-9: 0
2. FEELING DOWN, DEPRESSED OR HOPELESS: 0
1. LITTLE INTEREST OR PLEASURE IN DOING THINGS: 0
SUM OF ALL RESPONSES TO PHQ QUESTIONS 1-9: 0
SUM OF ALL RESPONSES TO PHQ QUESTIONS 1-9: 0

## 2023-08-01 NOTE — PROGRESS NOTES
Hui Vazquez (:  1965) is a 59 y.o. male,Established patient, here for evaluation of the following chief complaint(s):  Hypertension, Cough (2-3 months on and off), and Wheezing          Subjective   SUBJECTIVE/OBJECTIVE:  Pt here today for routine f/u, accompanied by wife  BP significantly elevated, asymptomatic    Pt's wife reports they have been monitoring his BP at home but not over the last few weeks as it had mostly normalized after multiple med changes  Today he ate fast food and other high sodium containing foods prior to appt and thinks this is the cause for his elevated BP    He reports needing multiple med refills, most of which are prescribed by cardiology  Both pt and wife are uncertain when the last visit he had w/ cardiology was, I informed them that typically when your refills are out it means a visit it due    Pt's wife is concerned about a dry intermittent cough he has had the last 2 weeks  Pt denies SOB, no CP. Cough is infrequent but he does endorse he has noticed it as well  Pt has been out of Spironolactone for a few days per his report and only has 1 Eliquis tab left  Mild wet lung sounds to bilateral lower lobes, SpO2 WNL      Review of Systems   Constitutional:  Negative for activity change, appetite change, chills, fatigue, fever and unexpected weight change. HENT:  Negative for congestion, ear pain and sore throat. Eyes:  Negative for pain, discharge and visual disturbance. Respiratory:  Positive for cough. Negative for chest tightness, shortness of breath and wheezing. Cardiovascular:  Negative for chest pain, palpitations and leg swelling. Gastrointestinal:  Negative for abdominal pain, constipation, diarrhea, nausea and vomiting. Genitourinary:  Negative for difficulty urinating and dysuria. Musculoskeletal:  Negative for back pain, gait problem and neck pain. Skin:  Negative for color change, rash and wound.    Neurological:  Negative for dizziness,

## 2023-08-02 ASSESSMENT — ENCOUNTER SYMPTOMS
COLOR CHANGE: 0
VOMITING: 0
SORE THROAT: 0
EYE DISCHARGE: 0
CHEST TIGHTNESS: 0
EYE PAIN: 0
SHORTNESS OF BREATH: 0
NAUSEA: 0
WHEEZING: 0
ABDOMINAL PAIN: 0
COUGH: 1
CONSTIPATION: 0
BACK PAIN: 0
DIARRHEA: 0

## 2023-08-15 RX ORDER — FERROUS SULFATE 325(65) MG
325 TABLET ORAL 2 TIMES DAILY
Qty: 60 TABLET | Refills: 0 | Status: SHIPPED | OUTPATIENT
Start: 2023-08-15

## 2023-08-15 RX ORDER — SENNOSIDES A AND B 8.6 MG/1
1 TABLET, FILM COATED ORAL 2 TIMES DAILY
Qty: 60 TABLET | Refills: 0 | Status: SHIPPED | OUTPATIENT
Start: 2023-08-15 | End: 2024-08-14

## 2023-08-15 NOTE — TELEPHONE ENCOUNTER
Blanka Rsoario is calling to request a refill on the following medication(s):    Medication Request:  Requested Prescriptions     Pending Prescriptions Disp Refills    senna (SENOKOT) 8.6 MG tablet 60 tablet 0     Sig: Take 1 tablet by mouth 2 times daily    ferrous sulfate (IRON 325) 325 (65 Fe) MG tablet 60 tablet 0     Sig: Take 1 tablet by mouth 2 times daily       Last Visit Date (If Applicable):  9/8/0814    Next Visit Date:    11/6/2023

## 2023-09-11 RX ORDER — FERROUS SULFATE 325(65) MG
1 TABLET ORAL 2 TIMES DAILY
Qty: 60 TABLET | Refills: 0 | Status: SHIPPED | OUTPATIENT
Start: 2023-09-11

## 2023-09-11 NOTE — TELEPHONE ENCOUNTER
Blanka Rosario is calling to request a refill on the following medication(s):    Last Visit Date (If Applicable):  6/3/5109    Next Visit Date:    11/6/2023    Medication Request:  Requested Prescriptions     Pending Prescriptions Disp Refills    ferrous sulfate (IRON 325) 325 (65 Fe) MG tablet [Pharmacy Med Name: FERROUS SULFATE 325 MG TABLET] 60 tablet 0     Sig: take 1 tablet by mouth twice a day

## 2023-09-12 RX ORDER — SUCRALFATE 1 G/1
1 TABLET ORAL 4 TIMES DAILY
Qty: 120 TABLET | Refills: 3 | OUTPATIENT
Start: 2023-09-12

## 2023-09-12 NOTE — TELEPHONE ENCOUNTER
Demetrius Martinez is calling to request a refill on the following medication(s):    Medication Request:  Requested Prescriptions     Pending Prescriptions Disp Refills    sucralfate (CARAFATE) 1 GM tablet 120 tablet 3     Sig: Take 1 tablet by mouth 4 times daily       Last Visit Date (If Applicable):  0/4/8186    Next Visit Date:    11/6/2023

## 2023-09-26 DIAGNOSIS — I50.42 CHRONIC COMBINED SYSTOLIC AND DIASTOLIC CHF (CONGESTIVE HEART FAILURE) (HCC): ICD-10-CM

## 2023-09-26 DIAGNOSIS — I10 HTN (HYPERTENSION), MALIGNANT: ICD-10-CM

## 2023-09-26 RX ORDER — SPIRONOLACTONE 25 MG/1
12.5 TABLET ORAL DAILY
Qty: 30 TABLET | Refills: 0 | Status: SHIPPED | OUTPATIENT
Start: 2023-09-26

## 2023-09-26 NOTE — TELEPHONE ENCOUNTER
Andria Harding is calling to request a refill on the following medication(s):    Medication Request:  Requested Prescriptions     Pending Prescriptions Disp Refills    spironolactone (ALDACTONE) 25 MG tablet [Pharmacy Med Name: SPIRONOLACTONE 25 MG TABLET] 30 tablet 0     Sig: take 1/2 tablet by mouth once daily       Last Visit Date (If Applicable):  4/6/9908    Next Visit Date:    11/6/2023

## 2023-10-06 DIAGNOSIS — I10 HTN (HYPERTENSION), MALIGNANT: ICD-10-CM

## 2023-10-06 RX ORDER — LOSARTAN POTASSIUM 50 MG/1
50 TABLET ORAL DAILY
Qty: 90 TABLET | Refills: 1 | Status: SHIPPED | OUTPATIENT
Start: 2023-10-06

## 2023-10-06 NOTE — TELEPHONE ENCOUNTER
Hui Vazquez is calling to request a refill on the following medication(s):    Medication Request:  Requested Prescriptions     Pending Prescriptions Disp Refills    losartan (COZAAR) 50 MG tablet [Pharmacy Med Name: LOSARTAN POTASSIUM 50 MG TAB] 90 tablet 1     Sig: take 1 tablet by mouth once daily       Last Visit Date (If Applicable):  8/8/4047    Next Visit Date:    11/6/2023

## 2023-10-11 RX ORDER — FERROUS SULFATE 325(65) MG
1 TABLET ORAL 2 TIMES DAILY
Qty: 60 TABLET | Refills: 0 | Status: ON HOLD | OUTPATIENT
Start: 2023-10-11

## 2023-10-14 ENCOUNTER — HOSPITAL ENCOUNTER (INPATIENT)
Age: 58
LOS: 2 days | Discharge: HOME | DRG: 199 | End: 2023-10-16
Attending: EMERGENCY MEDICINE | Admitting: INTERNAL MEDICINE
Payer: COMMERCIAL

## 2023-10-14 ENCOUNTER — APPOINTMENT (OUTPATIENT)
Dept: CT IMAGING | Age: 58
DRG: 199 | End: 2023-10-14
Payer: COMMERCIAL

## 2023-10-14 ENCOUNTER — APPOINTMENT (OUTPATIENT)
Dept: GENERAL RADIOLOGY | Age: 58
DRG: 199 | End: 2023-10-14
Payer: COMMERCIAL

## 2023-10-14 ENCOUNTER — APPOINTMENT (OUTPATIENT)
Dept: VASCULAR LAB | Age: 58
DRG: 199 | End: 2023-10-14
Payer: COMMERCIAL

## 2023-10-14 DIAGNOSIS — R00.1 SYMPTOMATIC BRADYCARDIA: Primary | ICD-10-CM

## 2023-10-14 DIAGNOSIS — I65.23 BILATERAL CAROTID ARTERY STENOSIS: ICD-10-CM

## 2023-10-14 DIAGNOSIS — R42 DIZZINESS: ICD-10-CM

## 2023-10-14 PROBLEM — I16.0 HYPERTENSIVE URGENCY: Status: ACTIVE | Noted: 2023-10-14

## 2023-10-14 LAB
ANION GAP SERPL CALCULATED.3IONS-SCNC: 11 MMOL/L (ref 9–17)
BASOPHILS # BLD: 0.03 K/UL (ref 0–0.2)
BASOPHILS NFR BLD: 1 % (ref 0–2)
BUN SERPL-MCNC: 31 MG/DL (ref 6–20)
CALCIUM SERPL-MCNC: 8.9 MG/DL (ref 8.6–10.4)
CHLORIDE SERPL-SCNC: 104 MMOL/L (ref 98–107)
CO2 SERPL-SCNC: 26 MMOL/L (ref 20–31)
CREAT SERPL-MCNC: 2.2 MG/DL (ref 0.7–1.2)
EOSINOPHIL # BLD: 0.19 K/UL (ref 0–0.44)
EOSINOPHILS RELATIVE PERCENT: 5 % (ref 1–4)
ERYTHROCYTE [DISTWIDTH] IN BLOOD BY AUTOMATED COUNT: 12.1 % (ref 11.8–14.4)
GFR SERPL CREATININE-BSD FRML MDRD: 34 ML/MIN/1.73M2
GLUCOSE SERPL-MCNC: 95 MG/DL (ref 70–99)
HCT VFR BLD AUTO: 42.1 % (ref 40.7–50.3)
HGB BLD-MCNC: 14.3 G/DL (ref 13–17)
IMM GRANULOCYTES # BLD AUTO: <0.03 K/UL (ref 0–0.3)
IMM GRANULOCYTES NFR BLD: 0 %
LYMPHOCYTES NFR BLD: 0.89 K/UL (ref 1.1–3.7)
LYMPHOCYTES RELATIVE PERCENT: 23 % (ref 24–43)
MCH RBC QN AUTO: 31.8 PG (ref 25.2–33.5)
MCHC RBC AUTO-ENTMCNC: 34 G/DL (ref 28.4–34.8)
MCV RBC AUTO: 93.6 FL (ref 82.6–102.9)
MONOCYTES NFR BLD: 0.27 K/UL (ref 0.1–1.2)
MONOCYTES NFR BLD: 7 % (ref 3–12)
NEUTROPHILS NFR BLD: 64 % (ref 36–65)
NEUTS SEG NFR BLD: 2.43 K/UL (ref 1.5–8.1)
NRBC BLD-RTO: 0 PER 100 WBC
PLATELET # BLD AUTO: 155 K/UL (ref 138–453)
PMV BLD AUTO: 11.5 FL (ref 8.1–13.5)
POTASSIUM SERPL-SCNC: 4.1 MMOL/L (ref 3.7–5.3)
RBC # BLD AUTO: 4.5 M/UL (ref 4.21–5.77)
SODIUM SERPL-SCNC: 141 MMOL/L (ref 135–144)
TROPONIN I SERPL HS-MCNC: 20 NG/L (ref 0–22)
TROPONIN I SERPL HS-MCNC: 22 NG/L (ref 0–22)
TSH SERPL DL<=0.05 MIU/L-ACNC: 0.94 UIU/ML (ref 0.3–5)
WBC OTHER # BLD: 3.8 K/UL (ref 3.5–11.3)

## 2023-10-14 PROCEDURE — 93880 EXTRACRANIAL BILAT STUDY: CPT | Performed by: SURGERY

## 2023-10-14 PROCEDURE — 84484 ASSAY OF TROPONIN QUANT: CPT

## 2023-10-14 PROCEDURE — 2580000003 HC RX 258

## 2023-10-14 PROCEDURE — 80048 BASIC METABOLIC PNL TOTAL CA: CPT

## 2023-10-14 PROCEDURE — 84443 ASSAY THYROID STIM HORMONE: CPT

## 2023-10-14 PROCEDURE — 2060000000 HC ICU INTERMEDIATE R&B

## 2023-10-14 PROCEDURE — 85025 COMPLETE CBC W/AUTO DIFF WBC: CPT

## 2023-10-14 PROCEDURE — 70450 CT HEAD/BRAIN W/O DYE: CPT

## 2023-10-14 PROCEDURE — 93880 EXTRACRANIAL BILAT STUDY: CPT

## 2023-10-14 PROCEDURE — 99285 EMERGENCY DEPT VISIT HI MDM: CPT

## 2023-10-14 PROCEDURE — 6360000002 HC RX W HCPCS

## 2023-10-14 PROCEDURE — 6370000000 HC RX 637 (ALT 250 FOR IP)

## 2023-10-14 PROCEDURE — 71045 X-RAY EXAM CHEST 1 VIEW: CPT

## 2023-10-14 PROCEDURE — 99223 1ST HOSP IP/OBS HIGH 75: CPT | Performed by: INTERNAL MEDICINE

## 2023-10-14 RX ORDER — POLYETHYLENE GLYCOL 3350 17 G/17G
17 POWDER, FOR SOLUTION ORAL DAILY PRN
Status: DISCONTINUED | OUTPATIENT
Start: 2023-10-14 | End: 2023-10-16 | Stop reason: HOSPADM

## 2023-10-14 RX ORDER — SODIUM CHLORIDE 9 MG/ML
INJECTION, SOLUTION INTRAVENOUS PRN
Status: DISCONTINUED | OUTPATIENT
Start: 2023-10-14 | End: 2023-10-16 | Stop reason: HOSPADM

## 2023-10-14 RX ORDER — SODIUM CHLORIDE 0.9 % (FLUSH) 0.9 %
5-40 SYRINGE (ML) INJECTION PRN
Status: DISCONTINUED | OUTPATIENT
Start: 2023-10-14 | End: 2023-10-16 | Stop reason: HOSPADM

## 2023-10-14 RX ORDER — ONDANSETRON 4 MG/1
4 TABLET, ORALLY DISINTEGRATING ORAL EVERY 8 HOURS PRN
Status: DISCONTINUED | OUTPATIENT
Start: 2023-10-14 | End: 2023-10-16 | Stop reason: HOSPADM

## 2023-10-14 RX ORDER — SPIRONOLACTONE 25 MG/1
12.5 TABLET ORAL DAILY
Status: DISCONTINUED | OUTPATIENT
Start: 2023-10-14 | End: 2023-10-14

## 2023-10-14 RX ORDER — ACETAMINOPHEN 650 MG/1
650 SUPPOSITORY RECTAL EVERY 6 HOURS PRN
Status: DISCONTINUED | OUTPATIENT
Start: 2023-10-14 | End: 2023-10-16 | Stop reason: HOSPADM

## 2023-10-14 RX ORDER — SODIUM CHLORIDE 0.9 % (FLUSH) 0.9 %
5-40 SYRINGE (ML) INJECTION EVERY 12 HOURS SCHEDULED
Status: DISCONTINUED | OUTPATIENT
Start: 2023-10-14 | End: 2023-10-16 | Stop reason: HOSPADM

## 2023-10-14 RX ORDER — ATORVASTATIN CALCIUM 40 MG/1
40 TABLET, FILM COATED ORAL DAILY
Status: DISCONTINUED | OUTPATIENT
Start: 2023-10-14 | End: 2023-10-16 | Stop reason: HOSPADM

## 2023-10-14 RX ORDER — PANTOPRAZOLE SODIUM 40 MG/1
40 TABLET, DELAYED RELEASE ORAL
Status: DISCONTINUED | OUTPATIENT
Start: 2023-10-15 | End: 2023-10-16 | Stop reason: HOSPADM

## 2023-10-14 RX ORDER — LOSARTAN POTASSIUM 50 MG/1
50 TABLET ORAL DAILY
Status: DISCONTINUED | OUTPATIENT
Start: 2023-10-15 | End: 2023-10-15

## 2023-10-14 RX ORDER — HYDRALAZINE HYDROCHLORIDE 20 MG/ML
20 INJECTION INTRAMUSCULAR; INTRAVENOUS EVERY 4 HOURS PRN
Status: DISCONTINUED | OUTPATIENT
Start: 2023-10-14 | End: 2023-10-16 | Stop reason: HOSPADM

## 2023-10-14 RX ORDER — HYDRALAZINE HYDROCHLORIDE 50 MG/1
50 TABLET, FILM COATED ORAL 3 TIMES DAILY
Status: DISCONTINUED | OUTPATIENT
Start: 2023-10-14 | End: 2023-10-15

## 2023-10-14 RX ORDER — ONDANSETRON 2 MG/ML
4 INJECTION INTRAMUSCULAR; INTRAVENOUS EVERY 6 HOURS PRN
Status: DISCONTINUED | OUTPATIENT
Start: 2023-10-14 | End: 2023-10-16 | Stop reason: HOSPADM

## 2023-10-14 RX ORDER — AMLODIPINE BESYLATE 10 MG/1
10 TABLET ORAL DAILY
Status: DISCONTINUED | OUTPATIENT
Start: 2023-10-14 | End: 2023-10-16 | Stop reason: HOSPADM

## 2023-10-14 RX ORDER — LORAZEPAM 2 MG/ML
1 INJECTION INTRAMUSCULAR DAILY PRN
Status: DISCONTINUED | OUTPATIENT
Start: 2023-10-14 | End: 2023-10-16 | Stop reason: HOSPADM

## 2023-10-14 RX ORDER — ACETAMINOPHEN 325 MG/1
650 TABLET ORAL EVERY 6 HOURS PRN
Status: DISCONTINUED | OUTPATIENT
Start: 2023-10-14 | End: 2023-10-16 | Stop reason: HOSPADM

## 2023-10-14 RX ADMIN — APIXABAN 5 MG: 5 TABLET, FILM COATED ORAL at 21:19

## 2023-10-14 RX ADMIN — HYDRALAZINE HYDROCHLORIDE 50 MG: 50 TABLET, FILM COATED ORAL at 14:45

## 2023-10-14 RX ADMIN — SODIUM CHLORIDE, PRESERVATIVE FREE 10 ML: 5 INJECTION INTRAVENOUS at 21:19

## 2023-10-14 RX ADMIN — HYDRALAZINE HYDROCHLORIDE 50 MG: 50 TABLET, FILM COATED ORAL at 21:19

## 2023-10-14 RX ADMIN — HYDRALAZINE HYDROCHLORIDE 20 MG: 20 INJECTION, SOLUTION INTRAMUSCULAR; INTRAVENOUS at 13:20

## 2023-10-14 RX ADMIN — ACETAMINOPHEN 650 MG: 325 TABLET ORAL at 14:45

## 2023-10-14 RX ADMIN — AMLODIPINE BESYLATE 10 MG: 10 TABLET ORAL at 14:45

## 2023-10-14 ASSESSMENT — ENCOUNTER SYMPTOMS
COUGH: 0
SHORTNESS OF BREATH: 0
WHEEZING: 0
VOMITING: 0
NAUSEA: 0
DIARRHEA: 0
ABDOMINAL DISTENTION: 0
ANAL BLEEDING: 0
CHEST TIGHTNESS: 1
ABDOMINAL PAIN: 0
STRIDOR: 0

## 2023-10-14 ASSESSMENT — PAIN DESCRIPTION - LOCATION
LOCATION: CHEST
LOCATION: HEAD

## 2023-10-14 ASSESSMENT — PAIN DESCRIPTION - ORIENTATION: ORIENTATION: LEFT

## 2023-10-14 ASSESSMENT — PAIN DESCRIPTION - FREQUENCY: FREQUENCY: INTERMITTENT

## 2023-10-14 ASSESSMENT — PAIN SCALES - GENERAL
PAINLEVEL_OUTOF10: 2
PAINLEVEL_OUTOF10: 2

## 2023-10-14 ASSESSMENT — PAIN - FUNCTIONAL ASSESSMENT: PAIN_FUNCTIONAL_ASSESSMENT: 0-10

## 2023-10-14 ASSESSMENT — PAIN DESCRIPTION - DESCRIPTORS: DESCRIPTORS: DULL

## 2023-10-14 NOTE — H&P
cm/s    Right CCA prox PSV 98.8 cm/s    Right ICA dist PSV 52.9 cm/s    Right ICA dist EDV 10.6 cm/s    Right ICA mid PSV 30.5 cm/s    Right ICA mid EDV 8.6 cm/s    Right ICA prox PSV 27.9 cm/s    Right ICA prox EDV 10.0 cm/s    Right ECA .0 cm/s    Right ECA EDV 21.20 cm/s    Right vertebral PSV 61.5 cm/s    Right vertebral EDV 14.00 cm/s    Body Surface Area 2.21 m2    Right subclavian prox .0 cm/s    Right subclavian prox EDV 0.0 cm/s    Right subclavian mid PSV 69.8 cm/s    Right subclavian mid EDV 0.0 cm/s    Left subclavian prox .0 cm/s    Left subclavian prox EDV 0.0 cm/s    Left subclavian mid PSV 52.5 cm/s    Left subclavian mid EDV 0.0 cm/s       Imaging:   CT HEAD WO CONTRAST    Result Date: 10/14/2023  1. No acute intracranial abnormality. XR CHEST PORTABLE    Result Date: 10/14/2023  Stable cardiomegaly. No focal airspace disease. ASSESSMENT & PLAN     ASSESSMENT / PLAN:     IMPRESSION  This is a 59 y.o. male who presented with lightheadedness and found to have hypertensive urgency bradycardia. Principal Problem:    Bradycardia  Active Problems:    Hypertensive urgency    Atrial fibrillation (HCC)    Nonischemic cardiomyopathy (HCC)    Chronic combined systolic and diastolic CHF (congestive heart failure) (HCC)    CKD (chronic kidney disease), stage III (HCC)  Resolved Problems:    * No resolved hospital problems.  *      # Bradycardia  -Patient's heart rate in the emergency department was in the 40s to 46s  - Patient is on Coreg 25 twice daily at home  -Per chart review, it seems that the patient has not had significant bradycardia on past office visits to various physicians within the past year  - This could be reflex bradycardia secondary to hypertensive urgency  - MRI brain ordered to rule out further intracranial pathology to cause Cushing reflex  - Cardiology consulted     #Presyncope  -Seems that patient does have tenderness while working and when standing up  -

## 2023-10-14 NOTE — ED NOTES
Pt updated on plan of care, no distress noted, aox4. Non labored RR.      Darshan Cox, RN  10/14/23 [id number]

## 2023-10-14 NOTE — CARE COORDINATION
Case Management Assessment  Initial Evaluation    Date/Time of Evaluation: 10/14/2023 6:13 PM  Assessment Completed by: Katie Pavon RN    If patient is discharged prior to next notation, then this note serves as note for discharge by case management. Patient Name: Shira Law                   YOB: 1965  Diagnosis: Dizziness [R42]  Bradycardia [R00.1]  Bilateral carotid artery stenosis [I65.23]  Symptomatic bradycardia [R00.1]                   Date / Time: 10/14/2023  9:22 AM    Patient Admission Status: Inpatient   Readmission Risk (Low < 19, Mod (19-27), High > 27): Readmission Risk Score: 13.9    Current PCP: CHUCHO Leroy CNP  PCP verified by CM? (P) Yes    Chart Reviewed: Yes      History Provided by: (P) Patient  Patient Orientation: (P) Alert and Oriented    Patient Cognition: (P) Alert    Hospitalization in the last 30 days (Readmission):  No    If yes, Readmission Assessment in  Navigator will be completed. Advance Directives:      Code Status: Full Code   Patient's Primary Decision Maker is: (P) Legal Next of Kin      Discharge Planning:    Patient lives with: (P) Spouse/Significant Other Type of Home: (P) House  Primary Care Giver: (P) Self  Patient Support Systems include: (P) Spouse/Significant Other   Current Financial resources: (P) Medicaid  Current community resources:    Current services prior to admission: (P) None            Current DME:              Type of Home Care services:  (P) None    ADLS  Prior functional level: (P) Independent in ADLs/IADLs  Current functional level: (P) Independent in ADLs/IADLs    PT AM-PAC:   /24  OT AM-PAC:   /24    Family can provide assistance at DC: (P) Yes  Would you like Case Management to discuss the discharge plan with any other family members/significant others, and if so, who?     Plans to Return to Present Housing: (P) Yes  Other Identified Issues/Barriers to RETURNING to current housing: none  Potential Assistance

## 2023-10-14 NOTE — ED NOTES
Pt updated on plan of care, no distress noted, non labored RR, aox4.       Sarah Overall, RN  10/14/23 0321

## 2023-10-14 NOTE — ED NOTES
Pt to ed from home with significant other, reports went to urgent care, had bout of dizziness yesterday at work and again today. Patient has been having intermittent chest pain that is ongoing as well for \"awhile. \" Patient significant other reports at urgent care they had him walk in a straight line and he could not, wobbly, reports eyes closed with arms up and was stumbling and unable to stand upright and straight. Patient takes home medications but does miss some medications at time, reports he puts his pills in weekly container but may possibly be taking more than what he is supposed to on some and missing some.       Brock Smith RN  10/14/23 5262

## 2023-10-14 NOTE — ED TRIAGE NOTES
Patient presented to the ED today with complaints of chest [pain and dizziness. Patient states that symptoms presented yesterday. Patient has a history of a-fib.

## 2023-10-15 ENCOUNTER — APPOINTMENT (OUTPATIENT)
Dept: MRI IMAGING | Age: 58
DRG: 199 | End: 2023-10-15
Payer: COMMERCIAL

## 2023-10-15 PROBLEM — N17.9 ACUTE KIDNEY INJURY SUPERIMPOSED ON CKD (HCC): Status: ACTIVE | Noted: 2023-10-15

## 2023-10-15 PROBLEM — R90.82 WHITE MATTER ABNORMALITY ON MRI OF BRAIN: Status: ACTIVE | Noted: 2023-10-15

## 2023-10-15 PROBLEM — N18.9 ACUTE KIDNEY INJURY SUPERIMPOSED ON CKD (HCC): Status: ACTIVE | Noted: 2023-10-15

## 2023-10-15 LAB
ANION GAP SERPL CALCULATED.3IONS-SCNC: 10 MMOL/L (ref 9–17)
BASOPHILS # BLD: <0.03 K/UL (ref 0–0.2)
BASOPHILS NFR BLD: 1 % (ref 0–2)
BUN SERPL-MCNC: 30 MG/DL (ref 6–20)
CALCIUM SERPL-MCNC: 8.6 MG/DL (ref 8.6–10.4)
CHLORIDE SERPL-SCNC: 105 MMOL/L (ref 98–107)
CO2 SERPL-SCNC: 22 MMOL/L (ref 20–31)
CREAT SERPL-MCNC: 1.8 MG/DL (ref 0.7–1.2)
ECHO BSA: 2.21 M2
EOSINOPHIL # BLD: 0.17 K/UL (ref 0–0.44)
EOSINOPHILS RELATIVE PERCENT: 5 % (ref 1–4)
ERYTHROCYTE [DISTWIDTH] IN BLOOD BY AUTOMATED COUNT: 12.5 % (ref 11.8–14.4)
GFR SERPL CREATININE-BSD FRML MDRD: 43 ML/MIN/1.73M2
GLUCOSE SERPL-MCNC: 108 MG/DL (ref 70–99)
HCT VFR BLD AUTO: 45.3 % (ref 40.7–50.3)
HGB BLD-MCNC: 15.3 G/DL (ref 13–17)
IMM GRANULOCYTES # BLD AUTO: <0.03 K/UL (ref 0–0.3)
IMM GRANULOCYTES NFR BLD: 0 %
LYMPHOCYTES NFR BLD: 0.91 K/UL (ref 1.1–3.7)
LYMPHOCYTES RELATIVE PERCENT: 25 % (ref 24–43)
MCH RBC QN AUTO: 31.7 PG (ref 25.2–33.5)
MCHC RBC AUTO-ENTMCNC: 33.8 G/DL (ref 28.4–34.8)
MCV RBC AUTO: 94 FL (ref 82.6–102.9)
MONOCYTES NFR BLD: 0.21 K/UL (ref 0.1–1.2)
MONOCYTES NFR BLD: 6 % (ref 3–12)
NEUTROPHILS NFR BLD: 63 % (ref 36–65)
NEUTS SEG NFR BLD: 2.33 K/UL (ref 1.5–8.1)
NRBC BLD-RTO: 0 PER 100 WBC
PLATELET # BLD AUTO: 149 K/UL (ref 138–453)
PMV BLD AUTO: 11.1 FL (ref 8.1–13.5)
POTASSIUM SERPL-SCNC: 4 MMOL/L (ref 3.7–5.3)
RBC # BLD AUTO: 4.82 M/UL (ref 4.21–5.77)
SODIUM SERPL-SCNC: 137 MMOL/L (ref 135–144)
VAS LEFT BULB EDV: 10.6 CM/S
VAS LEFT BULB PSV: 35.3 CM/S
VAS LEFT CCA DIST EDV: 20.5 CM/S
VAS LEFT CCA DIST PSV: 66.5 CM/S
VAS LEFT CCA MID EDV: 28.6 CM/S
VAS LEFT CCA MID PSV: 101 CM/S
VAS LEFT CCA PROX EDV: 30.4 CM/S
VAS LEFT CCA PROX PSV: 112 CM/S
VAS LEFT ECA EDV: 21.3 CM/S
VAS LEFT ECA PSV: 119 CM/S
VAS LEFT ICA DIST EDV: 20.3 CM/S
VAS LEFT ICA DIST PSV: 84.1 CM/S
VAS LEFT ICA MID EDV: 38.5 CM/S
VAS LEFT ICA MID PSV: 98.1 CM/S
VAS LEFT ICA PROX EDV: 12.9 CM/S
VAS LEFT ICA PROX PSV: 45.9 CM/S
VAS LEFT SUBCLAVIAN MID EDV: 0 CM/S
VAS LEFT SUBCLAVIAN MID PSV: 52.5 CM/S
VAS LEFT SUBCLAVIAN PROX EDV: 0 CM/S
VAS LEFT SUBCLAVIAN PROX PSV: 117 CM/S
VAS LEFT VERTEBRAL EDV: 11.8 CM/S
VAS LEFT VERTEBRAL PSV: 58 CM/S
VAS RIGHT BULB EDV: 8.2 CM/S
VAS RIGHT BULB PSV: 44.8 CM/S
VAS RIGHT CCA DIST EDV: 9.1 CM/S
VAS RIGHT CCA DIST PSV: 49.9 CM/S
VAS RIGHT CCA MID EDV: 19.9 CM/S
VAS RIGHT CCA MID PSV: 81.4 CM/S
VAS RIGHT CCA PROX PSV: 98.8 CM/S
VAS RIGHT ECA EDV: 21.2 CM/S
VAS RIGHT ECA PSV: 100 CM/S
VAS RIGHT ICA DIST EDV: 10.6 CM/S
VAS RIGHT ICA DIST PSV: 52.9 CM/S
VAS RIGHT ICA MID EDV: 8.6 CM/S
VAS RIGHT ICA MID PSV: 30.5 CM/S
VAS RIGHT ICA PROX EDV: 10 CM/S
VAS RIGHT ICA PROX PSV: 27.9 CM/S
VAS RIGHT SUBCLAVIAN MID EDV: 0 CM/S
VAS RIGHT SUBCLAVIAN MID PSV: 69.8 CM/S
VAS RIGHT SUBCLAVIAN PROX EDV: 0 CM/S
VAS RIGHT SUBCLAVIAN PROX PSV: 110 CM/S
VAS RIGHT VERTEBRAL EDV: 14 CM/S
VAS RIGHT VERTEBRAL PSV: 61.5 CM/S
WBC OTHER # BLD: 3.7 K/UL (ref 3.5–11.3)

## 2023-10-15 PROCEDURE — 2060000000 HC ICU INTERMEDIATE R&B

## 2023-10-15 PROCEDURE — 6370000000 HC RX 637 (ALT 250 FOR IP)

## 2023-10-15 PROCEDURE — 6360000002 HC RX W HCPCS

## 2023-10-15 PROCEDURE — 36415 COLL VENOUS BLD VENIPUNCTURE: CPT

## 2023-10-15 PROCEDURE — 2580000003 HC RX 258

## 2023-10-15 PROCEDURE — 70551 MRI BRAIN STEM W/O DYE: CPT

## 2023-10-15 PROCEDURE — 99254 IP/OBS CNSLTJ NEW/EST MOD 60: CPT | Performed by: INTERNAL MEDICINE

## 2023-10-15 PROCEDURE — 85025 COMPLETE CBC W/AUTO DIFF WBC: CPT

## 2023-10-15 PROCEDURE — 6370000000 HC RX 637 (ALT 250 FOR IP): Performed by: STUDENT IN AN ORGANIZED HEALTH CARE EDUCATION/TRAINING PROGRAM

## 2023-10-15 PROCEDURE — 99232 SBSQ HOSP IP/OBS MODERATE 35: CPT | Performed by: INTERNAL MEDICINE

## 2023-10-15 PROCEDURE — 99221 1ST HOSP IP/OBS SF/LOW 40: CPT | Performed by: PSYCHIATRY & NEUROLOGY

## 2023-10-15 PROCEDURE — 80048 BASIC METABOLIC PNL TOTAL CA: CPT

## 2023-10-15 RX ORDER — HYDRALAZINE HYDROCHLORIDE 50 MG/1
100 TABLET, FILM COATED ORAL 3 TIMES DAILY
Status: DISCONTINUED | OUTPATIENT
Start: 2023-10-15 | End: 2023-10-16 | Stop reason: HOSPADM

## 2023-10-15 RX ORDER — LOSARTAN POTASSIUM 50 MG/1
100 TABLET ORAL DAILY
Status: DISCONTINUED | OUTPATIENT
Start: 2023-10-15 | End: 2023-10-16 | Stop reason: HOSPADM

## 2023-10-15 RX ORDER — HYDRALAZINE HYDROCHLORIDE 50 MG/1
100 TABLET, FILM COATED ORAL 3 TIMES DAILY
Status: DISCONTINUED | OUTPATIENT
Start: 2023-10-15 | End: 2023-10-15

## 2023-10-15 RX ADMIN — HYDRALAZINE HYDROCHLORIDE 50 MG: 50 TABLET, FILM COATED ORAL at 10:05

## 2023-10-15 RX ADMIN — LOSARTAN POTASSIUM 100 MG: 50 TABLET, FILM COATED ORAL at 13:44

## 2023-10-15 RX ADMIN — HYDRALAZINE HYDROCHLORIDE 20 MG: 20 INJECTION, SOLUTION INTRAMUSCULAR; INTRAVENOUS at 17:20

## 2023-10-15 RX ADMIN — PANTOPRAZOLE SODIUM 40 MG: 40 TABLET, DELAYED RELEASE ORAL at 10:09

## 2023-10-15 RX ADMIN — AMLODIPINE BESYLATE 10 MG: 10 TABLET ORAL at 10:05

## 2023-10-15 RX ADMIN — SODIUM CHLORIDE, PRESERVATIVE FREE 10 ML: 5 INJECTION INTRAVENOUS at 10:05

## 2023-10-15 RX ADMIN — ACETAMINOPHEN 650 MG: 325 TABLET ORAL at 12:07

## 2023-10-15 RX ADMIN — APIXABAN 5 MG: 5 TABLET, FILM COATED ORAL at 19:59

## 2023-10-15 RX ADMIN — APIXABAN 5 MG: 5 TABLET, FILM COATED ORAL at 10:05

## 2023-10-15 RX ADMIN — HYDRALAZINE HYDROCHLORIDE 20 MG: 20 INJECTION, SOLUTION INTRAMUSCULAR; INTRAVENOUS at 04:06

## 2023-10-15 RX ADMIN — SODIUM CHLORIDE, PRESERVATIVE FREE 10 ML: 5 INJECTION INTRAVENOUS at 20:00

## 2023-10-15 RX ADMIN — LOSARTAN POTASSIUM 50 MG: 50 TABLET, FILM COATED ORAL at 10:05

## 2023-10-15 RX ADMIN — HYDRALAZINE HYDROCHLORIDE 100 MG: 50 TABLET, FILM COATED ORAL at 13:45

## 2023-10-15 RX ADMIN — LORAZEPAM 1 MG: 2 INJECTION INTRAMUSCULAR; INTRAVENOUS at 12:27

## 2023-10-15 RX ADMIN — HYDRALAZINE HYDROCHLORIDE 100 MG: 50 TABLET, FILM COATED ORAL at 20:00

## 2023-10-15 RX ADMIN — HYDRALAZINE HYDROCHLORIDE 20 MG: 20 INJECTION, SOLUTION INTRAMUSCULAR; INTRAVENOUS at 23:09

## 2023-10-15 RX ADMIN — ATORVASTATIN CALCIUM 40 MG: 40 TABLET, FILM COATED ORAL at 10:05

## 2023-10-15 NOTE — PLAN OF CARE
Problem: Discharge Planning  Goal: Discharge to home or other facility with appropriate resources  Recent Flowsheet Documentation  Taken 10/14/2023 2000 by Mario Alberto Vega RN  Discharge to home or other facility with appropriate resources: Identify barriers to discharge with patient and caregiver     Problem: ABCDS Injury Assessment  Goal: Absence of physical injury  Recent Flowsheet Documentation  Taken 10/14/2023 2135 by Mario Alberto Vega RN  Absence of Physical Injury: Implement safety measures based on patient assessment

## 2023-10-16 ENCOUNTER — TELEPHONE (OUTPATIENT)
Dept: FAMILY MEDICINE CLINIC | Age: 58
End: 2023-10-16

## 2023-10-16 VITALS
BODY MASS INDEX: 29.83 KG/M2 | WEIGHT: 208.34 LBS | TEMPERATURE: 98.5 F | OXYGEN SATURATION: 95 % | DIASTOLIC BLOOD PRESSURE: 78 MMHG | SYSTOLIC BLOOD PRESSURE: 155 MMHG | RESPIRATION RATE: 16 BRPM | HEART RATE: 71 BPM | HEIGHT: 70 IN

## 2023-10-16 PROBLEM — N18.9 ACUTE KIDNEY INJURY SUPERIMPOSED ON CKD (HCC): Status: RESOLVED | Noted: 2023-10-15 | Resolved: 2023-10-16

## 2023-10-16 PROBLEM — N17.9 ACUTE KIDNEY INJURY SUPERIMPOSED ON CKD (HCC): Status: RESOLVED | Noted: 2023-10-15 | Resolved: 2023-10-16

## 2023-10-16 PROBLEM — R00.1 SYMPTOMATIC BRADYCARDIA: Status: RESOLVED | Noted: 2023-10-14 | Resolved: 2023-10-16

## 2023-10-16 PROBLEM — R42 DIZZINESS: Status: RESOLVED | Noted: 2023-10-14 | Resolved: 2023-10-16

## 2023-10-16 LAB
ANION GAP SERPL CALCULATED.3IONS-SCNC: 9 MMOL/L (ref 9–17)
BASOPHILS # BLD: 0.04 K/UL (ref 0–0.2)
BASOPHILS NFR BLD: 1 % (ref 0–2)
BUN SERPL-MCNC: 30 MG/DL (ref 6–20)
CALCIUM SERPL-MCNC: 8.7 MG/DL (ref 8.6–10.4)
CHLORIDE SERPL-SCNC: 107 MMOL/L (ref 98–107)
CO2 SERPL-SCNC: 23 MMOL/L (ref 20–31)
CREAT SERPL-MCNC: 1.8 MG/DL (ref 0.7–1.2)
EOSINOPHIL # BLD: 0.14 K/UL (ref 0–0.44)
EOSINOPHILS RELATIVE PERCENT: 3 % (ref 1–4)
ERYTHROCYTE [DISTWIDTH] IN BLOOD BY AUTOMATED COUNT: 12.6 % (ref 11.8–14.4)
GFR SERPL CREATININE-BSD FRML MDRD: 43 ML/MIN/1.73M2
GLUCOSE SERPL-MCNC: 105 MG/DL (ref 70–99)
HCT VFR BLD AUTO: 46.3 % (ref 40.7–50.3)
HGB BLD-MCNC: 15.4 G/DL (ref 13–17)
IMM GRANULOCYTES # BLD AUTO: <0.03 K/UL (ref 0–0.3)
IMM GRANULOCYTES NFR BLD: 0 %
LYMPHOCYTES NFR BLD: 0.91 K/UL (ref 1.1–3.7)
LYMPHOCYTES RELATIVE PERCENT: 21 % (ref 24–43)
MCH RBC QN AUTO: 31.8 PG (ref 25.2–33.5)
MCHC RBC AUTO-ENTMCNC: 33.3 G/DL (ref 28.4–34.8)
MCV RBC AUTO: 95.7 FL (ref 82.6–102.9)
MONOCYTES NFR BLD: 0.25 K/UL (ref 0.1–1.2)
MONOCYTES NFR BLD: 6 % (ref 3–12)
NEUTROPHILS NFR BLD: 69 % (ref 36–65)
NEUTS SEG NFR BLD: 2.99 K/UL (ref 1.5–8.1)
NRBC BLD-RTO: 0 PER 100 WBC
PLATELET # BLD AUTO: 160 K/UL (ref 138–453)
PMV BLD AUTO: 11.1 FL (ref 8.1–13.5)
POTASSIUM SERPL-SCNC: 3.8 MMOL/L (ref 3.7–5.3)
RBC # BLD AUTO: 4.84 M/UL (ref 4.21–5.77)
SODIUM SERPL-SCNC: 139 MMOL/L (ref 135–144)
WBC OTHER # BLD: 4.3 K/UL (ref 3.5–11.3)

## 2023-10-16 PROCEDURE — 85025 COMPLETE CBC W/AUTO DIFF WBC: CPT

## 2023-10-16 PROCEDURE — 6370000000 HC RX 637 (ALT 250 FOR IP)

## 2023-10-16 PROCEDURE — 36415 COLL VENOUS BLD VENIPUNCTURE: CPT

## 2023-10-16 PROCEDURE — 6370000000 HC RX 637 (ALT 250 FOR IP): Performed by: STUDENT IN AN ORGANIZED HEALTH CARE EDUCATION/TRAINING PROGRAM

## 2023-10-16 PROCEDURE — 97165 OT EVAL LOW COMPLEX 30 MIN: CPT

## 2023-10-16 PROCEDURE — 99232 SBSQ HOSP IP/OBS MODERATE 35: CPT | Performed by: INTERNAL MEDICINE

## 2023-10-16 PROCEDURE — 2580000003 HC RX 258

## 2023-10-16 PROCEDURE — 99233 SBSQ HOSP IP/OBS HIGH 50: CPT | Performed by: SURGERY

## 2023-10-16 PROCEDURE — 6360000002 HC RX W HCPCS

## 2023-10-16 PROCEDURE — 80048 BASIC METABOLIC PNL TOTAL CA: CPT

## 2023-10-16 RX ORDER — HYDROCHLOROTHIAZIDE 25 MG/1
25 TABLET ORAL DAILY
Status: DISCONTINUED | OUTPATIENT
Start: 2023-10-16 | End: 2023-10-16

## 2023-10-16 RX ORDER — SPIRONOLACTONE 25 MG/1
12.5 TABLET ORAL DAILY
Status: DISCONTINUED | OUTPATIENT
Start: 2023-10-16 | End: 2023-10-16 | Stop reason: HOSPADM

## 2023-10-16 RX ORDER — LOSARTAN POTASSIUM 100 MG/1
100 TABLET ORAL DAILY
Qty: 30 TABLET | Refills: 3 | Status: SHIPPED | OUTPATIENT
Start: 2023-10-17

## 2023-10-16 RX ORDER — AMLODIPINE BESYLATE 10 MG/1
10 TABLET ORAL DAILY
Qty: 30 TABLET | Refills: 3 | Status: SHIPPED | OUTPATIENT
Start: 2023-10-17

## 2023-10-16 RX ORDER — CARVEDILOL 6.25 MG/1
6.25 TABLET ORAL 2 TIMES DAILY WITH MEALS
Status: DISCONTINUED | OUTPATIENT
Start: 2023-10-16 | End: 2023-10-16 | Stop reason: HOSPADM

## 2023-10-16 RX ORDER — CARVEDILOL 12.5 MG/1
12.5 TABLET ORAL 2 TIMES DAILY WITH MEALS
Status: DISCONTINUED | OUTPATIENT
Start: 2023-10-16 | End: 2023-10-16

## 2023-10-16 RX ORDER — HYDRALAZINE HYDROCHLORIDE 100 MG/1
100 TABLET, FILM COATED ORAL 3 TIMES DAILY
Qty: 90 TABLET | Refills: 3 | Status: SHIPPED | OUTPATIENT
Start: 2023-10-16

## 2023-10-16 RX ORDER — CARVEDILOL 6.25 MG/1
6.25 TABLET ORAL 2 TIMES DAILY WITH MEALS
Qty: 60 TABLET | Refills: 3 | Status: SHIPPED | OUTPATIENT
Start: 2023-10-16

## 2023-10-16 RX ADMIN — APIXABAN 5 MG: 5 TABLET, FILM COATED ORAL at 08:45

## 2023-10-16 RX ADMIN — ONDANSETRON 4 MG: 2 INJECTION INTRAMUSCULAR; INTRAVENOUS at 13:09

## 2023-10-16 RX ADMIN — SPIRONOLACTONE 12.5 MG: 25 TABLET ORAL at 14:11

## 2023-10-16 RX ADMIN — AMLODIPINE BESYLATE 10 MG: 10 TABLET ORAL at 08:44

## 2023-10-16 RX ADMIN — CARVEDILOL 12.5 MG: 12.5 TABLET, FILM COATED ORAL at 08:47

## 2023-10-16 RX ADMIN — ACETAMINOPHEN 650 MG: 325 TABLET ORAL at 13:09

## 2023-10-16 RX ADMIN — HYDRALAZINE HYDROCHLORIDE 100 MG: 50 TABLET, FILM COATED ORAL at 14:11

## 2023-10-16 RX ADMIN — HYDRALAZINE HYDROCHLORIDE 100 MG: 50 TABLET, FILM COATED ORAL at 08:45

## 2023-10-16 RX ADMIN — CARVEDILOL 6.25 MG: 6.25 TABLET, FILM COATED ORAL at 18:11

## 2023-10-16 RX ADMIN — LOSARTAN POTASSIUM 100 MG: 50 TABLET, FILM COATED ORAL at 08:44

## 2023-10-16 RX ADMIN — ATORVASTATIN CALCIUM 40 MG: 40 TABLET, FILM COATED ORAL at 08:44

## 2023-10-16 RX ADMIN — HYDRALAZINE HYDROCHLORIDE 20 MG: 20 INJECTION, SOLUTION INTRAMUSCULAR; INTRAVENOUS at 12:12

## 2023-10-16 RX ADMIN — SODIUM CHLORIDE, PRESERVATIVE FREE 10 ML: 5 INJECTION INTRAVENOUS at 08:45

## 2023-10-16 RX ADMIN — PANTOPRAZOLE SODIUM 40 MG: 40 TABLET, DELAYED RELEASE ORAL at 08:45

## 2023-10-16 NOTE — TELEPHONE ENCOUNTER
If it's a work restriction because of his cardiac condition (which ER and hospital notes state it was), any documentation would need to come from cardiology.

## 2023-10-16 NOTE — PLAN OF CARE
Problem: Discharge Planning  Goal: Discharge to home or other facility with appropriate resources  Outcome: Progressing  Flowsheets (Taken 10/15/2023 2000 by Britney Cummins, RN)  Discharge to home or other facility with appropriate resources: Identify barriers to discharge with patient and caregiver     Problem: Pain  Goal: Verbalizes/displays adequate comfort level or baseline comfort level  Outcome: Progressing     Problem: ABCDS Injury Assessment  Goal: Absence of physical injury  Outcome: Progressing  Flowsheets (Taken 10/15/2023 2135 by Britney Cummins, RN)  Absence of Physical Injury: Implement safety measures based on patient assessment

## 2023-10-16 NOTE — TELEPHONE ENCOUNTER
Patient's wife called stating that patient is in hospital after episode at work. 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 On Saturday he had to be take to the ED because of this. Patient is asking for a letter for light duty. Patient is scheduled for hospital follow up 10/25/2023.  Patient is returning to work 10/18/2023

## 2023-10-16 NOTE — PLAN OF CARE
Problem: Discharge Planning  Goal: Discharge to home or other facility with appropriate resources  Recent Flowsheet Documentation  Taken 10/15/2023 2000 by Kimberley Contreras RN  Discharge to home or other facility with appropriate resources: Identify barriers to discharge with patient and caregiver     Problem: Pain  Goal: Verbalizes/displays adequate comfort level or baseline comfort level  Recent Flowsheet Documentation  Taken 10/15/2023 0745 by Sarah Willson RN  Verbalizes/displays adequate comfort level or baseline comfort level:   Encourage patient to monitor pain and request assistance   Assess pain using appropriate pain scale     Problem: ABCDS Injury Assessment  Goal: Absence of physical injury  Recent Flowsheet Documentation  Taken 10/15/2023 2135 by Kimberley Contreras RN  Absence of Physical Injury: Implement safety measures based on patient assessment

## 2023-10-16 NOTE — DISCHARGE INSTRUCTIONS
You have been admitted to the hospital and found out to have high blood pressure. Cardiology recommended echocardiogram which can be done as an outpatient  You have been started on amlodipine 10 mg, dose of carvedilol has been decreased to 6.25 mg twice daily, dose of hydralazine has been increased to 100 mg 3 times daily, dose of losartan has been increased to 100 mg daily for better blood pressure control  Please follow-up with your PCP for hypertension. Please check your blood pressure at home regularly and call your PCP if your blood pressure runs more than 180/110.   Please come to the ER if you have any worsening of symptoms  Please take all your medications as prescribed

## 2023-10-19 LAB
EKG ATRIAL RATE: 46 BPM
EKG P AXIS: 54 DEGREES
EKG P-R INTERVAL: 164 MS
EKG Q-T INTERVAL: 528 MS
EKG QRS DURATION: 108 MS
EKG QTC CALCULATION (BAZETT): 462 MS
EKG R AXIS: -43 DEGREES
EKG T AXIS: 173 DEGREES
EKG VENTRICULAR RATE: 46 BPM

## 2023-10-20 NOTE — DISCHARGE SUMMARY
[address] [facility]     Department of Internal Medicine - Staff Internal Medicine Teaching Service    INPATIENT DISCHARGE SUMMARY      Patient Identification:  Karmen Vasquez is a 59 y.o. male. :  1965  MRN: [id number]     Acct: [de-identified]   PCP: CHUCHO Pollard CNP  Admit Date:  10/14/2023  Discharge date and time: 10/16/2023  6:52 PM   Attending Provider: No att. providers found                                      [facility] [address] Problem Lists:  Principal Problem (Resolved): Symptomatic bradycardia  Active Problems:    Hypertensive urgency    Atrial fibrillation (HCC)    Nonischemic cardiomyopathy (HCC)    Chronic combined systolic and diastolic CHF (congestive heart failure) (HCC)    CKD (chronic kidney disease), stage III (HCC)    White matter abnormality on MRI of brain  Resolved Problems:    Acute kidney injury superimposed on CKD [facility])    Dizziness      HOSPITAL STAY     Brief Inpatient course:   Karmen Vasquez is a 59 y.o. male medical history of atrial fibrillation, hypertension, non-ischemic HFmrEF 45-50% EF, stage IIIb CKD, presents with a chief complaint of dizziness. Patient states that he has had dizziness for the past couple days. He has noticed this when he gets up and walks around. Of note, the patient does take Cardura at home. Patient also has a complaint of chest pain. Pain is nonexertional, nonreproducible, nonradiating, is located mostly in the left side of his chest.  Chest pain does not worsen with coughing. Also states it is not worse with exertion. Of note, the patient does have history of hypertension. Recent stress test was in 2022. At that time patient did not have any ST abnormalities EKG during the stress portion, but he did have T wave abnormalities in inferior lateral leads at baseline. Nuclear stress test did not show any stress-induced ischemia or infarction. Global hypokinesis noted.   TTE

## 2023-10-25 ENCOUNTER — OFFICE VISIT (OUTPATIENT)
Dept: FAMILY MEDICINE CLINIC | Age: 58
End: 2023-10-25
Payer: COMMERCIAL

## 2023-10-25 VITALS
TEMPERATURE: 97.3 F | BODY MASS INDEX: 31.57 KG/M2 | OXYGEN SATURATION: 97 % | WEIGHT: 220 LBS | DIASTOLIC BLOOD PRESSURE: 98 MMHG | SYSTOLIC BLOOD PRESSURE: 162 MMHG | HEART RATE: 64 BPM

## 2023-10-25 DIAGNOSIS — Z09 HOSPITAL DISCHARGE FOLLOW-UP: Primary | ICD-10-CM

## 2023-10-25 DIAGNOSIS — E78.5 DYSLIPIDEMIA: ICD-10-CM

## 2023-10-25 DIAGNOSIS — I48.0 PAROXYSMAL ATRIAL FIBRILLATION (HCC): ICD-10-CM

## 2023-10-25 DIAGNOSIS — I42.8 NONISCHEMIC CARDIOMYOPATHY (HCC): ICD-10-CM

## 2023-10-25 DIAGNOSIS — K22.70 BARRETT'S ESOPHAGUS WITHOUT DYSPLASIA: ICD-10-CM

## 2023-10-25 DIAGNOSIS — I10 HTN (HYPERTENSION), MALIGNANT: ICD-10-CM

## 2023-10-25 DIAGNOSIS — N18.32 STAGE 3B CHRONIC KIDNEY DISEASE (HCC): ICD-10-CM

## 2023-10-25 DIAGNOSIS — E55.9 VITAMIN D DEFICIENCY: ICD-10-CM

## 2023-10-25 DIAGNOSIS — I65.23 BILATERAL CAROTID ARTERY STENOSIS: ICD-10-CM

## 2023-10-25 DIAGNOSIS — R00.1 SYMPTOMATIC BRADYCARDIA: ICD-10-CM

## 2023-10-25 DIAGNOSIS — I50.42 CHRONIC COMBINED SYSTOLIC AND DIASTOLIC CHF (CONGESTIVE HEART FAILURE) (HCC): ICD-10-CM

## 2023-10-25 PROCEDURE — G8427 DOCREV CUR MEDS BY ELIG CLIN: HCPCS

## 2023-10-25 PROCEDURE — 99214 OFFICE O/P EST MOD 30 MIN: CPT

## 2023-10-25 PROCEDURE — G8484 FLU IMMUNIZE NO ADMIN: HCPCS

## 2023-10-25 PROCEDURE — 1036F TOBACCO NON-USER: CPT

## 2023-10-25 PROCEDURE — G8417 CALC BMI ABV UP PARAM F/U: HCPCS

## 2023-10-25 PROCEDURE — 3077F SYST BP >= 140 MM HG: CPT

## 2023-10-25 PROCEDURE — 1111F DSCHRG MED/CURRENT MED MERGE: CPT

## 2023-10-25 PROCEDURE — 3080F DIAST BP >= 90 MM HG: CPT

## 2023-10-25 PROCEDURE — 3017F COLORECTAL CA SCREEN DOC REV: CPT

## 2023-10-25 RX ORDER — SPIRONOLACTONE 25 MG/1
12.5 TABLET ORAL DAILY
Qty: 30 TABLET | Refills: 0 | Status: SHIPPED | OUTPATIENT
Start: 2023-10-25

## 2023-10-25 RX ORDER — PANTOPRAZOLE SODIUM 40 MG/1
40 TABLET, DELAYED RELEASE ORAL
Qty: 60 TABLET | Refills: 1 | Status: SHIPPED | OUTPATIENT
Start: 2023-10-25

## 2023-10-25 RX ORDER — ATORVASTATIN CALCIUM 40 MG/1
40 TABLET, FILM COATED ORAL DAILY
Qty: 30 TABLET | Refills: 0 | Status: SHIPPED | OUTPATIENT
Start: 2023-10-25

## 2023-10-25 RX ORDER — MELATONIN
1000 DAILY
Qty: 90 TABLET | Refills: 1 | Status: SHIPPED | OUTPATIENT
Start: 2023-10-25

## 2023-10-25 SDOH — ECONOMIC STABILITY: INCOME INSECURITY: HOW HARD IS IT FOR YOU TO PAY FOR THE VERY BASICS LIKE FOOD, HOUSING, MEDICAL CARE, AND HEATING?: NOT HARD AT ALL

## 2023-10-25 SDOH — ECONOMIC STABILITY: FOOD INSECURITY: WITHIN THE PAST 12 MONTHS, THE FOOD YOU BOUGHT JUST DIDN'T LAST AND YOU DIDN'T HAVE MONEY TO GET MORE.: NEVER TRUE

## 2023-10-25 SDOH — ECONOMIC STABILITY: FOOD INSECURITY: WITHIN THE PAST 12 MONTHS, YOU WORRIED THAT YOUR FOOD WOULD RUN OUT BEFORE YOU GOT MONEY TO BUY MORE.: NEVER TRUE

## 2023-10-25 ASSESSMENT — PATIENT HEALTH QUESTIONNAIRE - PHQ9
SUM OF ALL RESPONSES TO PHQ9 QUESTIONS 1 & 2: 0
SUM OF ALL RESPONSES TO PHQ QUESTIONS 1-9: 0
1. LITTLE INTEREST OR PLEASURE IN DOING THINGS: 0
2. FEELING DOWN, DEPRESSED OR HOPELESS: 0

## 2023-10-25 NOTE — PROGRESS NOTES
tablet  Commonly known as: COZAAR  Take 1 tablet by mouth daily     senna 8.6 MG tablet  Commonly known as: Senokot  Take 1 tablet by mouth 2 times daily     spironolactone 25 MG tablet  Commonly known as: ALDACTONE  Take 0.5 tablets by mouth daily     sucralfate 1 GM tablet  Commonly known as: CARAFATE  Take 1 tablet by mouth 4 times daily           * This list has 2 medication(s) that are the same as other medications prescribed for you. Read the directions carefully, and ask your doctor or other care provider to review them with you.                    Where to Get Your Medications        These medications were sent to [address] [place] [address], [address]  [address] [place] [address], [address] [place] [address]      Phone: [phone]   apixaban 5 MG Tabs tablet  atorvastatin 40 MG tablet  pantoprazole 40 MG tablet  spironolactone 25 MG tablet  vitamin D3 25 MCG (1000 UT) Tabs tablet           Medications marked \"taking\" at this time  Outpatient Medications Marked as Taking for the 10/25/23 encounter (Office Visit) with CHUCHO Cervantes CNP   Medication Sig Dispense Refill    vitamin D3 (CHOLECALCIFEROL) 25 MCG (1000 UT) TABS tablet Take 1 tablet by mouth daily 90 tablet 1    spironolactone (ALDACTONE) 25 MG tablet Take 0.5 tablets by mouth daily 30 tablet 0    apixaban (ELIQUIS) 5 MG TABS tablet Take 1 tablet by mouth 2 times daily 60 tablet 0    atorvastatin (LIPITOR) 40 MG tablet Take 1 tablet by mouth daily 30 tablet 0    pantoprazole (PROTONIX) 40 MG tablet Take 1 tablet by mouth every morning (before breakfast) 60 tablet 1    hydrALAZINE (APRESOLINE) 100 MG tablet Take 1 tablet by mouth 3 times daily 90 tablet 3    losartan (COZAAR) 100 MG tablet Take 1 tablet by mouth daily 30 tablet 3    carvedilol (COREG) 6.25 MG tablet Take 1 tablet by mouth 2 times daily (with meals) 60 tablet 3    amLODIPine (NORVASC) 10 MG tablet Take 1 tablet by mouth daily 30 tablet 3    FEROSUL

## 2023-10-30 ENCOUNTER — TELEPHONE (OUTPATIENT)
Dept: GASTROENTEROLOGY | Age: 58
End: 2023-10-30

## 2023-10-30 DIAGNOSIS — I48.0 PAROXYSMAL ATRIAL FIBRILLATION (HCC): ICD-10-CM

## 2023-10-30 DIAGNOSIS — I10 HTN, GOAL BELOW 130/80: ICD-10-CM

## 2023-10-30 RX ORDER — SENNOSIDES A AND B 8.6 MG/1
1 TABLET, FILM COATED ORAL 2 TIMES DAILY
Qty: 60 TABLET | Refills: 0 | Status: CANCELLED | OUTPATIENT
Start: 2023-10-30 | End: 2024-10-29

## 2023-10-30 RX ORDER — SUCRALFATE 1 G/1
1 TABLET ORAL 4 TIMES DAILY
Qty: 120 TABLET | Refills: 3 | Status: CANCELLED | OUTPATIENT
Start: 2023-10-30

## 2023-10-30 RX ORDER — DOXAZOSIN 2 MG/1
2 TABLET ORAL DAILY
Qty: 90 TABLET | Refills: 1 | Status: CANCELLED | OUTPATIENT
Start: 2023-10-30

## 2023-10-30 RX ORDER — AMLODIPINE BESYLATE 10 MG/1
10 TABLET ORAL DAILY
Qty: 30 TABLET | Refills: 3 | Status: CANCELLED | OUTPATIENT
Start: 2023-10-30

## 2023-10-30 NOTE — TELEPHONE ENCOUNTER
Andria Harding is calling to request a refill on the following medication(s):    Medication Request:  Requested Prescriptions     Pending Prescriptions Disp Refills    amLODIPine (NORVASC) 10 MG tablet 30 tablet 3     Sig: Take 1 tablet by mouth daily    senna (SENOKOT) 8.6 MG tablet 60 tablet 0     Sig: Take 1 tablet by mouth 2 times daily    apixaban (ELIQUIS) 5 MG TABS tablet 60 tablet 0     Sig: Take 1 tablet by mouth 2 times daily    doxazosin (CARDURA) 2 MG tablet 90 tablet 1     Sig: Take 1 tablet by mouth daily    sucralfate (CARAFATE) 1 GM tablet 120 tablet 3     Sig: Take 1 tablet by mouth 4 times daily       Last Visit Date (If Applicable):  07/17/6513    Next Visit Date:    11/6/2023

## 2023-11-06 ENCOUNTER — OFFICE VISIT (OUTPATIENT)
Dept: FAMILY MEDICINE CLINIC | Age: 58
End: 2023-11-06
Payer: COMMERCIAL

## 2023-11-06 VITALS
HEART RATE: 67 BPM | WEIGHT: 219.8 LBS | TEMPERATURE: 97.5 F | OXYGEN SATURATION: 96 % | BODY MASS INDEX: 31.54 KG/M2 | DIASTOLIC BLOOD PRESSURE: 90 MMHG | SYSTOLIC BLOOD PRESSURE: 148 MMHG

## 2023-11-06 DIAGNOSIS — N18.32 STAGE 3B CHRONIC KIDNEY DISEASE (HCC): ICD-10-CM

## 2023-11-06 DIAGNOSIS — I50.42 CHRONIC COMBINED SYSTOLIC AND DIASTOLIC CHF (CONGESTIVE HEART FAILURE) (HCC): ICD-10-CM

## 2023-11-06 DIAGNOSIS — I42.8 NONISCHEMIC CARDIOMYOPATHY (HCC): ICD-10-CM

## 2023-11-06 DIAGNOSIS — I10 HTN, GOAL BELOW 130/80: Primary | ICD-10-CM

## 2023-11-06 DIAGNOSIS — K92.2 UGIB (UPPER GASTROINTESTINAL BLEED): ICD-10-CM

## 2023-11-06 PROCEDURE — 3017F COLORECTAL CA SCREEN DOC REV: CPT

## 2023-11-06 PROCEDURE — 1111F DSCHRG MED/CURRENT MED MERGE: CPT

## 2023-11-06 PROCEDURE — 3077F SYST BP >= 140 MM HG: CPT

## 2023-11-06 PROCEDURE — G8484 FLU IMMUNIZE NO ADMIN: HCPCS

## 2023-11-06 PROCEDURE — G8427 DOCREV CUR MEDS BY ELIG CLIN: HCPCS

## 2023-11-06 PROCEDURE — G8417 CALC BMI ABV UP PARAM F/U: HCPCS

## 2023-11-06 PROCEDURE — 3080F DIAST BP >= 90 MM HG: CPT

## 2023-11-06 PROCEDURE — 99214 OFFICE O/P EST MOD 30 MIN: CPT

## 2023-11-06 PROCEDURE — 1036F TOBACCO NON-USER: CPT

## 2023-11-06 RX ORDER — DOXAZOSIN 2 MG/1
2 TABLET ORAL DAILY
Qty: 90 TABLET | Refills: 1 | Status: SHIPPED | OUTPATIENT
Start: 2023-11-06

## 2023-11-06 SDOH — ECONOMIC STABILITY: FOOD INSECURITY: WITHIN THE PAST 12 MONTHS, THE FOOD YOU BOUGHT JUST DIDN'T LAST AND YOU DIDN'T HAVE MONEY TO GET MORE.: NEVER TRUE

## 2023-11-06 SDOH — ECONOMIC STABILITY: INCOME INSECURITY: HOW HARD IS IT FOR YOU TO PAY FOR THE VERY BASICS LIKE FOOD, HOUSING, MEDICAL CARE, AND HEATING?: NOT HARD AT ALL

## 2023-11-06 SDOH — ECONOMIC STABILITY: FOOD INSECURITY: WITHIN THE PAST 12 MONTHS, YOU WORRIED THAT YOUR FOOD WOULD RUN OUT BEFORE YOU GOT MONEY TO BUY MORE.: NEVER TRUE

## 2023-11-06 ASSESSMENT — ENCOUNTER SYMPTOMS
BACK PAIN: 0
SHORTNESS OF BREATH: 0
NAUSEA: 0
CHEST TIGHTNESS: 0
EYE DISCHARGE: 0
ABDOMINAL PAIN: 0
COUGH: 0
VOMITING: 0
SORE THROAT: 0
CONSTIPATION: 0
WHEEZING: 0
DIARRHEA: 0
EYE PAIN: 0
COLOR CHANGE: 0

## 2023-11-06 ASSESSMENT — PATIENT HEALTH QUESTIONNAIRE - PHQ9
SUM OF ALL RESPONSES TO PHQ QUESTIONS 1-9: 0
SUM OF ALL RESPONSES TO PHQ QUESTIONS 1-9: 0
1. LITTLE INTEREST OR PLEASURE IN DOING THINGS: 0
SUM OF ALL RESPONSES TO PHQ QUESTIONS 1-9: 0
2. FEELING DOWN, DEPRESSED OR HOPELESS: 0
SUM OF ALL RESPONSES TO PHQ9 QUESTIONS 1 & 2: 0
SUM OF ALL RESPONSES TO PHQ QUESTIONS 1-9: 0

## 2023-11-06 NOTE — PROGRESS NOTES
Nisreen Sanchez (:  1965) is a 59 y.o. male,Established patient, here for evaluation of the following chief complaint(s):  Hypertension          Subjective   SUBJECTIVE/OBJECTIVE:  Pt here today for HTN f/u, accompanied by wife  BP elevated but improved from previous visit    He has restarted all medications that were initiated prior to and changed in the hospital  Has been out of Doxazosin, however  Has not yet seen cardiology, GI or nephro since hospitalization     Not monitoring BP at home    Pt reports he has noticed his stools have darkened in color over the last few weeks  This will come and go  He denies abdominal pain, is not straining or having diarrhea    He has been taking PO iron since hospitalization but also has hx of GI bleed          Review of Systems   Constitutional:  Negative for activity change, appetite change, chills, fatigue, fever and unexpected weight change. HENT:  Negative for congestion, ear pain and sore throat. Eyes:  Negative for pain, discharge and visual disturbance. Respiratory:  Negative for cough, chest tightness, shortness of breath and wheezing. Cardiovascular:  Negative for chest pain, palpitations and leg swelling. Gastrointestinal:  Negative for abdominal pain, constipation, diarrhea, nausea and vomiting. Dark stool   Genitourinary:  Negative for difficulty urinating and dysuria. Musculoskeletal:  Negative for back pain, gait problem and neck pain. Skin:  Negative for color change, rash and wound. Neurological:  Negative for dizziness, seizures, syncope, weakness, light-headedness, numbness and headaches. Psychiatric/Behavioral:  Negative for confusion. Objective   Physical Exam  Vitals and nursing note reviewed. Constitutional:       General: He is not in acute distress. Appearance: Normal appearance. He is well-developed. He is obese. He is not ill-appearing, toxic-appearing or diaphoretic.    HENT:      Head:

## 2023-11-06 NOTE — PATIENT INSTRUCTIONS
January 4th 3:30 pm at [facility] office by [facility] Gastroenterology- Palak Caceres MD

## 2023-11-07 ENCOUNTER — TELEPHONE (OUTPATIENT)
Dept: FAMILY MEDICINE CLINIC | Age: 58
End: 2023-11-07

## 2023-11-07 RX ORDER — FERROUS SULFATE 325(65) MG
1 TABLET ORAL 2 TIMES DAILY
Qty: 60 TABLET | Refills: 0 | Status: SHIPPED | OUTPATIENT
Start: 2023-11-07

## 2023-11-07 NOTE — TELEPHONE ENCOUNTER
Patient wife calling stating that he is vomiting, nauseous, abdominal pain, dark stools, weak. Feeling worse then yesterday at appointment She is asking for advice as to what to do.

## 2023-11-07 NOTE — TELEPHONE ENCOUNTER
Nisreen Sanchez is calling to request a refill on the following medication(s):    Medication Request:  Requested Prescriptions     Pending Prescriptions Disp Refills    FEROSUL 325 (65 Fe) MG tablet [Pharmacy Med Name: FEROSUL 325 MG TABLET] 60 tablet 0     Sig: take 1 tablet by mouth twice a day       Last Visit Date (If Applicable):  96/5/8374    Next Visit Date:    5/8/2024

## 2023-11-08 ENCOUNTER — HOSPITAL ENCOUNTER (INPATIENT)
Age: 58
LOS: 1 days | Discharge: HOME | DRG: 139 | End: 2023-11-09
Attending: EMERGENCY MEDICINE | Admitting: STUDENT IN AN ORGANIZED HEALTH CARE EDUCATION/TRAINING PROGRAM
Payer: COMMERCIAL

## 2023-11-08 ENCOUNTER — ANCILLARY PROCEDURE (OUTPATIENT)
Dept: EMERGENCY DEPT | Age: 58
DRG: 139 | End: 2023-11-08
Attending: EMERGENCY MEDICINE
Payer: COMMERCIAL

## 2023-11-08 ENCOUNTER — APPOINTMENT (OUTPATIENT)
Dept: GENERAL RADIOLOGY | Age: 58
DRG: 139 | End: 2023-11-08
Payer: COMMERCIAL

## 2023-11-08 VITALS
TEMPERATURE: 101.7 F | RESPIRATION RATE: 15 BRPM | DIASTOLIC BLOOD PRESSURE: 84 MMHG | SYSTOLIC BLOOD PRESSURE: 129 MMHG | OXYGEN SATURATION: 100 % | HEART RATE: 92 BPM

## 2023-11-08 DIAGNOSIS — N17.9 AKI (ACUTE KIDNEY INJURY) (HCC): ICD-10-CM

## 2023-11-08 DIAGNOSIS — J18.9 PNEUMONIA OF BOTH LUNGS DUE TO INFECTIOUS ORGANISM, UNSPECIFIED PART OF LUNG: Primary | ICD-10-CM

## 2023-11-08 LAB
ALBUMIN SERPL-MCNC: 3.5 G/DL (ref 3.5–5.2)
ALBUMIN/GLOB SERPL: 1 {RATIO} (ref 1–2.5)
ALP SERPL-CCNC: 63 U/L (ref 40–129)
ALT SERPL-CCNC: 14 U/L (ref 5–41)
ANION GAP SERPL CALCULATED.3IONS-SCNC: 13 MMOL/L (ref 9–17)
AST SERPL-CCNC: 17 U/L
BASOPHILS # BLD: 0 K/UL (ref 0–0.2)
BASOPHILS NFR BLD: 0 % (ref 0–2)
BILIRUB SERPL-MCNC: 1.3 MG/DL (ref 0.3–1.2)
BNP SERPL-MCNC: 99 PG/ML
BUN SERPL-MCNC: 30 MG/DL (ref 6–20)
CALCIUM SERPL-MCNC: 8.8 MG/DL (ref 8.6–10.4)
CHLORIDE SERPL-SCNC: 102 MMOL/L (ref 98–107)
CO2 SERPL-SCNC: 22 MMOL/L (ref 20–31)
CREAT SERPL-MCNC: 2.6 MG/DL (ref 0.7–1.2)
D DIMER PPP FEU-MCNC: 0.41 UG/ML FEU (ref 0–0.57)
EOSINOPHIL # BLD: 0.1 K/UL (ref 0–0.4)
EOSINOPHILS RELATIVE PERCENT: 1 % (ref 1–4)
ERYTHROCYTE [DISTWIDTH] IN BLOOD BY AUTOMATED COUNT: 11.6 % (ref 11.8–14.4)
FLUAV AG SPEC QL: NEGATIVE
FLUBV AG SPEC QL: NEGATIVE
GFR SERPL CREATININE-BSD FRML MDRD: 28 ML/MIN/1.73M2
GLUCOSE SERPL-MCNC: 120 MG/DL (ref 70–99)
HCT VFR BLD AUTO: 40.4 % (ref 40.7–50.3)
HGB BLD-MCNC: 13.6 G/DL (ref 13–17)
IMM GRANULOCYTES # BLD AUTO: 0 K/UL (ref 0–0.3)
IMM GRANULOCYTES NFR BLD: 0 %
LACTIC ACID, WHOLE BLOOD: 1.2 MMOL/L (ref 0.7–2.1)
LYMPHOCYTES NFR BLD: 0.98 K/UL (ref 1–4.8)
LYMPHOCYTES RELATIVE PERCENT: 10 % (ref 24–44)
MCH RBC QN AUTO: 31.5 PG (ref 25.2–33.5)
MCHC RBC AUTO-ENTMCNC: 33.7 G/DL (ref 28.4–34.8)
MCV RBC AUTO: 93.5 FL (ref 82.6–102.9)
MONOCYTES NFR BLD: 0.2 K/UL (ref 0.1–0.8)
MONOCYTES NFR BLD: 2 % (ref 1–7)
MORPHOLOGY: NORMAL
NEUTROPHILS NFR BLD: 87 % (ref 36–66)
NEUTS SEG NFR BLD: 8.52 K/UL (ref 1.8–7.7)
NRBC BLD-RTO: 0 PER 100 WBC
PLATELET # BLD AUTO: ABNORMAL K/UL (ref 138–453)
PLATELET, FLUORESCENCE: 143 K/UL (ref 138–453)
PLATELETS.RETICULATED NFR BLD AUTO: 6.4 % (ref 1.1–10.3)
POTASSIUM SERPL-SCNC: 3.8 MMOL/L (ref 3.7–5.3)
PROT SERPL-MCNC: 7 G/DL (ref 6.4–8.3)
RBC # BLD AUTO: 4.32 M/UL (ref 4.21–5.77)
SARS-COV-2 RDRP RESP QL NAA+PROBE: NOT DETECTED
SODIUM SERPL-SCNC: 137 MMOL/L (ref 135–144)
SPECIMEN DESCRIPTION: NORMAL
TROPONIN I SERPL HS-MCNC: 18 NG/L (ref 0–22)
TROPONIN I SERPL HS-MCNC: 19 NG/L (ref 0–22)
WBC OTHER # BLD: 9.8 K/UL (ref 3.5–11.3)

## 2023-11-08 PROCEDURE — 99223 1ST HOSP IP/OBS HIGH 75: CPT | Performed by: STUDENT IN AN ORGANIZED HEALTH CARE EDUCATION/TRAINING PROGRAM

## 2023-11-08 PROCEDURE — 96366 THER/PROPH/DIAG IV INF ADDON: CPT

## 2023-11-08 PROCEDURE — 96375 TX/PRO/DX INJ NEW DRUG ADDON: CPT

## 2023-11-08 PROCEDURE — 80053 COMPREHEN METABOLIC PANEL: CPT

## 2023-11-08 PROCEDURE — 96374 THER/PROPH/DIAG INJ IV PUSH: CPT

## 2023-11-08 PROCEDURE — 87040 BLOOD CULTURE FOR BACTERIA: CPT

## 2023-11-08 PROCEDURE — 96361 HYDRATE IV INFUSION ADD-ON: CPT

## 2023-11-08 PROCEDURE — G0378 HOSPITAL OBSERVATION PER HR: HCPCS

## 2023-11-08 PROCEDURE — 96365 THER/PROPH/DIAG IV INF INIT: CPT

## 2023-11-08 PROCEDURE — 93308 TTE F-UP OR LMTD: CPT

## 2023-11-08 PROCEDURE — 6360000002 HC RX W HCPCS: Performed by: STUDENT IN AN ORGANIZED HEALTH CARE EDUCATION/TRAINING PROGRAM

## 2023-11-08 PROCEDURE — 85379 FIBRIN DEGRADATION QUANT: CPT

## 2023-11-08 PROCEDURE — 99285 EMERGENCY DEPT VISIT HI MDM: CPT

## 2023-11-08 PROCEDURE — 87804 INFLUENZA ASSAY W/OPTIC: CPT

## 2023-11-08 PROCEDURE — 71045 X-RAY EXAM CHEST 1 VIEW: CPT

## 2023-11-08 PROCEDURE — 83880 ASSAY OF NATRIURETIC PEPTIDE: CPT

## 2023-11-08 PROCEDURE — 93005 ELECTROCARDIOGRAM TRACING: CPT | Performed by: STUDENT IN AN ORGANIZED HEALTH CARE EDUCATION/TRAINING PROGRAM

## 2023-11-08 PROCEDURE — 85025 COMPLETE CBC W/AUTO DIFF WBC: CPT

## 2023-11-08 PROCEDURE — 85055 RETICULATED PLATELET ASSAY: CPT

## 2023-11-08 PROCEDURE — 87635 SARS-COV-2 COVID-19 AMP PRB: CPT

## 2023-11-08 PROCEDURE — 1200000000 HC SEMI PRIVATE

## 2023-11-08 PROCEDURE — 84484 ASSAY OF TROPONIN QUANT: CPT

## 2023-11-08 PROCEDURE — 0202U NFCT DS 22 TRGT SARS-COV-2: CPT

## 2023-11-08 PROCEDURE — 83605 ASSAY OF LACTIC ACID: CPT

## 2023-11-08 PROCEDURE — 2580000003 HC RX 258: Performed by: STUDENT IN AN ORGANIZED HEALTH CARE EDUCATION/TRAINING PROGRAM

## 2023-11-08 PROCEDURE — 6370000000 HC RX 637 (ALT 250 FOR IP): Performed by: STUDENT IN AN ORGANIZED HEALTH CARE EDUCATION/TRAINING PROGRAM

## 2023-11-08 RX ORDER — SPIRONOLACTONE 25 MG/1
12.5 TABLET ORAL DAILY
Status: CANCELLED | OUTPATIENT
Start: 2023-11-09

## 2023-11-08 RX ORDER — FERROUS SULFATE 325(65) MG
325 TABLET ORAL 2 TIMES DAILY
Status: CANCELLED | OUTPATIENT
Start: 2023-11-08

## 2023-11-08 RX ORDER — SODIUM CHLORIDE 0.9 % (FLUSH) 0.9 %
10 SYRINGE (ML) INJECTION PRN
Status: CANCELLED | OUTPATIENT
Start: 2023-11-08

## 2023-11-08 RX ORDER — SENNOSIDES A AND B 8.6 MG/1
1 TABLET, FILM COATED ORAL 2 TIMES DAILY
Status: CANCELLED | OUTPATIENT
Start: 2023-11-08

## 2023-11-08 RX ORDER — DOXAZOSIN 2 MG/1
2 TABLET ORAL DAILY
Status: CANCELLED | OUTPATIENT
Start: 2023-11-09

## 2023-11-08 RX ORDER — AZITHROMYCIN 500 MG/1
500 TABLET, FILM COATED ORAL DAILY
Qty: 1 PACKET | Refills: 0 | Status: SHIPPED | OUTPATIENT
Start: 2023-11-08 | End: 2023-11-11

## 2023-11-08 RX ORDER — ALBUTEROL SULFATE 2.5 MG/3ML
2.5 SOLUTION RESPIRATORY (INHALATION)
Status: CANCELLED | OUTPATIENT
Start: 2023-11-08

## 2023-11-08 RX ORDER — FLUTICASONE PROPIONATE 50 MCG
1 SPRAY, SUSPENSION (ML) NASAL DAILY
Qty: 32 G | Refills: 1 | Status: SHIPPED | OUTPATIENT
Start: 2023-11-08

## 2023-11-08 RX ORDER — ACETAMINOPHEN 650 MG/1
650 SUPPOSITORY RECTAL EVERY 6 HOURS PRN
Status: CANCELLED | OUTPATIENT
Start: 2023-11-08

## 2023-11-08 RX ORDER — ASPIRIN 81 MG/1
81 TABLET ORAL DAILY
Status: CANCELLED | OUTPATIENT
Start: 2023-11-09

## 2023-11-08 RX ORDER — AMLODIPINE BESYLATE 10 MG/1
10 TABLET ORAL DAILY
Status: CANCELLED | OUTPATIENT
Start: 2023-11-09

## 2023-11-08 RX ORDER — CARVEDILOL 12.5 MG/1
6.25 TABLET ORAL 2 TIMES DAILY WITH MEALS
Status: CANCELLED | OUTPATIENT
Start: 2023-11-09

## 2023-11-08 RX ORDER — SODIUM CHLORIDE 0.9 % (FLUSH) 0.9 %
5-40 SYRINGE (ML) INJECTION EVERY 12 HOURS SCHEDULED
Status: CANCELLED | OUTPATIENT
Start: 2023-11-08

## 2023-11-08 RX ORDER — HYDRALAZINE HYDROCHLORIDE 50 MG/1
100 TABLET, FILM COATED ORAL 3 TIMES DAILY
Status: CANCELLED | OUTPATIENT
Start: 2023-11-08

## 2023-11-08 RX ORDER — ONDANSETRON 2 MG/ML
4 INJECTION INTRAMUSCULAR; INTRAVENOUS EVERY 6 HOURS PRN
Status: CANCELLED | OUTPATIENT
Start: 2023-11-08

## 2023-11-08 RX ORDER — PANTOPRAZOLE SODIUM 40 MG/1
40 TABLET, DELAYED RELEASE ORAL
Status: CANCELLED | OUTPATIENT
Start: 2023-11-09

## 2023-11-08 RX ORDER — ONDANSETRON 4 MG/1
4 TABLET, ORALLY DISINTEGRATING ORAL EVERY 8 HOURS PRN
Status: CANCELLED | OUTPATIENT
Start: 2023-11-08

## 2023-11-08 RX ORDER — LOSARTAN POTASSIUM 50 MG/1
100 TABLET ORAL DAILY
Status: CANCELLED | OUTPATIENT
Start: 2023-11-09

## 2023-11-08 RX ORDER — SODIUM CHLORIDE 9 MG/ML
INJECTION, SOLUTION INTRAVENOUS PRN
Status: CANCELLED | OUTPATIENT
Start: 2023-11-08

## 2023-11-08 RX ORDER — ACETAMINOPHEN 325 MG/1
650 TABLET ORAL EVERY 6 HOURS PRN
Status: CANCELLED | OUTPATIENT
Start: 2023-11-08

## 2023-11-08 RX ORDER — SODIUM CHLORIDE, SODIUM LACTATE, POTASSIUM CHLORIDE, AND CALCIUM CHLORIDE .6; .31; .03; .02 G/100ML; G/100ML; G/100ML; G/100ML
1000 INJECTION, SOLUTION INTRAVENOUS ONCE
Status: COMPLETED | OUTPATIENT
Start: 2023-11-08 | End: 2023-11-08

## 2023-11-08 RX ORDER — ACETAMINOPHEN 500 MG
1000 TABLET ORAL
Status: COMPLETED | OUTPATIENT
Start: 2023-11-08 | End: 2023-11-08

## 2023-11-08 RX ORDER — BENZONATATE 100 MG/1
100 CAPSULE ORAL 3 TIMES DAILY PRN
Qty: 30 CAPSULE | Refills: 0 | Status: SHIPPED | OUTPATIENT
Start: 2023-11-08 | End: 2023-11-18

## 2023-11-08 RX ORDER — AZITHROMYCIN 250 MG/1
500 TABLET, FILM COATED ORAL EVERY 24 HOURS
Status: CANCELLED | OUTPATIENT
Start: 2023-11-08 | End: 2023-11-11

## 2023-11-08 RX ORDER — ONDANSETRON 2 MG/ML
4 INJECTION INTRAMUSCULAR; INTRAVENOUS ONCE
Status: COMPLETED | OUTPATIENT
Start: 2023-11-08 | End: 2023-11-08

## 2023-11-08 RX ORDER — IPRATROPIUM BROMIDE AND ALBUTEROL SULFATE 2.5; .5 MG/3ML; MG/3ML
1 SOLUTION RESPIRATORY (INHALATION)
Status: CANCELLED | OUTPATIENT
Start: 2023-11-09

## 2023-11-08 RX ORDER — SODIUM CHLORIDE 9 MG/ML
INJECTION, SOLUTION INTRAVENOUS CONTINUOUS
Status: CANCELLED | OUTPATIENT
Start: 2023-11-08

## 2023-11-08 RX ORDER — ATORVASTATIN CALCIUM 80 MG/1
40 TABLET, FILM COATED ORAL DAILY
Status: CANCELLED | OUTPATIENT
Start: 2023-11-09

## 2023-11-08 RX ORDER — MELATONIN
1000 DAILY
Status: CANCELLED | OUTPATIENT
Start: 2023-11-09

## 2023-11-08 RX ADMIN — AZITHROMYCIN MONOHYDRATE 500 MG: 500 INJECTION, POWDER, LYOPHILIZED, FOR SOLUTION INTRAVENOUS at 23:19

## 2023-11-08 RX ADMIN — ACETAMINOPHEN 1000 MG: 500 TABLET ORAL at 21:21

## 2023-11-08 RX ADMIN — SODIUM CHLORIDE, POTASSIUM CHLORIDE, SODIUM LACTATE AND CALCIUM CHLORIDE 1000 ML: 600; 310; 30; 20 INJECTION, SOLUTION INTRAVENOUS at 21:28

## 2023-11-08 RX ADMIN — CEFTRIAXONE SODIUM 1000 MG: 1 INJECTION, POWDER, FOR SOLUTION INTRAMUSCULAR; INTRAVENOUS at 23:15

## 2023-11-08 RX ADMIN — ONDANSETRON 4 MG: 2 INJECTION INTRAMUSCULAR; INTRAVENOUS at 21:21

## 2023-11-09 LAB
B PARAP IS1001 DNA NPH QL NAA+NON-PROBE: NOT DETECTED
B PERT DNA SPEC QL NAA+PROBE: NOT DETECTED
C PNEUM DNA NPH QL NAA+NON-PROBE: NOT DETECTED
FLUAV RNA NPH QL NAA+NON-PROBE: NOT DETECTED
FLUBV RNA NPH QL NAA+NON-PROBE: NOT DETECTED
HADV DNA NPH QL NAA+NON-PROBE: NOT DETECTED
HCOV 229E RNA NPH QL NAA+NON-PROBE: NOT DETECTED
HCOV HKU1 RNA NPH QL NAA+NON-PROBE: NOT DETECTED
HCOV NL63 RNA NPH QL NAA+NON-PROBE: NOT DETECTED
HCOV OC43 RNA NPH QL NAA+NON-PROBE: NOT DETECTED
HMPV RNA NPH QL NAA+NON-PROBE: NOT DETECTED
HPIV1 RNA NPH QL NAA+NON-PROBE: NOT DETECTED
HPIV2 RNA NPH QL NAA+NON-PROBE: NOT DETECTED
HPIV3 RNA NPH QL NAA+NON-PROBE: NOT DETECTED
HPIV4 RNA NPH QL NAA+NON-PROBE: NOT DETECTED
M PNEUMO DNA NPH QL NAA+NON-PROBE: NOT DETECTED
RSV RNA NPH QL NAA+NON-PROBE: NOT DETECTED
RV+EV RNA NPH QL NAA+NON-PROBE: NOT DETECTED
SARS-COV-2 RNA NPH QL NAA+NON-PROBE: NOT DETECTED
SPECIMEN DESCRIPTION: NORMAL

## 2023-11-09 PROCEDURE — 96366 THER/PROPH/DIAG IV INF ADDON: CPT

## 2023-11-09 PROCEDURE — G0378 HOSPITAL OBSERVATION PER HR: HCPCS

## 2023-11-09 NOTE — H&P
[facility]  Office: [address], DO, Nicky Aloe, DO, Dwayne Essex, DO, Dionna Fraser Blood, DO, Mariluz De La Rosa MD, Sybil Curry MD, Jovani Mireles MD, Elliott Alvarado MD,  Mary Lemons MD, Wong Rose MD, Bisi Wu MD,  Ko Calvin MD, Susan Shi MD, Renato Mora, DO, Bianca Becker MD,  Leyla Bui, DO, Myra Bhatt MD, Alfredo Garza MD, Stanford Ross MD, Jadon Bonner MD,  Stone Perrin MD, Jigna Rdz MD, Kia Rodriguez MD, Marielle Magdaleno MD, Cornelius Steinberg MD, Shyam Borden, DO, France Ackerman, DO, Efren Angeles MD,  Hafsa Angeles MD, Cortney Xavier, CNP,  Panfilo Munguia, CNP, Renan Kaiser, CNP,  Anna Mcconnell, [facility], Sumanth Man, CNP, Myrna Rodriguez, CNP, Chyna Vizcarra, CNP, Nguyen Wright, CNP, Marichuy Dowell, CNP, Rachel Kahn, CNP, Gerri Frank, CNS, Ken Alvarenga, CNP, Cecile Antunez, [address] [place] [address]    HISTORY AND PHYSICAL EXAMINATION            Date:   11/8/2023  Patient name:  Namita Palma  Date of admission:  11/8/2023  8:56 PM  MRN:   [id number]  Account:  [de-identified]  YOB: 1965  PCP:    CHUCHO Madsen CNP  Room:   03/03  Code Status:    Prior    Chief Complaint:     Chief Complaint   Patient presents with    Generalized Body Aches     X2 days    Shortness of Breath       History Obtained From:     patient    History of Present Illness:     Namita Palma is a 59 y.o. Non- / non  male who presents with Generalized Body Aches (X2 days) and Shortness of Breath   and is admitted to the hospital for the management of Pneumonia due to infectious organism. 57-year-old male with a past medical history of A-fib, MYNOR, and CKD presents emergency department for fevers and nasal congestion. Patient recently seen his GI doctor for black stool.   He was instructed to stop taking his iron and he endorses stools area  Extremities:  peripheral pulses palpable, no pedal edema or calf pain with palpation  Psych: normal affect     Investigations:      Laboratory Testing:  Recent Results (from the past 24 hour(s))   CMP    Collection Time: 11/08/23  9:15 PM   Result Value Ref Range    Sodium 137 135 - 144 mmol/L    Potassium 3.8 3.7 - 5.3 mmol/L    Chloride 102 98 - 107 mmol/L    CO2 22 20 - 31 mmol/L    Anion Gap 13 9 - 17 mmol/L    Glucose 120 (H) 70 - 99 mg/dL    BUN 30 (H) 6 - 20 mg/dL    Creatinine 2.6 (H) 0.7 - 1.2 mg/dL    Est, Glom Filt Rate 28 (L) >60 mL/min/1.73m2    Calcium 8.8 8.6 - 10.4 mg/dL    Total Protein 7.0 6.4 - 8.3 g/dL    Albumin 3.5 3.5 - 5.2 g/dL    Albumin/Globulin Ratio 1.0 1.0 - 2.5    Total Bilirubin 1.3 (H) 0.3 - 1.2 mg/dL    Alkaline Phosphatase 63 40 - 129 U/L    ALT 14 5 - 41 U/L    AST 17 <40 U/L   CBC with Auto Differential    Collection Time: 11/08/23  9:15 PM   Result Value Ref Range    WBC 9.8 3.5 - 11.3 k/uL    RBC 4.32 4.21 - 5.77 m/uL    Hemoglobin 13.6 13.0 - 17.0 g/dL    Hematocrit 40.4 (L) 40.7 - 50.3 %    MCV 93.5 82.6 - 102.9 fL    MCH 31.5 25.2 - 33.5 pg    MCHC 33.7 28.4 - 34.8 g/dL    RDW 11.6 (L) 11.8 - 14.4 %    Platelets See Reflexed IPF Result 138 - 453 k/uL    Platelet, Fluorescence 143 138 - 453 k/uL    Platelet, Immature Fraction 6.4 1.1 - 10.3 %    NRBC Automated 0.0 0.0 per 100 WBC    Immature Granulocytes 0 0 %    Neutrophils % 87 (H) 36 - 66 %    Lymphocytes % 10 (L) 24 - 44 %    Monocytes % 2 1 - 7 %    Eosinophils % 1 1 - 4 %    Basophils % 0 0 - 2 %    Absolute Immature Granulocyte 0.00 0.00 - 0.30 k/uL    Neutrophils Absolute 8.52 (H) 1.8 - 7.7 k/uL    Lymphocytes Absolute 0.98 (L) 1.0 - 4.8 k/uL    Monocytes Absolute 0.20 0.1 - 0.8 k/uL    Eosinophils Absolute 0.10 0.0 - 0.4 k/uL    Basophils Absolute 0.00 0.0 - 0.2 k/uL    Morphology Normal    Troponin    Collection Time: 11/08/23  9:15 PM   Result Value Ref Range    Troponin, High Sensitivity 19 0 - 22 ng/L

## 2023-11-09 NOTE — DISCHARGE SUMMARY
[facility]  Office: [address]  1826, DO, Manuela Hanna, DO, Colette Moreno, DO, Parul Dejesus, DO, Marielle Delgado MD, Ian Roach MD, Iris Mcgraw MD, Moy Bartlett MD,  Fletcher Chang MD, Renato Nicholson MD, Davi Victoria MD,  Jihan Whitten, DO, Deejay Zarate MD, Sebastian Henderson MD, Rowena Oliva, DO, Jonah Celaya MD,  Stepan Jamison, DO, Itzel Kirkpatrick MD, Jimenez Figueroa MD, Jill Bland MD, Joaquin Alejandra MD,  Omero Horn MD, Dagmar Tineo MD, Nava Gustafson MD, Kris Paul MD, Tony Min MD, Hemalatha Chan, DO, Madi Shields, DO, Augusto Garcia MD,  Tracey Guillory MD, Margret Rico, Elis Mcbride, CNP, Rafaela Mcadams, CNP,  Monica Conn, [facility], Derek Kimball, CNP, Lesley Tan, CNP, Katya Mg, CNP, Eric Villatoro, CNP, Riki Red, CNP, Ezekiel Gonzalez, CNP, Micheline Guerrier, CNS, Tianna Arnold, CNP, Mariel ePdro, [facility]    Discharge Summary     Patient ID: Hanh Bosch  :  1965   MRN: [id number]     ACCOUNT:  [de-identified]   Patient's PCP: CHUCHO Ponce CNP  Admit Date: 2023   Discharge Date: 2023     Length of Stay: 0  Code Status:  Prior  Admitting Physician: Deejay Zarate MD  Discharge Physician: Carrie Sherman MD     Active Discharge Diagnoses:     Hospital Problem Lists:  Principal Problem:    Pneumonia due to infectious organism  Resolved Problems:    * No resolved hospital problems. *      Admission Condition:  fair     Discharged Condition: fair    Hospital Stay:     Hospital Course:     Hanh Bosch is a 59 y.o. Non- / non  male who presents with Generalized Body Aches (X2 days) and Shortness of Breath   and is admitted to the hospital for the management of Pneumonia due to infectious organism.      47-year-old male with a past medical history of A-fib, MYNOR, and CKD presents emergency department for fevers

## 2023-11-09 NOTE — ED NOTES
Dr. Black Arms at bedside with Lisa Conklin, [place]  11/08/23 [facility]

## 2023-11-09 NOTE — ED NOTES
The following labs were labeled with appropriate pt sticker and tubed to lab:     [] Blue     [] Lavender   [] on ice  [x] Green/yellow  [] Green/black [] on ice  [] Yohan Goodland  [] on ice  [] Yellow  [] Red  [] Pink  [] Type/ Screen  [] ABG  [] VBG    [] COVID-19 swab    [] Rapid  [] PCR  [] Flu swab  [] Peds Viral Panel     [] Urine Sample  [] Fecal Sample  [] Pelvic Cultures  [] Blood Cultures  [] X 2  [] STREP Cultures       Margarito Jones RN  11/08/23 6927

## 2023-11-09 NOTE — ED NOTES
Hospitalist at [facility], [facility], [address]  11/08/23 6896

## 2023-11-09 NOTE — ED NOTES
Pt reports to the ED with complaints of generalized body aches and SOB. Pt and family state pt was seen last week with the same complaints and sent home with a suspected virus. Pt states his symptoms still have not improved. Pt denies any respiratory hx. Family states pt has a hx of afib, CHF and HTN. Pt states he is on eliquis and is compliant with it. Pt states he has not eaten, drank or had a BM in the last few days. Pt is A&O and speaking in complete sentences. Pt is resting in bed comfortably, NAD noted. Pt denies chest pain at this time but does state he's had it intermittently for the past few days. EKG obtained in triage. Pt placed on full cardiac monitor.  Care ongoing       Huyen Mcdaniel RN  11/08/23 5043

## 2023-11-09 NOTE — ED NOTES
The following labs were labeled with appropriate pt sticker and tubed to lab:     [] Blue     [] Lavender   [] on ice  [] Green/yellow  [] Green/black [] on ice  [] Madonna Kitten  [] on ice  [] Yellow  [] Red  [] Pink  [] Type/ Screen  [] ABG  [] VBG    [] COVID-19 swab    [] Rapid  [] PCR  [] Flu swab  [x] Viral Panel     [] Urine Sample  [] Fecal Sample  [] Pelvic Cultures  [] Blood Cultures  [] X 2  [] STREP Cultures       Huyen Mcdaniel RN  11/08/23 3619

## 2023-11-09 NOTE — ED NOTES
The following labs were labeled with appropriate pt sticker and tubed to lab:     [x] Blue     [x] Lavender   [] on ice  [x] Green/yellow  [x] Green/black [x] on ice  [] Marycruz Signs  [] on ice  [] Yellow  [] Red  [] Pink  [] Type/ Screen  [] ABG  [] VBG    [x] COVID-19 swab    [x] Rapid  [] PCR  [x] Flu swab  [] Peds Viral Panel     [] Urine Sample  [] Fecal Sample  [] Pelvic Cultures  [x] Blood Cultures  [x] X 2  [] STREP Cultures       Daphnie Zelaya RN  11/08/23 2237

## 2023-11-10 LAB
EKG ATRIAL RATE: 98 BPM
EKG P AXIS: 46 DEGREES
EKG P-R INTERVAL: 136 MS
EKG Q-T INTERVAL: 346 MS
EKG QRS DURATION: 106 MS
EKG QTC CALCULATION (BAZETT): 441 MS
EKG R AXIS: -19 DEGREES
EKG T AXIS: 114 DEGREES
EKG VENTRICULAR RATE: 98 BPM

## 2023-11-10 PROCEDURE — 93010 ELECTROCARDIOGRAM REPORT: CPT | Performed by: INTERNAL MEDICINE

## 2023-11-13 LAB
MICROORGANISM SPEC CULT: NORMAL
MICROORGANISM SPEC CULT: NORMAL
SERVICE CMNT-IMP: NORMAL
SERVICE CMNT-IMP: NORMAL
SPECIMEN DESCRIPTION: NORMAL
SPECIMEN DESCRIPTION: NORMAL

## 2023-11-16 DIAGNOSIS — E78.5 DYSLIPIDEMIA: ICD-10-CM

## 2023-11-16 RX ORDER — ATORVASTATIN CALCIUM 40 MG/1
40 TABLET, FILM COATED ORAL DAILY
Qty: 30 TABLET | Refills: 0 | Status: SHIPPED | OUTPATIENT
Start: 2023-11-16

## 2023-11-16 NOTE — TELEPHONE ENCOUNTER
Shabbir Holloway is calling to request a refill on the following medication(s):    Medication Request:  Requested Prescriptions     Pending Prescriptions Disp Refills    atorvastatin (LIPITOR) 40 MG tablet [Pharmacy Med Name: ATORVASTATIN 40 MG TABLET] 30 tablet 0     Sig: take 1 tablet by mouth once daily       Last Visit Date (If Applicable):  60/3/2808    Next Visit Date:    5/8/2024

## 2023-12-08 RX ORDER — FERROUS SULFATE 325(65) MG
1 TABLET ORAL 2 TIMES DAILY
Qty: 60 TABLET | Refills: 0 | Status: SHIPPED | OUTPATIENT
Start: 2023-12-08

## 2023-12-08 NOTE — TELEPHONE ENCOUNTER
Massiel Butler is calling to request a refill on the following medication(s):    Medication Request:  Requested Prescriptions     Pending Prescriptions Disp Refills    FEROSUL 325 (65 Fe) MG tablet [Pharmacy Med Name: FEROSUL 325 MG TABLET] 60 tablet 0     Sig: take 1 tablet by mouth twice a day       Last Visit Date (If Applicable):  82/7/7385    Next Visit Date:    5/8/2024

## 2023-12-13 DIAGNOSIS — E78.5 DYSLIPIDEMIA: ICD-10-CM

## 2023-12-13 RX ORDER — ATORVASTATIN CALCIUM 40 MG/1
40 TABLET, FILM COATED ORAL DAILY
Qty: 30 TABLET | Refills: 0 | Status: SHIPPED | OUTPATIENT
Start: 2023-12-13

## 2023-12-13 NOTE — TELEPHONE ENCOUNTER
Nisreen Sanchez is calling to request a refill on the following medication(s):    Last Visit Date (If Applicable):  84/3/6274    Next Visit Date:    5/8/2024    Medication Request:  Requested Prescriptions     Pending Prescriptions Disp Refills    atorvastatin (LIPITOR) 40 MG tablet [Pharmacy Med Name: ATORVASTATIN 40 MG TABLET] 30 tablet 0     Sig: take 1 tablet by mouth once daily

## 2024-01-04 ENCOUNTER — OFFICE VISIT (OUTPATIENT)
Dept: GASTROENTEROLOGY | Age: 59
End: 2024-01-04
Payer: COMMERCIAL

## 2024-01-04 VITALS — WEIGHT: 227 LBS | OXYGEN SATURATION: 97 % | TEMPERATURE: 97.3 F | BODY MASS INDEX: 32.57 KG/M2 | HEART RATE: 68 BPM

## 2024-01-04 DIAGNOSIS — Z86.010 HX OF COLONIC POLYP: ICD-10-CM

## 2024-01-04 DIAGNOSIS — K20.90 ESOPHAGITIS: Primary | ICD-10-CM

## 2024-01-04 DIAGNOSIS — K29.60 LYMPHOCYTIC GASTRITIS: ICD-10-CM

## 2024-01-04 DIAGNOSIS — E78.5 DYSLIPIDEMIA: ICD-10-CM

## 2024-01-04 PROCEDURE — G8427 DOCREV CUR MEDS BY ELIG CLIN: HCPCS | Performed by: INTERNAL MEDICINE

## 2024-01-04 PROCEDURE — G8484 FLU IMMUNIZE NO ADMIN: HCPCS | Performed by: INTERNAL MEDICINE

## 2024-01-04 PROCEDURE — G8417 CALC BMI ABV UP PARAM F/U: HCPCS | Performed by: INTERNAL MEDICINE

## 2024-01-04 PROCEDURE — 1036F TOBACCO NON-USER: CPT | Performed by: INTERNAL MEDICINE

## 2024-01-04 PROCEDURE — 99214 OFFICE O/P EST MOD 30 MIN: CPT | Performed by: INTERNAL MEDICINE

## 2024-01-04 PROCEDURE — 3017F COLORECTAL CA SCREEN DOC REV: CPT | Performed by: INTERNAL MEDICINE

## 2024-01-04 RX ORDER — ATORVASTATIN CALCIUM 40 MG/1
40 TABLET, FILM COATED ORAL DAILY
Qty: 90 TABLET | Refills: 0 | Status: SHIPPED | OUTPATIENT
Start: 2024-01-04

## 2024-01-04 NOTE — PROGRESS NOTES
GI CLINIC FOLLOW UP    INTERVAL HISTORY:   No referring provider defined for this encounter.    Chief Complaint   Patient presents with    Follow-up     Patient here for hospital fu            HISTORY OF PRESENT ILLNESS: Mr.Robert SHAHID Machado is a 58 y.o. male , referred for evaluation of    .  1. Adenomatous polyps      2. Lymphocytic gastritis      3. Esophageal dysphagia        Here for f/u    Was seen in [facility] 3/23  GIB   On ASA and Elliquis fro A fib  Had EGD which showed significant esophagitis by the hospitalist as below  Colonoscopy 2021 : polyps, repeat in 2026  Labs Nov/2023: normal LFTS/CBC     EGD  Findings:   LA grade B reflux esophagitis at the GE junction  Multiple sessile polyps measuring from 3 to 6 mm in size were found in the gastric body and antrum.  Biopsies were performed for histology and H. pylori testing.  A 3 mm bleeding AVM was found in the second portion of the duodenum.  This was treated with bipolar cautery and completely obliterated.  There was no bleeding at the end of the procedure.  The remainder of the examined duodenum appeared normal.     - Diagnosis --     Stomach, polyp, biopsy:     -  Fundic gland polyp with erosion, reactive change and acute   inflammation.         Yomi Romo M.D.   **Electronically Signed Out**         rdd/3/17/2023         Past Medical,Family, and Social History reviewed and does contribute to the patient presentingcondition.    I did review all the labs results available for the labs which were ordered by the primary care physician, and the other consultants, we search on epic at [facility] and all the available care everywhere epic    I did review all the imaging studies of the abdomen available on EMR, ordered by the primary care physician and the other consultant    I did review all the pathology from the biopsies done on the previous endoscopies    Patient's PMH/PSH,SH,PSYCH Hx, MEDs, ALLERGIES, and ROS were all reviewed and updated in the

## 2024-01-04 NOTE — TELEPHONE ENCOUNTER
Tejas Machado is calling to request a refill on the following medication(s):    Medication Request:  Requested Prescriptions     Pending Prescriptions Disp Refills    atorvastatin (LIPITOR) 40 MG tablet [Pharmacy Med Name: ATORVASTATIN 40 MG TABLET] 30 tablet 0     Sig: take 1 tablet by mouth once daily       Last Visit Date (If Applicable):  11/6/2023    Next Visit Date:    5/8/2024

## 2024-01-08 RX ORDER — FERROUS SULFATE 325(65) MG
1 TABLET ORAL 2 TIMES DAILY
Qty: 60 TABLET | Refills: 0 | Status: SHIPPED | OUTPATIENT
Start: 2024-01-08

## 2024-01-08 NOTE — TELEPHONE ENCOUNTER
Tejas Machado is calling to request a refill on the following medication(s):    Medication Request:  Requested Prescriptions     Pending Prescriptions Disp Refills    FEROSUL 325 (65 Fe) MG tablet [Pharmacy Med Name: FEROSUL 325 MG TABLET] 60 tablet 0     Sig: take 1 tablet by mouth twice a day       Last Visit Date (If Applicable):  11/6/2023    Next Visit Date:    5/8/2024

## 2024-01-24 DIAGNOSIS — I10 HTN (HYPERTENSION), MALIGNANT: ICD-10-CM

## 2024-01-24 DIAGNOSIS — I50.42 CHRONIC COMBINED SYSTOLIC AND DIASTOLIC CHF (CONGESTIVE HEART FAILURE) (HCC): ICD-10-CM

## 2024-01-24 RX ORDER — SPIRONOLACTONE 25 MG/1
12.5 TABLET ORAL DAILY
Qty: 30 TABLET | Refills: 0 | Status: SHIPPED | OUTPATIENT
Start: 2024-01-24

## 2024-01-24 NOTE — TELEPHONE ENCOUNTER
Tejas Machado is calling to request a refill on the following medication(s):    Medication Request:  Requested Prescriptions     Pending Prescriptions Disp Refills    spironolactone (ALDACTONE) 25 MG tablet [Pharmacy Med Name: SPIRONOLACTONE 25 MG TABLET] 30 tablet 0     Sig: take 1/2 tablet by mouth once daily       Last Visit Date (If Applicable):  11/6/2023    Next Visit Date:    5/8/2024

## 2024-02-06 RX ORDER — FERROUS SULFATE 325(65) MG
1 TABLET ORAL 2 TIMES DAILY
Qty: 60 TABLET | Refills: 0 | Status: SHIPPED | OUTPATIENT
Start: 2024-02-06

## 2024-02-07 DIAGNOSIS — K22.70 BARRETT'S ESOPHAGUS WITHOUT DYSPLASIA: ICD-10-CM

## 2024-02-07 RX ORDER — PANTOPRAZOLE SODIUM 40 MG/1
40 TABLET, DELAYED RELEASE ORAL
Qty: 60 TABLET | Refills: 1 | Status: SHIPPED | OUTPATIENT
Start: 2024-02-07

## 2024-02-07 NOTE — TELEPHONE ENCOUNTER
Tejas Machado is calling to request a refill on the following medication(s):    Medication Request:  Requested Prescriptions     Pending Prescriptions Disp Refills    pantoprazole (PROTONIX) 40 MG tablet [Pharmacy Med Name: PANTOPRAZOLE SOD DR 40 MG TAB] 60 tablet 1     Sig: take 1 tablet by mouth EVERY MORNING BEFORE BREAKFAST       Last Visit Date (If Applicable):  11/6/2023    Next Visit Date:    5/8/2024                 I recommend order for Tums, two pills q 12 hours PRN reflux symptoms.  I will hold off on a benadryl order so that Dr. Heyden can decide on this

## 2024-02-21 ENCOUNTER — TELEPHONE (OUTPATIENT)
Dept: FAMILY MEDICINE CLINIC | Age: 59
End: 2024-02-21

## 2024-02-21 DIAGNOSIS — H91.90 DECREASED HEARING, UNSPECIFIED LATERALITY: Primary | ICD-10-CM

## 2024-02-21 NOTE — TELEPHONE ENCOUNTER
There is no [facility]'s specific ENT, typically who rounds there is Dr Cavanaugh/[facility]. They do not however take [place] from what I recall. I can place an audiology referral in but if it is not in network then I recommend they call insurance to find out covered options.

## 2024-02-21 NOTE — TELEPHONE ENCOUNTER
Patient's wife called requesting a referral to ENT for hearing test. Says patient turns TV up way too loud. Was wanting to go to the one at [facility].

## 2024-03-11 RX ORDER — FERROUS SULFATE 325(65) MG
1 TABLET ORAL 2 TIMES DAILY
Qty: 60 TABLET | Refills: 0 | Status: SHIPPED | OUTPATIENT
Start: 2024-03-11

## 2024-03-14 ENCOUNTER — TELEPHONE (OUTPATIENT)
Dept: FAMILY MEDICINE CLINIC | Age: 59
End: 2024-03-14

## 2024-03-14 DIAGNOSIS — I10 HTN (HYPERTENSION), MALIGNANT: Primary | ICD-10-CM

## 2024-03-14 DIAGNOSIS — R73.9 HYPERGLYCEMIA: ICD-10-CM

## 2024-03-14 DIAGNOSIS — I65.23 BILATERAL CAROTID ARTERY STENOSIS: Primary | ICD-10-CM

## 2024-03-14 DIAGNOSIS — E78.5 DYSLIPIDEMIA: ICD-10-CM

## 2024-03-14 NOTE — TELEPHONE ENCOUNTER
I'll place basic labs, they'll be fasting. Dr Johns is who she requested so if they would like a different referral that's fine. He had a hospitalization in November that he should have followed up w/ me after to help monitor and assess all of these things.

## 2024-03-14 NOTE — TELEPHONE ENCOUNTER
Patient's wife requesting updated labs because he's applying for SSI and they need them. Also she has called numerous times to Dr. Johns and has yet to hear back from them.

## 2024-03-15 ENCOUNTER — HOSPITAL ENCOUNTER (OUTPATIENT)
Age: 59
Setting detail: SPECIMEN
Discharge: HOME | End: 2024-03-15

## 2024-03-15 DIAGNOSIS — E78.5 DYSLIPIDEMIA: ICD-10-CM

## 2024-03-15 DIAGNOSIS — I10 HTN (HYPERTENSION), MALIGNANT: ICD-10-CM

## 2024-03-15 DIAGNOSIS — R73.9 HYPERGLYCEMIA: ICD-10-CM

## 2024-03-15 LAB
ALBUMIN SERPL-MCNC: 4.2 G/DL (ref 3.5–5.2)
ALBUMIN/GLOB SERPL: 2 {RATIO} (ref 1–2.5)
ALP SERPL-CCNC: 68 U/L (ref 40–129)
ALT SERPL-CCNC: 20 U/L (ref 10–50)
ANION GAP SERPL CALCULATED.3IONS-SCNC: 10 MMOL/L (ref 9–16)
AST SERPL-CCNC: 25 U/L (ref 10–50)
BASOPHILS # BLD: 0.03 K/UL (ref 0–0.2)
BASOPHILS NFR BLD: 1 % (ref 0–2)
BILIRUB SERPL-MCNC: 0.7 MG/DL (ref 0–1.2)
BUN SERPL-MCNC: 25 MG/DL (ref 8–23)
CALCIUM SERPL-MCNC: 8.7 MG/DL (ref 8.6–10.4)
CHLORIDE SERPL-SCNC: 106 MMOL/L (ref 98–107)
CHOLEST SERPL-MCNC: 130 MG/DL (ref 0–199)
CHOLESTEROL/HDL RATIO: 3
CO2 SERPL-SCNC: 26 MMOL/L (ref 20–31)
CREAT SERPL-MCNC: 1.9 MG/DL (ref 0.7–1.2)
EOSINOPHIL # BLD: 0.34 K/UL (ref 0–0.44)
EOSINOPHILS RELATIVE PERCENT: 8 % (ref 1–4)
ERYTHROCYTE [DISTWIDTH] IN BLOOD BY AUTOMATED COUNT: 13.3 % (ref 11.8–14.4)
EST. AVERAGE GLUCOSE BLD GHB EST-MCNC: 71 MG/DL
GFR SERPL CREATININE-BSD FRML MDRD: 39 ML/MIN/1.73M2
GLUCOSE SERPL-MCNC: 102 MG/DL (ref 74–99)
HBA1C MFR BLD: 4.1 % (ref 4–6)
HCT VFR BLD AUTO: 37.9 % (ref 40.7–50.3)
HDLC SERPL-MCNC: 50 MG/DL
HGB BLD-MCNC: 12.4 G/DL (ref 13–17)
IMM GRANULOCYTES # BLD AUTO: <0.03 K/UL (ref 0–0.3)
IMM GRANULOCYTES NFR BLD: 0 %
LDLC SERPL CALC-MCNC: 61 MG/DL (ref 0–100)
LYMPHOCYTES NFR BLD: 0.77 K/UL (ref 1.1–3.7)
LYMPHOCYTES RELATIVE PERCENT: 17 % (ref 24–43)
MAGNESIUM SERPL-MCNC: 2.1 MG/DL (ref 1.6–2.4)
MCH RBC QN AUTO: 28.7 PG (ref 25.2–33.5)
MCHC RBC AUTO-ENTMCNC: 32.7 G/DL (ref 28.4–34.8)
MCV RBC AUTO: 87.7 FL (ref 82.6–102.9)
MONOCYTES NFR BLD: 0.33 K/UL (ref 0.1–1.2)
MONOCYTES NFR BLD: 7 % (ref 3–12)
NEUTROPHILS NFR BLD: 67 % (ref 36–65)
NEUTS SEG NFR BLD: 3.02 K/UL (ref 1.5–8.1)
NRBC BLD-RTO: 0 PER 100 WBC
PLATELET # BLD AUTO: 159 K/UL (ref 138–453)
PMV BLD AUTO: 11.4 FL (ref 8.1–13.5)
POTASSIUM SERPL-SCNC: 3.8 MMOL/L (ref 3.7–5.3)
PROT SERPL-MCNC: 6.7 G/DL (ref 6.6–8.7)
RBC # BLD AUTO: 4.32 M/UL (ref 4.21–5.77)
SODIUM SERPL-SCNC: 142 MMOL/L (ref 136–145)
TRIGL SERPL-MCNC: 97 MG/DL (ref 0–149)
TSH SERPL DL<=0.05 MIU/L-ACNC: 1.87 UIU/ML (ref 0.27–4.2)
VLDLC SERPL CALC-MCNC: 19 MG/DL
WBC OTHER # BLD: 4.5 K/UL (ref 3.5–11.3)

## 2024-03-18 DIAGNOSIS — I10 HTN (HYPERTENSION), MALIGNANT: ICD-10-CM

## 2024-03-18 DIAGNOSIS — I50.42 CHRONIC COMBINED SYSTOLIC AND DIASTOLIC CHF (CONGESTIVE HEART FAILURE) (HCC): ICD-10-CM

## 2024-03-18 RX ORDER — SPIRONOLACTONE 25 MG/1
12.5 TABLET ORAL DAILY
Qty: 30 TABLET | Refills: 0 | Status: SHIPPED | OUTPATIENT
Start: 2024-03-18

## 2024-03-18 NOTE — TELEPHONE ENCOUNTER
Tejas Machado is calling to request a refill on the following medication(s):    Medication Request:  Requested Prescriptions     Pending Prescriptions Disp Refills    spironolactone (ALDACTONE) 25 MG tablet [Pharmacy Med Name: SPIRONOLACTONE 25 MG TABLET] 30 tablet 0     Sig: take 1/2 tablet by mouth once daily       Last Visit Date (If Applicable):  11/6/2023    Next Visit Date:    4/15/2024

## 2024-03-19 NOTE — OP NOTE
[facility]        Date:   3/3/2021  Patient name:  Chapito Vega  Date of admission:  3/3/2021  8:37 AM  MRN:   [id number]  YOB: 1965    CARDIAC CATHETERIZATION    Operators:  Primary:Adrian Malone MD    CV Fellow: Javier Campos M.D. Procedure performed:       [x] Left Heart Catheterization. [] Graft Angiography. [x] Left Ventriculography. [] Right Heart Catheterization. [x] Coronary Angiography. [] Aortic Valve Studies. [] PCI:      [] Other:       Pre Procedure Conscious Sedation Data:    ASA Class:    [] I [] II [x] III [] IV    Mallampati Class:  [] I [] II [x] III [] IV      Indication:  [] STEMI      [x] + Stress test  [] ACS      [] + EKG Changes  [] Non Q MI       [] Significant Risk Factors  [] Recurrent Angina             [] Diabetes Mellitus    [] New LBBB      [] Uncontrolled HTN. [] CHF / Low EF changes     [] Abnormal CTA / Ca Score  [] Other:     Procedure:  Access:  [x] Femoral artery  [] Radial  artery       [x] Right   [] Left    Procedure: After informed consent was obtained with explanation of the risks and benefits, patient was brought to the cath lab. The access area was prepped and draped in sterile fashion. 1% lidocaine was used for local block. The artery was cannulated with 6  Fr sheath with brisk arterial blood return. The side port was frequently flushed and aspirated with normal saline. Estimated blood loss: 10 ml    Findings:   LMCA: Mild irregularities 10-20%.     LAD: Mild irregularities 10-20%.     LCx: Mild irregularities 10-20%.     RCA: Mild irregularities 10-20%.     Coronary Tree      Dominance: Right     LV Analysis  LV function assessed as:Normal.  Ejection Fraction: 50%          Conclusions:      Mild CAD. Left ventricular systolic function at lower limits of normal.    Recommendations:      Medical therapy and risk factor modifications.       History and Risk Factors    [x] Hypertension     [] Family history of CAD  [x] Hyperlipidemia     [] Cerebrovascular Disease   [] Prior MI       [] Peripheral Vascular disease   [] Prior PCI              [] Diabetes Mellitus    [] Left Main PCI. [] Currently on Dialysis. [] Prior CABG. [x] Currently smoker. [] Cardiac Arrest outside of healthcare facility. [] Yes    [x] No        Witnessed     [] Yes   [] No     Arrest after arrival of EMS  [] Yes   [] No     [] Cardiac Arrest at other Facility. [] Yes   [x] No    Pre-Procedure Information. Heart Failure       [x] Yes    [] No        Class  [] I      [] II  [] III    [] IV. New Diagnosis    [] Yes  [x] No    HF Type      [x] Systolic   [x] Diastolic          [] Unknown. Diagnostic Test:   EKG       [x] Normal   [] Abnormal    New antiarrhythmia medications:    [] Yes   [] No   New onset atrial fibrillation / Flutter     [] Yes   [] No   ECG Abnormalities:      [] V. Fib   [] Madeleine V. Tach           [] NS V. T   [] New LBBB           [] T. Inv  []  ST dev > 0.5 mm         [] PVC's freq  [] PVC's infrequent    Stress Test Performed:      [x] Yes    [] No     Type:     [] Stress Echo   [] Exercise Stress Test (no imaging)      [x] Stress Nuclear  [] Stress Imaging     Results   [] Negative   [x] Positive        [] Indeterminate  [] Unavailable     If Positive/ Risk / Extent of Ischemia:       [] Low  [] Intermediate         [] High  [] Unavailable      Cardiac CTA Performed:     [] Yes    [x] No      Results   [] CAD   [] Non obstructive CAD      [] No CAD   [] Uncertain      [] Unknown   [] Structural Disease.      Pre Procedure Medications:   [x] Yes    [] No         [x] ASA  [x] Beta Blockers      [] Nitrate  [x] Ca Channel Blockers      [] Ranolazine  [x] Statin       [] Plavix/Others antiplatelets          Eunice Morgan MD  Fellow, [address]vino MD Attending Physician  [facility] PAST MEDICAL HISTORY:  No pertinent past medical history

## 2024-03-31 DIAGNOSIS — E78.5 DYSLIPIDEMIA: ICD-10-CM

## 2024-04-01 ENCOUNTER — HOSPITAL ENCOUNTER (OUTPATIENT)
Dept: VASCULAR LAB | Age: 59
Discharge: HOME | End: 2024-04-03
Attending: STUDENT IN AN ORGANIZED HEALTH CARE EDUCATION/TRAINING PROGRAM
Payer: COMMERCIAL

## 2024-04-01 DIAGNOSIS — I65.23 BILATERAL CAROTID ARTERY STENOSIS: ICD-10-CM

## 2024-04-01 LAB
VAS LEFT CCA DIST EDV: 13.8 CM/S
VAS LEFT CCA DIST PSV: 37.3 CM/S
VAS LEFT CCA MID EDV: 17.2 CM/S
VAS LEFT CCA MID PSV: 63.4 CM/S
VAS LEFT CCA PROX EDV: 18.2 CM/S
VAS LEFT CCA PROX PSV: 78.6 CM/S
VAS LEFT ECA EDV: 11.8 CM/S
VAS LEFT ECA PSV: 65.9 CM/S
VAS LEFT ICA DIST EDV: 32.4 CM/S
VAS LEFT ICA DIST PSV: 70.8 CM/S
VAS LEFT ICA MID EDV: 30.5 CM/S
VAS LEFT ICA MID PSV: 74.7 CM/S
VAS LEFT ICA PROX EDV: 14.7 CM/S
VAS LEFT ICA PROX PSV: 32.9 CM/S
VAS LEFT VERTEBRAL EDV: 16.7 CM/S
VAS LEFT VERTEBRAL PSV: 52.6 CM/S
VAS RIGHT CCA DIST EDV: 15.7 CM/S
VAS RIGHT CCA DIST PSV: 54.5 CM/S
VAS RIGHT CCA MID EDV: 13.8 CM/S
VAS RIGHT CCA MID PSV: 58 CM/S
VAS RIGHT CCA PROX EDV: 16.7 CM/S
VAS RIGHT CCA PROX PSV: 58 CM/S
VAS RIGHT ECA EDV: 16.2 CM/S
VAS RIGHT ECA PSV: 64.9 CM/S
VAS RIGHT ICA DIST EDV: 24.1 CM/S
VAS RIGHT ICA DIST PSV: 59.5 CM/S
VAS RIGHT ICA MID EDV: 16.5 CM/S
VAS RIGHT ICA MID PSV: 38.2 CM/S
VAS RIGHT ICA PROX EDV: 11.2 CM/S
VAS RIGHT ICA PROX PSV: 29.8 CM/S
VAS RIGHT VERTEBRAL EDV: 12.8 CM/S
VAS RIGHT VERTEBRAL PSV: 52.1 CM/S

## 2024-04-01 PROCEDURE — 93880 EXTRACRANIAL BILAT STUDY: CPT

## 2024-04-01 PROCEDURE — 93880 EXTRACRANIAL BILAT STUDY: CPT | Performed by: SURGERY

## 2024-04-01 RX ORDER — ATORVASTATIN CALCIUM 40 MG/1
40 TABLET, FILM COATED ORAL DAILY
Qty: 90 TABLET | Refills: 0 | Status: SHIPPED | OUTPATIENT
Start: 2024-04-01

## 2024-04-01 NOTE — TELEPHONE ENCOUNTER
Tejas Machado is calling to request a refill on the following medication(s):    Medication Request:  Requested Prescriptions     Pending Prescriptions Disp Refills    atorvastatin (LIPITOR) 40 MG tablet [Pharmacy Med Name: ATORVASTATIN 40 MG TABLET] 90 tablet 0     Sig: take 1 tablet by mouth once daily       Last Visit Date (If Applicable):  11/6/2023    Next Visit Date:    4/15/2024

## 2024-04-02 ENCOUNTER — OFFICE VISIT (OUTPATIENT)
Dept: VASCULAR SURGERY | Age: 59
End: 2024-04-02
Payer: COMMERCIAL

## 2024-04-02 VITALS
RESPIRATION RATE: 16 BRPM | DIASTOLIC BLOOD PRESSURE: 108 MMHG | OXYGEN SATURATION: 94 % | HEART RATE: 64 BPM | BODY MASS INDEX: 33.93 KG/M2 | HEIGHT: 70 IN | WEIGHT: 237 LBS | SYSTOLIC BLOOD PRESSURE: 189 MMHG

## 2024-04-02 DIAGNOSIS — I65.23 ASYMPTOMATIC CAROTID ARTERY STENOSIS, BILATERAL: Primary | ICD-10-CM

## 2024-04-02 PROCEDURE — 1036F TOBACCO NON-USER: CPT | Performed by: STUDENT IN AN ORGANIZED HEALTH CARE EDUCATION/TRAINING PROGRAM

## 2024-04-02 PROCEDURE — G8417 CALC BMI ABV UP PARAM F/U: HCPCS | Performed by: STUDENT IN AN ORGANIZED HEALTH CARE EDUCATION/TRAINING PROGRAM

## 2024-04-02 PROCEDURE — G8427 DOCREV CUR MEDS BY ELIG CLIN: HCPCS | Performed by: STUDENT IN AN ORGANIZED HEALTH CARE EDUCATION/TRAINING PROGRAM

## 2024-04-02 PROCEDURE — 3077F SYST BP >= 140 MM HG: CPT | Performed by: STUDENT IN AN ORGANIZED HEALTH CARE EDUCATION/TRAINING PROGRAM

## 2024-04-02 PROCEDURE — 99213 OFFICE O/P EST LOW 20 MIN: CPT | Performed by: STUDENT IN AN ORGANIZED HEALTH CARE EDUCATION/TRAINING PROGRAM

## 2024-04-02 PROCEDURE — 3080F DIAST BP >= 90 MM HG: CPT | Performed by: STUDENT IN AN ORGANIZED HEALTH CARE EDUCATION/TRAINING PROGRAM

## 2024-04-02 PROCEDURE — 3017F COLORECTAL CA SCREEN DOC REV: CPT | Performed by: STUDENT IN AN ORGANIZED HEALTH CARE EDUCATION/TRAINING PROGRAM

## 2024-04-02 NOTE — PROGRESS NOTES
[facility]  [address] [address] [address]  [place] [address]  Dept: [phone]     Patient: Tejas Machado  : 1965  MRN: [id number]  DOS: 2024    HPI:  3/14/24: Tejas Machado is a 58 y.o. male who comes to the office regarding for evaluation of carotid stenosis. He underwent a carotid duplex on 3/10/23. I reviewed these images myself and he has < 50% on the right and 50-69% on the left. No focal neurological deficits, vision issues or difficulty with speech. He does feel weak today and complaining of whooshing sound in ear. He was laying down in the exam room due to fatigue.    24: Here for follow up. Carotid duplex was reviewed by myself. There is < 50% carotid stenosis bilaterally. No focal neurological deficits, vision issues or difficulty with speech.     Past Medical History:   Diagnosis Date    Atrial fibrillation (HCC)     Benign essential HTN     CKD (chronic kidney disease), stage III (HCC) 2020    Secondary to suspected ischemic nephrosclerosis baseline creatinine 1.5-1.9    Enlarged heart     Hypertensive urgency 10/14/2023     Family History   Problem Relation Age of Onset    Hypertension Mother     Hypertension Father     Other Father         enlarged heart      Social History     Socioeconomic History    Marital status:      Spouse name: Not on file    Number of children: Not on file    Years of education: Not on file    Highest education level: Not on file   Occupational History    Not on file   Tobacco Use    Smoking status: Former     Current packs/day: 0.00     Average packs/day: 1 pack/day for 44.0 years (44.0 ttl pk-yrs)     Types: Cigarettes     Start date:      Quit date:      Years since quittin.2    Smokeless tobacco: Former    Tobacco comments:     1-2 per week   Vaping Use    Vaping Use: Never used   Substance and Sexual Activity    Alcohol use: Yes

## 2024-04-11 ENCOUNTER — TELEPHONE (OUTPATIENT)
Dept: FAMILY MEDICINE CLINIC | Age: 59
End: 2024-04-11

## 2024-04-11 RX ORDER — FERROUS SULFATE 325(65) MG
1 TABLET ORAL 2 TIMES DAILY
Qty: 60 TABLET | Refills: 0 | Status: SHIPPED | OUTPATIENT
Start: 2024-04-11

## 2024-04-11 NOTE — TELEPHONE ENCOUNTER
Tejas Machado is calling to request a refill on the following medication(s):    Medication Request:  Requested Prescriptions     Pending Prescriptions Disp Refills    FEROSUL 325 (65 Fe) MG tablet [Pharmacy Med Name: FEROSUL 325 MG TABLET] 60 tablet 0     Sig: take 1 tablet by mouth twice a day       Last Visit Date (If Applicable):  11/6/2023    Next Visit Date:    4/11/2024

## 2024-04-11 NOTE — TELEPHONE ENCOUNTER
SUBJECT: Illness  PATIENT/CALLER:Renata  CURRENT SYMPTOMS:sinus drainage, clear mucus, wheezing, congestion, cough, bodyaches, fatigue, vomiting from coughing  ONSET: 2 days  PAIN LEVEL:0  TEMP: 99  WHAT HAS BEEN TRIED: Nyquil, benadryl.       Recommended she take patient to urgent care. They are going now.

## 2024-04-15 ENCOUNTER — HOSPITAL ENCOUNTER (OUTPATIENT)
Dept: GENERAL RADIOLOGY | Age: 59
Discharge: HOME | End: 2024-04-17
Payer: COMMERCIAL

## 2024-04-15 ENCOUNTER — HOSPITAL ENCOUNTER (OUTPATIENT)
Age: 59
Discharge: HOME | End: 2024-04-17
Payer: COMMERCIAL

## 2024-04-15 ENCOUNTER — OFFICE VISIT (OUTPATIENT)
Dept: FAMILY MEDICINE CLINIC | Age: 59
End: 2024-04-15
Payer: COMMERCIAL

## 2024-04-15 VITALS
OXYGEN SATURATION: 98 % | HEART RATE: 82 BPM | SYSTOLIC BLOOD PRESSURE: 180 MMHG | WEIGHT: 235.8 LBS | TEMPERATURE: 96.9 F | BODY MASS INDEX: 33.83 KG/M2 | DIASTOLIC BLOOD PRESSURE: 104 MMHG

## 2024-04-15 DIAGNOSIS — I16.0 HYPERTENSIVE URGENCY: Primary | ICD-10-CM

## 2024-04-15 DIAGNOSIS — N18.32 STAGE 3B CHRONIC KIDNEY DISEASE (HCC): ICD-10-CM

## 2024-04-15 DIAGNOSIS — I42.8 NONISCHEMIC CARDIOMYOPATHY (HCC): ICD-10-CM

## 2024-04-15 DIAGNOSIS — I48.0 PAROXYSMAL ATRIAL FIBRILLATION (HCC): ICD-10-CM

## 2024-04-15 DIAGNOSIS — K31.811 AVM (ARTERIOVENOUS MALFORMATION) OF DUODENUM, ACQUIRED WITH HEMORRHAGE: ICD-10-CM

## 2024-04-15 DIAGNOSIS — Z87.01 HISTORY OF PNEUMONIA: ICD-10-CM

## 2024-04-15 DIAGNOSIS — R05.8 PRODUCTIVE COUGH: ICD-10-CM

## 2024-04-15 DIAGNOSIS — I50.42 CHRONIC COMBINED SYSTOLIC AND DIASTOLIC CHF (CONGESTIVE HEART FAILURE) (HCC): ICD-10-CM

## 2024-04-15 DIAGNOSIS — I10 HTN (HYPERTENSION), MALIGNANT: ICD-10-CM

## 2024-04-15 PROCEDURE — 1036F TOBACCO NON-USER: CPT

## 2024-04-15 PROCEDURE — G8427 DOCREV CUR MEDS BY ELIG CLIN: HCPCS

## 2024-04-15 PROCEDURE — 3017F COLORECTAL CA SCREEN DOC REV: CPT

## 2024-04-15 PROCEDURE — 3080F DIAST BP >= 90 MM HG: CPT

## 2024-04-15 PROCEDURE — G8417 CALC BMI ABV UP PARAM F/U: HCPCS

## 2024-04-15 PROCEDURE — 99214 OFFICE O/P EST MOD 30 MIN: CPT

## 2024-04-15 PROCEDURE — 3077F SYST BP >= 140 MM HG: CPT

## 2024-04-15 PROCEDURE — 71046 X-RAY EXAM CHEST 2 VIEWS: CPT

## 2024-04-15 RX ORDER — NIFEDIPINE 30 MG/1
30 TABLET, EXTENDED RELEASE ORAL DAILY
Qty: 90 TABLET | Refills: 1 | Status: SHIPPED | OUTPATIENT
Start: 2024-04-15

## 2024-04-15 RX ORDER — BENZONATATE 100 MG/1
100-200 CAPSULE ORAL 3 TIMES DAILY PRN
Qty: 60 CAPSULE | Refills: 0 | Status: SHIPPED | OUTPATIENT
Start: 2024-04-15 | End: 2024-04-22

## 2024-04-15 SDOH — ECONOMIC STABILITY: FOOD INSECURITY: WITHIN THE PAST 12 MONTHS, THE FOOD YOU BOUGHT JUST DIDN'T LAST AND YOU DIDN'T HAVE MONEY TO GET MORE.: NEVER TRUE

## 2024-04-15 SDOH — ECONOMIC STABILITY: FOOD INSECURITY: WITHIN THE PAST 12 MONTHS, YOU WORRIED THAT YOUR FOOD WOULD RUN OUT BEFORE YOU GOT MONEY TO BUY MORE.: NEVER TRUE

## 2024-04-15 SDOH — ECONOMIC STABILITY: INCOME INSECURITY: HOW HARD IS IT FOR YOU TO PAY FOR THE VERY BASICS LIKE FOOD, HOUSING, MEDICAL CARE, AND HEATING?: NOT HARD AT ALL

## 2024-04-15 ASSESSMENT — PATIENT HEALTH QUESTIONNAIRE - PHQ9
SUM OF ALL RESPONSES TO PHQ QUESTIONS 1-9: 0
SUM OF ALL RESPONSES TO PHQ QUESTIONS 1-9: 0
SUM OF ALL RESPONSES TO PHQ9 QUESTIONS 1 & 2: 0
2. FEELING DOWN, DEPRESSED OR HOPELESS: NOT AT ALL
SUM OF ALL RESPONSES TO PHQ QUESTIONS 1-9: 0
SUM OF ALL RESPONSES TO PHQ QUESTIONS 1-9: 0
1. LITTLE INTEREST OR PLEASURE IN DOING THINGS: NOT AT ALL

## 2024-04-22 DIAGNOSIS — I10 HTN, GOAL BELOW 130/80: ICD-10-CM

## 2024-04-22 RX ORDER — DOXAZOSIN 2 MG/1
2 TABLET ORAL DAILY
Qty: 90 TABLET | Refills: 1 | Status: SHIPPED | OUTPATIENT
Start: 2024-04-22

## 2024-04-22 NOTE — TELEPHONE ENCOUNTER
Tejas Machado is calling to request a refill on the following medication(s):    Medication Request:  Requested Prescriptions     Pending Prescriptions Disp Refills    doxazosin (CARDURA) 2 MG tablet [Pharmacy Med Name: DOXAZOSIN MESYLATE 2 MG TAB] 90 tablet 1     Sig: take 1 tablet by mouth once daily       Last Visit Date (If Applicable):  4/15/2024    Next Visit Date:    5/8/2024

## 2024-05-08 ENCOUNTER — OFFICE VISIT (OUTPATIENT)
Dept: FAMILY MEDICINE CLINIC | Age: 59
End: 2024-05-08
Payer: COMMERCIAL

## 2024-05-08 VITALS
WEIGHT: 235 LBS | OXYGEN SATURATION: 97 % | HEART RATE: 64 BPM | DIASTOLIC BLOOD PRESSURE: 100 MMHG | SYSTOLIC BLOOD PRESSURE: 170 MMHG | TEMPERATURE: 97.4 F | BODY MASS INDEX: 33.72 KG/M2

## 2024-05-08 DIAGNOSIS — I16.0 HYPERTENSIVE URGENCY: ICD-10-CM

## 2024-05-08 DIAGNOSIS — N52.9 ERECTILE DYSFUNCTION, UNSPECIFIED ERECTILE DYSFUNCTION TYPE: ICD-10-CM

## 2024-05-08 DIAGNOSIS — I50.42 CHRONIC COMBINED SYSTOLIC AND DIASTOLIC CHF (CONGESTIVE HEART FAILURE) (HCC): ICD-10-CM

## 2024-05-08 DIAGNOSIS — I48.0 PAROXYSMAL ATRIAL FIBRILLATION (HCC): ICD-10-CM

## 2024-05-08 DIAGNOSIS — I42.8 NONISCHEMIC CARDIOMYOPATHY (HCC): ICD-10-CM

## 2024-05-08 DIAGNOSIS — I24.89 DEMAND ISCHEMIA (HCC): ICD-10-CM

## 2024-05-08 DIAGNOSIS — N18.32 STAGE 3B CHRONIC KIDNEY DISEASE (HCC): ICD-10-CM

## 2024-05-08 DIAGNOSIS — K31.811 AVM (ARTERIOVENOUS MALFORMATION) OF DUODENUM, ACQUIRED WITH HEMORRHAGE: ICD-10-CM

## 2024-05-08 DIAGNOSIS — Z87.891 PERSONAL HISTORY OF TOBACCO USE: ICD-10-CM

## 2024-05-08 DIAGNOSIS — I10 HTN (HYPERTENSION), MALIGNANT: Primary | ICD-10-CM

## 2024-05-08 PROCEDURE — 99214 OFFICE O/P EST MOD 30 MIN: CPT

## 2024-05-08 PROCEDURE — G8417 CALC BMI ABV UP PARAM F/U: HCPCS

## 2024-05-08 PROCEDURE — G0296 VISIT TO DETERM LDCT ELIG: HCPCS

## 2024-05-08 PROCEDURE — 3017F COLORECTAL CA SCREEN DOC REV: CPT

## 2024-05-08 PROCEDURE — 1036F TOBACCO NON-USER: CPT

## 2024-05-08 PROCEDURE — 3077F SYST BP >= 140 MM HG: CPT

## 2024-05-08 PROCEDURE — 3080F DIAST BP >= 90 MM HG: CPT

## 2024-05-08 PROCEDURE — G8427 DOCREV CUR MEDS BY ELIG CLIN: HCPCS

## 2024-05-08 RX ORDER — SILDENAFIL 50 MG/1
50 TABLET, FILM COATED ORAL PRN
Qty: 30 TABLET | Refills: 3 | Status: SHIPPED | OUTPATIENT
Start: 2024-05-08

## 2024-05-08 RX ORDER — NIFEDIPINE 30 MG/1
60 TABLET, EXTENDED RELEASE ORAL DAILY
Qty: 90 TABLET | Refills: 1
Start: 2024-05-08

## 2024-05-08 SDOH — ECONOMIC STABILITY: FOOD INSECURITY: WITHIN THE PAST 12 MONTHS, YOU WORRIED THAT YOUR FOOD WOULD RUN OUT BEFORE YOU GOT MONEY TO BUY MORE.: NEVER TRUE

## 2024-05-08 SDOH — ECONOMIC STABILITY: FOOD INSECURITY: WITHIN THE PAST 12 MONTHS, THE FOOD YOU BOUGHT JUST DIDN'T LAST AND YOU DIDN'T HAVE MONEY TO GET MORE.: NEVER TRUE

## 2024-05-08 SDOH — ECONOMIC STABILITY: INCOME INSECURITY: HOW HARD IS IT FOR YOU TO PAY FOR THE VERY BASICS LIKE FOOD, HOUSING, MEDICAL CARE, AND HEATING?: NOT HARD AT ALL

## 2024-05-08 ASSESSMENT — PATIENT HEALTH QUESTIONNAIRE - PHQ9
1. LITTLE INTEREST OR PLEASURE IN DOING THINGS: NOT AT ALL
SUM OF ALL RESPONSES TO PHQ QUESTIONS 1-9: 0
SUM OF ALL RESPONSES TO PHQ QUESTIONS 1-9: 0
SUM OF ALL RESPONSES TO PHQ9 QUESTIONS 1 & 2: 0
SUM OF ALL RESPONSES TO PHQ QUESTIONS 1-9: 0
SUM OF ALL RESPONSES TO PHQ QUESTIONS 1-9: 0
2. FEELING DOWN, DEPRESSED OR HOPELESS: NOT AT ALL

## 2024-05-08 NOTE — PATIENT INSTRUCTIONS
follow-up, he or she will help you understand what to do next.  After a lung cancer screening, you can go back to your usual activities right away.  A lung cancer screening test can't tell if you have lung cancer. If your results are positive, your doctor can't tell whether an abnormal finding is a harmless nodule, cancer, or something else without doing more tests.  What can you do to help prevent lung cancer?  Some lung cancers can't be prevented. But if you smoke, quitting smoking is the best step you can take to prevent lung cancer. If you want to quit, your doctor can recommend medicines or other ways to help.  Follow-up care is a key part of your treatment and safety. Be sure to make and go to all appointments, and call your doctor if you are having problems. It's also a good idea to know your test results and keep a list of the medicines you take.  Where can you learn more?  Go to https://www.[employer].net/patientEd and enter Q940 to learn more about \"Learning About Lung Cancer Screening.\"  Current as of: October 25, 2023               Content Version: 14.0  © 2146-2865 [employer].   Care instructions adapted under license by [employer]. If you have questions about a medical condition or this instruction, always ask your healthcare professional. [employer] disclaims any warranty or liability for your use of this information.

## 2024-05-08 NOTE — PROGRESS NOTES
in patients aged 50-77 with at least a 20 pack-year smoking history who currently smoke or have quit in the last 15 years    Return in about 3 months (around 8/8/2024), or if symptoms worsen or fail to improve, for HTN/chronic conditions.               An electronic signature was used to authenticate this note.    --Chelly Hess, APRN - CNP

## 2024-05-15 PROBLEM — N18.32 STAGE 3B CHRONIC KIDNEY DISEASE (HCC): Status: ACTIVE | Noted: 2020-09-29

## 2024-06-09 DIAGNOSIS — K22.70 BARRETT'S ESOPHAGUS WITHOUT DYSPLASIA: ICD-10-CM

## 2024-06-10 RX ORDER — PANTOPRAZOLE SODIUM 40 MG/1
40 TABLET, DELAYED RELEASE ORAL
Qty: 30 TABLET | Refills: 1 | Status: SHIPPED | OUTPATIENT
Start: 2024-06-10

## 2024-06-10 NOTE — TELEPHONE ENCOUNTER
Tejas Machado is calling to request a refill on the following medication(s):    Medication Request:  Requested Prescriptions     Pending Prescriptions Disp Refills    pantoprazole (PROTONIX) 40 MG tablet [Pharmacy Med Name: PANTOPRAZOLE SOD DR 40 MG TAB] 60 tablet 1     Sig: take 1 tablet by mouth EVERY MORNING BEFORE BREAKFAST       Last Visit Date (If Applicable):  5/8/2024    Next Visit Date:    8/12/2024

## 2024-07-02 DIAGNOSIS — E78.5 DYSLIPIDEMIA: ICD-10-CM

## 2024-07-02 RX ORDER — ATORVASTATIN CALCIUM 40 MG/1
40 TABLET, FILM COATED ORAL DAILY
Qty: 90 TABLET | Refills: 0 | Status: SHIPPED | OUTPATIENT
Start: 2024-07-02

## 2024-07-02 NOTE — TELEPHONE ENCOUNTER
Tejas Machado is calling to request a refill on the following medication(s):    Medication Request:  Requested Prescriptions     Pending Prescriptions Disp Refills    atorvastatin (LIPITOR) 40 MG tablet [Pharmacy Med Name: ATORVASTATIN 40 MG TABLET] 90 tablet 0     Sig: take 1 tablet by mouth once daily       Last Visit Date (If Applicable):  5/8/2024    Next Visit Date:    8/12/2024

## 2024-07-15 ENCOUNTER — TELEPHONE (OUTPATIENT)
Dept: FAMILY MEDICINE CLINIC | Age: 59
End: 2024-07-15

## 2024-07-15 ENCOUNTER — HOSPITAL ENCOUNTER (INPATIENT)
Age: 59
LOS: 4 days | Discharge: HOME | End: 2024-07-19
Attending: EMERGENCY MEDICINE | Admitting: HOSPITALIST
Payer: COMMERCIAL

## 2024-07-15 ENCOUNTER — APPOINTMENT (OUTPATIENT)
Dept: GENERAL RADIOLOGY | Age: 59
End: 2024-07-15
Payer: COMMERCIAL

## 2024-07-15 ENCOUNTER — HOSPITAL ENCOUNTER (OUTPATIENT)
Age: 59
Setting detail: SPECIMEN
Discharge: HOME | End: 2024-07-15

## 2024-07-15 DIAGNOSIS — I50.42 CHRONIC COMBINED SYSTOLIC AND DIASTOLIC CHF (CONGESTIVE HEART FAILURE) (HCC): ICD-10-CM

## 2024-07-15 DIAGNOSIS — I10 ESSENTIAL HYPERTENSION: ICD-10-CM

## 2024-07-15 DIAGNOSIS — I42.9 CARDIOMYOPATHY, UNSPECIFIED TYPE (HCC): ICD-10-CM

## 2024-07-15 DIAGNOSIS — I10 HTN (HYPERTENSION), MALIGNANT: ICD-10-CM

## 2024-07-15 DIAGNOSIS — K92.2 GASTROINTESTINAL HEMORRHAGE, UNSPECIFIED GASTROINTESTINAL HEMORRHAGE TYPE: Primary | ICD-10-CM

## 2024-07-15 DIAGNOSIS — N18.32 STAGE 3B CHRONIC KIDNEY DISEASE (HCC): ICD-10-CM

## 2024-07-15 LAB
25(OH)D3 SERPL-MCNC: 42.7 NG/ML (ref 30–100)
ALBUMIN SERPL-MCNC: 4.4 G/DL (ref 3.5–5.2)
ALBUMIN SERPL-MCNC: 4.5 G/DL (ref 3.5–5.2)
ALBUMIN/GLOB SERPL: 2 {RATIO} (ref 1–2.5)
ALP SERPL-CCNC: 64 U/L (ref 40–129)
ALT SERPL-CCNC: 15 U/L (ref 10–50)
ANION GAP SERPL CALCULATED.3IONS-SCNC: 10 MMOL/L (ref 9–16)
ANION GAP SERPL CALCULATED.3IONS-SCNC: 11 MMOL/L (ref 9–16)
AST SERPL-CCNC: 21 U/L (ref 10–50)
BASOPHILS # BLD: 0.04 K/UL (ref 0–0.2)
BASOPHILS NFR BLD: 1 % (ref 0–2)
BILIRUB SERPL-MCNC: 0.6 MG/DL (ref 0–1.2)
BNP SERPL-MCNC: 172 PG/ML (ref 0–300)
BUN SERPL-MCNC: 34 MG/DL (ref 8–23)
BUN SERPL-MCNC: 34 MG/DL (ref 8–23)
CALCIUM SERPL-MCNC: 8.9 MG/DL (ref 8.6–10.4)
CALCIUM SERPL-MCNC: 9.1 MG/DL (ref 8.6–10.4)
CHLORIDE SERPL-SCNC: 105 MMOL/L (ref 98–107)
CHLORIDE SERPL-SCNC: 106 MMOL/L (ref 98–107)
CK SERPL-CCNC: 304 U/L (ref 39–308)
CO2 SERPL-SCNC: 23 MMOL/L (ref 20–31)
CO2 SERPL-SCNC: 24 MMOL/L (ref 20–31)
CREAT SERPL-MCNC: 2.4 MG/DL (ref 0.7–1.2)
CREAT SERPL-MCNC: 2.4 MG/DL (ref 0.7–1.2)
CREAT UR-MCNC: 225 MG/DL (ref 39–259)
EOSINOPHIL # BLD: 0.11 K/UL (ref 0–0.44)
EOSINOPHILS RELATIVE PERCENT: 2 % (ref 1–4)
ERYTHROCYTE [DISTWIDTH] IN BLOOD BY AUTOMATED COUNT: 15 % (ref 11.8–14.4)
ERYTHROCYTE [DISTWIDTH] IN BLOOD BY AUTOMATED COUNT: 15.2 % (ref 11.8–14.4)
GFR, ESTIMATED: 30 ML/MIN/1.73M2
GFR, ESTIMATED: 30 ML/MIN/1.73M2
GLUCOSE SERPL-MCNC: 104 MG/DL (ref 74–99)
GLUCOSE SERPL-MCNC: 98 MG/DL (ref 74–99)
HCT VFR BLD AUTO: 23.6 % (ref 40.7–50.3)
HCT VFR BLD AUTO: 24.1 % (ref 40.7–50.3)
HCT VFR BLD AUTO: 24.6 % (ref 40.7–50.3)
HGB BLD-MCNC: 6.8 G/DL (ref 13–17)
HGB BLD-MCNC: 6.9 G/DL (ref 13–17)
HGB BLD-MCNC: 7.2 G/DL (ref 13–17)
IMM GRANULOCYTES # BLD AUTO: 0.04 K/UL (ref 0–0.3)
IMM GRANULOCYTES NFR BLD: 1 %
INR PPP: 1
LACTIC ACID, WHOLE BLOOD: 0.7 MMOL/L (ref 0.7–2.1)
LIPASE SERPL-CCNC: 27 U/L (ref 13–60)
LYMPHOCYTES NFR BLD: 0.83 K/UL (ref 1.1–3.7)
LYMPHOCYTES RELATIVE PERCENT: 18 % (ref 24–43)
MAGNESIUM SERPL-MCNC: 2.3 MG/DL (ref 1.6–2.4)
MCH RBC QN AUTO: 22.9 PG (ref 25.2–33.5)
MCH RBC QN AUTO: 23.4 PG (ref 25.2–33.5)
MCHC RBC AUTO-ENTMCNC: 28.6 G/DL (ref 28.4–34.8)
MCHC RBC AUTO-ENTMCNC: 28.8 G/DL (ref 28.4–34.8)
MCV RBC AUTO: 79.5 FL (ref 82.6–102.9)
MCV RBC AUTO: 81.7 FL (ref 82.6–102.9)
MONOCYTES NFR BLD: 0.37 K/UL (ref 0.1–1.2)
MONOCYTES NFR BLD: 8 % (ref 3–12)
NEUTROPHILS NFR BLD: 70 % (ref 36–65)
NEUTS SEG NFR BLD: 3.2 K/UL (ref 1.5–8.1)
NRBC BLD-RTO: 1.2 PER 100 WBC
NRBC BLD-RTO: 1.3 PER 100 WBC
PARTIAL THROMBOPLASTIN TIME: 29 SEC (ref 23–36.5)
PHOSPHATE SERPL-MCNC: 3.2 MG/DL (ref 2.5–4.5)
PHOSPHATE SERPL-MCNC: 3.6 MG/DL (ref 2.5–4.5)
PLATELET # BLD AUTO: 216 K/UL (ref 138–453)
PLATELET # BLD AUTO: 221 K/UL (ref 138–453)
PMV BLD AUTO: 11 FL (ref 8.1–13.5)
PMV BLD AUTO: 11.4 FL (ref 8.1–13.5)
POTASSIUM SERPL-SCNC: 4 MMOL/L (ref 3.7–5.3)
POTASSIUM SERPL-SCNC: 4 MMOL/L (ref 3.7–5.3)
PROT SERPL-MCNC: 6.9 G/DL (ref 6.6–8.7)
PROTHROMBIN TIME: 13.2 SEC (ref 11.7–14.9)
PTH-INTACT SERPL-MCNC: 114 PG/ML (ref 15–65)
RBC # BLD AUTO: 2.95 M/UL (ref 4.21–5.77)
RBC # BLD AUTO: 2.97 M/UL (ref 4.21–5.77)
RBC # BLD: ABNORMAL 10*6/UL
SODIUM SERPL-SCNC: 139 MMOL/L (ref 136–145)
SODIUM SERPL-SCNC: 140 MMOL/L (ref 136–145)
TOTAL PROTEIN, URINE: 29 MG/DL
TROPONIN I SERPL HS-MCNC: 21 NG/L (ref 0–22)
TROPONIN I SERPL HS-MCNC: 23 NG/L (ref 0–22)
TSH SERPL DL<=0.05 MIU/L-ACNC: 1.32 UIU/ML (ref 0.27–4.2)
URINE TOTAL PROTEIN CREATININE RATIO: 0.13
WBC OTHER # BLD: 4.2 K/UL (ref 3.5–11.3)
WBC OTHER # BLD: 4.6 K/UL (ref 3.5–11.3)

## 2024-07-15 PROCEDURE — 6370000000 HC RX 637 (ALT 250 FOR IP)

## 2024-07-15 PROCEDURE — 6360000002 HC RX W HCPCS

## 2024-07-15 PROCEDURE — 80053 COMPREHEN METABOLIC PANEL: CPT

## 2024-07-15 PROCEDURE — 86901 BLOOD TYPING SEROLOGIC RH(D): CPT

## 2024-07-15 PROCEDURE — 83880 ASSAY OF NATRIURETIC PEPTIDE: CPT

## 2024-07-15 PROCEDURE — 83605 ASSAY OF LACTIC ACID: CPT

## 2024-07-15 PROCEDURE — 83690 ASSAY OF LIPASE: CPT

## 2024-07-15 PROCEDURE — 99223 1ST HOSP IP/OBS HIGH 75: CPT | Performed by: HOSPITALIST

## 2024-07-15 PROCEDURE — 82550 ASSAY OF CK (CPK): CPT

## 2024-07-15 PROCEDURE — 86850 RBC ANTIBODY SCREEN: CPT

## 2024-07-15 PROCEDURE — 84443 ASSAY THYROID STIM HORMONE: CPT

## 2024-07-15 PROCEDURE — 85610 PROTHROMBIN TIME: CPT

## 2024-07-15 PROCEDURE — 2580000003 HC RX 258

## 2024-07-15 PROCEDURE — P9016 RBC LEUKOCYTES REDUCED: HCPCS

## 2024-07-15 PROCEDURE — 30233N1 TRANSFUSION OF NONAUTOLOGOUS RED BLOOD CELLS INTO PERIPHERAL VEIN, PERCUTANEOUS APPROACH: ICD-10-PCS | Performed by: INTERNAL MEDICINE

## 2024-07-15 PROCEDURE — 99285 EMERGENCY DEPT VISIT HI MDM: CPT

## 2024-07-15 PROCEDURE — 83735 ASSAY OF MAGNESIUM: CPT

## 2024-07-15 PROCEDURE — 2060000000 HC ICU INTERMEDIATE R&B

## 2024-07-15 PROCEDURE — 86920 COMPATIBILITY TEST SPIN: CPT

## 2024-07-15 PROCEDURE — 85025 COMPLETE CBC W/AUTO DIFF WBC: CPT

## 2024-07-15 PROCEDURE — 85730 THROMBOPLASTIN TIME PARTIAL: CPT

## 2024-07-15 PROCEDURE — 71045 X-RAY EXAM CHEST 1 VIEW: CPT

## 2024-07-15 PROCEDURE — 84484 ASSAY OF TROPONIN QUANT: CPT

## 2024-07-15 PROCEDURE — 86900 BLOOD TYPING SEROLOGIC ABO: CPT

## 2024-07-15 PROCEDURE — 96374 THER/PROPH/DIAG INJ IV PUSH: CPT

## 2024-07-15 PROCEDURE — 85018 HEMOGLOBIN: CPT

## 2024-07-15 PROCEDURE — 93005 ELECTROCARDIOGRAM TRACING: CPT

## 2024-07-15 PROCEDURE — 85014 HEMATOCRIT: CPT

## 2024-07-15 PROCEDURE — 84100 ASSAY OF PHOSPHORUS: CPT

## 2024-07-15 RX ORDER — SODIUM CHLORIDE 9 MG/ML
INJECTION, SOLUTION INTRAVENOUS PRN
Status: COMPLETED | OUTPATIENT
Start: 2024-07-15 | End: 2024-07-17

## 2024-07-15 RX ORDER — LABETALOL HYDROCHLORIDE 5 MG/ML
10 INJECTION, SOLUTION INTRAVENOUS EVERY 6 HOURS PRN
Status: DISCONTINUED | OUTPATIENT
Start: 2024-07-15 | End: 2024-07-15

## 2024-07-15 RX ORDER — SODIUM CHLORIDE 9 MG/ML
INJECTION, SOLUTION INTRAVENOUS PRN
Status: DISCONTINUED | OUTPATIENT
Start: 2024-07-15 | End: 2024-07-19 | Stop reason: HOSPADM

## 2024-07-15 RX ORDER — ONDANSETRON 2 MG/ML
4 INJECTION INTRAMUSCULAR; INTRAVENOUS EVERY 6 HOURS PRN
Status: DISCONTINUED | OUTPATIENT
Start: 2024-07-15 | End: 2024-07-19 | Stop reason: HOSPADM

## 2024-07-15 RX ORDER — POLYETHYLENE GLYCOL 3350 17 G/17G
17 POWDER, FOR SOLUTION ORAL DAILY PRN
Status: DISCONTINUED | OUTPATIENT
Start: 2024-07-15 | End: 2024-07-19 | Stop reason: HOSPADM

## 2024-07-15 RX ORDER — ACETAMINOPHEN 325 MG/1
650 TABLET ORAL EVERY 6 HOURS PRN
Status: DISCONTINUED | OUTPATIENT
Start: 2024-07-15 | End: 2024-07-19 | Stop reason: HOSPADM

## 2024-07-15 RX ORDER — CARVEDILOL 6.25 MG/1
6.25 TABLET ORAL 2 TIMES DAILY WITH MEALS
Status: DISCONTINUED | OUTPATIENT
Start: 2024-07-15 | End: 2024-07-19

## 2024-07-15 RX ORDER — SPIRONOLACTONE 25 MG/1
50 TABLET ORAL DAILY
Status: DISCONTINUED | OUTPATIENT
Start: 2024-07-16 | End: 2024-07-19 | Stop reason: HOSPADM

## 2024-07-15 RX ORDER — ONDANSETRON 4 MG/1
4 TABLET, ORALLY DISINTEGRATING ORAL EVERY 8 HOURS PRN
Status: DISCONTINUED | OUTPATIENT
Start: 2024-07-15 | End: 2024-07-19 | Stop reason: HOSPADM

## 2024-07-15 RX ORDER — SODIUM CHLORIDE 0.9 % (FLUSH) 0.9 %
5-40 SYRINGE (ML) INJECTION PRN
Status: DISCONTINUED | OUTPATIENT
Start: 2024-07-15 | End: 2024-07-19 | Stop reason: HOSPADM

## 2024-07-15 RX ORDER — LABETALOL HYDROCHLORIDE 5 MG/ML
10 INJECTION, SOLUTION INTRAVENOUS EVERY 6 HOURS PRN
Status: DISCONTINUED | OUTPATIENT
Start: 2024-07-15 | End: 2024-07-16

## 2024-07-15 RX ORDER — NIFEDIPINE 30 MG/1
60 TABLET, EXTENDED RELEASE ORAL DAILY
Status: DISCONTINUED | OUTPATIENT
Start: 2024-07-15 | End: 2024-07-17

## 2024-07-15 RX ORDER — ACETAMINOPHEN 650 MG/1
650 SUPPOSITORY RECTAL EVERY 6 HOURS PRN
Status: DISCONTINUED | OUTPATIENT
Start: 2024-07-15 | End: 2024-07-19 | Stop reason: HOSPADM

## 2024-07-15 RX ORDER — MAGNESIUM SULFATE IN WATER 40 MG/ML
2000 INJECTION, SOLUTION INTRAVENOUS PRN
Status: DISCONTINUED | OUTPATIENT
Start: 2024-07-15 | End: 2024-07-19 | Stop reason: HOSPADM

## 2024-07-15 RX ORDER — POTASSIUM CHLORIDE 7.45 MG/ML
10 INJECTION INTRAVENOUS PRN
Status: DISCONTINUED | OUTPATIENT
Start: 2024-07-15 | End: 2024-07-19 | Stop reason: HOSPADM

## 2024-07-15 RX ORDER — POTASSIUM CHLORIDE 20 MEQ/1
40 TABLET, EXTENDED RELEASE ORAL PRN
Status: DISCONTINUED | OUTPATIENT
Start: 2024-07-15 | End: 2024-07-19 | Stop reason: HOSPADM

## 2024-07-15 RX ORDER — SODIUM CHLORIDE 0.9 % (FLUSH) 0.9 %
5-40 SYRINGE (ML) INJECTION EVERY 12 HOURS SCHEDULED
Status: DISCONTINUED | OUTPATIENT
Start: 2024-07-15 | End: 2024-07-19 | Stop reason: HOSPADM

## 2024-07-15 RX ADMIN — LABETALOL HYDROCHLORIDE 10 MG: 5 INJECTION, SOLUTION INTRAVENOUS at 18:05

## 2024-07-15 RX ADMIN — SODIUM CHLORIDE, PRESERVATIVE FREE 10 ML: 5 INJECTION INTRAVENOUS at 21:49

## 2024-07-15 RX ADMIN — NIFEDIPINE 60 MG: 30 TABLET, FILM COATED, EXTENDED RELEASE ORAL at 21:48

## 2024-07-15 RX ADMIN — PANTOPRAZOLE SODIUM 80 MG: 40 INJECTION, POWDER, FOR SOLUTION INTRAVENOUS at 14:36

## 2024-07-15 RX ADMIN — CARVEDILOL 6.25 MG: 12.5 TABLET, FILM COATED ORAL at 21:48

## 2024-07-15 RX ADMIN — PANTOPRAZOLE SODIUM 8 MG/HR: 40 INJECTION, POWDER, FOR SOLUTION INTRAVENOUS at 14:36

## 2024-07-15 ASSESSMENT — PAIN - FUNCTIONAL ASSESSMENT: PAIN_FUNCTIONAL_ASSESSMENT: NONE - DENIES PAIN

## 2024-07-15 ASSESSMENT — LIFESTYLE VARIABLES
HOW MANY STANDARD DRINKS CONTAINING ALCOHOL DO YOU HAVE ON A TYPICAL DAY: PATIENT DOES NOT DRINK
HOW OFTEN DO YOU HAVE A DRINK CONTAINING ALCOHOL: NEVER

## 2024-07-15 NOTE — ED NOTES
daily    BLOOD PRESSURE KIT    1 kit by Does not apply route daily    BLOOD PRESSURE MONITORING (ADULT BLOOD PRESSURE CUFF LG) KIT    Check blood pressure daily    CARVEDILOL (COREG) 6.25 MG TABLET    Take 1 tablet by mouth 2 times daily (with meals)    DOXAZOSIN (CARDURA) 2 MG TABLET    take 1 tablet by mouth once daily    FEROSUL 325 (65 FE) MG TABLET    take 1 tablet by mouth twice a day    FLUTICASONE (FLONASE) 50 MCG/ACT NASAL SPRAY    1 spray by Each Nostril route daily    HYDRALAZINE (APRESOLINE) 100 MG TABLET    Take 1 tablet by mouth 3 times daily    LOSARTAN (COZAAR) 100 MG TABLET    Take 1 tablet by mouth daily    NIFEDIPINE (PROCARDIA XL) 30 MG EXTENDED RELEASE TABLET    Take 2 tablets by mouth daily    PANTOPRAZOLE (PROTONIX) 40 MG TABLET    take 1 tablet by mouth EVERY MORNING BEFORE BREAKFAST    SENNA (SENOKOT) 8.6 MG TABLET    Take 1 tablet by mouth 2 times daily    SILDENAFIL (VIAGRA) 50 MG TABLET    Take 1 tablet by mouth as needed for Erectile Dysfunction    SPIRONOLACTONE (ALDACTONE) 25 MG TABLET    Take 2 tablets by mouth daily    VITAMIN D, CHOLECALCIFEROL, PO    Take by mouth daily    VITAMIN D3 (CHOLECALCIFEROL) 25 MCG (1000 UT) TABS TABLET    Take 1 tablet by mouth daily     Orders Placed This Encounter   Medications    pantoprazole (PROTONIX) 80 mg in sodium chloride (PF) 0.9 % 20 mL injection    pantoprazole (PROTONIX) 80 mg in sodium chloride 0.9 % 100 mL infusion    pantoprazole (PROTONIX) 40 mg in sodium chloride (PF) 0.9 % 10 mL injection    0.9 % sodium chloride infusion    sodium chloride flush 0.9 % injection 5-40 mL    sodium chloride flush 0.9 % injection 5-40 mL    0.9 % sodium chloride infusion    OR Linked Order Group     potassium chloride (KLOR-CON M) extended release tablet 40 mEq     potassium bicarb-citric acid (EFFER-K) effervescent tablet 40 mEq     potassium chloride 10 mEq/100 mL IVPB (Peripheral Line)    magnesium sulfate 2000 mg in 50 mL IVPB premix    OR Linked  Order Group     ondansetron (ZOFRAN-ODT) disintegrating tablet 4 mg     ondansetron (ZOFRAN) injection 4 mg    polyethylene glycol (GLYCOLAX) packet 17 g    OR Linked Order Group     acetaminophen (TYLENOL) tablet 650 mg     acetaminophen (TYLENOL) suppository 650 mg    labetalol (NORMODYNE;TRANDATE) injection 10 mg       SURGICAL HISTORY       Past Surgical History:   Procedure Laterality Date    COLONOSCOPY N/A 04/14/2021    COLONOSCOPY WITH BIOPSY performed by Maureen Sanchez MD at [facility] ENDO    ESOPHAGOGASTRODUODENOSCOPY N/A 03/15/2023    TONSILLECTOMY      UPPER GASTROINTESTINAL ENDOSCOPY N/A 04/14/2021    EGD BIOPSY, ESOPHAGEAL DILATION performed by Maureen Sanchez MD at [facility] ENDO    UPPER GASTROINTESTINAL ENDOSCOPY  3/15/2023    EGD CONTROL HEMORRHAGE performed by Treva Rothman MD at [facility] OR    UPPER GASTROINTESTINAL ENDOSCOPY  3/15/2023    EGD BIOPSY performed by Treva Rothman MD at [facility] OR       PAST MEDICAL HISTORY       Past Medical History:   Diagnosis Date    Atrial fibrillation (HCC)     Benign essential HTN     CKD (chronic kidney disease), stage III (HCC) 09/29/2020    Secondary to suspected ischemic nephrosclerosis baseline creatinine 1.5-1.9    Enlarged heart     Hypertensive urgency 10/14/2023       Labs:  Labs Reviewed   CBC WITH AUTO DIFFERENTIAL - Abnormal; Notable for the following components:       Result Value    RBC 2.97 (*)     Hemoglobin 6.8 (*)     Hematocrit 23.6 (*)     MCV 79.5 (*)     MCH 22.9 (*)     RDW 15.0 (*)     NRBC Automated 1.3 (*)     Neutrophils % 70 (*)     Lymphocytes % 18 (*)     Immature Granulocytes % 1 (*)     Lymphocytes Absolute 0.83 (*)     All other components within normal limits   COMPREHENSIVE METABOLIC PANEL - Abnormal; Notable for the following components:    BUN 34 (*)     Creatinine 2.4 (*)     Est, Glom Filt Rate 30 (*)     All other components within normal limits   TROPONIN - Abnormal; Notable for the following components:    Troponin, High Sensitivity

## 2024-07-15 NOTE — CONSENT
Informed Consent for Blood Component Transfusion Note    I have discussed with the patient the rationale for blood component transfusion; its benefits in treating or preventing fatigue, organ damage, or death; and its risk which includes mild transfusion reactions, rare risk of blood borne infection, or more serious but rare reactions. I have discussed the alternatives to transfusion, including the risk and consequences of not receiving transfusion. The patient had an opportunity to ask questions and had agreed to proceed with transfusion of blood components.    Electronically signed by Naren Mijares MD on 7/15/24 at 2:40 PM EDT

## 2024-07-15 NOTE — ED PROVIDER NOTES
[facility] ED     Emergency Department     Faculty Attestation        I performed a history and physical examination of the patient and discussed management with the resident. I reviewed the resident’s note and agree with the documented findings and plan of care. Any areas of disagreement are noted on the chart. I was personally present for the key portions of any procedures. I have documented in the chart those procedures where I was not present during the key portions. I have reviewed the emergency nurses triage note. I agree with the chief complaint, past medical history, past surgical history, allergies, medications, social and family history as documented unless otherwise noted below.    For mid-level providers such as nurse practitioners as well as physicians assistants:    I have personally seen and evaluated the patient.    I find the patient's history and physical exam are consistent with NP/PA documentation.  I agree with the care provided, treatment rendered, disposition, & follow-up plan.     Additional findings are as noted.    Vital Signs: BP (!) 179/92   Pulse 97   Temp 98.4 °F (36.9 °C)   Resp 29   Ht 1.778 m (5' 10\")   Wt 106.6 kg (235 lb)   SpO2 97%   BMI 33.72 kg/m²   PCP:  Chelly Hess, APRN - CNP    Pertinent Comments:           Critical Care  None          Tyrone Solo MD    Attending Emergency Medicine Physician           Ed Solo MD  07/15/24 1840    
 Handoff taken on the following patient from prior Attending Physician:    Pt Name: Tejas Machado    PCP:  Chelly Hess APRN - CNP         Attestation    I was available and discussed any additional care issues that arose and coordinated the management plans with the resident(s) caring for the patient during my duty period. Any areas of disagreement with resident’s documentation of care or procedures are noted on the chart. I was personally present for the key portions of any/all procedures during my duty period. I have documented in the chart those procedures where I was not present during the key portions.      Patient is a 58-year-old with GI bleed medicine to admit patient is getting blood and is on apixaban currently not actively bleeding not hemodynamically unstable and does not need emergent reversal at this moment     Eliceo Singh DO  07/15/24 1924    
MEDICINE    CRITICAL CARE:  There was significant risk of life threatening deterioration of patient's condition requiring my direct management. Critical care time  minutes, excluding any documented procedures.    FINAL IMPRESSION      1. Gastrointestinal hemorrhage, unspecified gastrointestinal hemorrhage type          DISPOSITION / PLAN     DISPOSITION Decision To Admit 07/15/2024 03:24:24 PM      PATIENT REFERRED TO:  No follow-up provider specified.    DISCHARGE MEDICATIONS:  New Prescriptions    No medications on file       Naren Mijares MD  Emergency Medicine Resident    (Please note that portions of this note were completed with a voice recognition program.  Efforts were made to edit the dictations but occasionally words are mis-transcribed.)

## 2024-07-15 NOTE — TELEPHONE ENCOUNTER
SUBJECT:  Returned symptoms from hospital  PATIENT/CALLER: Wife / Renata  CURRENT SYMPTOMS: Blood stool, short of breath, can't walk more than 10 feet, lips and eyes pale  ONSET: 4-5 days shortness of breath / Blood in stool a couple weeks  PAIN LEVEL:  none  TEMP:  none  WHAT HAS BEEN TRIED:  No

## 2024-07-15 NOTE — ED NOTES
ED to inpatient nurses report      Chief Complaint:  Chief Complaint   Patient presents with   • Chest Pain   • Rectal Bleeding     Present to ED from: home    MOA:     LOC: alert and orientated to name, place, date  Mobility: Independent  Oxygen Baseline: room air    Current needs required: n/a   Pending ED orders:   Present condition: stable resting on cot, getting transfusion     Why did the patient come to the ED? Pt to ed via ambulatory to room with complaints of chest pain and rectal bleeding  Pt states this has been an ongoing thing for the past few days  Pt states he hasn't taken his eliquis in 5 days, pt states he hasn't picked it up from the pharmacy yet  Pt states diffuse chest pain that feels heavy  Pt denies being a smoker  Pt states hx of gi bleed  Pt states stools are dark  Pt had a positive hemoccult test at bedside   Pt denies taking anything for pain  Wife at bedside, a good historian  Wife states pt is a kidney failure pt but no receiving dialysis   Pt states he is compliant with his daily other medications  Pt placed on cont cardiac monitor, ekg completed, iv established, labs drawn, labeled and will send to lab via orders  Pt alert and oriented x4, talking in complete sentences, respirations even and unlabored. Pt acting age appropriate. White board updated, will continue to plan of care   What is the plan? admit  Any procedures or intervention occur? Labs, scans, blood transfusion  Any safety concerns?? no    Mental Status:  Level of Consciousness: Alert (0)    Psych Assessment:   Psychosocial  Psychosocial (WDL): Within Defined Limits  Vital signs   Vitals:    07/15/24 1405 07/15/24 1515 07/15/24 1516 07/15/24 1544   BP:  (!) 161/96     Pulse: 97 82 84    Resp: 29 24 19    Temp:    98.4 °F (36.9 °C)   SpO2: 97% 100% 98%    Weight:       Height:            Vitals:  Patient Vitals for the past 24 hrs:   BP Temp Pulse Resp SpO2 Height Weight   07/15/24 1544 -- 98.4 °F (36.9 °C) -- -- -- -- --  Average packs/day: 1 pack/day for 44.0 years (44.0 ttl pk-yrs)     Types: Cigarettes     Start date:      Quit date:      Years since quittin.5   • Smokeless tobacco: Former   • Tobacco comments:     1-2 per week   Vaping Use   • Vaping Use: Never used   Substance and Sexual Activity   • Alcohol use: Yes     Comment: socially   • Drug use: No     Social Determinants of Health     Financial Resource Strain: Low Risk  (2024)    Overall Financial Resource Strain (CARDIA)    • Difficulty of Paying Living Expenses: Not hard at all   Food Insecurity: No Food Insecurity (2024)    Hunger Vital Sign    • Worried About Running Out of Food in the Last Year: Never true    • Ran Out of Food in the Last Year: Never true   Transportation Needs: Unknown (2024)    PRAPARE - Transportation    • Lack of Transportation (Non-Medical): No   Housing Stability: Unknown (2024)    Housing Stability Vital Sign    • Unstable Housing in the Last Year: No       FAMILY HISTORY       Family History   Problem Relation Age of Onset   • Hypertension Mother    • Hypertension Father    • Other Father         enlarged heart       ALLERGIES     Patient has no known allergies.    CURRENT MEDICATIONS       Previous Medications    APIXABAN (ELIQUIS) 5 MG TABS TABLET    take 1 tablet by mouth twice a day    ASPIRIN 81 MG EC TABLET    Take 1 tablet by mouth daily    ATORVASTATIN (LIPITOR) 40 MG TABLET    take 1 tablet by mouth once daily    BLOOD PRESSURE KIT    1 kit by Does not apply route daily    BLOOD PRESSURE MONITORING (ADULT BLOOD PRESSURE CUFF LG) KIT    Check blood pressure daily    CARVEDILOL (COREG) 6.25 MG TABLET    Take 1 tablet by mouth 2 times daily (with meals)    DOXAZOSIN (CARDURA) 2 MG TABLET    take 1 tablet by mouth once daily    FEROSUL 325 (65 FE) MG TABLET    take 1 tablet by mouth twice a day    FLUTICASONE (FLONASE) 50 MCG/ACT NASAL SPRAY    1 spray by Each Nostril route daily    HYDRALAZINE (APRESOLINE)

## 2024-07-15 NOTE — ED NOTES
Pt to ed via ambulatory to room with complaints of chest pain and rectal bleeding  Pt states this has been an ongoing thing for the past few days  Pt states he hasn't taken his eliquis in 5 days, pt states he hasn't picked it up from the pharmacy yet  Pt states diffuse chest pain that feels heavy  Pt denies being a smoker  Pt states hx of gi bleed  Pt states stools are dark  Pt had a positive hemoccult test at bedside   Pt denies taking anything for pain  Wife at bedside, a good historian  Wife states pt is a kidney failure pt but no receiving dialysis   Pt states he is compliant with his daily other medications  Pt placed on cont cardiac monitor, ekg completed, iv established, labs drawn, labeled and will send to lab via orders  Pt alert and oriented x4, talking in complete sentences, respirations even and unlabored. Pt acting age appropriate. White board updated, will continue to plan of care      Alert-The patient is alert, awake and responds to voice. The patient is oriented to time, place, and person. The triage nurse is able to obtain subjective information.

## 2024-07-15 NOTE — H&P
[place]     Department of Internal Medicine - Staff Internal Medicine Teaching Service          ADMISSION NOTE/HISTORY AND PHYSICAL EXAMINATION   Date: 7/15/2024  Patient Name: Tejas Machado  Date of admission: 7/15/2024  1:52 PM  YOB: 1965  PCP: Chelly Hess APRN - CNP  History Obtained From:  patient    CHIEF COMPLAINT     Chief complaint: Dizziness    HISTORY OF PRESENTING ILLNESS     The patient is a pleasant 58 y.o. male with a PMHx significant for     Atrial fibrillation on Eliquis  GI bleed  Essential hypertension  CKD stage III  AV malformations    presents with a chief complaint of dizziness for the past 2 weeks.  Patient has experienced exertional dizziness, shortness of breath, chest pain for the past 2 weeks which he reports gets better by rest.  He started observing dark tarry black stools for the past 2 weeks, since then he has experienced dizziness, shortness of breath, chest pain.  Patient is taking Eliquis for atrial fibrillation, but he has stopped taking it for the last 5 days because he ran out of the medication..  Patient has not taken any medication for his symptoms, he reports that he has not taken any NSAIDs in the recent past.  Patient stated that he has experienced episodes of GI bleeds in the past as well.    Patient denies constipation, fever, chills, palpitations, loss of consciousness, motor weakness, sensory impairment, vision disturbances.    On my examination in the emergency department, patient was AOx4 and maintaining saturation on room air.  He was hypertensive with blood pressure 161/96 mmHg.  He was found to be anemic with hemoglobin 6.8, hematocrit 23.6, MCV 79.5, MCH 22.9, BUN 34, creatinine 2.4, eGFR 30, troponin 23 with repeat troponin 21.  He was transfused with 1 unit of PRBC, posttransfusion his hemoglobin was 7.2, hematocrit 24.6.  He was given intravenous labetalol 10 mg for his elevated blood pressure.  GI was

## 2024-07-16 ENCOUNTER — ANESTHESIA (OUTPATIENT)
Dept: OPERATING ROOM | Age: 59
End: 2024-07-16
Payer: COMMERCIAL

## 2024-07-16 ENCOUNTER — ANESTHESIA EVENT (OUTPATIENT)
Dept: OPERATING ROOM | Age: 59
End: 2024-07-16
Payer: COMMERCIAL

## 2024-07-16 ENCOUNTER — APPOINTMENT (OUTPATIENT)
Age: 59
End: 2024-07-16
Attending: HOSPITALIST
Payer: COMMERCIAL

## 2024-07-16 ENCOUNTER — APPOINTMENT (OUTPATIENT)
Age: 59
End: 2024-07-16
Attending: INTERNAL MEDICINE
Payer: COMMERCIAL

## 2024-07-16 LAB
CA-I BLD-SCNC: 1.18 MMOL/L (ref 1.13–1.33)
ECHO AO ROOT DIAM: 2.6 CM
ECHO AO ROOT INDEX: 1.16 CM/M2
ECHO AV AREA PEAK VELOCITY: 2 CM2
ECHO AV AREA VTI: 2.1 CM2
ECHO AV AREA/BSA PEAK VELOCITY: 0.9 CM2/M2
ECHO AV AREA/BSA VTI: 0.9 CM2/M2
ECHO AV MEAN GRADIENT: 7 MMHG
ECHO AV MEAN VELOCITY: 1.2 M/S
ECHO AV PEAK GRADIENT: 13 MMHG
ECHO AV PEAK VELOCITY: 1.8 M/S
ECHO AV VELOCITY RATIO: 0.61
ECHO AV VTI: 28.2 CM
ECHO BSA: 2.3 M2
ECHO LA AREA 2C: 24.6 CM2
ECHO LA AREA 4C: 13.3 CM2
ECHO LA DIAMETER INDEX: 1.52 CM/M2
ECHO LA DIAMETER: 3.4 CM
ECHO LA MAJOR AXIS: 5.6 CM
ECHO LA MINOR AXIS: 6.5 CM
ECHO LA TO AORTIC ROOT RATIO: 1.31
ECHO LA VOL BP: 48 ML (ref 18–58)
ECHO LA VOL MOD A2C: 77 ML (ref 18–58)
ECHO LA VOL MOD A4C: 26 ML (ref 18–58)
ECHO LA VOL/BSA BIPLANE: 21 ML/M2 (ref 16–34)
ECHO LA VOLUME INDEX MOD A2C: 34 ML/M2 (ref 16–34)
ECHO LA VOLUME INDEX MOD A4C: 12 ML/M2 (ref 16–34)
ECHO LV E' LATERAL VELOCITY: 5 CM/S
ECHO LV E' SEPTAL VELOCITY: 9 CM/S
ECHO LV EDV A2C: 52 ML
ECHO LV EDV A4C: 105 ML
ECHO LV EDV INDEX A4C: 47 ML/M2
ECHO LV EDV NDEX A2C: 23 ML/M2
ECHO LV EJECTION FRACTION A2C: 54 %
ECHO LV EJECTION FRACTION A4C: 69 %
ECHO LV EJECTION FRACTION BIPLANE: 62 % (ref 55–100)
ECHO LV ESV A2C: 24 ML
ECHO LV ESV A4C: 33 ML
ECHO LV ESV INDEX A2C: 11 ML/M2
ECHO LV ESV INDEX A4C: 15 ML/M2
ECHO LV FRACTIONAL SHORTENING: 33 % (ref 28–44)
ECHO LV INTERNAL DIMENSION DIASTOLE INDEX: 2.68 CM/M2
ECHO LV INTERNAL DIMENSION DIASTOLIC: 6 CM (ref 4.2–5.9)
ECHO LV INTERNAL DIMENSION SYSTOLIC INDEX: 1.79 CM/M2
ECHO LV INTERNAL DIMENSION SYSTOLIC: 4 CM
ECHO LV IVSD: 1.5 CM (ref 0.6–1)
ECHO LV MASS 2D: 407.4 G (ref 88–224)
ECHO LV MASS INDEX 2D: 181.9 G/M2 (ref 49–115)
ECHO LV POSTERIOR WALL DIASTOLIC: 1.4 CM (ref 0.6–1)
ECHO LV RELATIVE WALL THICKNESS RATIO: 0.47
ECHO LVOT AREA: 3.5 CM2
ECHO LVOT AV VTI INDEX: 0.59
ECHO LVOT DIAM: 2.1 CM
ECHO LVOT MEAN GRADIENT: 2 MMHG
ECHO LVOT PEAK GRADIENT: 4 MMHG
ECHO LVOT PEAK VELOCITY: 1.1 M/S
ECHO LVOT STROKE VOLUME INDEX: 25.8 ML/M2
ECHO LVOT SV: 57.8 ML
ECHO LVOT VTI: 16.7 CM
ECHO MV A VELOCITY: 0.77 M/S
ECHO MV AREA VTI: 2.2 CM2
ECHO MV E DECELERATION TIME (DT): 283 MS
ECHO MV E VELOCITY: 0.71 M/S
ECHO MV E/A RATIO: 0.92
ECHO MV E/E' LATERAL: 14.2
ECHO MV E/E' RATIO (AVERAGED): 11.04
ECHO MV E/E' SEPTAL: 7.89
ECHO MV LVOT VTI INDEX: 1.6
ECHO MV MAX VELOCITY: 1 M/S
ECHO MV MEAN GRADIENT: 1 MMHG
ECHO MV MEAN VELOCITY: 0.5 M/S
ECHO MV PEAK GRADIENT: 4 MMHG
ECHO MV VTI: 26.8 CM
ECHO PV MAX VELOCITY: 1.3 M/S
ECHO PV PEAK GRADIENT: 6 MMHG
ECHO RV INTERNAL DIMENSION: 3.3 CM
ECHO RV MID DIMENSION: 3.2 CM
EKG ATRIAL RATE: 98 BPM
EKG P AXIS: 42 DEGREES
EKG P-R INTERVAL: 148 MS
EKG Q-T INTERVAL: 372 MS
EKG QRS DURATION: 98 MS
EKG QTC CALCULATION (BAZETT): 474 MS
EKG R AXIS: -21 DEGREES
EKG T AXIS: 117 DEGREES
EKG VENTRICULAR RATE: 98 BPM
HCT VFR BLD AUTO: 24.8 % (ref 40.7–50.3)
HCT VFR BLD AUTO: 25 % (ref 40.7–50.3)
HGB BLD-MCNC: 7.3 G/DL (ref 13–17)
HGB BLD-MCNC: 7.4 G/DL (ref 13–17)

## 2024-07-16 PROCEDURE — 99233 SBSQ HOSP IP/OBS HIGH 50: CPT | Performed by: HOSPITALIST

## 2024-07-16 PROCEDURE — 85014 HEMATOCRIT: CPT

## 2024-07-16 PROCEDURE — 2580000003 HC RX 258: Performed by: NURSE ANESTHETIST, CERTIFIED REGISTERED

## 2024-07-16 PROCEDURE — APPNB60 APP NON BILLABLE TIME 46-60 MINS: Performed by: NURSE PRACTITIONER

## 2024-07-16 PROCEDURE — 99254 IP/OBS CNSLTJ NEW/EST MOD 60: CPT | Performed by: INTERNAL MEDICINE

## 2024-07-16 PROCEDURE — 6370000000 HC RX 637 (ALT 250 FOR IP)

## 2024-07-16 PROCEDURE — 0DJ08ZZ INSPECTION OF UPPER INTESTINAL TRACT, VIA NATURAL OR ARTIFICIAL OPENING ENDOSCOPIC: ICD-10-PCS | Performed by: INTERNAL MEDICINE

## 2024-07-16 PROCEDURE — 36415 COLL VENOUS BLD VENIPUNCTURE: CPT

## 2024-07-16 PROCEDURE — 6360000002 HC RX W HCPCS

## 2024-07-16 PROCEDURE — 2580000003 HC RX 258

## 2024-07-16 PROCEDURE — 2500000003 HC RX 250 WO HCPCS: Performed by: NURSE ANESTHETIST, CERTIFIED REGISTERED

## 2024-07-16 PROCEDURE — 2580000003 HC RX 258: Performed by: INTERNAL MEDICINE

## 2024-07-16 PROCEDURE — 3609017100 HC EGD: Performed by: INTERNAL MEDICINE

## 2024-07-16 PROCEDURE — 6370000000 HC RX 637 (ALT 250 FOR IP): Performed by: INTERNAL MEDICINE

## 2024-07-16 PROCEDURE — 7100000001 HC PACU RECOVERY - ADDTL 15 MIN: Performed by: INTERNAL MEDICINE

## 2024-07-16 PROCEDURE — 82330 ASSAY OF CALCIUM: CPT

## 2024-07-16 PROCEDURE — 7100000000 HC PACU RECOVERY - FIRST 15 MIN: Performed by: INTERNAL MEDICINE

## 2024-07-16 PROCEDURE — 93306 TTE W/DOPPLER COMPLETE: CPT

## 2024-07-16 PROCEDURE — 3700000000 HC ANESTHESIA ATTENDED CARE: Performed by: INTERNAL MEDICINE

## 2024-07-16 PROCEDURE — 6360000002 HC RX W HCPCS: Performed by: NURSE ANESTHETIST, CERTIFIED REGISTERED

## 2024-07-16 PROCEDURE — 93306 TTE W/DOPPLER COMPLETE: CPT | Performed by: INTERNAL MEDICINE

## 2024-07-16 PROCEDURE — 85018 HEMOGLOBIN: CPT

## 2024-07-16 PROCEDURE — 1200000000 HC SEMI PRIVATE

## 2024-07-16 RX ORDER — SODIUM CHLORIDE 9 MG/ML
INJECTION, SOLUTION INTRAVENOUS PRN
Status: DISCONTINUED | OUTPATIENT
Start: 2024-07-16 | End: 2024-07-16 | Stop reason: HOSPADM

## 2024-07-16 RX ORDER — DROPERIDOL 2.5 MG/ML
0.62 INJECTION, SOLUTION INTRAMUSCULAR; INTRAVENOUS
Status: DISCONTINUED | OUTPATIENT
Start: 2024-07-16 | End: 2024-07-16 | Stop reason: HOSPADM

## 2024-07-16 RX ORDER — METOCLOPRAMIDE HYDROCHLORIDE 5 MG/ML
10 INJECTION INTRAMUSCULAR; INTRAVENOUS
Status: DISCONTINUED | OUTPATIENT
Start: 2024-07-16 | End: 2024-07-16 | Stop reason: HOSPADM

## 2024-07-16 RX ORDER — HYDRALAZINE HYDROCHLORIDE 20 MG/ML
10 INJECTION INTRAMUSCULAR; INTRAVENOUS
Status: DISCONTINUED | OUTPATIENT
Start: 2024-07-16 | End: 2024-07-16 | Stop reason: HOSPADM

## 2024-07-16 RX ORDER — LABETALOL HYDROCHLORIDE 5 MG/ML
5 INJECTION, SOLUTION INTRAVENOUS EVERY 6 HOURS PRN
Status: DISCONTINUED | OUTPATIENT
Start: 2024-07-16 | End: 2024-07-19 | Stop reason: HOSPADM

## 2024-07-16 RX ORDER — SODIUM CHLORIDE 0.9 % (FLUSH) 0.9 %
5-40 SYRINGE (ML) INJECTION EVERY 12 HOURS SCHEDULED
Status: DISCONTINUED | OUTPATIENT
Start: 2024-07-16 | End: 2024-07-16 | Stop reason: HOSPADM

## 2024-07-16 RX ORDER — LIDOCAINE HYDROCHLORIDE 10 MG/ML
INJECTION, SOLUTION INFILTRATION; PERINEURAL PRN
Status: DISCONTINUED | OUTPATIENT
Start: 2024-07-16 | End: 2024-07-16 | Stop reason: SDUPTHER

## 2024-07-16 RX ORDER — SODIUM CHLORIDE, SODIUM LACTATE, POTASSIUM CHLORIDE, CALCIUM CHLORIDE 600; 310; 30; 20 MG/100ML; MG/100ML; MG/100ML; MG/100ML
INJECTION, SOLUTION INTRAVENOUS CONTINUOUS PRN
Status: DISCONTINUED | OUTPATIENT
Start: 2024-07-16 | End: 2024-07-16 | Stop reason: SDUPTHER

## 2024-07-16 RX ORDER — DIPHENHYDRAMINE HYDROCHLORIDE 50 MG/ML
12.5 INJECTION INTRAMUSCULAR; INTRAVENOUS
Status: DISCONTINUED | OUTPATIENT
Start: 2024-07-16 | End: 2024-07-16 | Stop reason: HOSPADM

## 2024-07-16 RX ORDER — SODIUM CHLORIDE 0.9 % (FLUSH) 0.9 %
5-40 SYRINGE (ML) INJECTION PRN
Status: DISCONTINUED | OUTPATIENT
Start: 2024-07-16 | End: 2024-07-16 | Stop reason: HOSPADM

## 2024-07-16 RX ORDER — PROPOFOL 10 MG/ML
INJECTION, EMULSION INTRAVENOUS PRN
Status: DISCONTINUED | OUTPATIENT
Start: 2024-07-16 | End: 2024-07-16 | Stop reason: SDUPTHER

## 2024-07-16 RX ORDER — NALOXONE HYDROCHLORIDE 0.4 MG/ML
INJECTION, SOLUTION INTRAMUSCULAR; INTRAVENOUS; SUBCUTANEOUS PRN
Status: DISCONTINUED | OUTPATIENT
Start: 2024-07-16 | End: 2024-07-16 | Stop reason: HOSPADM

## 2024-07-16 RX ADMIN — SODIUM CHLORIDE, PRESERVATIVE FREE 10 ML: 5 INJECTION INTRAVENOUS at 08:18

## 2024-07-16 RX ADMIN — NIFEDIPINE 60 MG: 30 TABLET, FILM COATED, EXTENDED RELEASE ORAL at 08:18

## 2024-07-16 RX ADMIN — POLYETHYLENE GLYCOL 3350 17 G: 17 POWDER, FOR SOLUTION ORAL at 16:51

## 2024-07-16 RX ADMIN — LABETALOL HYDROCHLORIDE 10 MG: 5 INJECTION, SOLUTION INTRAVENOUS at 06:41

## 2024-07-16 RX ADMIN — SPIRONOLACTONE 50 MG: 25 TABLET, FILM COATED ORAL at 08:18

## 2024-07-16 RX ADMIN — PROPOFOL 50 MG: 10 INJECTION, EMULSION INTRAVENOUS at 11:51

## 2024-07-16 RX ADMIN — PANTOPRAZOLE SODIUM 8 MG/HR: 40 INJECTION, POWDER, FOR SOLUTION INTRAVENOUS at 00:06

## 2024-07-16 RX ADMIN — CARVEDILOL 6.25 MG: 12.5 TABLET, FILM COATED ORAL at 08:18

## 2024-07-16 RX ADMIN — SODIUM CHLORIDE, PRESERVATIVE FREE 10 ML: 5 INJECTION INTRAVENOUS at 20:10

## 2024-07-16 RX ADMIN — SODIUM CHLORIDE, PRESERVATIVE FREE 10 ML: 5 INJECTION INTRAVENOUS at 21:35

## 2024-07-16 RX ADMIN — LABETALOL HYDROCHLORIDE 5 MG: 5 INJECTION, SOLUTION INTRAVENOUS at 21:33

## 2024-07-16 RX ADMIN — CARVEDILOL 6.25 MG: 12.5 TABLET, FILM COATED ORAL at 16:45

## 2024-07-16 RX ADMIN — PROPOFOL 50 MG: 10 INJECTION, EMULSION INTRAVENOUS at 11:54

## 2024-07-16 RX ADMIN — SODIUM CHLORIDE, POTASSIUM CHLORIDE, SODIUM LACTATE AND CALCIUM CHLORIDE: 600; 310; 30; 20 INJECTION, SOLUTION INTRAVENOUS at 11:46

## 2024-07-16 RX ADMIN — LIDOCAINE HYDROCHLORIDE 50 MG: 10 INJECTION, SOLUTION INFILTRATION; PERINEURAL at 11:51

## 2024-07-16 ASSESSMENT — PAIN - FUNCTIONAL ASSESSMENT: PAIN_FUNCTIONAL_ASSESSMENT: NONE - DENIES PAIN

## 2024-07-16 NOTE — OP NOTE
PROCEDURE NOTE    DATE OF PROCEDURE: 7/16/2024     SURGEON: Min Green MD  Facility: [facility]  ASSISTANT: None  Anesthesia: Monitored anesthesia care  PREOPERATIVE DIAGNOSIS: Melena    Diagnosis:    POSTOPERATIVE DIAGNOSIS: As described below    OPERATION: Upper GI endoscopy with Biopsy    ANESTHESIA: Moderate Sedation     ESTIMATED BLOOD LOSS: Less than 50 ml    COMPLICATIONS: None.     SPECIMENS:  Was Not Obtained    HISTORY: The patient is a 58 y.o. year old male with history of above preop diagnosis.  I recommended esophagogastroduodenoscopy with possible biopsy and I explained the risk, benefits, expected outcome, and alternatives to the procedure.  Risks included but are not limited to bleeding, infection, respiratory distress, hypotension, and perforation of the esophagus, stomach, or duodenum.  Patient understands and is in agreement.      PROCEDURE: The patient was given IV conscious sedation.  The patient's SPO2 remained above 90% throughout the procedure.The gastroscope was inserted orally and advanced under direct vision through the esophagus, through the stomach, through the pylorus, and into the descending duodenum.      Post sedation note :The patient's SPO2 remained above 90% throughout the procedure.the vital signs remained stable , and no immediate complication form the procedure noted, patient will be ready for d/c when criteria is met .      Findings:    Retropharyngeal area was grossly normal appearing    Esophagus: normal;     Esophagogastric markings: Diaphragmatic hiatus- 42 cm; GE junction- 42 cm; Squamo-columnar junction- 42 cm    Stomach:    Fundus: normal    Body: normal    Antrum: normal    Duodenum:     Descending: normal    Bulb: normal    The scope was removed and the patient tolerated the procedure well.     Impression- Normal exam    Recommendations/Plan:   If patient re bleeds, will consider colonoscopy  Monitor H&H  F/U In Office in 3-4 weeks      Electronically signed by Min

## 2024-07-16 NOTE — ANESTHESIA PRE PROCEDURE
Department of Anesthesiology  Preprocedure Note       Name:  Tejas Machado   Age:  58 y.o.  :  1965                                          MRN:  [id number]         Date:  2024      Surgeon: Surgeon(s):  Min Green MD    Procedure: Procedure(s):  ESOPHAGOGASTRODUODENOSCOPY    Medications prior to admission:   Prior to Admission medications    Medication Sig Start Date End Date Taking? Authorizing Provider   apixaban (ELIQUIS) 5 MG TABS tablet take 1 tablet by mouth twice a day 7/15/24   Michoacano Collins APRN - NP   atorvastatin (LIPITOR) 40 MG tablet take 1 tablet by mouth once daily 24   Scarlett Edwards APRN - CNP   pantoprazole (PROTONIX) 40 MG tablet take 1 tablet by mouth EVERY MORNING BEFORE BREAKFAST 6/10/24   Chelly Hess APRN - CNP   spironolactone (ALDACTONE) 25 MG tablet Take 2 tablets by mouth daily 24  Dianna Beyer APRN - CNP   sildenafil (VIAGRA) 50 MG tablet Take 1 tablet by mouth as needed for Erectile Dysfunction 24   OmeChelly benites APRN - CNP   NIFEdipine (PROCARDIA XL) 30 MG extended release tablet Take 2 tablets by mouth daily 24   Chelly Hess APRN - CNP   doxazosin (CARDURA) 2 MG tablet take 1 tablet by mouth once daily 24   OmeChelly benites APRN - CNP   FEROSUL 325 (65 Fe) MG tablet take 1 tablet by mouth twice a day  Patient taking differently: Take 1 tablet by mouth 2 times daily Taking every other day 24   Chelly Hess APRN - CNP   fluticasone (FLONASE) 50 MCG/ACT nasal spray 1 spray by Each Nostril route daily 23   Adrian Zaidi MD   vitamin D3 (CHOLECALCIFEROL) 25 MCG (1000 UT) TABS tablet Take 1 tablet by mouth daily 10/25/23   Chelly Hess APRN - CNP   hydrALAZINE (APRESOLINE) 100 MG tablet Take 1 tablet by mouth 3 times daily 10/16/23   Tae Paige MD   losartan (COZAAR) 100 MG tablet Take 1 tablet by mouth daily 10/17/23   Tae Paige MD   carvedilol (COREG) 6.25 MG tablet Take 1 tablet by mouth 2

## 2024-07-16 NOTE — PROGRESS NOTES
Attending Physician Statement  I have discussed the case, including pertinent history and exam findings with the resident and the team.  I have seen and examined the patient and the key elements of the encounter have been performed by me.  I agree with the assessment, plan and orders as documented by the resident.      In Brief:  Tejas Machado is a 58 y.o. male, who is on day 0 of hospital stay, presented with Chest Pain and Rectal Bleeding  , found to have GI bleed.    Principal Problem:    GI bleed  Active Problems:    Atrial fibrillation (HCC)    Bilateral carotid artery stenosis    Acute anemia    AVM (arteriovenous malformation) of duodenum, acquired with hemorrhage    HTN (hypertension), malignant    Stage 3b chronic kidney disease (HCC)  Resolved Problems:    * No resolved hospital problems. *      Plan:       Melena  - Upper vs lower GI bleed  - IV protonix bolus and infusion for now  - NPO for now  - IV fluids for hydration  - Watch for worsening bleeding  - GI consulted and will await input  - May need EGD and/or Colonoscopy    2. Anemia of acute blood loss on chronic anemia  - Sec to possible GI bleed  - Avoid all anticoagulation, antiplatelets and NSAIDs  - Check H&H every 8 hrs  - 1U PRBC transfused  - Keep Hb > 7  - Monitor closely    3. Dizziness, Dyspnea and chest pain  - Likely sec to anemia  - Supportive and symptomatic care    4. Hypertensive urgency  - Restart home meds  - Keep BP under 160/100  - Normalize over the next 48 hrs    5. Elevated troponins  - Likely type 2 demand ischemia sec to anemia and hypertensive urgency  - EKG no acute changes  - Cardiac monitor  - Trend troponins  - ASA, Oxygen and NTG PRN  - Hold off on full dose anticoagulation  - Check 2D Echo  - Recent stress test from 2022 unequivocal about clearing patient of CAD    6. HTN, HLD, Atrial fibrillation on Eliquis, AVMs, CKD III, Carotid artery stenosis  - All other conditions appear to be stable  - Restart home  meds    7. DVT and GI prophylaxis          MAIKEL LEON MD   Attending Physician, Internal Medicine Residency Program  7/15/2024, 9:17 PM

## 2024-07-16 NOTE — PLAN OF CARE
Problem: ABCDS Injury Assessment  Goal: Absence of physical injury  7/16/2024 0821 by Quynh Aguilar, RN  Outcome: Progressing  7/15/2024 2242 by Abraham Daly, RN  Outcome: Progressing     Problem: Discharge Planning  Goal: Discharge to home or other facility with appropriate resources  Outcome: Progressing

## 2024-07-16 NOTE — CONSULTS
anemia    AVM (arteriovenous malformation) of duodenum, acquired with hemorrhage    HTN (hypertension), malignant    Stage 3b chronic kidney disease (HCC)  Resolved Problems:    * No resolved hospital problems. *       GI Impression:    Symptomatic anemia  Melena x 1 week  Acute blood loss anemia  History of AVM s/p cautery 2023  A-fib-on Eliquis  Hypertension  Obesity    Recommendations and plan:    Will plan for endoscopy in the OR this a.m.  Keep patient n.p.o.  Continue Protonix drip  Monitor for active GI bleeding  Daily labs including CBC BMP, trend hemoglobin, transfuse as needed  Complete plan of care to follow endoscopy    This plan was formulated in collaboration with Dr. Norma MD  Please feel free to contact us with any questions or concerns      [facility]  Sidney Stanton, CHUCHO - CNP   [phone]  7/16/2024    7:21 AM     Estimated time of 45 mins reviewing chart, assessing patient and formulating plan of care    This note was created with the assistance of a speech-recognition program.  Although the intention is to generate a document that actually reflects the content of the visit, no guarantees can be provided that every mistake has been identified and corrected by editing.       Attending Attestation:    I have discussed the care of Tejas KEY Nellsteven and I have examined the patient myselft and taken ros and hpi , including pertinent history and exam findings,  with the author of this note . I have reviewed the key elements of all parts of the encounter with the nurse practitioner/resident.  I agree with the assessment, plan and orders as documented by the above health care provider with the following addendum    58 y.o. male, known c/o- A-fib on Eliquis, AVM bleeding status post ablation 2023, hypertension, CKD , admitted with c/o- melena    Impression- Melena with anemia, R/o- UGI source of blood loss      Plan- Monitor H&H q daily and transfuse if Hb <

## 2024-07-16 NOTE — ANESTHESIA POSTPROCEDURE EVALUATION
Department of Anesthesiology  Postprocedure Note    Patient: Tejas Machado  MRN: [id number]  YOB: 1965  Date of evaluation: 7/16/2024    Procedure Summary       Date: 07/16/24 Room / Location: [address]    Anesthesia Start: 1146 Anesthesia Stop: 1205    Procedure: ESOPHAGOGASTRODUODENOSCOPY Diagnosis:       Melena      (Melena [K92.1])    Surgeons: Min Green MD Responsible Provider: Tushar Amezcua MD    Anesthesia Type: MAC ASA Status: 3            Anesthesia Type: No value filed.    Eduardo Phase I: Eduardo Score: 10    Eduardo Phase II:      Anesthesia Post Evaluation    Patient location during evaluation: bedside  Patient participation: complete - patient participated  Level of consciousness: awake and alert  Airway patency: patent  Nausea & Vomiting: no nausea and no vomiting  Cardiovascular status: hemodynamically stable  Respiratory status: acceptable  Hydration status: stable  Comments: BP (!) 141/94   Pulse 62   Temp 97.2 °F (36.2 °C)   Resp 16   Ht 1.778 m (5' 10\")   Wt 107.5 kg (236 lb 15.9 oz)   SpO2 99%   BMI 34.01 kg/m²     Pain management: adequate    No notable events documented.

## 2024-07-16 NOTE — PROGRESS NOTES
[facility]  Internal Medicine Teaching Residency Program  Inpatient Daily Progress Note  ______________________________________________________________________________    Patient: Tejas Machado  YOB: 1965   MRN:[id number]    Acct: [id number]     Room: 0407/0407-01  Admit date: 7/15/2024  Today's date: 07/16/24  Number of days in the hospital: 1    SUBJECTIVE   Admitting Diagnosis: GI bleed    CC: Dizziness     Pt examined at bedside. Chart & results reviewed.   Patient is AOx4.  No acute overnight distress.  Patient is eating ok, sleeping ok, normal bowel/ bladder movements.  Patient is hemodynamically stable and maintaining saturation at room air.  Patient has no episode of tarry black stool since admission.    Labs reviewed, showes hemoglobin 7.4, hematocrit 24.8.  GI on board, will perform EGD today.  Follow-up with GI.      BRIEF HISTORY     The patient is a pleasant 58 y.o. male with a PMHx significant for      Atrial fibrillation on Eliquis  GI bleed  Essential hypertension  CKD stage III  AV malformations     presents with a chief complaint of dizziness for the past 2 weeks.  Patient has experienced exertional dizziness, shortness of breath, chest pain for the past 2 weeks which he reports gets better by rest.  He started observing dark tarry black stools for the past 2 weeks, since then he has experienced dizziness, shortness of breath, chest pain.  Patient is taking Eliquis for atrial fibrillation, but he has stopped taking it for the last 5 days because he ran out of the medication..  Patient has not taken any medication for his symptoms, he reports that he has not taken any NSAIDs in the recent past.  Patient stated that he has experienced episodes of GI bleeds in the past as well.     Patient denies constipation, fever, chills, palpitations, loss of consciousness, motor weakness, sensory impairment, vision disturbances.     On my examination

## 2024-07-17 LAB
ANION GAP SERPL CALCULATED.3IONS-SCNC: 8 MMOL/L (ref 9–16)
BASOPHILS # BLD: 0.05 K/UL (ref 0–0.2)
BASOPHILS NFR BLD: 1 % (ref 0–2)
BUN SERPL-MCNC: 28 MG/DL (ref 8–23)
CALCIUM SERPL-MCNC: 8.3 MG/DL (ref 8.6–10.4)
CHLORIDE SERPL-SCNC: 106 MMOL/L (ref 98–107)
CO2 SERPL-SCNC: 24 MMOL/L (ref 20–31)
CREAT SERPL-MCNC: 2.3 MG/DL (ref 0.7–1.2)
EOSINOPHIL # BLD: 0.31 K/UL (ref 0–0.44)
EOSINOPHILS RELATIVE PERCENT: 6 % (ref 1–4)
ERYTHROCYTE [DISTWIDTH] IN BLOOD BY AUTOMATED COUNT: 15.2 % (ref 11.8–14.4)
GFR, ESTIMATED: 32 ML/MIN/1.73M2
GLUCOSE SERPL-MCNC: 112 MG/DL (ref 74–99)
HCT VFR BLD AUTO: 24.7 % (ref 40.7–50.3)
HCT VFR BLD AUTO: 28.4 % (ref 40.7–50.3)
HCT VFR BLD AUTO: 29.6 % (ref 40.7–50.3)
HCT VFR BLD AUTO: 31.8 % (ref 40.7–50.3)
HGB BLD-MCNC: 6.9 G/DL (ref 13–17)
HGB BLD-MCNC: 8.5 G/DL (ref 13–17)
HGB BLD-MCNC: 8.9 G/DL (ref 13–17)
HGB BLD-MCNC: 9.5 G/DL (ref 13–17)
IMM GRANULOCYTES # BLD AUTO: 0.05 K/UL (ref 0–0.3)
IMM GRANULOCYTES NFR BLD: 1 %
LYMPHOCYTES NFR BLD: 0.88 K/UL (ref 1.1–3.7)
LYMPHOCYTES RELATIVE PERCENT: 17 % (ref 24–43)
MCH RBC QN AUTO: 23.5 PG (ref 25.2–33.5)
MCHC RBC AUTO-ENTMCNC: 27.9 G/DL (ref 28.4–34.8)
MCV RBC AUTO: 84.3 FL (ref 82.6–102.9)
MONOCYTES NFR BLD: 0.42 K/UL (ref 0.1–1.2)
MONOCYTES NFR BLD: 8 % (ref 3–12)
MORPHOLOGY: ABNORMAL
MORPHOLOGY: ABNORMAL
NEUTROPHILS NFR BLD: 67 % (ref 36–65)
NEUTS SEG NFR BLD: 3.49 K/UL (ref 1.5–8.1)
NRBC BLD-RTO: 0.6 PER 100 WBC
PLATELET # BLD AUTO: 192 K/UL (ref 138–453)
PMV BLD AUTO: 11.3 FL (ref 8.1–13.5)
POTASSIUM SERPL-SCNC: 4.4 MMOL/L (ref 3.7–5.3)
RBC # BLD AUTO: 2.93 M/UL (ref 4.21–5.77)
SODIUM SERPL-SCNC: 138 MMOL/L (ref 136–145)
WBC OTHER # BLD: 5.2 K/UL (ref 3.5–11.3)

## 2024-07-17 PROCEDURE — 99232 SBSQ HOSP IP/OBS MODERATE 35: CPT | Performed by: INTERNAL MEDICINE

## 2024-07-17 PROCEDURE — 6360000002 HC RX W HCPCS

## 2024-07-17 PROCEDURE — 85018 HEMOGLOBIN: CPT

## 2024-07-17 PROCEDURE — 2580000003 HC RX 258: Performed by: INTERNAL MEDICINE

## 2024-07-17 PROCEDURE — 97530 THERAPEUTIC ACTIVITIES: CPT

## 2024-07-17 PROCEDURE — 36430 TRANSFUSION BLD/BLD COMPNT: CPT

## 2024-07-17 PROCEDURE — 6370000000 HC RX 637 (ALT 250 FOR IP): Performed by: NURSE PRACTITIONER

## 2024-07-17 PROCEDURE — 80048 BASIC METABOLIC PNL TOTAL CA: CPT

## 2024-07-17 PROCEDURE — 85025 COMPLETE CBC W/AUTO DIFF WBC: CPT

## 2024-07-17 PROCEDURE — 36415 COLL VENOUS BLD VENIPUNCTURE: CPT

## 2024-07-17 PROCEDURE — P9016 RBC LEUKOCYTES REDUCED: HCPCS

## 2024-07-17 PROCEDURE — 97161 PT EVAL LOW COMPLEX 20 MIN: CPT

## 2024-07-17 PROCEDURE — 6370000000 HC RX 637 (ALT 250 FOR IP): Performed by: INTERNAL MEDICINE

## 2024-07-17 PROCEDURE — 85014 HEMATOCRIT: CPT

## 2024-07-17 PROCEDURE — 1200000000 HC SEMI PRIVATE

## 2024-07-17 PROCEDURE — 99232 SBSQ HOSP IP/OBS MODERATE 35: CPT | Performed by: HOSPITALIST

## 2024-07-17 PROCEDURE — 6370000000 HC RX 637 (ALT 250 FOR IP)

## 2024-07-17 RX ORDER — HYDRALAZINE HYDROCHLORIDE 50 MG/1
50 TABLET, FILM COATED ORAL 3 TIMES DAILY
Status: DISCONTINUED | OUTPATIENT
Start: 2024-07-17 | End: 2024-07-19

## 2024-07-17 RX ORDER — BISACODYL 5 MG/1
20 TABLET, DELAYED RELEASE ORAL ONCE
Status: COMPLETED | OUTPATIENT
Start: 2024-07-17 | End: 2024-07-17

## 2024-07-17 RX ORDER — HYDRALAZINE HYDROCHLORIDE 20 MG/ML
10 INJECTION INTRAMUSCULAR; INTRAVENOUS EVERY 6 HOURS PRN
Status: DISCONTINUED | OUTPATIENT
Start: 2024-07-17 | End: 2024-07-19 | Stop reason: HOSPADM

## 2024-07-17 RX ORDER — NIFEDIPINE 30 MG/1
90 TABLET, EXTENDED RELEASE ORAL DAILY
Status: DISCONTINUED | OUTPATIENT
Start: 2024-07-17 | End: 2024-07-17

## 2024-07-17 RX ORDER — SODIUM CHLORIDE 9 MG/ML
INJECTION, SOLUTION INTRAVENOUS CONTINUOUS
Status: DISCONTINUED | OUTPATIENT
Start: 2024-07-17 | End: 2024-07-18

## 2024-07-17 RX ORDER — HYDRALAZINE HYDROCHLORIDE 50 MG/1
100 TABLET, FILM COATED ORAL 3 TIMES DAILY
Status: DISCONTINUED | OUTPATIENT
Start: 2024-07-17 | End: 2024-07-17

## 2024-07-17 RX ORDER — NIFEDIPINE 30 MG/1
60 TABLET, EXTENDED RELEASE ORAL DAILY
Status: DISCONTINUED | OUTPATIENT
Start: 2024-07-17 | End: 2024-07-19 | Stop reason: HOSPADM

## 2024-07-17 RX ADMIN — HYDRALAZINE HYDROCHLORIDE 50 MG: 50 TABLET ORAL at 09:38

## 2024-07-17 RX ADMIN — CARVEDILOL 6.25 MG: 12.5 TABLET, FILM COATED ORAL at 08:29

## 2024-07-17 RX ADMIN — HYDRALAZINE HYDROCHLORIDE 10 MG: 20 INJECTION INTRAMUSCULAR; INTRAVENOUS at 11:37

## 2024-07-17 RX ADMIN — SODIUM CHLORIDE: 9 INJECTION, SOLUTION INTRAVENOUS at 06:02

## 2024-07-17 RX ADMIN — HYDRALAZINE HYDROCHLORIDE 50 MG: 50 TABLET ORAL at 15:03

## 2024-07-17 RX ADMIN — NIFEDIPINE 60 MG: 30 TABLET, FILM COATED, EXTENDED RELEASE ORAL at 08:29

## 2024-07-17 RX ADMIN — BISACODYL 20 MG: 5 TABLET, COATED ORAL at 15:03

## 2024-07-17 RX ADMIN — CARVEDILOL 6.25 MG: 12.5 TABLET, FILM COATED ORAL at 19:10

## 2024-07-17 RX ADMIN — SPIRONOLACTONE 50 MG: 25 TABLET, FILM COATED ORAL at 08:29

## 2024-07-17 RX ADMIN — POLYETHYLENE GLYCOL 3350, SODIUM SULFATE ANHYDROUS, SODIUM BICARBONATE, SODIUM CHLORIDE, POTASSIUM CHLORIDE 4000 ML: 236; 22.74; 6.74; 5.86; 2.97 POWDER, FOR SOLUTION ORAL at 16:24

## 2024-07-17 RX ADMIN — HYDRALAZINE HYDROCHLORIDE 10 MG: 20 INJECTION INTRAMUSCULAR; INTRAVENOUS at 19:10

## 2024-07-17 RX ADMIN — HYDRALAZINE HYDROCHLORIDE 50 MG: 50 TABLET ORAL at 20:59

## 2024-07-17 RX ADMIN — SODIUM CHLORIDE, PRESERVATIVE FREE 10 ML: 5 INJECTION INTRAVENOUS at 21:00

## 2024-07-17 NOTE — PLAN OF CARE
Problem: ABCDS Injury Assessment  Goal: Absence of physical injury  Outcome: Progressing  Flowsheets (Taken 7/16/2024 2000)  Absence of Physical Injury: Implement safety measures based on patient assessment     Problem: Discharge Planning  Goal: Discharge to home or other facility with appropriate resources  Outcome: Progressing  Flowsheets (Taken 7/16/2024 2015)  Discharge to home or other facility with appropriate resources:   Identify barriers to discharge with patient and caregiver   Arrange for needed discharge resources and transportation as appropriate   Identify discharge learning needs (meds, wound care, etc)   Refer to discharge planning if patient needs post-hospital services based on physician order or complex needs related to functional status, cognitive ability or social support system

## 2024-07-17 NOTE — PROGRESS NOTES
[facility]  Internal Medicine Teaching Residency Program  Inpatient Daily Progress Note  ______________________________________________________________________________    Patient: Tejas Machado  YOB: 1965   MRN:[id number]    Acct: [id number]     Room: 0407/0407-01  Admit date: 7/15/2024  Today's date: 07/17/24  Number of days in the hospital: 2    SUBJECTIVE   Admitting Diagnosis: GI bleed    CC: Dizziness     Pt examined at bedside. Chart & results reviewed.   Patient is AOx4.  No acute overnight distress.  Patient reports passing black stool yesterday.  Patient is maintaining saturation on room air.  Patient is hemoglobin dropped to 6.9 overnight, 1 unit of RBC was transfused.      Labs reviewed, showes BUN 28, creatinine 2.3, hemoglobin 6.9 with repeat 8.5, hematocrit 24.7 with repeat 28.4.  GI on board, patient underwent successful EGD yesterday, no abnormalities found.  Will follow-up with GI.      BRIEF HISTORY     The patient is a pleasant 58 y.o. male with a PMHx significant for      Atrial fibrillation on Eliquis  GI bleed  Essential hypertension  CKD stage III  AV malformations     presents with a chief complaint of dizziness for the past 2 weeks.  Patient has experienced exertional dizziness, shortness of breath, chest pain for the past 2 weeks which he reports gets better by rest.  He started observing dark tarry black stools for the past 2 weeks, since then he has experienced dizziness, shortness of breath, chest pain.  Patient is taking Eliquis for atrial fibrillation, but he has stopped taking it for the last 5 days because he ran out of the medication..  Patient has not taken any medication for his symptoms, he reports that he has not taken any NSAIDs in the recent past.  Patient stated that he has experienced episodes of GI bleeds in the past as well.     Patient denies constipation, fever, chills, palpitations, loss of consciousness,  cardiomegaly.       ASSESSMENT & PLAN     Assessment and Plan:    Principal Problem:    GI bleed  Active Problems:    Atrial fibrillation (HCC)    Bilateral carotid artery stenosis    Acute anemia    AVM (arteriovenous malformation) of duodenum, acquired with hemorrhage    HTN (hypertension), malignant    Stage 3b chronic kidney disease (HCC)  Resolved Problems:    * No resolved hospital problems. *      GI bleed  AV malformations of the duodenum     - Last EGD was done 03/14/2023, known demonstrated bleeding AVM in the duodenum s/p EGD with Endo-therapy .  -History of previous GI bleed  - Started patient on IV Protonix bolus and infusion.  - Patient is n.p.o.  For possible EGD and/or colonoscopy.  -.Continue patient on intravenous fluids.  -EGD was done yesterday, no abnormalities found.  GI recommended to perform colonoscopy tomorrow.  Will follow-up with GI.  - GI is consulted follow-up with the recommendations.   - Monitor for any active bleeding.     Anemia of acute blood loss on chronic anemia.  Possibly secondary to GI     -History of previous episodes of anemia.  -.  Hemoglobin at admission was 6.8.  -.  1 unit of PRBC was transfused in the ED, posttransfusion hemoglobin was 7.2.  Hemoglobin on 7/16/24 7.4.  - On 7/17/24 in the early morning patient hemoglobin dropped to 6.9, 1 unit of RBC was transfused.  Repeat hemoglobin 8.5.  -.  Monitor H&H every 8 hourly.  Continue to monitor  -Avoid anticoagulation, antiplatelets, NSAIDs  -Keep hemoglobin more than 7 and transfuse as medically indicated.     Dizziness, Dyspnea and chest pain      -Likely secondary to anemia.  -Continue supportive care, continue to monitor.     Hypertensive urgency     -Blood pressure at admission on 7/15/24 was 179/72 mmHg.  Blood pressure today 153/67  -Started patient on carvedilol 6.25 mg, labetalol 10 mg, nifedipine 60 mg, spironolactone 50 mg.  -Started patient on home hydralazine 50 mg..  -Keep blood pressure under 160/100

## 2024-07-17 NOTE — PLAN OF CARE
Problem: ABCDS Injury Assessment  Goal: Absence of physical injury  7/17/2024 1010 by Quynh Aguilar RN  Outcome: Progressing  7/17/2024 0140 by Keke Rosario RN  Outcome: Progressing  Flowsheets (Taken 7/16/2024 2000)  Absence of Physical Injury: Implement safety measures based on patient assessment     Problem: Discharge Planning  Goal: Discharge to home or other facility with appropriate resources  7/17/2024 1010 by Quynh Aguilar RN  Outcome: Progressing  7/17/2024 0140 by Keke Rosario RN  Outcome: Progressing  Flowsheets (Taken 7/16/2024 2015)  Discharge to home or other facility with appropriate resources:   Identify barriers to discharge with patient and caregiver   Arrange for needed discharge resources and transportation as appropriate   Identify discharge learning needs (meds, wound care, etc)   Refer to discharge planning if patient needs post-hospital services based on physician order or complex needs related to functional status, cognitive ability or social support system     Problem: Chronic Conditions and Co-morbidities  Goal: Patient's chronic conditions and co-morbidity symptoms are monitored and maintained or improved  7/17/2024 1010 by Quynh Aguilar RN  Outcome: Progressing  7/17/2024 0140 by Keke Rosario RN  Outcome: Progressing  Flowsheets (Taken 7/16/2024 2015)  Care Plan - Patient's Chronic Conditions and Co-Morbidity Symptoms are Monitored and Maintained or Improved:   Monitor and assess patient's chronic conditions and comorbid symptoms for stability, deterioration, or improvement   Collaborate with multidisciplinary team to address chronic and comorbid conditions and prevent exacerbation or deterioration   Update acute care plan with appropriate goals if chronic or comorbid symptoms are exacerbated and prevent overall improvement and discharge

## 2024-07-17 NOTE — PROGRESS NOTES
[facility]   Gastroenterology Progress Note    Tejas Machado is a 58 y.o. male patient.  Hospitalization Day:2      Chief consult reason:   Upper GI bleed     Subjective:  Pt seen and examined, no acute events overnight  Hgb dropped slightly 7.3-6.9  Pt had unremarkable EGD yesterday  Last night and again this morning patient had melena  Hemoglobin dropped from 7.3 to 6.9 g/dL for which she received 1 unit of PRBCs with repeat hemoglobin 8.5 g/dL      VITALS:  BP (!) 159/88   Pulse 63   Temp 97.4 °F (36.3 °C) (Oral)   Resp 19   Ht 1.778 m (5' 10\")   Wt 107.5 kg (236 lb 15.9 oz)   SpO2 100%   BMI 34.01 kg/m²   TEMPERATURE:  Current - Temp: 97.4 °F (36.3 °C); Max - Temp  Av.9 °F (36.6 °C)  Min: 97 °F (36.1 °C)  Max: 98.4 °F (36.9 °C)    Physical Assessment:  General appearance:  alert, cooperative and no distress  Mental Status:  oriented to person, place and time and normal affect  Lungs:  clear to auscultation bilaterally, normal effort  Heart:  regular rate and rhythm, no murmur  Abdomen:  soft, nontender, nondistended, normal bowel sounds, no masses, hepatomegaly, splenomegaly  Extremities:  no edema, redness, tenderness in the calves  Skin:  no gross lesions, rashes, induration    Data Review:    Labs and Imaging:     CBC:  Recent Labs     07/15/24  1053 07/15/24  1426 07/15/24  1913 24  0217 24  2141 24  0352   WBC 4.2 4.6  --   --   --  5.2   HGB 6.9* 6.8* 7.2* 7.4* 7.3* 6.9*   MCV 81.7* 79.5*  --   --   --  84.3   RDW 15.2* 15.0*  --   --   --  15.2*    216  --   --   --  192       ANEMIA STUDIES:  No results for input(s): \"TIBC\", \"FERRITIN\", \"VUOVWSSI49\", \"FOLATE\", \"OCCULTBLD\" in the last 72 hours.    Invalid input(s): \"LABIRON\"    BMP:  Recent Labs     07/15/24  1053 07/15/24  1426 07/15/24  2210 24  0352    139  --  138   K 4.0 4.0  --  4.4    105  --  106   CO2 24 23  --  24   BUN 34* 34*  --  28*   CREATININE 2.4* 2.4*  --  2.3*  including CBC BMP, trend hemoglobin, transfuse as needed  Will follow      This plan was formulated in collaboration with Dr. Norma MD    Thank you for allowing me to participate in the care of your patient.  Please feel free to contact me with any questions or concerns.     [facility]   Sidney Stanton, APRN - CNP   [phone]  7/17/2024  8:20 AM    Estimated time of 20 mins reviewing chart, assessing patient and formulating plan of care    This note was created with the assistance of a speech-recognition program.  Although the intention is to generate a document that actually reflects the content of the visit, no guarantees can be provided that every mistake has been identified and corrected by editing.

## 2024-07-17 NOTE — PROGRESS NOTES
Physical Therapy  Facility/Department: [address] STEPDOWN  Physical Therapy Initial Assessment    Name: Tejas Machado  : 1965  MRN: [id number]  Date of Service: 2024  Chief Complaint   Patient presents with    Chest Pain    Rectal Bleeding      Discharge Recommendations:  No therapy recommended at discharge   PT Equipment Recommendations  Equipment Needed: No      Patient Diagnosis(es): The primary encounter diagnosis was Gastrointestinal hemorrhage, unspecified gastrointestinal hemorrhage type. A diagnosis of Cardiomyopathy, unspecified type (HCC) was also pertinent to this visit.  Past Medical History:  has a past medical history of Atrial fibrillation (HCC), Benign essential HTN, CKD (chronic kidney disease), stage III (HCC), Enlarged heart, and Hypertensive urgency.  Past Surgical History:  has a past surgical history that includes Tonsillectomy; Upper gastrointestinal endoscopy (N/A, 2021); Colonoscopy (N/A, 2021); Esophagogastroduodenoscopy (N/A, 03/15/2023); Upper gastrointestinal endoscopy (03/15/2023); Upper gastrointestinal endoscopy (03/15/2023); Esophagogastroduodenoscopy (Right, 2024); and Upper gastrointestinal endoscopy (N/A, 2024).    Assessment   Assessment: Pt performed well at therapy this date.  Pt was able to ambulate 225 feet with no AD and supervision.  Pt reports dizziness has subsided and remained asymptomatic throughout today's session.  Pt is at baseline functional mobility without additional acute care PT needs.  Pt is being discharged from PT at this time.  PT should be reordered with a change in medical status.  Pt is expected to be safe to return to prior living arrangements upon discharge based on today's session.  Therapy Prognosis: Good  Decision Making: Low Complexity  Requires PT Follow-Up: No  Activity Tolerance  Activity Tolerance: Patient tolerated treatment well     Plan   Physical Therapy Plan  General Plan: Discharge  Safety Devices  Type

## 2024-07-18 ENCOUNTER — ANESTHESIA EVENT (OUTPATIENT)
Dept: OPERATING ROOM | Age: 59
End: 2024-07-18
Payer: COMMERCIAL

## 2024-07-18 ENCOUNTER — ANESTHESIA (OUTPATIENT)
Dept: OPERATING ROOM | Age: 59
End: 2024-07-18
Payer: COMMERCIAL

## 2024-07-18 LAB
ABO/RH: NORMAL
ANION GAP SERPL CALCULATED.3IONS-SCNC: 12 MMOL/L (ref 9–16)
ANTIBODY SCREEN: NEGATIVE
ARM BAND NUMBER: NORMAL
BASOPHILS # BLD: 0.03 K/UL (ref 0–0.2)
BASOPHILS NFR BLD: 1 % (ref 0–2)
BLOOD BANK BLOOD PRODUCT EXPIRATION DATE: NORMAL
BLOOD BANK BLOOD PRODUCT EXPIRATION DATE: NORMAL
BLOOD BANK DISPENSE STATUS: NORMAL
BLOOD BANK DISPENSE STATUS: NORMAL
BLOOD BANK ISBT PRODUCT BLOOD TYPE: 5100
BLOOD BANK ISBT PRODUCT BLOOD TYPE: 5100
BLOOD BANK PRODUCT CODE: NORMAL
BLOOD BANK PRODUCT CODE: NORMAL
BLOOD BANK SAMPLE EXPIRATION: NORMAL
BLOOD BANK UNIT TYPE AND RH: NORMAL
BLOOD BANK UNIT TYPE AND RH: NORMAL
BPU ID: NORMAL
BPU ID: NORMAL
BUN SERPL-MCNC: 24 MG/DL (ref 8–23)
CALCIUM SERPL-MCNC: 8.4 MG/DL (ref 8.6–10.4)
CATECHOLS PLAS-IMP: NORMAL
CHLORIDE SERPL-SCNC: 105 MMOL/L (ref 98–107)
CO2 SERPL-SCNC: 21 MMOL/L (ref 20–31)
COMPONENT: NORMAL
COMPONENT: NORMAL
CREAT SERPL-MCNC: 2.1 MG/DL (ref 0.7–1.2)
CROSSMATCH RESULT: NORMAL
CROSSMATCH RESULT: NORMAL
DOPAMINE SERPL-MCNC: 227 PMOL/L
EOSINOPHIL # BLD: 0.28 K/UL (ref 0–0.44)
EOSINOPHILS RELATIVE PERCENT: 5 % (ref 1–4)
EPINEPHRINE PLASMA: 142 PMOL/L
ERYTHROCYTE [DISTWIDTH] IN BLOOD BY AUTOMATED COUNT: 15.4 % (ref 11.8–14.4)
GFR, ESTIMATED: 36 ML/MIN/1.73M2
GLUCOSE SERPL-MCNC: 108 MG/DL (ref 74–99)
HCT VFR BLD AUTO: 29.2 % (ref 40.7–50.3)
HCT VFR BLD AUTO: 29.8 % (ref 40.7–50.3)
HGB BLD-MCNC: 8.6 G/DL (ref 13–17)
HGB BLD-MCNC: 8.9 G/DL (ref 13–17)
IMM GRANULOCYTES # BLD AUTO: 0.04 K/UL (ref 0–0.3)
IMM GRANULOCYTES NFR BLD: 1 %
LYMPHOCYTES NFR BLD: 1.04 K/UL (ref 1.1–3.7)
LYMPHOCYTES RELATIVE PERCENT: 18 % (ref 24–43)
MCH RBC QN AUTO: 24.5 PG (ref 25.2–33.5)
MCHC RBC AUTO-ENTMCNC: 29.5 G/DL (ref 28.4–34.8)
MCV RBC AUTO: 83.2 FL (ref 82.6–102.9)
METANEPH/PLASMA INTERP: ABNORMAL
METANEPHRINE: 0.22 NMOL/L (ref 0–0.49)
MONOCYTES NFR BLD: 0.4 K/UL (ref 0.1–1.2)
MONOCYTES NFR BLD: 7 % (ref 3–12)
NEUTROPHILS NFR BLD: 68 % (ref 36–65)
NEUTS SEG NFR BLD: 3.96 K/UL (ref 1.5–8.1)
NOREPINEPHRINE: 4171 PMOL/L (ref 1050–4800)
NORMETANEPHRINE PLASMA: 1.21 NMOL/L (ref 0–0.89)
NRBC BLD-RTO: 0.5 PER 100 WBC
PLATELET # BLD AUTO: 193 K/UL (ref 138–453)
PMV BLD AUTO: 10.8 FL (ref 8.1–13.5)
POTASSIUM SERPL-SCNC: 3.7 MMOL/L (ref 3.7–5.3)
RBC # BLD AUTO: 3.51 M/UL (ref 4.21–5.77)
RBC # BLD: ABNORMAL 10*6/UL
SODIUM SERPL-SCNC: 138 MMOL/L (ref 136–145)
TRANSFUSION STATUS: NORMAL
TRANSFUSION STATUS: NORMAL
UNIT DIVISION: 0
UNIT DIVISION: 0
UNIT ISSUE DATE/TIME: NORMAL
UNIT ISSUE DATE/TIME: NORMAL
WBC OTHER # BLD: 5.8 K/UL (ref 3.5–11.3)

## 2024-07-18 PROCEDURE — 6370000000 HC RX 637 (ALT 250 FOR IP): Performed by: INTERNAL MEDICINE

## 2024-07-18 PROCEDURE — 3609009900 HC COLONOSCOPY W/CONTROL BLEEDING ANY METHOD: Performed by: INTERNAL MEDICINE

## 2024-07-18 PROCEDURE — 85014 HEMATOCRIT: CPT

## 2024-07-18 PROCEDURE — 6360000002 HC RX W HCPCS: Performed by: INTERNAL MEDICINE

## 2024-07-18 PROCEDURE — 3700000001 HC ADD 15 MINUTES (ANESTHESIA): Performed by: INTERNAL MEDICINE

## 2024-07-18 PROCEDURE — 2580000003 HC RX 258: Performed by: INTERNAL MEDICINE

## 2024-07-18 PROCEDURE — 2500000003 HC RX 250 WO HCPCS: Performed by: NURSE ANESTHETIST, CERTIFIED REGISTERED

## 2024-07-18 PROCEDURE — 99232 SBSQ HOSP IP/OBS MODERATE 35: CPT | Performed by: HOSPITALIST

## 2024-07-18 PROCEDURE — 7100000000 HC PACU RECOVERY - FIRST 15 MIN: Performed by: INTERNAL MEDICINE

## 2024-07-18 PROCEDURE — 2720000010 HC SURG SUPPLY STERILE: Performed by: INTERNAL MEDICINE

## 2024-07-18 PROCEDURE — 80048 BASIC METABOLIC PNL TOTAL CA: CPT

## 2024-07-18 PROCEDURE — 3700000000 HC ANESTHESIA ATTENDED CARE: Performed by: INTERNAL MEDICINE

## 2024-07-18 PROCEDURE — 85025 COMPLETE CBC W/AUTO DIFF WBC: CPT

## 2024-07-18 PROCEDURE — 2709999900 HC NON-CHARGEABLE SUPPLY: Performed by: INTERNAL MEDICINE

## 2024-07-18 PROCEDURE — 1200000000 HC SEMI PRIVATE

## 2024-07-18 PROCEDURE — 2580000003 HC RX 258: Performed by: NURSE ANESTHETIST, CERTIFIED REGISTERED

## 2024-07-18 PROCEDURE — 7100000001 HC PACU RECOVERY - ADDTL 15 MIN: Performed by: INTERNAL MEDICINE

## 2024-07-18 PROCEDURE — 45382 COLONOSCOPY W/CONTROL BLEED: CPT | Performed by: INTERNAL MEDICINE

## 2024-07-18 PROCEDURE — 0W3P8ZZ CONTROL BLEEDING IN GASTROINTESTINAL TRACT, VIA NATURAL OR ARTIFICIAL OPENING ENDOSCOPIC: ICD-10-PCS | Performed by: INTERNAL MEDICINE

## 2024-07-18 PROCEDURE — 6360000002 HC RX W HCPCS: Performed by: NURSE ANESTHETIST, CERTIFIED REGISTERED

## 2024-07-18 PROCEDURE — 36415 COLL VENOUS BLD VENIPUNCTURE: CPT

## 2024-07-18 PROCEDURE — 85018 HEMOGLOBIN: CPT

## 2024-07-18 RX ORDER — SODIUM CHLORIDE 0.9 % (FLUSH) 0.9 %
5-40 SYRINGE (ML) INJECTION EVERY 12 HOURS SCHEDULED
Status: DISCONTINUED | OUTPATIENT
Start: 2024-07-18 | End: 2024-07-18 | Stop reason: HOSPADM

## 2024-07-18 RX ORDER — SODIUM CHLORIDE 0.9 % (FLUSH) 0.9 %
5-40 SYRINGE (ML) INJECTION PRN
Status: DISCONTINUED | OUTPATIENT
Start: 2024-07-18 | End: 2024-07-18 | Stop reason: HOSPADM

## 2024-07-18 RX ORDER — PROPOFOL 10 MG/ML
INJECTION, EMULSION INTRAVENOUS PRN
Status: DISCONTINUED | OUTPATIENT
Start: 2024-07-18 | End: 2024-07-18 | Stop reason: SDUPTHER

## 2024-07-18 RX ORDER — LIDOCAINE HYDROCHLORIDE 10 MG/ML
1 INJECTION, SOLUTION INFILTRATION; PERINEURAL
Status: DISCONTINUED | OUTPATIENT
Start: 2024-07-18 | End: 2024-07-18 | Stop reason: HOSPADM

## 2024-07-18 RX ORDER — SODIUM CHLORIDE 9 MG/ML
INJECTION, SOLUTION INTRAVENOUS PRN
Status: DISCONTINUED | OUTPATIENT
Start: 2024-07-18 | End: 2024-07-18 | Stop reason: HOSPADM

## 2024-07-18 RX ORDER — ONDANSETRON 2 MG/ML
4 INJECTION INTRAMUSCULAR; INTRAVENOUS
Status: DISCONTINUED | OUTPATIENT
Start: 2024-07-18 | End: 2024-07-18 | Stop reason: HOSPADM

## 2024-07-18 RX ORDER — SODIUM CHLORIDE, SODIUM LACTATE, POTASSIUM CHLORIDE, CALCIUM CHLORIDE 600; 310; 30; 20 MG/100ML; MG/100ML; MG/100ML; MG/100ML
INJECTION, SOLUTION INTRAVENOUS CONTINUOUS PRN
Status: DISCONTINUED | OUTPATIENT
Start: 2024-07-18 | End: 2024-07-18 | Stop reason: SDUPTHER

## 2024-07-18 RX ORDER — SODIUM CHLORIDE, SODIUM LACTATE, POTASSIUM CHLORIDE, CALCIUM CHLORIDE 600; 310; 30; 20 MG/100ML; MG/100ML; MG/100ML; MG/100ML
INJECTION, SOLUTION INTRAVENOUS CONTINUOUS
Status: DISCONTINUED | OUTPATIENT
Start: 2024-07-18 | End: 2024-07-18

## 2024-07-18 RX ORDER — NALOXONE HYDROCHLORIDE 0.4 MG/ML
INJECTION, SOLUTION INTRAMUSCULAR; INTRAVENOUS; SUBCUTANEOUS PRN
Status: DISCONTINUED | OUTPATIENT
Start: 2024-07-18 | End: 2024-07-18 | Stop reason: HOSPADM

## 2024-07-18 RX ORDER — MIDAZOLAM HYDROCHLORIDE 2 MG/2ML
1 INJECTION, SOLUTION INTRAMUSCULAR; INTRAVENOUS EVERY 10 MIN PRN
Status: DISCONTINUED | OUTPATIENT
Start: 2024-07-18 | End: 2024-07-18 | Stop reason: HOSPADM

## 2024-07-18 RX ORDER — LIDOCAINE HYDROCHLORIDE 10 MG/ML
INJECTION, SOLUTION EPIDURAL; INFILTRATION; INTRACAUDAL; PERINEURAL PRN
Status: DISCONTINUED | OUTPATIENT
Start: 2024-07-18 | End: 2024-07-18 | Stop reason: SDUPTHER

## 2024-07-18 RX ADMIN — SODIUM CHLORIDE, POTASSIUM CHLORIDE, SODIUM LACTATE AND CALCIUM CHLORIDE: 600; 310; 30; 20 INJECTION, SOLUTION INTRAVENOUS at 07:49

## 2024-07-18 RX ADMIN — PROPOFOL 85 MCG/KG/MIN: 10 INJECTION, EMULSION INTRAVENOUS at 07:56

## 2024-07-18 RX ADMIN — HYDRALAZINE HYDROCHLORIDE 10 MG: 20 INJECTION INTRAMUSCULAR; INTRAVENOUS at 16:32

## 2024-07-18 RX ADMIN — PROPOFOL 60 MG: 10 INJECTION, EMULSION INTRAVENOUS at 07:55

## 2024-07-18 RX ADMIN — PROPOFOL 40 MG: 10 INJECTION, EMULSION INTRAVENOUS at 07:59

## 2024-07-18 RX ADMIN — HYDRALAZINE HYDROCHLORIDE 50 MG: 50 TABLET ORAL at 20:12

## 2024-07-18 RX ADMIN — LIDOCAINE HYDROCHLORIDE 25 MG: 10 INJECTION, SOLUTION EPIDURAL; INFILTRATION; INTRACAUDAL; PERINEURAL at 07:55

## 2024-07-18 RX ADMIN — SODIUM CHLORIDE, PRESERVATIVE FREE 10 ML: 5 INJECTION INTRAVENOUS at 09:14

## 2024-07-18 RX ADMIN — SODIUM CHLORIDE, PRESERVATIVE FREE 10 ML: 5 INJECTION INTRAVENOUS at 20:12

## 2024-07-18 RX ADMIN — HYDRALAZINE HYDROCHLORIDE 10 MG: 20 INJECTION INTRAMUSCULAR; INTRAVENOUS at 09:59

## 2024-07-18 RX ADMIN — SODIUM CHLORIDE, POTASSIUM CHLORIDE, SODIUM LACTATE AND CALCIUM CHLORIDE: 600; 310; 30; 20 INJECTION, SOLUTION INTRAVENOUS at 06:58

## 2024-07-18 RX ADMIN — SPIRONOLACTONE 50 MG: 25 TABLET, FILM COATED ORAL at 09:15

## 2024-07-18 RX ADMIN — LABETALOL HYDROCHLORIDE 5 MG: 5 INJECTION, SOLUTION INTRAVENOUS at 12:32

## 2024-07-18 RX ADMIN — HYDRALAZINE HYDROCHLORIDE 50 MG: 50 TABLET ORAL at 09:14

## 2024-07-18 RX ADMIN — NIFEDIPINE 60 MG: 30 TABLET, FILM COATED, EXTENDED RELEASE ORAL at 09:14

## 2024-07-18 RX ADMIN — SODIUM CHLORIDE, PRESERVATIVE FREE 40 MG: 5 INJECTION INTRAVENOUS at 14:24

## 2024-07-18 RX ADMIN — HYDRALAZINE HYDROCHLORIDE 50 MG: 50 TABLET ORAL at 14:24

## 2024-07-18 RX ADMIN — SODIUM CHLORIDE, POTASSIUM CHLORIDE, SODIUM LACTATE AND CALCIUM CHLORIDE: 600; 310; 30; 20 INJECTION, SOLUTION INTRAVENOUS at 09:21

## 2024-07-18 RX ADMIN — CARVEDILOL 6.25 MG: 12.5 TABLET, FILM COATED ORAL at 16:30

## 2024-07-18 RX ADMIN — CARVEDILOL 6.25 MG: 12.5 TABLET, FILM COATED ORAL at 09:13

## 2024-07-18 ASSESSMENT — PAIN - FUNCTIONAL ASSESSMENT
PAIN_FUNCTIONAL_ASSESSMENT: 0-10
PAIN_FUNCTIONAL_ASSESSMENT: ADULT NONVERBAL PAIN SCALE (NPVS)

## 2024-07-18 NOTE — PROGRESS NOTES
[facility]  Internal Medicine Teaching Residency Program  Inpatient Daily Progress Note  ______________________________________________________________________________    Patient: Tejas Machado  YOB: 1965   MRN:[id number]    Acct: [id number]     Room: [facility] [place] [facility]/NONE  Admit date: 7/15/2024  Today's date: 07/18/24  Number of days in the hospital: 3    SUBJECTIVE   Admitting Diagnosis: GI bleed    CC: Dizziness     Pt examined at bedside. Chart & results reviewed.   Patient is AOx4.  No acute overnight distress.  Patient reports he is no longer passing black stool.  Patient is maintaining saturation on room air.  Patient hemoglobin today is 8.6, no transfusion was needed overnight.      Labs reviewed, showes BUN 24, creatinine 2.1, 8.6, hematocrit 29.2.  GI on board, patient underwent successful EGD on 7/16/24, no abnormalities found.    Patient underwent colonoscopy on 7/18/24, he was found to have AV malformation in the cecum which was ablated.    Will follow-up with GI recommendations.    BRIEF HISTORY     The patient is a pleasant 58 y.o. male with a PMHx significant for      Atrial fibrillation on Eliquis  GI bleed  Essential hypertension  CKD stage III  AV malformations     presents with a chief complaint of dizziness for the past 2 weeks.  Patient has experienced exertional dizziness, shortness of breath, chest pain for the past 2 weeks which he reports gets better by rest.  He started observing dark tarry black stools for the past 2 weeks, since then he has experienced dizziness, shortness of breath, chest pain.  Patient is taking Eliquis for atrial fibrillation, but he has stopped taking it for the last 5 days because he ran out of the medication..  Patient has not taken any medication for his symptoms, he reports that he has not taken any NSAIDs in the recent past.  Patient stated that he has experienced episodes of GI bleeds in the past as

## 2024-07-18 NOTE — OP NOTE
PROCEDURE NOTE    DATE OF PROCEDURE: 7/18/2024    SURGEON: Min Green MD  Facility : [facility]  ASSISTANT: None  Anesthesia: Monitored anesthesia care  PREOPERATIVE DIAGNOSIS: Anemia    POSTOPERATIVE DIAGNOSIS: as described below    OPERATION: Total colonoscopy     ANESTHESIA: Moderate Sedation    ESTIMATED BLOOD LOSS: less than 50     COMPLICATIONS: None.     SPECIMENS:  Was Not Obtained    HISTORY: The patient is a 58 y.o. year old male with history of above preop diagnosis.  I recommended colonoscopy with possible biopsy or polypectomy and I explained the risk, benefits, expected outcome, and alternatives to the procedure.  Risks included but are not limited to bleeding, infection, respiratory distress, hypotension, and perforation of the colon and possibility of missing a lesion.  The patient understands and is in agreement.        PROCEDURE: The patient was given IV conscious sedation.  The patient's SPO2 remained above 90% throughout the procedure.     Digital rectal exam- normal    The colonoscope was inserted per rectum and advanced under direct vision to the cecum without difficulty.      Post sedation note :The patient's SPO2 remained above 90% throughout the procedure.the vital signs remained stable , and no immediate complication form the procedure noted, patient will be ready for d/c when criteria is met .        The prep was good.      Findings:    Cecum/Ascending colon: abnormal: AVM in the cecum which was ablated using APC    Transverse colon: normal    Descending/Sigmoid colon: normal    Rectum/Anus: examined in normal and retroflexed positions and was normal    Withdrawal Time was (minutes): 16    Diverticulosis: none    The colon was decompressed and the scope was removed.  The patient tolerated the procedure well.     Impression: Cecal AVM S/P APC    Recommendations/Plan:   If persists with anemia, will need VCE  F/U In Office Yes  Discussed with the family  Repeat colonoscopy in 10  years    Electronically signed by Min Green MD  on 7/18/2024 at 8:24 AM

## 2024-07-18 NOTE — ANESTHESIA PRE PROCEDURE
daily (with meals) 10/16/23   Tae Paige MD   senna (SENOKOT) 8.6 MG tablet Take 1 tablet by mouth 2 times daily 8/15/23 8/14/24  Chelly Hess APRN - CNP   Blood Pressure KIT 1 kit by Does not apply route daily 1/23/23   Chelly Hess APRN - CNP   Blood Pressure Monitoring (ADULT BLOOD PRESSURE CUFF LG) KIT Check blood pressure daily 3/16/21   Tejas Charles DO   VITAMIN D, CHOLECALCIFEROL, PO Take by mouth daily    ProviderEdgar MD   aspirin 81 MG EC tablet Take 1 tablet by mouth daily    Provider, MD Edgar       Current medications:    Current Facility-Administered Medications   Medication Dose Route Frequency Provider Last Rate Last Admin   • 0.9 % sodium chloride infusion   IntraVENous Continuous Jose R Yates MD       • hydrALAZINE (APRESOLINE) injection 10 mg  10 mg IntraVENous Q6H PRN Douglas Watson MD   10 mg at 07/17/24 1910   • NIFEdipine (PROCARDIA XL) extended release tablet 60 mg  60 mg Oral Daily Douglas Watson MD   60 mg at 07/17/24 0829   • hydrALAZINE (APRESOLINE) tablet 50 mg  50 mg Oral TID Douglas Watson MD   50 mg at 07/17/24 2059   • labetalol (NORMODYNE;TRANDATE) injection 5 mg  5 mg IntraVENous Q6H PRN Flex Baez MD   5 mg at 07/16/24 2133   • pantoprazole (PROTONIX) 40 mg in sodium chloride (PF) 0.9 % 10 mL injection  40 mg IntraVENous Daily Min Green MD       • sodium chloride flush 0.9 % injection 5-40 mL  5-40 mL IntraVENous 2 times per day Min Green MD   10 mL at 07/17/24 2100   • sodium chloride flush 0.9 % injection 5-40 mL  5-40 mL IntraVENous PRN Min Green MD   10 mL at 07/16/24 2135   • 0.9 % sodium chloride infusion   IntraVENous PRN Min Green MD       • potassium chloride (KLOR-CON M) extended release tablet 40 mEq  40 mEq Oral PRN Min Green MD        Or   • potassium bicarb-citric acid (EFFER-K) effervescent tablet 40 mEq  40 mEq Oral PRN Min Green MD        Or   • potassium chloride 10 mEq/100 mL IVPB (Peripheral

## 2024-07-18 NOTE — PLAN OF CARE
Problem: ABCDS Injury Assessment  Goal: Absence of physical injury  Outcome: Progressing     Problem: Discharge Planning  Goal: Discharge to home or other facility with appropriate resources  Outcome: Progressing     Problem: Chronic Conditions and Co-morbidities  Goal: Patient's chronic conditions and co-morbidity symptoms are monitored and maintained or improved  Outcome: Progressing     Problem: Safety - Adult  Goal: Free from fall injury  Outcome: Progressing

## 2024-07-18 NOTE — ANESTHESIA POSTPROCEDURE EVALUATION
Department of Anesthesiology  Postprocedure Note    Patient: Tejas Machado  MRN: [id number]  YOB: 1965  Date of evaluation: 7/18/2024    Procedure Summary       Date: 07/18/24 Room / Location: [address]    Anesthesia Start: 0749 Anesthesia Stop: 0824    Procedure: COLONOSCOPY CONTROL HEMORRHAGE Diagnosis:       Anemia, unspecified type      (Anemia, unspecified type [D64.9])    Surgeons: Min Green MD Responsible Provider: Katerin Belcher MD    Anesthesia Type: MAC ASA Status: 3            Anesthesia Type: No value filed.    Eduardo Phase I: Eduardo Score: 10    Eduardo Phase II:      Anesthesia Post Evaluation    Patient location during evaluation: PACU  Patient participation: complete - patient participated  Level of consciousness: awake and alert  Pain score: 0  Airway patency: patent  Nausea & Vomiting: no vomiting  Cardiovascular status: hemodynamically stable  Respiratory status: acceptable  Hydration status: hypervolemic  Pain management: adequate    No notable events documented.

## 2024-07-18 NOTE — CARE COORDINATION
.Case Management Assessment  Initial Evaluation    Date/Time of Evaluation: 7/18/2024 3:27 PM  Assessment Completed by: Blanca Garza RN    If patient is discharged prior to next notation, then this note serves as note for discharge by case management.    Patient Name: Tejas Machado                   YOB: 1965  Diagnosis: GI bleed [K92.2]  Gastrointestinal hemorrhage, unspecified gastrointestinal hemorrhage type [K92.2]  Cardiomyopathy, unspecified type (HCC) [I42.9]                   Date / Time: 7/15/2024  1:52 PM    Patient Admission Status: Inpatient   Readmission Risk (Low < 19, Mod (19-27), High > 27): Readmission Risk Score: 17.8    Current PCP: Chelly Hess APRN - CNP  PCP verified by CM? (P) Yes    Chart Reviewed: Yes      History Provided by: (P) Patient  Patient Orientation: (P) Alert and Oriented    Patient Cognition: (P) Alert    Hospitalization in the last 30 days (Readmission):  No    If yes, Readmission Assessment in CM Navigator will be completed.    Advance Directives:      Code Status: Full Code   Patient's Primary Decision Maker is: (P) Legal Next of Kin    Primary Decision Maker: Rentaa Machado - Spouse - [phone]    Discharge Planning:    Patient lives with: (P) Spouse/Significant Other Type of Home: (P) House  Primary Care Giver: (P) Self  Patient Support Systems include: (P) Spouse/Significant Other   Current Financial resources: (P) Medicaid  Current community resources: (P) None  Current services prior to admission: (P) None            Current DME:              Type of Home Care services:  (P) None    ADLS  Prior functional level: (P) Independent in ADLs/IADLs  Current functional level: (P) Independent in ADLs/IADLs    PT AM-PAC: 24 /24  OT AM-PAC:   /24    Family can provide assistance at DC: (P) Yes  Would you like Case Management to discuss the discharge plan with any other family members/significant others, and if so, who? (P) Yes  Plans to Return to Present

## 2024-07-18 NOTE — PROGRESS NOTES
Pt's bp elevated; scheduled meds given, still elevated, prn given; report given to night shift and requested to recheck shortly for additional options if needed

## 2024-07-19 VITALS
RESPIRATION RATE: 20 BRPM | TEMPERATURE: 98.2 F | OXYGEN SATURATION: 98 % | HEIGHT: 70 IN | DIASTOLIC BLOOD PRESSURE: 88 MMHG | HEART RATE: 70 BPM | BODY MASS INDEX: 34.34 KG/M2 | SYSTOLIC BLOOD PRESSURE: 173 MMHG | WEIGHT: 239.86 LBS

## 2024-07-19 LAB
ANION GAP SERPL CALCULATED.3IONS-SCNC: 9 MMOL/L (ref 9–16)
BASOPHILS # BLD: 0.03 K/UL (ref 0–0.2)
BASOPHILS NFR BLD: 1 % (ref 0–2)
BUN SERPL-MCNC: 24 MG/DL (ref 8–23)
CALCIUM SERPL-MCNC: 8.6 MG/DL (ref 8.6–10.4)
CHLORIDE SERPL-SCNC: 106 MMOL/L (ref 98–107)
CO2 SERPL-SCNC: 24 MMOL/L (ref 20–31)
CREAT SERPL-MCNC: 2.2 MG/DL (ref 0.7–1.2)
EOSINOPHIL # BLD: 0.19 K/UL (ref 0–0.44)
EOSINOPHILS RELATIVE PERCENT: 4 % (ref 1–4)
ERYTHROCYTE [DISTWIDTH] IN BLOOD BY AUTOMATED COUNT: 15.2 % (ref 11.8–14.4)
GFR, ESTIMATED: 33 ML/MIN/1.73M2
GLUCOSE SERPL-MCNC: 88 MG/DL (ref 74–99)
HCT VFR BLD AUTO: 29.7 % (ref 40.7–50.3)
HGB BLD-MCNC: 8.6 G/DL (ref 13–17)
IMM GRANULOCYTES # BLD AUTO: <0.03 K/UL (ref 0–0.3)
IMM GRANULOCYTES NFR BLD: 0 %
LYMPHOCYTES NFR BLD: 0.83 K/UL (ref 1.1–3.7)
LYMPHOCYTES RELATIVE PERCENT: 17 % (ref 24–43)
MCH RBC QN AUTO: 23.8 PG (ref 25.2–33.5)
MCHC RBC AUTO-ENTMCNC: 29 G/DL (ref 28.4–34.8)
MCV RBC AUTO: 82 FL (ref 82.6–102.9)
MONOCYTES NFR BLD: 0.34 K/UL (ref 0.1–1.2)
MONOCYTES NFR BLD: 7 % (ref 3–12)
NEUTROPHILS NFR BLD: 71 % (ref 36–65)
NEUTS SEG NFR BLD: 3.37 K/UL (ref 1.5–8.1)
NRBC BLD-RTO: 0 PER 100 WBC
PLATELET # BLD AUTO: 190 K/UL (ref 138–453)
PMV BLD AUTO: 10.5 FL (ref 8.1–13.5)
POTASSIUM SERPL-SCNC: 4.2 MMOL/L (ref 3.7–5.3)
RBC # BLD AUTO: 3.62 M/UL (ref 4.21–5.77)
RBC # BLD: ABNORMAL 10*6/UL
SODIUM SERPL-SCNC: 139 MMOL/L (ref 136–145)
WBC OTHER # BLD: 4.8 K/UL (ref 3.5–11.3)

## 2024-07-19 PROCEDURE — 6360000002 HC RX W HCPCS: Performed by: INTERNAL MEDICINE

## 2024-07-19 PROCEDURE — 6370000000 HC RX 637 (ALT 250 FOR IP)

## 2024-07-19 PROCEDURE — 85025 COMPLETE CBC W/AUTO DIFF WBC: CPT

## 2024-07-19 PROCEDURE — 36415 COLL VENOUS BLD VENIPUNCTURE: CPT

## 2024-07-19 PROCEDURE — 2580000003 HC RX 258: Performed by: INTERNAL MEDICINE

## 2024-07-19 PROCEDURE — 99239 HOSP IP/OBS DSCHRG MGMT >30: CPT | Performed by: HOSPITALIST

## 2024-07-19 PROCEDURE — 6370000000 HC RX 637 (ALT 250 FOR IP): Performed by: INTERNAL MEDICINE

## 2024-07-19 PROCEDURE — 80048 BASIC METABOLIC PNL TOTAL CA: CPT

## 2024-07-19 RX ORDER — SPIRONOLACTONE 50 MG/1
50 TABLET, FILM COATED ORAL DAILY
Qty: 30 TABLET | Refills: 0 | Status: SHIPPED | OUTPATIENT
Start: 2024-07-19 | End: 2024-08-18

## 2024-07-19 RX ORDER — CARVEDILOL 12.5 MG/1
12.5 TABLET ORAL 2 TIMES DAILY WITH MEALS
Status: DISCONTINUED | OUTPATIENT
Start: 2024-07-19 | End: 2024-07-19 | Stop reason: HOSPADM

## 2024-07-19 RX ORDER — HYDRALAZINE HYDROCHLORIDE 50 MG/1
100 TABLET, FILM COATED ORAL 3 TIMES DAILY
Status: DISCONTINUED | OUTPATIENT
Start: 2024-07-19 | End: 2024-07-19 | Stop reason: HOSPADM

## 2024-07-19 RX ORDER — CARVEDILOL 12.5 MG/1
12.5 TABLET ORAL 2 TIMES DAILY WITH MEALS
Qty: 60 TABLET | Refills: 0 | Status: SHIPPED | OUTPATIENT
Start: 2024-07-19 | End: 2024-08-18

## 2024-07-19 RX ADMIN — HYDRALAZINE HYDROCHLORIDE 100 MG: 50 TABLET ORAL at 08:22

## 2024-07-19 RX ADMIN — SPIRONOLACTONE 50 MG: 25 TABLET, FILM COATED ORAL at 08:22

## 2024-07-19 RX ADMIN — HYDRALAZINE HYDROCHLORIDE 10 MG: 20 INJECTION INTRAMUSCULAR; INTRAVENOUS at 00:17

## 2024-07-19 RX ADMIN — NIFEDIPINE 60 MG: 30 TABLET, FILM COATED, EXTENDED RELEASE ORAL at 08:23

## 2024-07-19 RX ADMIN — CARVEDILOL 6.25 MG: 12.5 TABLET, FILM COATED ORAL at 08:23

## 2024-07-19 RX ADMIN — SODIUM CHLORIDE, PRESERVATIVE FREE 10 ML: 5 INJECTION INTRAVENOUS at 08:23

## 2024-07-19 RX ADMIN — SODIUM CHLORIDE, PRESERVATIVE FREE 40 MG: 5 INJECTION INTRAVENOUS at 08:23

## 2024-07-19 RX ADMIN — HYDRALAZINE HYDROCHLORIDE 100 MG: 50 TABLET ORAL at 13:41

## 2024-07-19 NOTE — DISCHARGE INSTRUCTIONS
You were admitted to the hospital with dizziness and found to have low hemoglobin.  You received blood transfusions. You underwent EGD and colonoscopy.  Hold taking eliquis(blood thinner)for 5 days. Restart after 7 days  Continue taking your old medications as prescribed.  Follow up with your PCP in one week.  Follow up with GI in 4-6 weeks  In case of worsening symptoms or new symptoms arise, please visit ER.

## 2024-07-19 NOTE — PROGRESS NOTES
CLINICAL PHARMACY NOTE: MEDS TO BEDS    Total # of Prescriptions Filled: 2   The following medications were delivered to the patient:  Carvedilol  Spironolactone      Additional Documentation:

## 2024-07-19 NOTE — PLAN OF CARE
Problem: ABCDS Injury Assessment  Goal: Absence of physical injury  7/19/2024 0949 by Lynda Millard RN  Outcome: Progressing  7/19/2024 0047 by Marian Medina RN  Outcome: Progressing     Problem: Discharge Planning  Goal: Discharge to home or other facility with appropriate resources  7/19/2024 0949 by Lynda Millard RN  Outcome: Progressing  7/19/2024 0047 by Marian Medina RN  Outcome: Progressing     Problem: Chronic Conditions and Co-morbidities  Goal: Patient's chronic conditions and co-morbidity symptoms are monitored and maintained or improved  7/19/2024 0949 by Lynda Millard RN  Outcome: Progressing  7/19/2024 0047 by Marian Medina RN  Outcome: Progressing

## 2024-07-19 NOTE — PROGRESS NOTES
[facility]  Internal Medicine Teaching Residency Program  Inpatient Daily Progress Note  ______________________________________________________________________________    Patient: Tejas Machado  YOB: 1965   MRN:[id number]    Acct: [id number]     Room: 0407/0407-01  Admit date: 7/15/2024  Today's date: 07/19/24  Number of days in the hospital: 4    SUBJECTIVE   Admitting Diagnosis: GI bleed    CC: Dizziness     Pt examined at bedside. Chart & results reviewed.   Patient is AOx4.  No acute overnight distress.  Patient has no acute overnight distress.  Patient is not passing dark stools anymore, he had a normal bowel movement overnight.  Patient is maintaining saturation on room air.    Labs reviewed, shows Hb stable around 8.6-8.9, hematocrit 29.2-29.8 .  GI on board, patient underwent successful EGD on 7/16/24, no abnormalities found.    Patient underwent colonoscopy on 7/18/24, he was found to have AV malformation in the cecum which was ablated.    Will follow-up with GI recommendations.    BRIEF HISTORY     The patient is a pleasant 58 y.o. male with a PMHx significant for      Atrial fibrillation on Eliquis  GI bleed  Essential hypertension  CKD stage III  AV malformations     presents with a chief complaint of dizziness for the past 2 weeks.  Patient has experienced exertional dizziness, shortness of breath, chest pain for the past 2 weeks which he reports gets better by rest.  He started observing dark tarry black stools for the past 2 weeks, since then he has experienced dizziness, shortness of breath, chest pain.  Patient is taking Eliquis for atrial fibrillation, but he has stopped taking it for the last 5 days because he ran out of the medication..  Patient has not taken any medication for his symptoms, he reports that he has not taken any NSAIDs in the recent past.  Patient stated that he has experienced episodes of GI bleeds in the past as

## 2024-07-22 ENCOUNTER — TELEPHONE (OUTPATIENT)
Dept: FAMILY MEDICINE CLINIC | Age: 59
End: 2024-07-22

## 2024-07-22 NOTE — TELEPHONE ENCOUNTER
Care Transitions Initial Follow Up Call    Outreach made within 2 business days of discharge: Yes    Patient: Tejas Machado Patient : 1965   MRN: [id number]  Reason for Admission: There are no discharge diagnoses documented for the most recent discharge.  Discharge Date: 24       Spoke with: Tejas    Discharge department/facility: [facility] Interactive Patient Contact:  Was patient able to fill all prescriptions: Yes  Was patient instructed to bring all medications to the follow-up visit: Yes  Is patient taking all medications as directed in the discharge summary? Yes  Does patient understand their discharge instructions: Yes  Does patient have questions or concerns that need addressed prior to 7-14 day follow up office visit: no    Scheduled appointment with PCP within 7-14 days    Follow Up  Future Appointments   Date Time Provider Department Center   2024  9:00 AM Chelly Hess APRN - CNP [facility]   2024  3:20 PM Chelly Hess APRN - CNP [facility]   2024  3:50 PM Dianna Beyer APRN - CNP [facility] [place] [facility]   2024  9:15 AM Adalberto Dey MD [facility] MA

## 2024-07-25 NOTE — DISCHARGE SUMMARY
[facility]     Department of Internal Medicine - Staff Internal Medicine Teaching Service    INPATIENT DISCHARGE SUMMARY      Patient Identification:  Tejas Machado is a 58 y.o. male.  :  1965  MRN: [id number]     Acct: [id number]   PCP: Chelly Hess APRN - CNP  Admit Date:  7/15/2024  Discharge date and time: 2024  4:08 PM   Attending Provider: No att. providers found                                     ACTIVE DISCHARGE DIAGNOSES     Hospital Problem Lists:  Principal Problem:    GI bleed  Active Problems:    Atrial fibrillation (HCC)    Bilateral carotid artery stenosis    Acute anemia    AVM (arteriovenous malformation) of duodenum, acquired with hemorrhage    HTN (hypertension), malignant    Stage 3b chronic kidney disease (HCC)  Resolved Problems:    * No resolved hospital problems. *      HOSPITAL STAY     Brief Inpatient course:   Tejas Machado is a 58 y.o. male patient past medical history of A-fib, GI bleed, essential hypertension, CKD stage III, AV malformation who was admitted for the management of GI bleed, presented to the emergency department with dizziness.  In the emergency department patient was found to be anemic with hemoglobin 6.8, hematocrit 23.6, MCV 79.5.  He was transfused with 1 unit of PRBC and posttransfusion hemoglobin was 7.2.  GI was consulted for concerns of GI bleed.    GI recommended starting patient on Protonix drip and plan for endoscopy.  On 24 patient underwent successful EGD, which revealed no abnormalities.  Subsequently on 24 patient underwent colonoscopy which revealed AV malformation in the cecum, which was ablated.  Patient is being discharged with the following instructions.      Procedures/ Significant Interventions:    1 unit of PRBC transfusion  EGD  Colonoscopy    Consults:     Consults:     Final Specialist Recommendations/Findings:   IP CONSULT TO GI  IP CONSULT TO INTERNAL MEDICINE  IP CONSULT TO CASE

## 2024-07-30 ENCOUNTER — OFFICE VISIT (OUTPATIENT)
Dept: FAMILY MEDICINE CLINIC | Age: 59
End: 2024-07-30
Payer: COMMERCIAL

## 2024-07-30 ENCOUNTER — HOSPITAL ENCOUNTER (OUTPATIENT)
Age: 59
Setting detail: SPECIMEN
Discharge: HOME | End: 2024-07-30

## 2024-07-30 VITALS
SYSTOLIC BLOOD PRESSURE: 128 MMHG | HEIGHT: 70 IN | WEIGHT: 235.6 LBS | HEART RATE: 61 BPM | TEMPERATURE: 97.2 F | DIASTOLIC BLOOD PRESSURE: 74 MMHG | OXYGEN SATURATION: 98 % | BODY MASS INDEX: 33.73 KG/M2

## 2024-07-30 DIAGNOSIS — G47.33 OBSTRUCTIVE SLEEP APNEA SYNDROME: ICD-10-CM

## 2024-07-30 DIAGNOSIS — N18.32 STAGE 3B CHRONIC KIDNEY DISEASE (HCC): ICD-10-CM

## 2024-07-30 DIAGNOSIS — D64.9 ACUTE ANEMIA: ICD-10-CM

## 2024-07-30 DIAGNOSIS — R53.82 CHRONIC FATIGUE: ICD-10-CM

## 2024-07-30 DIAGNOSIS — K92.1 GASTROINTESTINAL HEMORRHAGE WITH MELENA: ICD-10-CM

## 2024-07-30 DIAGNOSIS — I65.23 BILATERAL CAROTID ARTERY STENOSIS: ICD-10-CM

## 2024-07-30 DIAGNOSIS — Z09 HOSPITAL DISCHARGE FOLLOW-UP: Primary | ICD-10-CM

## 2024-07-30 DIAGNOSIS — I48.0 PAROXYSMAL ATRIAL FIBRILLATION (HCC): ICD-10-CM

## 2024-07-30 LAB
ALBUMIN SERPL-MCNC: 4.5 G/DL (ref 3.5–5.2)
ALBUMIN/GLOB SERPL: 2 {RATIO} (ref 1–2.5)
ALP SERPL-CCNC: 61 U/L (ref 40–129)
ALT SERPL-CCNC: 12 U/L (ref 10–50)
ANION GAP SERPL CALCULATED.3IONS-SCNC: 11 MMOL/L (ref 9–16)
AST SERPL-CCNC: 18 U/L (ref 10–50)
BASOPHILS # BLD: <0.03 K/UL (ref 0–0.2)
BASOPHILS NFR BLD: 1 % (ref 0–2)
BILIRUB SERPL-MCNC: 0.4 MG/DL (ref 0–1.2)
BUN SERPL-MCNC: 33 MG/DL (ref 8–23)
CALCIUM SERPL-MCNC: 9 MG/DL (ref 8.6–10.4)
CHLORIDE SERPL-SCNC: 105 MMOL/L (ref 98–107)
CO2 SERPL-SCNC: 25 MMOL/L (ref 20–31)
CREAT SERPL-MCNC: 2.4 MG/DL (ref 0.7–1.2)
EOSINOPHIL # BLD: 0.18 K/UL (ref 0–0.44)
EOSINOPHILS RELATIVE PERCENT: 5 % (ref 1–4)
ERYTHROCYTE [DISTWIDTH] IN BLOOD BY AUTOMATED COUNT: 16 % (ref 11.8–14.4)
GFR, ESTIMATED: 31 ML/MIN/1.73M2
GLUCOSE SERPL-MCNC: 93 MG/DL (ref 74–99)
HCT VFR BLD AUTO: 27.9 % (ref 40.7–50.3)
HGB BLD-MCNC: 8 G/DL (ref 13–17)
IMM GRANULOCYTES # BLD AUTO: <0.03 K/UL (ref 0–0.3)
IMM GRANULOCYTES NFR BLD: 0 %
LYMPHOCYTES NFR BLD: 0.97 K/UL (ref 1.1–3.7)
LYMPHOCYTES RELATIVE PERCENT: 25 % (ref 24–43)
MCH RBC QN AUTO: 22.5 PG (ref 25.2–33.5)
MCHC RBC AUTO-ENTMCNC: 28.7 G/DL (ref 28.4–34.8)
MCV RBC AUTO: 78.4 FL (ref 82.6–102.9)
MONOCYTES NFR BLD: 0.35 K/UL (ref 0.1–1.2)
MONOCYTES NFR BLD: 9 % (ref 3–12)
NEUTROPHILS NFR BLD: 60 % (ref 36–65)
NEUTS SEG NFR BLD: 2.39 K/UL (ref 1.5–8.1)
NRBC BLD-RTO: 0 PER 100 WBC
PLATELET # BLD AUTO: 170 K/UL (ref 138–453)
PMV BLD AUTO: 11.6 FL (ref 8.1–13.5)
POTASSIUM SERPL-SCNC: 4.2 MMOL/L (ref 3.7–5.3)
PROT SERPL-MCNC: 7 G/DL (ref 6.6–8.7)
RBC # BLD AUTO: 3.56 M/UL (ref 4.21–5.77)
RBC # BLD: ABNORMAL 10*6/UL
SODIUM SERPL-SCNC: 141 MMOL/L (ref 136–145)
WBC OTHER # BLD: 3.9 K/UL (ref 3.5–11.3)

## 2024-07-30 PROCEDURE — G8417 CALC BMI ABV UP PARAM F/U: HCPCS

## 2024-07-30 PROCEDURE — 3078F DIAST BP <80 MM HG: CPT

## 2024-07-30 PROCEDURE — 1036F TOBACCO NON-USER: CPT

## 2024-07-30 PROCEDURE — 99214 OFFICE O/P EST MOD 30 MIN: CPT

## 2024-07-30 PROCEDURE — G8427 DOCREV CUR MEDS BY ELIG CLIN: HCPCS

## 2024-07-30 PROCEDURE — 3017F COLORECTAL CA SCREEN DOC REV: CPT

## 2024-07-30 PROCEDURE — 3074F SYST BP LT 130 MM HG: CPT

## 2024-07-30 PROCEDURE — 1111F DSCHRG MED/CURRENT MED MERGE: CPT

## 2024-07-30 ASSESSMENT — PATIENT HEALTH QUESTIONNAIRE - PHQ9
SUM OF ALL RESPONSES TO PHQ QUESTIONS 1-9: 0
1. LITTLE INTEREST OR PLEASURE IN DOING THINGS: NOT AT ALL
2. FEELING DOWN, DEPRESSED OR HOPELESS: NOT AT ALL
SUM OF ALL RESPONSES TO PHQ9 QUESTIONS 1 & 2: 0
SUM OF ALL RESPONSES TO PHQ QUESTIONS 1-9: 0

## 2024-08-04 DIAGNOSIS — K22.70 BARRETT'S ESOPHAGUS WITHOUT DYSPLASIA: ICD-10-CM

## 2024-08-05 RX ORDER — PANTOPRAZOLE SODIUM 40 MG/1
40 TABLET, DELAYED RELEASE ORAL
Qty: 30 TABLET | Refills: 1 | Status: SHIPPED | OUTPATIENT
Start: 2024-08-05

## 2024-08-05 NOTE — TELEPHONE ENCOUNTER
Tejas Machado is calling to request a refill on the following medication(s):    Medication Request:  Requested Prescriptions     Pending Prescriptions Disp Refills    pantoprazole (PROTONIX) 40 MG tablet [Pharmacy Med Name: PANTOPRAZOLE SOD DR 40 MG TAB] 30 tablet 1     Sig: take 1 tablet by mouth EVERY MORNING BEFORE BREAKFAST       Last Visit Date (If Applicable):  7/30/2024    Next Visit Date:    8/12/2024

## 2024-08-28 ENCOUNTER — TELEPHONE (OUTPATIENT)
Dept: FAMILY MEDICINE CLINIC | Age: 59
End: 2024-08-28

## 2024-08-28 ENCOUNTER — HOSPITAL ENCOUNTER (INPATIENT)
Age: 59
LOS: 2 days | Discharge: HOME | End: 2024-08-31
Attending: EMERGENCY MEDICINE
Payer: COMMERCIAL

## 2024-08-28 ENCOUNTER — APPOINTMENT (OUTPATIENT)
Dept: GENERAL RADIOLOGY | Age: 59
End: 2024-08-28
Payer: COMMERCIAL

## 2024-08-28 DIAGNOSIS — U07.1 COVID-19: Primary | ICD-10-CM

## 2024-08-28 DIAGNOSIS — R11.2 NAUSEA AND VOMITING, UNSPECIFIED VOMITING TYPE: ICD-10-CM

## 2024-08-28 DIAGNOSIS — D64.9 ANEMIA, UNSPECIFIED TYPE: ICD-10-CM

## 2024-08-28 LAB
ALBUMIN SERPL-MCNC: 4.1 G/DL (ref 3.5–5.2)
ALBUMIN/GLOB SERPL: 1 {RATIO} (ref 1–2.5)
ALP SERPL-CCNC: 54 U/L (ref 40–129)
ALT SERPL-CCNC: 8 U/L (ref 10–50)
ANION GAP SERPL CALCULATED.3IONS-SCNC: 13 MMOL/L (ref 9–16)
AST SERPL-CCNC: 18 U/L (ref 10–50)
BILIRUB SERPL-MCNC: 1 MG/DL (ref 0–1.2)
BUN SERPL-MCNC: 24 MG/DL (ref 8–23)
CALCIUM SERPL-MCNC: 8.4 MG/DL (ref 8.6–10.4)
CHLORIDE SERPL-SCNC: 104 MMOL/L (ref 98–107)
CO2 SERPL-SCNC: 21 MMOL/L (ref 20–31)
CREAT SERPL-MCNC: 2.6 MG/DL (ref 0.7–1.2)
GFR, ESTIMATED: 28 ML/MIN/1.73M2
GLUCOSE SERPL-MCNC: 111 MG/DL (ref 74–99)
MAGNESIUM SERPL-MCNC: 2 MG/DL (ref 1.6–2.4)
POTASSIUM SERPL-SCNC: 4 MMOL/L (ref 3.7–5.3)
PROT SERPL-MCNC: 7 G/DL (ref 6.6–8.7)
SODIUM SERPL-SCNC: 138 MMOL/L (ref 136–145)
TROPONIN I SERPL HS-MCNC: 28 NG/L (ref 0–22)

## 2024-08-28 PROCEDURE — 99285 EMERGENCY DEPT VISIT HI MDM: CPT

## 2024-08-28 PROCEDURE — 6360000002 HC RX W HCPCS

## 2024-08-28 PROCEDURE — 84484 ASSAY OF TROPONIN QUANT: CPT

## 2024-08-28 PROCEDURE — 96361 HYDRATE IV INFUSION ADD-ON: CPT

## 2024-08-28 PROCEDURE — 86923 COMPATIBILITY TEST ELECTRIC: CPT

## 2024-08-28 PROCEDURE — 86850 RBC ANTIBODY SCREEN: CPT

## 2024-08-28 PROCEDURE — 85025 COMPLETE CBC W/AUTO DIFF WBC: CPT

## 2024-08-28 PROCEDURE — 96374 THER/PROPH/DIAG INJ IV PUSH: CPT

## 2024-08-28 PROCEDURE — 71046 X-RAY EXAM CHEST 2 VIEWS: CPT

## 2024-08-28 PROCEDURE — 86901 BLOOD TYPING SEROLOGIC RH(D): CPT

## 2024-08-28 PROCEDURE — 83735 ASSAY OF MAGNESIUM: CPT

## 2024-08-28 PROCEDURE — 86900 BLOOD TYPING SEROLOGIC ABO: CPT

## 2024-08-28 PROCEDURE — 30233N1 TRANSFUSION OF NONAUTOLOGOUS RED BLOOD CELLS INTO PERIPHERAL VEIN, PERCUTANEOUS APPROACH: ICD-10-PCS

## 2024-08-28 PROCEDURE — 2580000003 HC RX 258

## 2024-08-28 PROCEDURE — 93005 ELECTROCARDIOGRAM TRACING: CPT

## 2024-08-28 PROCEDURE — 80053 COMPREHEN METABOLIC PANEL: CPT

## 2024-08-28 RX ORDER — 0.9 % SODIUM CHLORIDE 0.9 %
250 INTRAVENOUS SOLUTION INTRAVENOUS ONCE
Status: COMPLETED | OUTPATIENT
Start: 2024-08-28 | End: 2024-08-29

## 2024-08-28 RX ORDER — SODIUM CHLORIDE 9 MG/ML
INJECTION, SOLUTION INTRAVENOUS PRN
Status: DISCONTINUED | OUTPATIENT
Start: 2024-08-28 | End: 2024-08-31

## 2024-08-28 RX ORDER — ONDANSETRON 2 MG/ML
4 INJECTION INTRAMUSCULAR; INTRAVENOUS ONCE
Status: COMPLETED | OUTPATIENT
Start: 2024-08-28 | End: 2024-08-28

## 2024-08-28 RX ADMIN — ONDANSETRON 4 MG: 2 INJECTION INTRAMUSCULAR; INTRAVENOUS at 22:28

## 2024-08-28 RX ADMIN — SODIUM CHLORIDE 250 ML: 9 INJECTION, SOLUTION INTRAVENOUS at 22:30

## 2024-08-28 ASSESSMENT — PAIN SCALES - GENERAL: PAINLEVEL_OUTOF10: 5

## 2024-08-28 ASSESSMENT — ENCOUNTER SYMPTOMS
SORE THROAT: 1
CHEST TIGHTNESS: 0
COUGH: 1
SHORTNESS OF BREATH: 0
RHINORRHEA: 1
WHEEZING: 0
EYES NEGATIVE: 1
GASTROINTESTINAL NEGATIVE: 1

## 2024-08-28 ASSESSMENT — PAIN - FUNCTIONAL ASSESSMENT: PAIN_FUNCTIONAL_ASSESSMENT: 0-10

## 2024-08-28 NOTE — TELEPHONE ENCOUNTER
Patient cx his appointment for today he checked him self and is positive for covid    Is asking for paxlovid to be called into the pharmacy?    Please advise

## 2024-08-28 NOTE — TELEPHONE ENCOUNTER
Paxlovid interacts with multiple of his medications that he cannot hold for 5 days so it is not recommended. He should rest, take Mucinex and Tylenol, stay hydrated, monitor sx. If sx worsen or don't improve I recommend evaluation or ER.

## 2024-08-29 ENCOUNTER — TELEPHONE (OUTPATIENT)
Dept: FAMILY MEDICINE CLINIC | Age: 59
End: 2024-08-29

## 2024-08-29 PROBLEM — D64.9 SYMPTOMATIC ANEMIA: Status: ACTIVE | Noted: 2024-08-29

## 2024-08-29 PROBLEM — R11.2 NAUSEA AND VOMITING: Status: ACTIVE | Noted: 2024-08-29

## 2024-08-29 PROBLEM — R71.0 DROP IN HEMOGLOBIN: Status: ACTIVE | Noted: 2024-08-29

## 2024-08-29 PROBLEM — N18.9 ACUTE KIDNEY INJURY SUPERIMPOSED ON CKD (HCC): Status: ACTIVE | Noted: 2024-08-29

## 2024-08-29 PROBLEM — N17.9 ACUTE KIDNEY INJURY SUPERIMPOSED ON CKD (HCC): Status: ACTIVE | Noted: 2024-08-29

## 2024-08-29 PROBLEM — Z87.19 HISTORY OF GI BLEED: Status: ACTIVE | Noted: 2024-08-29

## 2024-08-29 PROBLEM — N18.4 CKD (CHRONIC KIDNEY DISEASE) STAGE 4, GFR 15-29 ML/MIN (HCC): Status: ACTIVE | Noted: 2024-08-29

## 2024-08-29 PROBLEM — U07.1 COVID-19: Status: ACTIVE | Noted: 2024-08-29

## 2024-08-29 LAB
25(OH)D3 SERPL-MCNC: 34.4 NG/ML (ref 30–100)
ALBUMIN SERPL-MCNC: 4 G/DL (ref 3.5–5.2)
ALBUMIN/GLOB SERPL: 2 {RATIO} (ref 1–2.5)
ALP SERPL-CCNC: 49 U/L (ref 40–129)
ALT SERPL-CCNC: 8 U/L (ref 10–50)
ANION GAP SERPL CALCULATED.3IONS-SCNC: 9 MMOL/L (ref 9–16)
AST SERPL-CCNC: 16 U/L (ref 10–50)
BACTERIA URNS QL MICRO: NORMAL
BASOPHILS # BLD: 0.05 K/UL (ref 0–0.2)
BASOPHILS NFR BLD: 1 % (ref 0–2)
BILIRUB SERPL-MCNC: 1.1 MG/DL (ref 0–1.2)
BILIRUB UR QL STRIP: NEGATIVE
BUN SERPL-MCNC: 25 MG/DL (ref 8–23)
CALCIUM SERPL-MCNC: 8.3 MG/DL (ref 8.6–10.4)
CASTS #/AREA URNS LPF: NORMAL /LPF (ref 0–8)
CHLORIDE SERPL-SCNC: 107 MMOL/L (ref 98–107)
CLARITY UR: CLEAR
CO2 SERPL-SCNC: 23 MMOL/L (ref 20–31)
COLOR UR: YELLOW
CREAT SERPL-MCNC: 2.6 MG/DL (ref 0.7–1.2)
CREAT UR-MCNC: 273 MG/DL (ref 39–259)
CRP SERPL HS-MCNC: 158 MG/L (ref 0–5)
EOSINOPHIL # BLD: 0 K/UL (ref 0–0.44)
EOSINOPHILS RELATIVE PERCENT: 0 % (ref 1–4)
EPI CELLS #/AREA URNS HPF: NORMAL /HPF (ref 0–5)
ERYTHROCYTE [DISTWIDTH] IN BLOOD BY AUTOMATED COUNT: 17 % (ref 11.8–14.4)
GFR, ESTIMATED: 28 ML/MIN/1.73M2
GLUCOSE SERPL-MCNC: 100 MG/DL (ref 74–99)
GLUCOSE UR STRIP-MCNC: NEGATIVE MG/DL
HCT VFR BLD AUTO: 23.7 % (ref 40.7–50.3)
HCT VFR BLD AUTO: 24.6 % (ref 40.7–50.3)
HCT VFR BLD AUTO: 25.6 % (ref 40.7–50.3)
HCT VFR BLD AUTO: 26.9 % (ref 40.7–50.3)
HGB BLD-MCNC: 6.4 G/DL (ref 13–17)
HGB BLD-MCNC: 6.6 G/DL (ref 13–17)
HGB BLD-MCNC: 7 G/DL (ref 13–17)
HGB BLD-MCNC: 7.1 G/DL (ref 13–17)
HGB UR QL STRIP.AUTO: NEGATIVE
IMM GRANULOCYTES # BLD AUTO: 0.05 K/UL (ref 0–0.3)
IMM GRANULOCYTES NFR BLD: 1 %
KETONES UR STRIP-MCNC: NEGATIVE MG/DL
LEUKOCYTE ESTERASE UR QL STRIP: NEGATIVE
LYMPHOCYTES NFR BLD: 0.42 K/UL (ref 1.1–3.7)
LYMPHOCYTES RELATIVE PERCENT: 8 % (ref 24–43)
MCH RBC QN AUTO: 19.9 PG (ref 25.2–33.5)
MCHC RBC AUTO-ENTMCNC: 27 G/DL (ref 28.4–34.8)
MCV RBC AUTO: 73.6 FL (ref 82.6–102.9)
MONOCYTES NFR BLD: 0.52 K/UL (ref 0.1–1.2)
MONOCYTES NFR BLD: 10 % (ref 3–12)
MORPHOLOGY: ABNORMAL
NEUTROPHILS NFR BLD: 80 % (ref 36–65)
NEUTS SEG NFR BLD: 4.16 K/UL (ref 1.5–8.1)
NITRITE UR QL STRIP: NEGATIVE
NRBC BLD-RTO: 0 PER 100 WBC
PH UR STRIP: 5.5 [PH] (ref 5–8)
PLATELET # BLD AUTO: 121 K/UL (ref 138–453)
PMV BLD AUTO: 10.9 FL (ref 8.1–13.5)
POTASSIUM SERPL-SCNC: 4.3 MMOL/L (ref 3.7–5.3)
PROT SERPL-MCNC: 6.3 G/DL (ref 6.6–8.7)
PROT UR STRIP-MCNC: ABNORMAL MG/DL
RBC # BLD AUTO: 3.22 M/UL (ref 4.21–5.77)
RBC #/AREA URNS HPF: NORMAL /HPF (ref 0–4)
SARS-COV-2 RDRP RESP QL NAA+PROBE: DETECTED
SODIUM SERPL-SCNC: 139 MMOL/L (ref 136–145)
SODIUM UR-SCNC: 54 MMOL/L
SP GR UR STRIP: 1.02 (ref 1–1.03)
SPECIMEN DESCRIPTION: ABNORMAL
TROPONIN I SERPL HS-MCNC: 24 NG/L (ref 0–22)
UROBILINOGEN UR STRIP-ACNC: NORMAL EU/DL (ref 0–1)
WBC #/AREA URNS HPF: NORMAL /HPF (ref 0–5)
WBC OTHER # BLD: 5.2 K/UL (ref 3.5–11.3)

## 2024-08-29 PROCEDURE — 36430 TRANSFUSION BLD/BLD COMPNT: CPT

## 2024-08-29 PROCEDURE — 85014 HEMATOCRIT: CPT

## 2024-08-29 PROCEDURE — 82306 VITAMIN D 25 HYDROXY: CPT

## 2024-08-29 PROCEDURE — 87635 SARS-COV-2 COVID-19 AMP PRB: CPT

## 2024-08-29 PROCEDURE — 2580000003 HC RX 258: Performed by: INTERNAL MEDICINE

## 2024-08-29 PROCEDURE — 36415 COLL VENOUS BLD VENIPUNCTURE: CPT

## 2024-08-29 PROCEDURE — 99222 1ST HOSP IP/OBS MODERATE 55: CPT | Performed by: INTERNAL MEDICINE

## 2024-08-29 PROCEDURE — 1200000000 HC SEMI PRIVATE

## 2024-08-29 PROCEDURE — 99223 1ST HOSP IP/OBS HIGH 75: CPT | Performed by: STUDENT IN AN ORGANIZED HEALTH CARE EDUCATION/TRAINING PROGRAM

## 2024-08-29 PROCEDURE — 2580000003 HC RX 258: Performed by: NURSE PRACTITIONER

## 2024-08-29 PROCEDURE — 81001 URINALYSIS AUTO W/SCOPE: CPT

## 2024-08-29 PROCEDURE — 96361 HYDRATE IV INFUSION ADD-ON: CPT

## 2024-08-29 PROCEDURE — 86140 C-REACTIVE PROTEIN: CPT

## 2024-08-29 PROCEDURE — 6370000000 HC RX 637 (ALT 250 FOR IP): Performed by: NURSE PRACTITIONER

## 2024-08-29 PROCEDURE — 85018 HEMOGLOBIN: CPT

## 2024-08-29 PROCEDURE — 84484 ASSAY OF TROPONIN QUANT: CPT

## 2024-08-29 PROCEDURE — XW033E5 INTRODUCTION OF REMDESIVIR ANTI-INFECTIVE INTO PERIPHERAL VEIN, PERCUTANEOUS APPROACH, NEW TECHNOLOGY GROUP 5: ICD-10-PCS | Performed by: INTERNAL MEDICINE

## 2024-08-29 PROCEDURE — 6360000002 HC RX W HCPCS: Performed by: STUDENT IN AN ORGANIZED HEALTH CARE EDUCATION/TRAINING PROGRAM

## 2024-08-29 PROCEDURE — 82570 ASSAY OF URINE CREATININE: CPT

## 2024-08-29 PROCEDURE — P9016 RBC LEUKOCYTES REDUCED: HCPCS

## 2024-08-29 PROCEDURE — 80053 COMPREHEN METABOLIC PANEL: CPT

## 2024-08-29 PROCEDURE — 2580000003 HC RX 258: Performed by: STUDENT IN AN ORGANIZED HEALTH CARE EDUCATION/TRAINING PROGRAM

## 2024-08-29 PROCEDURE — 84300 ASSAY OF URINE SODIUM: CPT

## 2024-08-29 PROCEDURE — 6360000002 HC RX W HCPCS: Performed by: INTERNAL MEDICINE

## 2024-08-29 PROCEDURE — 6370000000 HC RX 637 (ALT 250 FOR IP): Performed by: STUDENT IN AN ORGANIZED HEALTH CARE EDUCATION/TRAINING PROGRAM

## 2024-08-29 RX ORDER — ENOXAPARIN SODIUM 100 MG/ML
40 INJECTION SUBCUTANEOUS DAILY
Status: CANCELLED | OUTPATIENT
Start: 2024-08-29

## 2024-08-29 RX ORDER — GUAIFENESIN/DEXTROMETHORPHAN 100-10MG/5
5 SYRUP ORAL EVERY 4 HOURS PRN
Status: DISCONTINUED | OUTPATIENT
Start: 2024-08-29 | End: 2024-08-31 | Stop reason: HOSPADM

## 2024-08-29 RX ORDER — ALBUTEROL SULFATE 90 UG/1
2 AEROSOL, METERED RESPIRATORY (INHALATION) EVERY 6 HOURS PRN
Status: DISCONTINUED | OUTPATIENT
Start: 2024-08-29 | End: 2024-08-31 | Stop reason: HOSPADM

## 2024-08-29 RX ORDER — SODIUM CHLORIDE 9 MG/ML
INJECTION, SOLUTION INTRAVENOUS CONTINUOUS
Status: ACTIVE | OUTPATIENT
Start: 2024-08-29 | End: 2024-08-31

## 2024-08-29 RX ORDER — ONDANSETRON 4 MG/1
4 TABLET, ORALLY DISINTEGRATING ORAL EVERY 8 HOURS PRN
Status: DISCONTINUED | OUTPATIENT
Start: 2024-08-29 | End: 2024-08-31 | Stop reason: HOSPADM

## 2024-08-29 RX ORDER — SODIUM CHLORIDE 9 MG/ML
INJECTION, SOLUTION INTRAVENOUS PRN
Status: DISCONTINUED | OUTPATIENT
Start: 2024-08-29 | End: 2024-08-31

## 2024-08-29 RX ORDER — SODIUM CHLORIDE 9 MG/ML
INJECTION, SOLUTION INTRAVENOUS PRN
Status: DISCONTINUED | OUTPATIENT
Start: 2024-08-29 | End: 2024-08-31 | Stop reason: HOSPADM

## 2024-08-29 RX ORDER — ONDANSETRON 2 MG/ML
4 INJECTION INTRAMUSCULAR; INTRAVENOUS EVERY 6 HOURS PRN
Status: DISCONTINUED | OUTPATIENT
Start: 2024-08-29 | End: 2024-08-31 | Stop reason: HOSPADM

## 2024-08-29 RX ORDER — CARVEDILOL 12.5 MG/1
12.5 TABLET ORAL 2 TIMES DAILY WITH MEALS
Status: DISCONTINUED | OUTPATIENT
Start: 2024-08-29 | End: 2024-08-31 | Stop reason: HOSPADM

## 2024-08-29 RX ORDER — ACETAMINOPHEN 325 MG/1
650 TABLET ORAL EVERY 6 HOURS PRN
Status: DISCONTINUED | OUTPATIENT
Start: 2024-08-29 | End: 2024-08-31 | Stop reason: HOSPADM

## 2024-08-29 RX ORDER — GUAIFENESIN 600 MG/1
600 TABLET, EXTENDED RELEASE ORAL 2 TIMES DAILY
Status: DISCONTINUED | OUTPATIENT
Start: 2024-08-29 | End: 2024-08-31 | Stop reason: HOSPADM

## 2024-08-29 RX ORDER — SODIUM CHLORIDE 0.9 % (FLUSH) 0.9 %
5-40 SYRINGE (ML) INJECTION EVERY 12 HOURS SCHEDULED
Status: DISCONTINUED | OUTPATIENT
Start: 2024-08-29 | End: 2024-08-31 | Stop reason: HOSPADM

## 2024-08-29 RX ORDER — ATORVASTATIN CALCIUM 40 MG/1
40 TABLET, FILM COATED ORAL DAILY
Status: DISCONTINUED | OUTPATIENT
Start: 2024-08-29 | End: 2024-08-31 | Stop reason: HOSPADM

## 2024-08-29 RX ORDER — 0.9 % SODIUM CHLORIDE 0.9 %
30 INTRAVENOUS SOLUTION INTRAVENOUS PRN
Status: DISCONTINUED | OUTPATIENT
Start: 2024-08-29 | End: 2024-08-31 | Stop reason: HOSPADM

## 2024-08-29 RX ORDER — SODIUM CHLORIDE 0.9 % (FLUSH) 0.9 %
5-40 SYRINGE (ML) INJECTION PRN
Status: DISCONTINUED | OUTPATIENT
Start: 2024-08-29 | End: 2024-08-31 | Stop reason: HOSPADM

## 2024-08-29 RX ORDER — ACETAMINOPHEN 650 MG/1
650 SUPPOSITORY RECTAL EVERY 6 HOURS PRN
Status: DISCONTINUED | OUTPATIENT
Start: 2024-08-29 | End: 2024-08-31 | Stop reason: HOSPADM

## 2024-08-29 RX ORDER — POLYETHYLENE GLYCOL 3350 17 G/17G
17 POWDER, FOR SOLUTION ORAL DAILY PRN
Status: DISCONTINUED | OUTPATIENT
Start: 2024-08-29 | End: 2024-08-31 | Stop reason: HOSPADM

## 2024-08-29 RX ADMIN — GUAIFENESIN 600 MG: 600 TABLET, EXTENDED RELEASE ORAL at 08:58

## 2024-08-29 RX ADMIN — PANTOPRAZOLE SODIUM 8 MG/HR: 40 INJECTION, POWDER, FOR SOLUTION INTRAVENOUS at 20:45

## 2024-08-29 RX ADMIN — ATORVASTATIN CALCIUM 40 MG: 40 TABLET, FILM COATED ORAL at 08:58

## 2024-08-29 RX ADMIN — CARVEDILOL 12.5 MG: 12.5 TABLET, FILM COATED ORAL at 18:32

## 2024-08-29 RX ADMIN — SODIUM CHLORIDE: 9 INJECTION, SOLUTION INTRAVENOUS at 01:19

## 2024-08-29 RX ADMIN — REMDESIVIR 200 MG: 100 INJECTION, POWDER, LYOPHILIZED, FOR SOLUTION INTRAVENOUS at 20:11

## 2024-08-29 RX ADMIN — CARVEDILOL 12.5 MG: 12.5 TABLET, FILM COATED ORAL at 08:58

## 2024-08-29 RX ADMIN — PANTOPRAZOLE SODIUM 8 MG/HR: 40 INJECTION, POWDER, FOR SOLUTION INTRAVENOUS at 02:44

## 2024-08-29 RX ADMIN — GUAIFENESIN 600 MG: 600 TABLET, EXTENDED RELEASE ORAL at 20:10

## 2024-08-29 RX ADMIN — PANTOPRAZOLE SODIUM 80 MG: 40 INJECTION, POWDER, FOR SOLUTION INTRAVENOUS at 02:40

## 2024-08-29 ASSESSMENT — LIFESTYLE VARIABLES
HOW OFTEN DO YOU HAVE A DRINK CONTAINING ALCOHOL: NEVER
HOW MANY STANDARD DRINKS CONTAINING ALCOHOL DO YOU HAVE ON A TYPICAL DAY: PATIENT DOES NOT DRINK

## 2024-08-29 ASSESSMENT — PAIN SCALES - GENERAL
PAINLEVEL_OUTOF10: 0
PAINLEVEL_OUTOF10: 0

## 2024-08-29 NOTE — ED NOTES
Pt came into the ed via triage due to having a cough and stated he tested positive for covid   Pt stated he has nausea and vomiting also   Pt has no other C/O   Pt is Aox4 and ambulatory   RR are equal and regular

## 2024-08-29 NOTE — ED NOTES
The following labs were labeled with appropriate pt sticker and tubed to lab:     [] Blue     [] Lavender   [] on ice  [] Green/yellow  [] Green/black [] on ice  [] Grey  [] on ice  [] Yellow  [] Red  [x] Pink  [] Type/ Screen  [] ABG  [] VBG    [] COVID-19 swab    [] Rapid  [] PCR  [] Flu swab  [] Peds Viral Panel     [] Urine Sample  [] Fecal Sample  [] Pelvic Cultures  [] Blood Cultures  [] X 2  [] STREP Cultures  [] Wound Cultures

## 2024-08-29 NOTE — TELEPHONE ENCOUNTER
Patient's wife paged the on call service as Tejas has COVID 19 and his breathing is getting worse - advised going to ED immediately

## 2024-08-29 NOTE — CONSENT
Informed Consent for Blood Component Transfusion Note    I have discussed with the patient the rationale for blood component transfusion; its benefits in treating or preventing fatigue, organ damage, or death; and its risk which includes mild transfusion reactions, rare risk of blood borne infection, or more serious but rare reactions. I have discussed the alternatives to transfusion, including the risk and consequences of not receiving transfusion. The patient had an opportunity to ask questions and had agreed to proceed with transfusion of blood components.    Electronically signed by Denton Fowler MD on 8/28/24 at 11:31 PM EDT

## 2024-08-29 NOTE — ED NOTES
ED to inpatient nurses report      Chief Complaint:  Chief Complaint   Patient presents with    Cough    Emesis     Present to ED from: Home    MOA:     LOC: alert and orientated to name, place, date  Mobility: Independent  Oxygen Baseline: RA    Current needs required: none    Pending ED orders: none  Present condition: stable     Why did the patient come to the ED? v  What is the plan? Admit   Any procedures or intervention occur? Labs ct   Any safety concerns??    Mental Status:  Level of Consciousness: Alert (0)    Psych Assessment:   Psychosocial  Psychosocial (WDL): Within Defined Limits  Vital signs   Vitals:    08/28/24 2217 08/28/24 2218   BP:  (!) 154/85   Pulse: 83    Resp: 19    Temp: 99.5 °F (37.5 °C)    TempSrc: Oral    SpO2: 95%         Vitals:  Patient Vitals for the past 24 hrs:   BP Temp Temp src Pulse Resp SpO2   08/28/24 2218 (!) 154/85 -- -- -- -- --   08/28/24 2217 -- 99.5 °F (37.5 °C) Oral 83 19 95 %      Visit Vitals  BP (!) 154/85   Pulse 83   Temp 99.5 °F (37.5 °C) (Oral)   Resp 19   SpO2 95%        LDAs:   Peripheral IV 08/28/24 Left;Proximal Forearm (Active)   Site Assessment Clean, dry & intact 08/28/24 2227   Line Status Blood return noted;Flushed;Normal saline locked;Specimen collected 08/28/24 2227   Phlebitis Assessment No symptoms 08/28/24 2227   Infiltration Assessment 0 08/28/24 2227   Dressing Status New dressing applied 08/28/24 2227   Dressing Type Transparent 08/28/24 2227   Dressing Intervention New 08/28/24 2227       Peripheral IV 08/29/24 Right Forearm (Active)   Site Assessment Clean, dry & intact 08/29/24 0003   Line Status Blood return noted;Flushed 08/29/24 0003       Ambulatory Status:  Presents to emergency department  because of falls (Syncope, seizure, or loss of consciousness): No, Age > 70: No, Altered Mental Status, Intoxication with alcohol or substance confusion (Disorientation, impaired judgment, poor safety awaremess, or inability to follow instructions):  baseline creatinine 1.5-1.9    Enlarged heart     Hypertensive urgency 10/14/2023       Labs:  Labs Reviewed   CBC WITH AUTO DIFFERENTIAL - Abnormal; Notable for the following components:       Result Value    RBC 3.22 (*)     Hemoglobin 6.4 (*)     Hematocrit 23.7 (*)     MCV 73.6 (*)     MCH 19.9 (*)     MCHC 27.0 (*)     RDW 17.0 (*)     Platelets 121 (*)     All other components within normal limits   COMPREHENSIVE METABOLIC PANEL - Abnormal; Notable for the following components:    Glucose 111 (*)     BUN 24 (*)     Creatinine 2.6 (*)     Est, Glom Filt Rate 28 (*)     Calcium 8.4 (*)     ALT 8 (*)     All other components within normal limits   TROPONIN - Abnormal; Notable for the following components:    Troponin, High Sensitivity 28 (*)     All other components within normal limits   MAGNESIUM   URINALYSIS   TROPONIN   PREPARE RBC (CROSSMATCH)   TYPE AND SCREEN       Electronically signed by Roxy Lake RN on 8/29/2024 at 12:17 AM

## 2024-08-29 NOTE — ED PROVIDER NOTES
[facility] ED  Emergency Department Encounter  Emergency Medicine Resident     Pt Name:Tejas Machado  MRN: [id number]  Birthdate 1965  Date of evaluation: 8/28/24  PCP:  Chelly Hess APRN - CNP  Note Started: 10:26 PM EDT      CHIEF COMPLAINT       Chief Complaint   Patient presents with    Cough    Emesis       HISTORY OF PRESENT ILLNESS  (Location/Symptom, Timing/Onset, Context/Setting, Quality, Duration, Modifying Factors, Severity.)      Tejas Machado is a 58 y.o. male who presents with cough, nausea and vomiting by 3 days.  Patient tested positive for COVID today.  Patient is known to have atrial fibrillation on Eliquis, hypertension, chronic kidney disease and CHF.  Patient has not been able to take his medications by 24 hours because of vomiting and decreased appetite.  Patient complains of nonproductive cough with associated centralized chest pain. Patient says chest pain is present only on coughing.  Patient does not complain of shortness of breath, difficulty breathing, patient able to speak in full sentences.  Patient denies any fever abdominal pain diarrhea urinary symptoms.  Patient denies any calf tenderness hemoptysis.  Patient unable to tolerate orally. Patient has sick contact but no recent travel. Patient was fully vaccinated for COVID-19    PAST MEDICAL / SURGICAL / SOCIAL / FAMILY HISTORY      has a past medical history of Atrial fibrillation (HCC), Benign essential HTN, CKD (chronic kidney disease), stage III (HCC), Enlarged heart, and Hypertensive urgency.       has a past surgical history that includes Tonsillectomy; Upper gastrointestinal endoscopy (N/A, 04/14/2021); Colonoscopy (N/A, 04/14/2021); Esophagogastroduodenoscopy (N/A, 03/15/2023); Upper gastrointestinal endoscopy (03/15/2023); Upper gastrointestinal endoscopy (03/15/2023); Esophagogastroduodenoscopy (Right, 07/16/2024); Upper gastrointestinal endoscopy (N/A, 07/16/2024); Colonoscopy (N/A,  Systems   Constitutional:  Positive for appetite change and fatigue.   HENT:  Positive for congestion, rhinorrhea and sore throat.    Eyes: Negative.    Respiratory:  Positive for cough. Negative for chest tightness, shortness of breath and wheezing.    Cardiovascular: Negative.    Gastrointestinal: Negative.    Genitourinary: Negative.    Musculoskeletal: Negative.    Neurological: Negative.        PHYSICAL EXAM      INITIAL VITALS:   BP (!) 154/85   Pulse 83   Temp 99.5 °F (37.5 °C) (Oral)   Resp 19   SpO2 95%     Physical Exam  Constitutional:       Appearance: He is ill-appearing.   HENT:      Nose: Congestion and rhinorrhea present.      Mouth/Throat:      Mouth: Mucous membranes are moist.      Pharynx: No oropharyngeal exudate or posterior oropharyngeal erythema.   Eyes:      Conjunctiva/sclera: Conjunctivae normal.   Cardiovascular:      Rate and Rhythm: Normal rate.      Pulses: Normal pulses.      Heart sounds: Normal heart sounds.   Pulmonary:      Effort: Pulmonary effort is normal.      Breath sounds: No stridor. Examination of the left-middle field reveals decreased breath sounds. Examination of the left-lower field reveals decreased breath sounds. Decreased breath sounds present. No wheezing, rhonchi or rales.   Abdominal:      General: Abdomen is flat. Bowel sounds are normal.      Palpations: Abdomen is soft.   Musculoskeletal:         General: Normal range of motion.      Cervical back: Normal range of motion and neck supple.   Skin:     General: Skin is warm and dry.      Capillary Refill: Capillary refill takes more than 3 seconds.   Neurological:      General: No focal deficit present.      Mental Status: He is alert and oriented to person, place, and time. Mental status is at baseline.           DDX/DIAGNOSTIC RESULTS / EMERGENCY DEPARTMENT COURSE / MDM     Medical Decision Making  58-year-old male presents with cough and vomiting by 3 days.  Patient tested positive for COVID-19 today.  Of  note patient is known to have atrial fibrillation CAD CHF hypertension CKD.  Patient is unable to tolerate orally and has not had any medications for the last 24 hours.  On examination patient appears to be ill-appearing with no signs of respiratory distress dry mucous membranes cap refill more than 3 seconds.  On auscultation of the lungs there is decreased breath sounds to the left mid to lower lobe, no rhonchi or rales heard.    Clinical impression: COVID 19 with decreased hydration rule out pneumonia, rule out atypical chest pain given cardiac risk factors, low suspicion for PE or aortic dissection.  Patient will receive IV fluids of 250 mL boluses with reassessment, EKG chest x-ray, CBC CMP troponins x 2, magnesium calcium.  Will review patient with the above.  Disposition pending    Amount and/or Complexity of Data Reviewed  Labs: ordered.  Radiology: ordered.  ECG/medicine tests: ordered.    Risk  Prescription drug management.  Decision regarding hospitalization.        EKG      All EKG's are interpreted by the Emergency Department Physician who either signs or Co-signs this chart in the absence of a cardiologist.    EMERGENCY DEPARTMENT COURSE:  58-year-old male presents with cough and vomiting by 3 days.  He tested positive for COVID-19 today.  Patient has significant cardiac history will give symptomatic management as well as evaluation for atypical chest pain given cardiac risk factors.  Patient will be reviewed and decision would be made on disposition.    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  Patient received transfuse blood 2 units during that admission [LL]   2336 Last hemoglobin was 8 on 7/30/24.  Patient was

## 2024-08-29 NOTE — ED PROVIDER NOTES
[facility]     Emergency Department     Faculty Note/ Attestation      Pt Name: Tejas Machado                                       MRN: [id number]  Birthdate 1965  Date of evaluation: 8/28/2024    Patients PCP:    Chlely Hess APRN - CNP    Note Started: 10:18 PM EDT      Attestation  I performed a history and physical examination of the patient and discussed management with the resident. I reviewed the resident’s note and agree with the documented findings and plan of care. Any areas of disagreement are noted on the chart. I was personally present for the key portions of any procedures. I have documented in the chart those procedures where I was not present during the key portions. I have reviewed the emergency nurses triage note. I agree with the chief complaint, past medical history, past surgical history, allergies, medications, social and family history as documented unless otherwise noted below.    For Physician Assistant/ Nurse Practitioner cases/documentation I have personally evaluated this patient and have completed at least one if not all key elements of the E/M (history, physical exam, and MDM). Additional findings are as noted.      Initial Screens:        Eugenio Coma Scale  Eye Opening: Spontaneous  Best Verbal Response: Oriented  Best Motor Response: Obeys commands  Eugenio Coma Scale Score: 15    Vitals:  There were no vitals filed for this visit.    CHIEF COMPLAINT       Chief Complaint   Patient presents with    Cough    Emesis             DIAGNOSTIC RESULTS             RADIOLOGY:   No orders to display         LABS:  Labs Reviewed - No data to display      EMERGENCY DEPARTMENT COURSE:     -------------------------   ,  ,  ,        Comments    COVID pos 2 days ago  Hx of CAD, CHF, Afib  Dec appetite, cough, nausea  Afebrile  CP only with coughing  Has not taken eloquis today d/t vomiting    IVF, LS dim, will get basic labs, CXR, reassess    (Please note that  portions of this note were completed with a voice recognition program.  Efforts were made to edit the dictations but occasionally words are mis-transcribed.)      Francis Mc MD,, MD  Attending Emergency Physician

## 2024-08-29 NOTE — PLAN OF CARE
Problem: Chronic Conditions and Co-morbidities  Goal: Patient's chronic conditions and co-morbidity symptoms are monitored and maintained or improved  Outcome: Progressing  Flowsheets (Taken 8/29/2024 0800)  Care Plan - Patient's Chronic Conditions and Co-Morbidity Symptoms are Monitored and Maintained or Improved: Monitor and assess patient's chronic conditions and comorbid symptoms for stability, deterioration, or improvement     Problem: Discharge Planning  Goal: Discharge to home or other facility with appropriate resources  Outcome: Progressing  Flowsheets (Taken 8/29/2024 0800)  Discharge to home or other facility with appropriate resources: Identify barriers to discharge with patient and caregiver     Problem: Pain  Goal: Verbalizes/displays adequate comfort level or baseline comfort level  Outcome: Progressing

## 2024-08-29 NOTE — ED PROVIDER NOTES
Faculty Sign-Out Attestation  Handoff taken on the following patient from prior Attending Physician: Alexandro  Note Started: 11:15 PM EDT    I was available and discussed any additional care issues that arose and coordinated the management plans with the resident(s) caring for the patient during my duty period. Any areas of disagreement with resident’s documentation of care or procedures are noted on the chart. I was personally present for the key portions of any/all procedures during my duty period. I have documented in the chart those procedures where I was not present during the [place] portions.    Cough, weak, hgb 6.5,   On eliquis, needing type / cross, needing admit    Pato Callaway DO  Attending Physician       Pato Callaway DO  08/28/24 7301

## 2024-08-29 NOTE — CONSULTS
[facility]  Consultation Note     .  Chief Complaint:  Cough and emesis  Symptomatic anemia    Reason for consult:    History of GI bleeds  Presented with anemia  Hemoglobin 6.5 g/dL  Patient is on Eliquis    History of present illness: This is a 58 y.o. male with PMH including A-fib on Eliquis, HTN, CKD stage III, history of GI bleeds who presented to the ED due to cough and recent positive COVID testing.  Patient also complains of chest pain related to coughing.  Patient reports ongoing nausea, vomiting, lack of oral intake, fatigue, weakness.  Patient was also found to have GUZMAN.  Patient denies any overt GI bleeding-no melena, hematochezia or hematemesis.   Of note patient was recently admitted in July with anemia, with hemoglobin 6.8 then given 1 unit of PRBCs, had endoscopy that was unremarkable, followed by colonoscopy that identified AVM in the cecum which was ablated with APC.  Recommendations at that time were that if patient has ongoing anemia we would recommend a VCE as outpatient.  According to the chart the patient has not followed up in the office yet to be scheduled for OP VCE    Patient had rectal exam in the ED that was unremarkable  No abdominal imaging completed at this time  Chest x-ray shows stable enlargement of the cardiac silhouette without acute pulmonary process    Summary of current abnormal labs completed at this time:    Metabolic: BUN 24-25, creatinine 2.6,   Hematology: Hemoglobin last night was 6.4, no leukocytosis, MCV 73.6, platelet count 121  Coags: NA  Liver profile: WNL  Cardiac profile: , high-sensitivity troponin 28-24    Previous GI history:   7/18/2024 colonoscopy per Dr. 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

## 2024-08-29 NOTE — CARE COORDINATION
Case Management Assessment  Initial Evaluation    Date/Time of Evaluation: 8/29/2024 2:46 PM  Assessment Completed by: ABRIL NEWMAN RN    If patient is discharged prior to next notation, then this note serves as note for discharge by case management.    Patient Name: Tejas Machado                   YOB: 1965  Diagnosis: Symptomatic anemia [D64.9]  Anemia, unspecified type [D64.9]  Nausea and vomiting, unspecified vomiting type [R11.2]  COVID-19 [U07.1]                   Date / Time: 8/28/2024 10:04 PM    Patient Admission Status: Inpatient   Readmission Risk (Low < 19, Mod (19-27), High > 27): Readmission Risk Score: 23.9    Current PCP: Chelly Hess APRN - CNP  PCP verified by CM? Yes    Chart Reviewed: Yes      History Provided by: Patient  Patient Orientation: Alert and Oriented    Patient Cognition: Alert    Hospitalization in the last 30 days (Readmission):  No    If yes, Readmission Assessment in CM Navigator will be completed.    Advance Directives:      Code Status: Full Code   Patient's Primary Decision Maker is: Legal Next of Kin    Primary Decision Maker: Renata Machado - Spouse - [phone]    Discharge Planning:    Patient lives with: Spouse/Significant Other Type of Home: House  Primary Care Giver: Self  Patient Support Systems include: Spouse/Significant Other   Current Financial resources: Medicaid  Current community resources: None  Current services prior to admission: Durable Medical Equipment, None            Current DME:  (none)            Type of Home Care services:  None    ADLS  Prior functional level: Independent in ADLs/IADLs  Current functional level: Independent in ADLs/IADLs    PT AM-PAC:   /24  OT AM-PAC:   /24    Family can provide assistance at DC: Yes  Would you like Case Management to discuss the discharge plan with any other family members/significant others, and if so, who? No  Plans to Return to Present Housing: Yes  Other Identified Issues/Barriers to  RETURNING to current housing: none  Potential Assistance needed at discharge: N/A            Patient expects to discharge to: House  Plan for transportation at discharge: Family    Financial    Payor: [employer] PLAN / [facility]: [facility] [facility] / Product Type: *No Product type* /     Does insurance require precert for SNF: Yes    Potential assistance Purchasing Medications: No  Meds-to-Beds request: Yes      [id number] - [place], [place] - [address] [phone] - F [phone]  [address] [address]  Phone: [phone] Fax: [phone]    [id number] [place], [place] - [address] [phone] - F [phone]  [address] [address]  Phone: [phone] Fax: [phone]    [id number] [place], [place] - [address] [phone] - F [phone]  [address] [address]  Phone: [phone] Fax: [phone]      Notes:    Factors facilitating achievement of predicted outcomes: Family support    Barriers to discharge: clinical status    Additional Case Management Notes: home with wife    The Plan for Transition of Care is related to the following treatment goals of Symptomatic anemia [D64.9]  Anemia, unspecified type [D64.9]  Nausea and vomiting, unspecified vomiting type [R11.2]  COVID-19 [U07.1]    IF APPLICABLE: The Patient and/or patient representative Tejas and his family were provided with a choice of provider and agrees with the discharge plan. Freedom of choice list with basic dialogue that supports the patient's individualized plan of care/goals and shares the quality data associated with the providers was provided to: Patient     The Patient and/or Patient Representative Agree with the Discharge Plan? Yes    ABRIL NEWMAN RN  Case Management Department

## 2024-08-29 NOTE — H&P
[facility]  Office: [phone]  Guy Reddy DO, Higinio Delgado DO, Lei Mtz DO, Moe Dejesus DO, Michael Rea MD, Madhavi Borjas MD, Loreto Izquierdo MD, Maya Carpio MD,  Matias Cueva MD, Stan Ramos MD, Fab Lucas DO, Azam Ann MD,  Derrick Aparicio DO, Rashi Lebron MD, Patel Lamas MD, Iain Reddy DO, Ewa Jett MD, Wyatt Noyola MD, Francis Perez DO, Maribeth Francisco MD, Lisy Jameson MD, Karen Jade MD, Cliff Barnhart MD,  Solitario Isaac DO, Scott Boss MD,  Shirley Waterhouse, CNP,  Cristina Fung, CNP, Gabriela Charles, CNP, Jaren Arellano, CNP,  Pearl Bishop, NITA, Jennifer Lara, CNP, Mariana Brito, CNP, Cathleen Grullon, CNP, Blanca Alvarado, CNP, Tabitha Lundberg, CNP, Naren Markham PA-C, Yudith Guillen, CNS, Le Garza, CNP, Sarah Cheung, CNP         [facility]   IN-PATIENT SERVICE   [facility]    HISTORY AND PHYSICAL EXAMINATION            Date:   8/29/2024  Patient name:  Tejas Machado  Date of admission:  8/28/2024 10:04 PM  MRN:   [id number]  Account:  [id number]  YOB: 1965  PCP:    Chelly Hess APRN - CNP  Room:   OBS 04/04-1  Code Status:    Full Code    Chief Complaint:     Chief Complaint   Patient presents with    Cough    Emesis       History Obtained From:     patient    History of Present Illness:     Tejas Machado is a 58 y.o. Non- / non  male who presents with Cough and Emesis   and is admitted to the hospital for the management of Symptomatic anemia.    58-year-old man with past medical history of A-fib on Eliquis, hypertension, CKD stage III, history of GI bleed presented to ED due to cough and patient mentioned that he has been tested positive for COVID.    Patient mentioned that he has been having cough, nausea and vomiting for the past 3 days.  He is tested positive for COVID.   Patient mentioned that due to deep coughing he sometimes feels chest pain.  Cough is  every 6 hour  Placing on Protonix drip  Fecal occult blood testing  GI consultation  Holding Eliquis and chemical DVT prophylaxis  Patient denies seeing any bright red blood or very dark stools.  This anemia could also be in the setting of CKD and acute viral infection.    COVID-19: Chest x-ray does not show any infiltrate.  Slightly febrile with temp of 99.5 overnight.  Supplemental oxygen if needed.  Consulting ID  GUZMAN on CKD stage III: Creatinine of 2.6 baseline creatinine close to around 2.  Consulting nephrology.  Holding Aldactone, Cozaar and nifedipine.  Can resume hydralazine if needed.  Sending urine studies.  Placing on gentle IV hydration.  Chronic systolic and diastolic heart failure: Resuming Coreg, holding nifedipine for now.  Last echo in July showed EF of 62% with concentric hypertrophy.  A-fib on Eliquis: Continue Coreg.  Holding Eliquis    Consultations:   IP CONSULT TO HOSPITALIST  IP CONSULT TO GI  IP CONSULT TO NEPHROLOGY  IP CONSULT TO INFECTIOUS DISEASES     Patient is admitted as inpatient status because of co-morbidities listed above, severity of signs and symptoms as outlined, requirement for current medical therapies and most importantly because of direct risk to patient if care not provided in a hospital setting.  Expected length of stay > 48 hours.    Samantha Hsieh MD  8/29/2024  5:44 AM    Copy sent to Chelly Hill, APRN - CNP

## 2024-08-29 NOTE — CONSULTS
[facility]  Initial Consult Note  Date: 8/29/2024    Hospital day :0     Impression:   COVID-19 virus infection tested positive at home 8/26/2024 and positive here 8/29/2024  Anemia  Thrombocytopenia  Essential hypertension  Chronic kidney disease stage III  Congestive heart failure  Atrial fibrillation on Eliquis    Recommendations   The patient has COVID-19 virus infection and was fully vaccinated.  He would be a candidate for antiviral therapy with Paxlovid but due to the Eliquis he cannot take this.  The patient can be started on antiviral therapy with remdesivir  Though the CRP is elevated the patient does not have any evidence of pneumonia and I do not believe that the CRP elevation is related to the COVID-19 virus infection  Given that he is not hypoxic the patient does not require any steroid therapy    Chief complaint/reason for consultation:   COVID-19 virus infection    History of Present Illness:   Tejas Machado is a 58 y.o.-year-old male who was initially admitted on 8/28/2024.   Tejas has hypertension, hyperlipidemia, congestive heart failure, chronic kidney disease stage III, atrial fibrillation on Eliquis among other medical problems.    The patient reports that he was out of town over the weekend went to [place] on Saturday and then was in [place] on Sunday and on Monday he noticed that he started having some cough, shortness of breath, fatigue, nausea/vomiting-difficulty keeping anything down, and his wife tested him for COVID-19 virus infection and it came back positive.  He is reporting that his wife did not test positive.  The patient was not able to take any of his medications and did not really report any significant shortness of breath and has not had any diarrhea.  The patient has been fully vaccinated and took his last booster in November/December 2023.    The patient was found to be afebrile here in the emergency department and blood pressure was elevated 150/85

## 2024-08-30 LAB
ALBUMIN SERPL-MCNC: 3.6 G/DL (ref 3.5–5.2)
ALBUMIN/GLOB SERPL: 2 {RATIO} (ref 1–2.5)
ALP SERPL-CCNC: 44 U/L (ref 40–129)
ALT SERPL-CCNC: 10 U/L (ref 10–50)
ANION GAP SERPL CALCULATED.3IONS-SCNC: 8 MMOL/L (ref 9–16)
AST SERPL-CCNC: 19 U/L (ref 10–50)
BILIRUB SERPL-MCNC: 0.6 MG/DL (ref 0–1.2)
BUN SERPL-MCNC: 22 MG/DL (ref 8–23)
CALCIUM SERPL-MCNC: 7.9 MG/DL (ref 8.6–10.4)
CHLORIDE SERPL-SCNC: 108 MMOL/L (ref 98–107)
CO2 SERPL-SCNC: 24 MMOL/L (ref 20–31)
CREAT SERPL-MCNC: 2.3 MG/DL (ref 0.7–1.2)
D DIMER PPP FEU-MCNC: <0.27 UG/ML FEU (ref 0–0.57)
EKG ATRIAL RATE: 88 BPM
EKG P AXIS: 42 DEGREES
EKG P-R INTERVAL: 134 MS
EKG Q-T INTERVAL: 368 MS
EKG QRS DURATION: 94 MS
EKG QTC CALCULATION (BAZETT): 445 MS
EKG R AXIS: -12 DEGREES
EKG T AXIS: 63 DEGREES
EKG VENTRICULAR RATE: 88 BPM
ERYTHROCYTE [DISTWIDTH] IN BLOOD BY AUTOMATED COUNT: 17.5 % (ref 11.8–14.4)
FDP: <5 UG/ML
FERRITIN SERPL-MCNC: 37 NG/ML (ref 30–400)
FOLATE SERPL-MCNC: 7.5 NG/ML (ref 4.8–24.2)
GFR, ESTIMATED: 32 ML/MIN/1.73M2
GLUCOSE SERPL-MCNC: 95 MG/DL (ref 74–99)
HAPTOGLOB SERPL-MCNC: 231 MG/DL (ref 30–200)
HCT VFR BLD AUTO: 24.1 % (ref 40.7–50.3)
HCT VFR BLD AUTO: 30.1 % (ref 40.7–50.3)
HGB BLD-MCNC: 6.6 G/DL (ref 13–17)
HGB BLD-MCNC: 7.8 G/DL (ref 13–17)
IMM RETICS NFR: 12.4 % (ref 2.7–18.3)
IRON SATN MFR SERPL: 3 % (ref 20–55)
IRON SERPL-MCNC: 9 UG/DL (ref 61–157)
LDH SERPL-CCNC: 191 U/L (ref 135–225)
MCH RBC QN AUTO: 20.9 PG (ref 25.2–33.5)
MCHC RBC AUTO-ENTMCNC: 27.4 G/DL (ref 28.4–34.8)
MCV RBC AUTO: 76.3 FL (ref 82.6–102.9)
NRBC BLD-RTO: 0.7 PER 100 WBC
PLATELET # BLD AUTO: ABNORMAL K/UL (ref 138–453)
PLATELET, FLUORESCENCE: 108 K/UL (ref 138–453)
PLATELETS.RETICULATED NFR BLD AUTO: 4.9 % (ref 1.1–10.3)
POTASSIUM SERPL-SCNC: 4.4 MMOL/L (ref 3.7–5.3)
PROT SERPL-MCNC: 5.8 G/DL (ref 6.6–8.7)
RBC # BLD AUTO: 3.16 M/UL (ref 4.21–5.77)
RETIC HEMOGLOBIN: 13.1 PG (ref 28.2–35.7)
RETICS # AUTO: 0.03 M/UL (ref 0.03–0.08)
RETICS/RBC NFR AUTO: 0.8 % (ref 0.5–1.9)
SODIUM SERPL-SCNC: 140 MMOL/L (ref 136–145)
TIBC SERPL-MCNC: 261 UG/DL (ref 250–450)
UNSATURATED IRON BINDING CAPACITY: 252 UG/DL (ref 112–347)
VIT B12 SERPL-MCNC: 597 PG/ML (ref 232–1245)
WBC OTHER # BLD: 3 K/UL (ref 3.5–11.3)

## 2024-08-30 PROCEDURE — 82607 VITAMIN B-12: CPT

## 2024-08-30 PROCEDURE — 85379 FIBRIN DEGRADATION QUANT: CPT

## 2024-08-30 PROCEDURE — 2580000003 HC RX 258: Performed by: INTERNAL MEDICINE

## 2024-08-30 PROCEDURE — 36415 COLL VENOUS BLD VENIPUNCTURE: CPT

## 2024-08-30 PROCEDURE — 99232 SBSQ HOSP IP/OBS MODERATE 35: CPT | Performed by: INTERNAL MEDICINE

## 2024-08-30 PROCEDURE — 6370000000 HC RX 637 (ALT 250 FOR IP): Performed by: NURSE PRACTITIONER

## 2024-08-30 PROCEDURE — 82746 ASSAY OF FOLIC ACID SERUM: CPT

## 2024-08-30 PROCEDURE — 85027 COMPLETE CBC AUTOMATED: CPT

## 2024-08-30 PROCEDURE — 80053 COMPREHEN METABOLIC PANEL: CPT

## 2024-08-30 PROCEDURE — 6360000002 HC RX W HCPCS: Performed by: STUDENT IN AN ORGANIZED HEALTH CARE EDUCATION/TRAINING PROGRAM

## 2024-08-30 PROCEDURE — 85362 FIBRIN DEGRADATION PRODUCTS: CPT

## 2024-08-30 PROCEDURE — 1200000000 HC SEMI PRIVATE

## 2024-08-30 PROCEDURE — 2580000003 HC RX 258: Performed by: STUDENT IN AN ORGANIZED HEALTH CARE EDUCATION/TRAINING PROGRAM

## 2024-08-30 PROCEDURE — 83615 LACTATE (LD) (LDH) ENZYME: CPT

## 2024-08-30 PROCEDURE — 85045 AUTOMATED RETICULOCYTE COUNT: CPT

## 2024-08-30 PROCEDURE — 6370000000 HC RX 637 (ALT 250 FOR IP): Performed by: STUDENT IN AN ORGANIZED HEALTH CARE EDUCATION/TRAINING PROGRAM

## 2024-08-30 PROCEDURE — P9016 RBC LEUKOCYTES REDUCED: HCPCS

## 2024-08-30 PROCEDURE — 82728 ASSAY OF FERRITIN: CPT

## 2024-08-30 PROCEDURE — 83010 ASSAY OF HAPTOGLOBIN QUANT: CPT

## 2024-08-30 PROCEDURE — 83540 ASSAY OF IRON: CPT

## 2024-08-30 PROCEDURE — 83550 IRON BINDING TEST: CPT

## 2024-08-30 PROCEDURE — 99232 SBSQ HOSP IP/OBS MODERATE 35: CPT | Performed by: STUDENT IN AN ORGANIZED HEALTH CARE EDUCATION/TRAINING PROGRAM

## 2024-08-30 PROCEDURE — 6360000002 HC RX W HCPCS: Performed by: INTERNAL MEDICINE

## 2024-08-30 PROCEDURE — 2580000003 HC RX 258: Performed by: NURSE PRACTITIONER

## 2024-08-30 PROCEDURE — 85018 HEMOGLOBIN: CPT

## 2024-08-30 PROCEDURE — 85014 HEMATOCRIT: CPT

## 2024-08-30 PROCEDURE — 36430 TRANSFUSION BLD/BLD COMPNT: CPT

## 2024-08-30 PROCEDURE — 85055 RETICULATED PLATELET ASSAY: CPT

## 2024-08-30 RX ORDER — ALBUTEROL SULFATE 90 UG/1
2 AEROSOL, METERED RESPIRATORY (INHALATION) EVERY 6 HOURS PRN
Qty: 18 G | Refills: 3 | Status: SHIPPED | OUTPATIENT
Start: 2024-08-30

## 2024-08-30 RX ADMIN — PANTOPRAZOLE SODIUM 8 MG/HR: 40 INJECTION, POWDER, FOR SOLUTION INTRAVENOUS at 05:49

## 2024-08-30 RX ADMIN — SODIUM CHLORIDE: 9 INJECTION, SOLUTION INTRAVENOUS at 05:49

## 2024-08-30 RX ADMIN — GUAIFENESIN 600 MG: 600 TABLET, EXTENDED RELEASE ORAL at 20:36

## 2024-08-30 RX ADMIN — SODIUM CHLORIDE, PRESERVATIVE FREE 10 ML: 5 INJECTION INTRAVENOUS at 20:36

## 2024-08-30 RX ADMIN — GUAIFENESIN 600 MG: 600 TABLET, EXTENDED RELEASE ORAL at 09:06

## 2024-08-30 RX ADMIN — REMDESIVIR 100 MG: 100 INJECTION, POWDER, LYOPHILIZED, FOR SOLUTION INTRAVENOUS at 17:48

## 2024-08-30 RX ADMIN — ATORVASTATIN CALCIUM 40 MG: 40 TABLET, FILM COATED ORAL at 09:06

## 2024-08-30 RX ADMIN — CARVEDILOL 12.5 MG: 12.5 TABLET, FILM COATED ORAL at 18:18

## 2024-08-30 RX ADMIN — CARVEDILOL 12.5 MG: 12.5 TABLET, FILM COATED ORAL at 09:06

## 2024-08-30 ASSESSMENT — ENCOUNTER SYMPTOMS
RESPIRATORY NEGATIVE: 1
GASTROINTESTINAL NEGATIVE: 1

## 2024-08-30 ASSESSMENT — PAIN SCALES - GENERAL: PAINLEVEL_OUTOF10: 0

## 2024-08-30 NOTE — PROGRESS NOTES
significant shortness of breath and has not had any diarrhea.  The patient has been fully vaccinated and took his last booster in 2023.     The patient was found to be afebrile here in the emergency department and blood pressure was elevated 150/85 and lab studies showed a BUN of 24 creatinine of 2.6.  The patient was noted to be anemic with a hemoglobin of 6.4 and platelet count of 121.  The patient does have a recent admission for GI bleed did get PRBC transfusion and had a EGD that was normal.  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     The patient did get a unit of PRBC transfusion and I was asked to evaluate for the COVID-19 virus infection    Current evaluation:2024    BP (!) 130/90   Pulse 67   Temp 98 °F (36.7 °C) (Oral)   Resp 17   Ht 1.778 m (5' 10\")   Wt 107.5 kg (237 lb)   SpO2 98%   BMI 34.01 kg/m²     Temperature Range: Temp: 98 °F (36.7 °C) Temp  Av.5 °F (36.9 °C)  Min: 98 °F (36.7 °C)  Max: 100 °F (37.8 °C)  The patient is seen and evaluated at bedside he is awake and alert eating dinner and he is feeling good.  He did have a low-grade fever 37.8.  The patient does have some desaturations at night while he is sleeping and was put on some oxygen overnight.  The patient does have a pending outpatient sleep study    The patient has been off oxygen while he is awake and during the day    Review of Systems   Constitutional: Negative.    HENT: Negative.     Respiratory: Negative.     Cardiovascular: Negative.    Gastrointestinal: Negative.    Genitourinary: Negative.    Musculoskeletal: Negative.    Neurological: Negative.    Psychiatric/Behavioral: Negative.         Physical Examination :     Physical Exam  Constitutional:       Appearance: He is well-developed.   HENT:      Head: Normocephalic and atraumatic.   Cardiovascular:      Rate and Rhythm: Normal rate.      Heart sounds: Normal heart sounds.      No friction rub. No gallop.  Rapid DETECTED (A) (8/29/2024) Not Detected Not in Time Range      Lab Results   Component Value Date/Time    COVID19 DETECTED 08/29/2024 12:29 AM    COVID19 Not Detected 11/08/2023 11:35 PM    COVID19 Not Detected 11/08/2023 09:43 PM    COVID19 Not Detected 03/14/2023 04:37 PM    COVID19 Not Detected 04/10/2021 09:50 AM       No results for input(s): \"VANCOTROUGH\" in the last 72 hours.    Imaging Studies:   No new imaging    Cultures:   Culture and Sensitivities:  Recent Labs     08/29/24 0029   SPECDESC .NASOPHARYNGEAL SWAB     Procedure Component Value Units Date/Time   COVID-19, Rapid [[id number]] (Abnormal) Collected: 08/29/24 0029   Order Status: Completed Specimen: Nasopharyngeal Swab Updated: 08/29/24 0057    Specimen Description .NASOPHARYNGEAL SWAB    SARS-CoV-2, Rapid DETECTED Abnormal     Comment:       Rapid NAAT: The specimen is POSITIVE for SARS-Cov-2, the novel coronavirus associated with  COVID-19.       This test has been authorized by the FDA under an Emergency Use Authorization (EUA) for use  by authorized laboratories.       The ID NOW COVID-19 assay is designed to detect the virus that causes COVID-19 in patients  with signs and symptoms of infection who are suspected of COVID-19.  An individual without symptoms of COVID-19 and who is not shedding SARS-CoV-2 virus would  expect to have a negative (not detected) result in this assay.  Fact sheet for Healthcare Providers: https://www.fda.gov/media/055296/download  Fact sheet for Patients: https://www.fda.gov/media/495837/download       Methodology: Isothermal Nucleic Acid Amplification       Results reported to the appropriate Health Department          Medications:      sodium chloride flush  5-40 mL IntraVENous 2 times per day    guaiFENesin  600 mg Oral BID    atorvastatin  40 mg Oral Daily    carvedilol  12.5 mg Oral BID WC    remdesivir 100 mg in sodium chloride 0.9 % 250 mL IVPB  100 mg IntraVENous Q24H       Electronically signed by

## 2024-08-30 NOTE — PROGRESS NOTES
[facility]  Office: [phone]  Guy Reddy DO, Higinio Delgado DO, Lei Mtz DO, Moe Dejesus DO, Michael Rea MD, Madhavi Borjas MD, Loreto Izquierdo MD, Maya Carpio MD,  Matias Cueva MD, Stan Ramos MD, Fab Lucas DO, Azam Ann MD,  Derrick Aparicio DO, Rashi Lebron MD, Patel Lamas MD, Iain Reddy DO, Ewa Jett MD,  Francis Perez DO, Maribeth Francisco MD, Lisy Jameson MD, Karen Jade MD, Cliff Barnhart MD,  Evert Brandon MD, Samantha Hsieh MD, Juli Wooten MD, Solitario Isaac DO, Scott Boss MD,  Wyatt Noyola MD, Shirley Waterhouse, CNP,  Cristina Fung, CNP, Gabriela Charles, CNP, Jaren Arellano, CNP,  Pearl Bishop, DNP, Jennifer Lara, CNP, Mariana Brito, CNP, Cathleen Grullon, CNP, Blanca Alvarado, CNP, Tabitha Lundberg, CNP, Naren Markham PA-C, Yudith Guillen, CNS, Le Garza, CNP, Sarah Cheung, CNP         [facility]   IN-PATIENT SERVICE   [facility]    Progress Note    8/30/2024    3:56 PM    Name:   Tejas Machado  MRN:     [id number]     Acct:      [id number]   Room:   OBS 04/04-1  IP Day:  1  Admit Date:  8/28/2024 10:04 PM    PCP:   Chelly Hess APRN - CNP  Code Status:  Full Code    Subjective:     Pt seen and examined this morning, his Hb dropped  to 6.6, he will be getting 1 unit of blood. No other acute events overnight.. tolerating PO diet, didn't have a BM yet.  Labs reviewed.    Medications:     Allergies:  No Known Allergies    Current Meds:   Scheduled Meds:    sodium chloride flush  5-40 mL IntraVENous 2 times per day    guaiFENesin  600 mg Oral BID    atorvastatin  40 mg Oral Daily    carvedilol  12.5 mg Oral BID     remdesivir 100 mg in sodium chloride 0.9 % 250 mL IVPB  100 mg IntraVENous Q24H     Continuous Infusions:    sodium chloride      sodium chloride 75 mL/hr at 08/30/24 0549    pantoprazole 8 mg/hr (08/30/24 0549)    sodium chloride      sodium chloride       PRN Meds: sodium  chronic kidney disease (HCC) 8/29/2024 Yes    Overview Addendum 5/15/2024  8:51 AM by Cheli Johns MD     Secondary to suspected ischemic nephrosclerosis baseline creatinine 1.8-2.0.  Workup for immune complex and paraprotein disease negative.  UPC 0.3.  Ultrasound shows kidney size to be around 9 cm.         History of GI bleed 8/29/2024 Yes    COVID-19 8/29/2024 Yes    Acute kidney injury superimposed on CKD (HCC) 8/29/2024 Yes    Nausea and vomiting 8/29/2024 Yes    CKD (chronic kidney disease) stage 4, GFR 15-29 ml/min ([facility]) 8/29/2024 Yes    Drop in hemoglobin 8/29/2024 Yes       Plan:     Acute on chronic anemia: likely sec from AVMs, GI evaluated the pt and recommended avoiding smoking, alcohol and NSAIDs. Continue Protonix 40 mg daily. Recommended Hem Onc evaluation.  History of GI bleed: AVMs Presented with hemoglobin of 6.5. s/p 2 units PRBC during this admission.  COVID-19: Chest x-ray does not show any infiltrate.  Slightly febrile with temp of 99.5 overnight.  Supplemental oxygen if needed.  ID recommended Remdesivir.  GUZMAN on CKD stage III: Creatinine of 2.6 baseline creatinine close to around 2.  Consulting nephrology.  Holding Aldactone, Cozaar and nifedipine.  Can resume hydralazine if needed.  Sending urine studies. continue gentle IV hydration.  Chronic systolic and diastolic heart failure: Resuming Coreg, holding nifedipine for now.  Last echo in July showed EF of 62% with concentric hypertrophy.  A-fib on Eliquis: Continue Coreg.  Holding Eliquis. Will reach out to GI regarding resuming AC.  Anticipate dc today if ok with GI.    Medical Decision Making: Medium      Pito Beltran MD  8/30/2024  3:56 PM

## 2024-08-30 NOTE — DISCHARGE SUMMARY
[facility]  Office: [phone]  Guy Reddy DO, Higinio Delgado DO, Lei Mtz DO, Moe Dejesus DO, Michael Rea MD, Madhavi Borjas MD, Loreto Izquierdo MD, Maya Carpio MD,  Matias Cueva MD, Stan Ramos MD, Fab Lucas DO, Azam Ann MD,  Derrick Aparicio DO, Rashi Lebron MD, Patel Lamas MD, Iain Reddy DO, Ewa Jett MD,  Francis Perez DO, Maribeth Francisco MD, Lisy Jameson MD, Karen Jade MD, Cliff Barnhart MD,  Evert Brandon MD, Samantha Hsieh MD, Juli Wooten MD, Solitario Isaac DO, Scott Boss MD,  Wyatt Noyola MD, Shirley Waterhouse, CNP,  Cristina Fung, [facility], Gabriela Charles, CNP, Jaren Arellano, [facility],  Pearl Bishop, [facility], Jennifer Lara, CNP, Mariana Brito, CNP, Cathleen Grullon, CNP, Blanca Alvarado, CNP, Tabitha Lundberg, CNP, Naren Markham PA-C, Yudith Guillen, CNS, Le Garza, CNP, Sarah Cheung, CNP         [facility]   IN-PATIENT SERVICE   [facility] - {Location:[address]::\"[place]\",\"[facility]"}    Discharge Summary     Patient ID: Tejas Machado  :  1965   MRN: [id number]     ACCOUNT:  [id number]   Patient's PCP: Chelly Hess APRN - CNP  Admit Date: 2024   Discharge Date: 2024  Length of Stay: 1  Code Status:  Full Code  Admitting Physician: No admitting provider for patient encounter.  Discharge Physician: Pito Beltran MD     Active Discharge Diagnoses:     Hospital Problem Lists:  Principal Problem:    Anemia  Active Problems:    Atrial fibrillation (HCC)    Chronic systolic (congestive) heart failure (HCC)    Nonischemic cardiomyopathy (HCC)    Chronic combined systolic and diastolic CHF (congestive heart failure) (HCC)    Acute anemia    Hypertension, essential    Renal insufficiency    Stage 3b chronic kidney disease (HCC)    History of GI bleed    COVID-19    Acute kidney injury superimposed on CKD (HCC)    Nausea and vomiting    CKD (chronic kidney disease) stage 4,

## 2024-08-30 NOTE — PROGRESS NOTES
[facility]   Gastroenterology Progress Note    Tejas Machado is a 58 y.o. male patient.  Hospitalization Day:1      Chief consult reason:   History of GI bleeds  Presented with anemia  Hemoglobin 6.5 g/dL  Patient is on Eliquis    Subjective:  Patient seen and examined, no acute events overnight  Patient denies any rectal bleeding or hematemesis  A.m. hemoglobin dropped slightly from 7.1-6.6 platelets 108  Patient receiving transfusion now      VITALS:  BP (!) 154/74   Pulse 72   Temp 100 °F (37.8 °C) (Oral)   Resp 19   Ht 1.778 m (5' 10\")   Wt 107.5 kg (237 lb)   SpO2 91%   BMI 34.01 kg/m²   TEMPERATURE:  Current - Temp: 100 °F (37.8 °C); Max - Temp  Av °F (37.2 °C)  Min: 98 °F (36.7 °C)  Max: 100 °F (37.8 °C)    Physical Assessment:  General appearance:  alert, cooperative and no distress  Mental Status:  oriented to person, place and time and normal affect  Lungs:  clear to auscultation bilaterally, normal effort  Heart:  regular rate and rhythm, no murmur  Abdomen:  soft, nontender, nondistended, normal bowel sounds, no masses, hepatomegaly, splenomegaly  Extremities:  no edema, redness, tenderness in the calves  Skin:  no gross lesions, rashes, induration    Data Review:    Labs and Imaging:     CBC:  Recent Labs     24  1035 24  1302 24  1957 24  0615   WBC 5.2  --   --   --  3.0*   HGB 6.4* 6.6* 7.0* 7.1* 6.6*   MCV 73.6*  --   --   --  76.3*   RDW 17.0*  --   --   --  17.5*   *  --   --   --  See Reflexed IPF Result       ANEMIA STUDIES:  No results for input(s): \"TIBC\", \"FERRITIN\", \"QDBTDVCX18\", \"FOLATE\", \"OCCULTBLD\" in the last 72 hours.    Invalid input(s): \"LABIRON\"    BMP:  Recent Labs     24  0402    139   K 4.0 4.3    107   CO2 21 23   BUN 24* 25*   CREATININE 2.6* 2.6*   GLUCOSE 111* 100*   CALCIUM 8.4* 8.3*   MG 2.0  --        LFTS:  Recent Labs     24  0402    ALKPHOS 54 49   ALT 8* 8*   AST 18 16   BILITOT 1.0 1.1       Amylase/Lipase and Ammonia:  No results for input(s): \"AMYLASE\", \"LIPASE\", \"AMMONIA\" in the last 72 hours.    Acute Hepatitis Panel:  Lab Results   Component Value Date/Time    HEPCAB NONREACTIVE 06/23/2020 09:21 AM       HCV Genotype:  No components found for: \"HEPATITISCGENOTYPE\"    HCV Quantitative:  No results found for: \"HCVQNT\"    LIVER WORK UP:    AFP  No results found for: \"AFP\"    Alpha 1 antitrypsin   No results found for: \"A1A\"    SANTI  Lab Results   Component Value Date/Time    SANTI NEGATIVE 12/15/2020 10:46 AM       AMA  No results found for: \"MITOAB\"    ASMA  No results found for: \"SMOOTHMUSCAB\"    PT/INR  No results for input(s): \"PROTIME\", \"INR\" in the last 72 hours.    Cancer Markers:  CEA:  No results for input(s): \"CEA\" in the last 72 hours.  Ca 125:  No results for input(s): \"\" in the last 72 hours.  Ca 19-9:   Invalid input(s): \"\"  AFP: No results for input(s): \"AFP\" in the last 72 hours.  Lactic acid:Invalid input(s): \"LACTIC ACID\"    Radiology Review:    No results found.      Principal Problem:    Anemia  Active Problems:    Atrial fibrillation (HCC)    Chronic systolic (congestive) heart failure (HCC)    Nonischemic cardiomyopathy (HCC)    Chronic combined systolic and diastolic CHF (congestive heart failure) (HCC)    Acute anemia    Hypertension, essential    Renal insufficiency    Stage 3b chronic kidney disease (HCC)    History of GI bleed    COVID-19    Acute kidney injury superimposed on CKD (HCC)    Nausea and vomiting    CKD (chronic kidney disease) stage 4, GFR 15-29 ml/min (HCC)    Drop in hemoglobin  Resolved Problems:    * No resolved hospital problems. *       GI Impression:    COVID positive-patient is now on 2 to 3 L normally on room air  Microcytic anemia without overt GI bleeding-hemoglobin dropped slightly to 6.6 for which she has received PRBCs, No overt GI bleeding hHistory of A-fib on Eliquis  CKD  stage III    Plan and Recommendations:    Recommend patient follow-up in the office to be scheduled for outpatient VCE-will message office staff to arrange as quickly as possible as outpatient  Avoid smoking, drinking alcohol, drug use and NSAIDs  Okay for diet from GI perspective  Protonix 40 mg p.o. once daily  Avoid smoking NSAIDs illegal drugs and alcohol consumption  Recommend complete anemic workup per primary.  Consider heme-onc consultation  No further recommendations at this time GI will sign off      This plan was formulated in collaboration with Dr. Soledad MD    Thank you for allowing me to participate in the care of your patient.  Please feel free to contact me with any questions or concerns.     [facility]   Sidney Stanton, APRN - CNP   [phone]  8/30/2024  7:30 AM    Estimated time of 20 mins reviewing chart, assessing patient and formulating plan of care    This note was created with the assistance of a speech-recognition program.  Although the intention is to generate a document that actually reflects the content of the visit, no guarantees can be provided that every mistake has been identified and corrected by editing.     Attending Physician Statement  I have discussed the care of Tejas Machado and I have examined the patient myselft and taken ros and hpi , including pertinent history and exam findings,  with the author of this note . I have reviewed the key elements of all parts of the encounter with the nurse practitioner/resident.  I agree with the assessment, plan and orders as documented by the above health care provider.  I have made necessary changes as deemed appropriate directly in the note.     More than 50% of the time was spent taking care of this patient in addition to the nurse practitioner time.  That also included history taking follow-up physical examination and review of system.    Electronically signed by Catherine Rowe MD

## 2024-08-30 NOTE — DISCHARGE INSTRUCTIONS
Follow-up outpatient with PCP, ID and GI.  Continue medications as prescribed.  Patient would benefit from outpatient heme-onc evaluation.  Holding off on Eliquis as of now due to concern for drop in hemoglobin on multiple occasions.  Patient need to follow-up with PCP, GI and heme-onc and discussed regarding resuming anticoagulation.

## 2024-08-31 VITALS
BODY MASS INDEX: 33.93 KG/M2 | HEIGHT: 70 IN | TEMPERATURE: 98.6 F | RESPIRATION RATE: 20 BRPM | WEIGHT: 237 LBS | SYSTOLIC BLOOD PRESSURE: 183 MMHG | HEART RATE: 74 BPM | DIASTOLIC BLOOD PRESSURE: 99 MMHG | OXYGEN SATURATION: 97 %

## 2024-08-31 PROBLEM — R79.82 ELEVATED C-REACTIVE PROTEIN (CRP): Status: ACTIVE | Noted: 2024-08-31

## 2024-08-31 LAB
ABO/RH: NORMAL
ALBUMIN SERPL-MCNC: 3.9 G/DL (ref 3.5–5.2)
ALBUMIN/GLOB SERPL: 2 {RATIO} (ref 1–2.5)
ALP SERPL-CCNC: 51 U/L (ref 40–129)
ALT SERPL-CCNC: 10 U/L (ref 10–50)
ANION GAP SERPL CALCULATED.3IONS-SCNC: 10 MMOL/L (ref 9–16)
ANTIBODY SCREEN: NEGATIVE
ARM BAND NUMBER: NORMAL
AST SERPL-CCNC: 22 U/L (ref 10–50)
BASOPHILS # BLD: 0.03 K/UL (ref 0–0.2)
BASOPHILS NFR BLD: 1 % (ref 0–2)
BILIRUB SERPL-MCNC: 0.6 MG/DL (ref 0–1.2)
BLOOD BANK BLOOD PRODUCT EXPIRATION DATE: NORMAL
BLOOD BANK BLOOD PRODUCT EXPIRATION DATE: NORMAL
BLOOD BANK DISPENSE STATUS: NORMAL
BLOOD BANK DISPENSE STATUS: NORMAL
BLOOD BANK ISBT PRODUCT BLOOD TYPE: 5100
BLOOD BANK ISBT PRODUCT BLOOD TYPE: 5100
BLOOD BANK PRODUCT CODE: NORMAL
BLOOD BANK PRODUCT CODE: NORMAL
BLOOD BANK SAMPLE EXPIRATION: NORMAL
BLOOD BANK UNIT TYPE AND RH: NORMAL
BLOOD BANK UNIT TYPE AND RH: NORMAL
BPU ID: NORMAL
BPU ID: NORMAL
BUN SERPL-MCNC: 18 MG/DL (ref 8–23)
CALCIUM SERPL-MCNC: 8.2 MG/DL (ref 8.6–10.4)
CHLORIDE SERPL-SCNC: 108 MMOL/L (ref 98–107)
CO2 SERPL-SCNC: 21 MMOL/L (ref 20–31)
COMPONENT: NORMAL
COMPONENT: NORMAL
CREAT SERPL-MCNC: 2 MG/DL (ref 0.7–1.2)
CROSSMATCH RESULT: NORMAL
CROSSMATCH RESULT: NORMAL
CRP SERPL HS-MCNC: 57.2 MG/L (ref 0–5)
EOSINOPHIL # BLD: 0.05 K/UL (ref 0–0.4)
EOSINOPHILS RELATIVE PERCENT: 2 % (ref 1–4)
ERYTHROCYTE [DISTWIDTH] IN BLOOD BY AUTOMATED COUNT: 18.1 % (ref 11.8–14.4)
GFR, ESTIMATED: 37 ML/MIN/1.73M2
GLUCOSE SERPL-MCNC: 109 MG/DL (ref 74–99)
HCT VFR BLD AUTO: 31 % (ref 40.7–50.3)
HGB BLD-MCNC: 8.6 G/DL (ref 13–17)
IMM GRANULOCYTES # BLD AUTO: 0 K/UL (ref 0–0.3)
IMM GRANULOCYTES NFR BLD: 0 %
LYMPHOCYTES NFR BLD: 0.76 K/UL (ref 1–4.8)
LYMPHOCYTES RELATIVE PERCENT: 28 % (ref 24–44)
MCH RBC QN AUTO: 21.3 PG (ref 25.2–33.5)
MCHC RBC AUTO-ENTMCNC: 27.7 G/DL (ref 28.4–34.8)
MCV RBC AUTO: 76.9 FL (ref 82.6–102.9)
MONOCYTES NFR BLD: 0.16 K/UL (ref 0.1–0.8)
MONOCYTES NFR BLD: 6 % (ref 1–7)
MORPHOLOGY: ABNORMAL
NEUTROPHILS NFR BLD: 63 % (ref 36–66)
NEUTS SEG NFR BLD: 1.7 K/UL (ref 1.8–7.7)
NRBC BLD-RTO: 1.9 PER 100 WBC
PLATELET # BLD AUTO: ABNORMAL K/UL (ref 138–453)
PLATELET, FLUORESCENCE: 146 K/UL (ref 138–453)
PLATELETS.RETICULATED NFR BLD AUTO: 3.5 % (ref 1.1–10.3)
POTASSIUM SERPL-SCNC: 3.9 MMOL/L (ref 3.7–5.3)
PROT SERPL-MCNC: 6.5 G/DL (ref 6.6–8.7)
RBC # BLD AUTO: 4.03 M/UL (ref 4.21–5.77)
SODIUM SERPL-SCNC: 139 MMOL/L (ref 136–145)
TRANSFUSION STATUS: NORMAL
TRANSFUSION STATUS: NORMAL
UNIT DIVISION: 0
UNIT DIVISION: 0
UNIT ISSUE DATE/TIME: NORMAL
UNIT ISSUE DATE/TIME: NORMAL
WBC OTHER # BLD: 2.7 K/UL (ref 3.5–11.3)

## 2024-08-31 PROCEDURE — 2580000003 HC RX 258: Performed by: NURSE PRACTITIONER

## 2024-08-31 PROCEDURE — 6370000000 HC RX 637 (ALT 250 FOR IP): Performed by: NURSE PRACTITIONER

## 2024-08-31 PROCEDURE — 6370000000 HC RX 637 (ALT 250 FOR IP): Performed by: STUDENT IN AN ORGANIZED HEALTH CARE EDUCATION/TRAINING PROGRAM

## 2024-08-31 PROCEDURE — 85055 RETICULATED PLATELET ASSAY: CPT

## 2024-08-31 PROCEDURE — 6360000002 HC RX W HCPCS: Performed by: STUDENT IN AN ORGANIZED HEALTH CARE EDUCATION/TRAINING PROGRAM

## 2024-08-31 PROCEDURE — 99232 SBSQ HOSP IP/OBS MODERATE 35: CPT | Performed by: INTERNAL MEDICINE

## 2024-08-31 PROCEDURE — 2580000003 HC RX 258: Performed by: INTERNAL MEDICINE

## 2024-08-31 PROCEDURE — 99232 SBSQ HOSP IP/OBS MODERATE 35: CPT | Performed by: STUDENT IN AN ORGANIZED HEALTH CARE EDUCATION/TRAINING PROGRAM

## 2024-08-31 PROCEDURE — 80053 COMPREHEN METABOLIC PANEL: CPT

## 2024-08-31 PROCEDURE — 86140 C-REACTIVE PROTEIN: CPT

## 2024-08-31 PROCEDURE — 6360000002 HC RX W HCPCS: Performed by: INTERNAL MEDICINE

## 2024-08-31 PROCEDURE — 2580000003 HC RX 258: Performed by: STUDENT IN AN ORGANIZED HEALTH CARE EDUCATION/TRAINING PROGRAM

## 2024-08-31 PROCEDURE — 36415 COLL VENOUS BLD VENIPUNCTURE: CPT

## 2024-08-31 PROCEDURE — 85025 COMPLETE CBC W/AUTO DIFF WBC: CPT

## 2024-08-31 RX ORDER — SPIRONOLACTONE 25 MG/1
50 TABLET ORAL DAILY
Status: DISCONTINUED | OUTPATIENT
Start: 2024-08-31 | End: 2024-08-31 | Stop reason: HOSPADM

## 2024-08-31 RX ORDER — GUAIFENESIN 600 MG/1
600 TABLET, EXTENDED RELEASE ORAL 2 TIMES DAILY
Qty: 30 TABLET | Refills: 0 | Status: SHIPPED | OUTPATIENT
Start: 2024-08-31 | End: 2024-09-15

## 2024-08-31 RX ORDER — HYDRALAZINE HYDROCHLORIDE 50 MG/1
100 TABLET, FILM COATED ORAL 3 TIMES DAILY
Status: DISCONTINUED | OUTPATIENT
Start: 2024-08-31 | End: 2024-08-31 | Stop reason: HOSPADM

## 2024-08-31 RX ORDER — NIFEDIPINE 30 MG/1
60 TABLET, EXTENDED RELEASE ORAL DAILY
Status: DISCONTINUED | OUTPATIENT
Start: 2024-08-31 | End: 2024-08-31 | Stop reason: HOSPADM

## 2024-08-31 RX ORDER — LOSARTAN POTASSIUM 50 MG/1
100 TABLET ORAL DAILY
Status: DISCONTINUED | OUTPATIENT
Start: 2024-08-31 | End: 2024-08-31 | Stop reason: HOSPADM

## 2024-08-31 RX ORDER — PANTOPRAZOLE SODIUM 40 MG/1
40 TABLET, DELAYED RELEASE ORAL
Status: DISCONTINUED | OUTPATIENT
Start: 2024-09-01 | End: 2024-08-31 | Stop reason: HOSPADM

## 2024-08-31 RX ADMIN — ATORVASTATIN CALCIUM 40 MG: 40 TABLET, FILM COATED ORAL at 08:12

## 2024-08-31 RX ADMIN — HYDRALAZINE HYDROCHLORIDE 100 MG: 50 TABLET ORAL at 14:52

## 2024-08-31 RX ADMIN — PANTOPRAZOLE SODIUM 8 MG/HR: 40 INJECTION, POWDER, FOR SOLUTION INTRAVENOUS at 01:07

## 2024-08-31 RX ADMIN — SODIUM CHLORIDE: 9 INJECTION, SOLUTION INTRAVENOUS at 01:08

## 2024-08-31 RX ADMIN — GUAIFENESIN 600 MG: 600 TABLET, EXTENDED RELEASE ORAL at 08:12

## 2024-08-31 RX ADMIN — LOSARTAN POTASSIUM 100 MG: 50 TABLET, FILM COATED ORAL at 10:16

## 2024-08-31 RX ADMIN — NIFEDIPINE 60 MG: 30 TABLET, FILM COATED, EXTENDED RELEASE ORAL at 11:45

## 2024-08-31 RX ADMIN — SODIUM CHLORIDE, PRESERVATIVE FREE 10 ML: 5 INJECTION INTRAVENOUS at 08:13

## 2024-08-31 RX ADMIN — HYDRALAZINE HYDROCHLORIDE 100 MG: 50 TABLET ORAL at 10:16

## 2024-08-31 RX ADMIN — SPIRONOLACTONE 50 MG: 25 TABLET, FILM COATED ORAL at 10:16

## 2024-08-31 RX ADMIN — CARVEDILOL 12.5 MG: 12.5 TABLET, FILM COATED ORAL at 08:12

## 2024-08-31 RX ADMIN — REMDESIVIR 100 MG: 100 INJECTION, POWDER, LYOPHILIZED, FOR SOLUTION INTRAVENOUS at 15:01

## 2024-08-31 ASSESSMENT — ENCOUNTER SYMPTOMS
RESPIRATORY NEGATIVE: 1
GASTROINTESTINAL NEGATIVE: 1

## 2024-08-31 NOTE — PLAN OF CARE
Problem: Chronic Conditions and Co-morbidities  Goal: Patient's chronic conditions and co-morbidity symptoms are monitored and maintained or improved  Outcome: Progressing  Flowsheets (Taken 8/30/2024 2000)  Care Plan - Patient's Chronic Conditions and Co-Morbidity Symptoms are Monitored and Maintained or Improved:   Collaborate with multidisciplinary team to address chronic and comorbid conditions and prevent exacerbation or deterioration   Monitor and assess patient's chronic conditions and comorbid symptoms for stability, deterioration, or improvement   Update acute care plan with appropriate goals if chronic or comorbid symptoms are exacerbated and prevent overall improvement and discharge     Problem: Discharge Planning  Goal: Discharge to home or other facility with appropriate resources  Outcome: Progressing  Flowsheets (Taken 8/30/2024 2000)  Discharge to home or other facility with appropriate resources:   Identify barriers to discharge with patient and caregiver   Arrange for needed discharge resources and transportation as appropriate   Identify discharge learning needs (meds, wound care, etc)   Arrange for interpreters to assist at discharge as needed   Refer to discharge planning if patient needs post-hospital services based on physician order or complex needs related to functional status, cognitive ability or social support system     Problem: Pain  Goal: Verbalizes/displays adequate comfort level or baseline comfort level  Outcome: Progressing  Flowsheets (Taken 8/30/2024 2035)  Verbalizes/displays adequate comfort level or baseline comfort level:   Encourage patient to monitor pain and request assistance   Assess pain using appropriate pain scale   Administer analgesics based on type and severity of pain and evaluate response   Implement non-pharmacological measures as appropriate and evaluate response   Consider cultural and social influences on pain and pain management   Notify Licensed

## 2024-08-31 NOTE — PROGRESS NOTES
[facility]  Office: [phone]  Guy Reddy DO, Higinio Delgado, DO, Lei Mtz DO, Moe Dejesus, DO, Michael Rea MD, Madhavi Borjas MD, Loreto Izquierdo MD, Maya Carpio MD,  Matias Cueva MD, Stan Ramos MD, Azam Ann MD,  Derrick Aparicio DO, Rashi Lebron MD, Patel Lamas MD, Iain Reddy DO, Ewa Jett MD,  Francis Perez DO, Maribeth Francisco MD, Lisy Jameson MD, Karen Jade MD, Cliff Barnhart MD,  Evert Brandon MD, Samantha Hsieh MD, Juli Wooten MD, Pepe Butler MD, Pito Beltran MD, Shara Yousif MD, Jaren Laughlin DO, Solitario Isaac DO, Fab Lucas DO, Scott Boss MD,  Wyatt Noyola MD, Shirley Waterhouse, CNP,  Cristina Fung, CNP, Jaren Arellano, CNP,  Pearl Bishop, DNP, Jennifer Lara, CNP, Mariana Brito, CNP, Blanca Alvarado, CNP, Tabitha Lundberg, CNP, Airam Hedrick, PA-C, Kristen Rosado, PA-C, Yuly King, CNP, Chastity Pedraza, CNP,  Akanksha Aguirre, CNP, Gabriela Charles, CNP, Cathleen Grullon, CNP, Yudith Guillen, CNS, Le Garza, CNP, Sarah Cheung, CNP, Tracy Schwab, CNP         [facility]   IN-PATIENT SERVICE   [facility]    Progress Note    8/31/2024    8:31 AM    Name:   Tejas Machado  MRN:     [id number]     Acct:      [id number]   Room:   OBS 04/04-1  IP Day:  2  Admit Date:  8/28/2024 10:04 PM    PCP:   Chelly Hess APRN - CNP  Code Status:  Full Code    Subjective:     C/C:   Chief Complaint   Patient presents with    Cough    Emesis     Interval History Status: improved.     Patient seen and examined.  He reports well.  No chest pain or shortness of breath.  Discussed with wife on the phone.  His hemoglobin is improved to 8.6, GI recommended outpatient follow-up and hold off Eliquis till workup completed.  He is on room air saturating around 98%.    Brief History:     78-year-old male with known medical history of A-fib on Eliquis, hypertension, CKD stage III, history of GI bleed presents  Yes       Plan:        Acute on chronic anemia-history of GI bleed with anemia, hemoglobin found to blood transfusion, GI to hold Eliquis till patient evaluated by heme-onc in the office as discussed with Dr Beltran.  Continue protonix, follow up in office for VCE. Also given follow up with hem onc    Leukopenia- herson 2/2 infection    COVID-19 infection-chest x-ray does not show any infiltrate, continue to monitor saturations, on remdesivir, will need clearance for discharge    GUZMAN on CKD stage III-baseline of 2, presented with 2.6, improved with hydration    Chronic systolic and diastolic heart failure-continue Coreg    J-trw-snthuape Coreg, hold Eliquis    Need sleep study outpatient    Rashi Lebron MD  8/31/2024  8:31 AM

## 2024-08-31 NOTE — PLAN OF CARE
Problem: Chronic Conditions and Co-morbidities  Goal: Patient's chronic conditions and co-morbidity symptoms are monitored and maintained or improved  8/31/2024 1028 by Delia Sheridan RN  Outcome: Progressing  8/31/2024 0048 by Natalie Blue RN  Outcome: Progressing  Flowsheets (Taken 8/30/2024 2000)  Care Plan - Patient's Chronic Conditions and Co-Morbidity Symptoms are Monitored and Maintained or Improved:   Collaborate with multidisciplinary team to address chronic and comorbid conditions and prevent exacerbation or deterioration   Monitor and assess patient's chronic conditions and comorbid symptoms for stability, deterioration, or improvement   Update acute care plan with appropriate goals if chronic or comorbid symptoms are exacerbated and prevent overall improvement and discharge     Problem: Discharge Planning  Goal: Discharge to home or other facility with appropriate resources  8/31/2024 1028 by Delia Sheridan RN  Outcome: Progressing  8/31/2024 0048 by Natalie Blue RN  Outcome: Progressing  Flowsheets (Taken 8/30/2024 2000)  Discharge to home or other facility with appropriate resources:   Identify barriers to discharge with patient and caregiver   Arrange for needed discharge resources and transportation as appropriate   Identify discharge learning needs (meds, wound care, etc)   Arrange for interpreters to assist at discharge as needed   Refer to discharge planning if patient needs post-hospital services based on physician order or complex needs related to functional status, cognitive ability or social support system     Problem: Pain  Goal: Verbalizes/displays adequate comfort level or baseline comfort level  8/31/2024 1028 by Delia Sheridan RN  Outcome: Progressing  8/31/2024 0048 by Natalie Blue RN  Outcome: Progressing  Flowsheets (Taken 8/30/2024 2035)  Verbalizes/displays adequate comfort level or baseline comfort level:   Encourage patient to monitor pain and request assistance

## 2024-08-31 NOTE — PROGRESS NOTES
[facility]  Physician Progress Note    Tejas Machado  MRN: [id number]  Date: 8/31/2024  LOS: 2     Reason for F/U :   COVID-19 virus infection    Impression :   COVID-19 virus infection tested positive at home 8/26/2024 and positive here 8/29/2024  Fully vaccinated-last booster 2023  Anemia  Thrombocytopenia  Essential hypertension  Chronic kidney disease stage III  Congestive heart failure  Atrial fibrillation on Eliquis    Recommendations:   He would be a candidate for antiviral therapy with Paxlovid but due to the Eliquis he cannot take this.  Continue antiviral therapy with remdesivir  The patient did not require 02  CRP: 158-->57.  No indication for steroids at this time    Infection Control Recommendations:   Droplet plus precautions-10 days total    Discharge Planning:   Estimated Length of IV antimicrobials: 5 days  Patient will need Midline Catheter Insertion/ PICC line Insertion: No  Patient will need: Home IV , Infusion Center,  SNF,  LTAC: Undetermined  Patient willneed outpatient wound care: No    Medical Decision making / Summary of Stay:   Tejas Machado is a 58 y.o.-year-old male who was initially admitted on 8/28/2024.     Tejas has hypertension, hyperlipidemia, congestive heart failure, chronic kidney disease stage III, atrial fibrillation on Eliquis among other medical problems.     The patient reports that he was out of town over the weekend went to [place] on Saturday and then was in [place] on Sunday and on Monday he noticed that he started having some cough, shortness of breath, fatigue, nausea/vomiting-difficulty keeping anything down, and his wife tested him for COVID-19 virus infection and it came back positive.  He is reporting that his wife did not test positive.  The patient was not able to take any of his medications and did not really report any significant shortness of breath and has not had any diarrhea.  The patient has been fully vaccinated and took his

## 2024-09-03 NOTE — DISCHARGE SUMMARY
[facility]  Office: [phone]  Guy Reddy DO, Higinio Delgado DO, Lei Mtz DO, Moe Dejesus DO, Michael Rea MD, Madhavi Borjas MD, Loreto Izquierdo MD, Maya Carpio MD,  Matias Cueva MD, Stan Ramos MD, Fab Lucas DO, Azam Ann MD,  Derrick Aparicio DO, Rashi Lebron MD, Patel Lamas MD, Iain Reddy DO, Ewa Jett MD,  Francis Perez DO, Maribeth Francisco MD, Lisy Jameson MD, Karen Jade MD, Cliff Barnhart MD,  Evert Brandon MD, Samantha Hsieh MD, Juli Wooten MD, Solitario Isaac DO, Scott Boss MD,  Wyatt Noyola MD, Shirley Waterhouse, CNP,  Cristina Fung, CNP, Gabriela Charles, CNP, Jaren Arellano, CNP,  Pearl Bishop, DNP, Jennifer Lara, CNP, Mariana Brito, CNP, Cathleen Grullon, CNP, Blanca Alvarado, CNP, Tabitha Lundberg, CNP, Naren Markham PA-C, Yudith Guillen, CNS, Le Garza, CNP, Sarah Cheung, CNP         [facility]   IN-PATIENT SERVICE   [facility]    Discharge Summary     Patient ID: Tejas Machado  :  1965   MRN: [id number]     ACCOUNT:  [id number]   Patient's PCP: Chelly Hess APRN - CNP  Admit Date: 2024   Discharge Date: 2024  Length of Stay: 2  Code Status:  Prior  Admitting Physician: No admitting provider for patient encounter.  Discharge Physician: Pito Beltran MD     Active Discharge Diagnoses:     Hospital Problem Lists:  Principal Problem:    Anemia  Active Problems:    Atrial fibrillation (HCC)    Chronic systolic (congestive) heart failure (HCC)    Nonischemic cardiomyopathy (HCC)    Chronic combined systolic and diastolic CHF (congestive heart failure) (HCC)    Acute anemia    Hypertension, essential    Renal insufficiency    Stage 3b chronic kidney disease (HCC)    History of GI bleed    COVID-19    Acute kidney injury superimposed on CKD (HCC)    Nausea and vomiting    CKD (chronic kidney disease) stage 4, GFR 15-29 ml/min (HCC)    Drop in hemoglobin    Elevated

## 2024-09-09 ENCOUNTER — TELEPHONE (OUTPATIENT)
Dept: ONCOLOGY | Age: 59
End: 2024-09-09

## 2024-09-10 ENCOUNTER — HOSPITAL ENCOUNTER (OUTPATIENT)
Age: 59
Setting detail: SPECIMEN
Discharge: HOME | End: 2024-09-10

## 2024-09-10 ENCOUNTER — OFFICE VISIT (OUTPATIENT)
Dept: FAMILY MEDICINE CLINIC | Age: 59
End: 2024-09-10
Payer: COMMERCIAL

## 2024-09-10 VITALS
HEIGHT: 70 IN | DIASTOLIC BLOOD PRESSURE: 94 MMHG | BODY MASS INDEX: 32.96 KG/M2 | WEIGHT: 230.2 LBS | SYSTOLIC BLOOD PRESSURE: 158 MMHG | OXYGEN SATURATION: 98 % | HEART RATE: 85 BPM | TEMPERATURE: 98.4 F

## 2024-09-10 DIAGNOSIS — Z09 HOSPITAL DISCHARGE FOLLOW-UP: Primary | ICD-10-CM

## 2024-09-10 DIAGNOSIS — I48.0 PAROXYSMAL ATRIAL FIBRILLATION (HCC): ICD-10-CM

## 2024-09-10 DIAGNOSIS — N18.32 STAGE 3B CHRONIC KIDNEY DISEASE (HCC): ICD-10-CM

## 2024-09-10 DIAGNOSIS — D64.9 ACUTE ANEMIA: ICD-10-CM

## 2024-09-10 DIAGNOSIS — K31.811 AVM (ARTERIOVENOUS MALFORMATION) OF DUODENUM, ACQUIRED WITH HEMORRHAGE: ICD-10-CM

## 2024-09-10 DIAGNOSIS — I10 HYPERTENSION, ESSENTIAL: ICD-10-CM

## 2024-09-10 DIAGNOSIS — I50.42 CHRONIC COMBINED SYSTOLIC AND DIASTOLIC CHF (CONGESTIVE HEART FAILURE) (HCC): ICD-10-CM

## 2024-09-10 LAB
BASOPHILS # BLD: 0.05 K/UL (ref 0–0.2)
BASOPHILS NFR BLD: 1 % (ref 0–2)
EOSINOPHIL # BLD: 0.15 K/UL (ref 0–0.44)
EOSINOPHILS RELATIVE PERCENT: 3 % (ref 1–4)
ERYTHROCYTE [DISTWIDTH] IN BLOOD BY AUTOMATED COUNT: 18.9 % (ref 11.8–14.4)
HCT VFR BLD AUTO: 30.5 % (ref 40.7–50.3)
HGB BLD-MCNC: 8.6 G/DL (ref 13–17)
IMM GRANULOCYTES # BLD AUTO: 0.05 K/UL (ref 0–0.3)
IMM GRANULOCYTES NFR BLD: 1 %
LYMPHOCYTES NFR BLD: 1.07 K/UL (ref 1.1–3.7)
LYMPHOCYTES RELATIVE PERCENT: 21 % (ref 24–43)
MCH RBC QN AUTO: 21.1 PG (ref 25.2–33.5)
MCHC RBC AUTO-ENTMCNC: 28.2 G/DL (ref 28.4–34.8)
MCV RBC AUTO: 74.9 FL (ref 82.6–102.9)
MONOCYTES NFR BLD: 0.41 K/UL (ref 0.1–1.2)
MONOCYTES NFR BLD: 8 % (ref 3–12)
MORPHOLOGY: ABNORMAL
NEUTROPHILS NFR BLD: 66 % (ref 36–65)
NEUTS SEG NFR BLD: 3.37 K/UL (ref 1.5–8.1)
NRBC BLD-RTO: 0 PER 100 WBC
PLATELET # BLD AUTO: 338 K/UL (ref 138–453)
PMV BLD AUTO: 10.6 FL (ref 8.1–13.5)
RBC # BLD AUTO: 4.07 M/UL (ref 4.21–5.77)
WBC OTHER # BLD: 5.1 K/UL (ref 3.5–11.3)

## 2024-09-10 PROCEDURE — 99214 OFFICE O/P EST MOD 30 MIN: CPT

## 2024-09-10 PROCEDURE — G8427 DOCREV CUR MEDS BY ELIG CLIN: HCPCS

## 2024-09-10 PROCEDURE — 3077F SYST BP >= 140 MM HG: CPT

## 2024-09-10 PROCEDURE — 1036F TOBACCO NON-USER: CPT

## 2024-09-10 PROCEDURE — G8417 CALC BMI ABV UP PARAM F/U: HCPCS

## 2024-09-10 PROCEDURE — 3080F DIAST BP >= 90 MM HG: CPT

## 2024-09-10 PROCEDURE — 3017F COLORECTAL CA SCREEN DOC REV: CPT

## 2024-09-10 PROCEDURE — 1111F DSCHRG MED/CURRENT MED MERGE: CPT

## 2024-09-10 RX ORDER — APIXABAN 5 MG/1
5 TABLET, FILM COATED ORAL 2 TIMES DAILY
COMMUNITY
Start: 2024-08-30

## 2024-09-10 ASSESSMENT — PATIENT HEALTH QUESTIONNAIRE - PHQ9
SUM OF ALL RESPONSES TO PHQ QUESTIONS 1-9: 0
2. FEELING DOWN, DEPRESSED OR HOPELESS: NOT AT ALL
1. LITTLE INTEREST OR PLEASURE IN DOING THINGS: NOT AT ALL
SUM OF ALL RESPONSES TO PHQ9 QUESTIONS 1 & 2: 0
SUM OF ALL RESPONSES TO PHQ QUESTIONS 1-9: 0

## 2024-10-29 ENCOUNTER — OFFICE VISIT (OUTPATIENT)
Dept: FAMILY MEDICINE CLINIC | Age: 59
End: 2024-10-29
Payer: COMMERCIAL

## 2024-10-29 ENCOUNTER — HOSPITAL ENCOUNTER (OUTPATIENT)
Age: 59
Setting detail: SPECIMEN
Discharge: HOME | End: 2024-10-29

## 2024-10-29 VITALS
WEIGHT: 237.2 LBS | BODY MASS INDEX: 34.03 KG/M2 | DIASTOLIC BLOOD PRESSURE: 82 MMHG | OXYGEN SATURATION: 98 % | SYSTOLIC BLOOD PRESSURE: 128 MMHG | TEMPERATURE: 98.2 F | HEART RATE: 57 BPM

## 2024-10-29 DIAGNOSIS — I10 HYPERTENSION, ESSENTIAL: ICD-10-CM

## 2024-10-29 DIAGNOSIS — D64.9 ANEMIA, UNSPECIFIED TYPE: ICD-10-CM

## 2024-10-29 DIAGNOSIS — D64.9 ANEMIA, UNSPECIFIED TYPE: Primary | ICD-10-CM

## 2024-10-29 DIAGNOSIS — N18.32 STAGE 3B CHRONIC KIDNEY DISEASE (HCC): ICD-10-CM

## 2024-10-29 DIAGNOSIS — R13.12 OROPHARYNGEAL DYSPHAGIA: ICD-10-CM

## 2024-10-29 PROBLEM — N18.9 ACUTE KIDNEY INJURY SUPERIMPOSED ON CKD (HCC): Status: RESOLVED | Noted: 2024-08-29 | Resolved: 2024-10-29

## 2024-10-29 PROBLEM — N17.9 ACUTE KIDNEY INJURY SUPERIMPOSED ON CKD (HCC): Status: RESOLVED | Noted: 2024-08-29 | Resolved: 2024-10-29

## 2024-10-29 PROBLEM — R71.0 DROP IN HEMOGLOBIN: Status: RESOLVED | Noted: 2024-08-29 | Resolved: 2024-10-29

## 2024-10-29 LAB
ALBUMIN SERPL-MCNC: 4.5 G/DL (ref 3.5–5.2)
ALBUMIN/GLOB SERPL: 1.7 {RATIO} (ref 1–2.5)
ALP SERPL-CCNC: 63 U/L (ref 40–129)
ALT SERPL-CCNC: 14 U/L (ref 10–50)
ANION GAP SERPL CALCULATED.3IONS-SCNC: 11 MMOL/L (ref 9–16)
AST SERPL-CCNC: 24 U/L (ref 10–50)
BASOPHILS # BLD: 0.05 K/UL (ref 0–0.2)
BASOPHILS NFR BLD: 1 % (ref 0–2)
BILIRUB SERPL-MCNC: 0.5 MG/DL (ref 0–1.2)
BUN SERPL-MCNC: 27 MG/DL (ref 8–23)
CALCIUM SERPL-MCNC: 9.3 MG/DL (ref 8.6–10.4)
CHLORIDE SERPL-SCNC: 104 MMOL/L (ref 98–107)
CO2 SERPL-SCNC: 26 MMOL/L (ref 20–31)
CREAT SERPL-MCNC: 2.3 MG/DL (ref 0.7–1.2)
EOSINOPHIL # BLD: 0.35 K/UL (ref 0–0.44)
EOSINOPHILS RELATIVE PERCENT: 7 % (ref 1–4)
ERYTHROCYTE [DISTWIDTH] IN BLOOD BY AUTOMATED COUNT: 18.3 % (ref 11.8–14.4)
GFR, ESTIMATED: 32 ML/MIN/1.73M2
GLUCOSE SERPL-MCNC: 100 MG/DL (ref 74–99)
HCT VFR BLD AUTO: 34.2 % (ref 40.7–50.3)
HGB BLD-MCNC: 9.3 G/DL (ref 13–17)
IMM GRANULOCYTES # BLD AUTO: 0 K/UL (ref 0–0.3)
IMM GRANULOCYTES NFR BLD: 0 %
LYMPHOCYTES NFR BLD: 1.15 K/UL (ref 1.1–3.7)
LYMPHOCYTES RELATIVE PERCENT: 23 % (ref 24–43)
MCH RBC QN AUTO: 20.9 PG (ref 25.2–33.5)
MCHC RBC AUTO-ENTMCNC: 27.2 G/DL (ref 28.4–34.8)
MCV RBC AUTO: 77 FL (ref 82.6–102.9)
MONOCYTES NFR BLD: 0.4 K/UL (ref 0.1–1.2)
MONOCYTES NFR BLD: 8 % (ref 3–12)
MORPHOLOGY: ABNORMAL
NEUTROPHILS NFR BLD: 61 % (ref 36–65)
NEUTS SEG NFR BLD: 3.05 K/UL (ref 1.5–8.1)
NRBC BLD-RTO: 0 PER 100 WBC
PLATELET # BLD AUTO: ABNORMAL K/UL (ref 138–453)
PLATELET, FLUORESCENCE: 190 K/UL (ref 138–453)
PLATELETS.RETICULATED NFR BLD AUTO: 5.5 % (ref 1.1–10.3)
POTASSIUM SERPL-SCNC: 4.2 MMOL/L (ref 3.7–5.3)
PROT SERPL-MCNC: 7.1 G/DL (ref 6.6–8.7)
RBC # BLD AUTO: 4.44 M/UL (ref 4.21–5.77)
SODIUM SERPL-SCNC: 141 MMOL/L (ref 136–145)
WBC OTHER # BLD: 5 K/UL (ref 3.5–11.3)

## 2024-10-29 PROCEDURE — 99215 OFFICE O/P EST HI 40 MIN: CPT | Performed by: STUDENT IN AN ORGANIZED HEALTH CARE EDUCATION/TRAINING PROGRAM

## 2024-10-29 PROCEDURE — 3079F DIAST BP 80-89 MM HG: CPT | Performed by: STUDENT IN AN ORGANIZED HEALTH CARE EDUCATION/TRAINING PROGRAM

## 2024-10-29 PROCEDURE — 3017F COLORECTAL CA SCREEN DOC REV: CPT | Performed by: STUDENT IN AN ORGANIZED HEALTH CARE EDUCATION/TRAINING PROGRAM

## 2024-10-29 PROCEDURE — G8484 FLU IMMUNIZE NO ADMIN: HCPCS | Performed by: STUDENT IN AN ORGANIZED HEALTH CARE EDUCATION/TRAINING PROGRAM

## 2024-10-29 PROCEDURE — 3074F SYST BP LT 130 MM HG: CPT | Performed by: STUDENT IN AN ORGANIZED HEALTH CARE EDUCATION/TRAINING PROGRAM

## 2024-10-29 PROCEDURE — 1036F TOBACCO NON-USER: CPT | Performed by: STUDENT IN AN ORGANIZED HEALTH CARE EDUCATION/TRAINING PROGRAM

## 2024-10-29 PROCEDURE — G8427 DOCREV CUR MEDS BY ELIG CLIN: HCPCS | Performed by: STUDENT IN AN ORGANIZED HEALTH CARE EDUCATION/TRAINING PROGRAM

## 2024-10-29 PROCEDURE — G8417 CALC BMI ABV UP PARAM F/U: HCPCS | Performed by: STUDENT IN AN ORGANIZED HEALTH CARE EDUCATION/TRAINING PROGRAM

## 2024-10-29 NOTE — PROGRESS NOTES
[facility]  [facility]  [address]  [place] [address]     Date of Visit:  10/29/2024  Patient Name: Tejas Machado   Patient :  1965     CHIEF COMPLAINT:     Tejas Machado is a 58 y.o. male who presents today for an general visit to be evaluated for the following condition(s):  Chief Complaint   Patient presents with    New Patient    Hypertension       REVIEW OF SYSTEM      Review of Systems    HISTORY OF PRESENT ILLNESS     History of Present Illness  The patient is a 58-year-old male who presents to [facility] care and for a blood pressure check. He is accompanied by his wife.    He reports feeling well overall. His blood pressure readings have been high, with a systolic reading of 190 and a diastolic reading of 90. He has a history of hypertension and chronic kidney disease stage 3. There is a family history of high blood pressure, enlarged heart, and congestive heart failure.    He has a history of atrial fibrillation and was previously on Eliquis, which he discontinued due to bleeding issues. He also takes Kiarra aspirin. His wife has noticed blood in his stool and has started him on Colace. He takes iron tablets every other day. His hemoglobin level was recorded as 4.5 in the hopsital and he has a history of low hemoglobin levels, which have caused dizziness. He has undergone a colonoscopy and an EGD in the past. He has also had a Doppler test done.    He has experienced episodes of choking on solid food, which have led to coughing fits lasting about 10 minutes. He has had a lung screening this year.    He has a history of smoking, having quit 3 years ago after a 44 pack-year history. He also has a history of heavy alcohol consumption but now only drinks occasionally. He reports no use of marijuana, CBD, Valium, or LSD.    He has had his tonsils removed.    His wife mentions that he has a fondness for hot dogs, consuming them frequently throughout the day however

## 2024-12-16 ENCOUNTER — HOSPITAL ENCOUNTER (OUTPATIENT)
Dept: ULTRASOUND IMAGING | Age: 59
Discharge: HOME | End: 2024-12-18
Payer: COMMERCIAL

## 2024-12-16 ENCOUNTER — HOSPITAL ENCOUNTER (OUTPATIENT)
Dept: CT IMAGING | Age: 59
Discharge: HOME | End: 2024-12-18
Payer: COMMERCIAL

## 2024-12-16 ENCOUNTER — HOSPITAL ENCOUNTER (OUTPATIENT)
Dept: GENERAL RADIOLOGY | Age: 59
Discharge: HOME | End: 2024-12-18
Payer: COMMERCIAL

## 2024-12-16 DIAGNOSIS — N18.32 STAGE 3B CHRONIC KIDNEY DISEASE (HCC): ICD-10-CM

## 2024-12-16 DIAGNOSIS — Z87.891 PERSONAL HISTORY OF TOBACCO USE: ICD-10-CM

## 2024-12-16 DIAGNOSIS — I10 ESSENTIAL HYPERTENSION: ICD-10-CM

## 2024-12-16 DIAGNOSIS — R13.12 OROPHARYNGEAL DYSPHAGIA: ICD-10-CM

## 2024-12-16 PROCEDURE — 71271 CT THORAX LUNG CANCER SCR C-: CPT

## 2024-12-16 PROCEDURE — 74230 X-RAY XM SWLNG FUNCJ C+: CPT

## 2024-12-16 PROCEDURE — 92611 MOTION FLUOROSCOPY/SWALLOW: CPT

## 2024-12-16 PROCEDURE — 76770 US EXAM ABDO BACK WALL COMP: CPT

## 2024-12-16 NOTE — PROCEDURES
INSTRUMENTAL SWALLOW REPORT  MODIFIED BARIUM SWALLOW    NAME: Tejas Machado   : 1965  MRN: [id number]       Date of Eval: 2024              Referring Diagnosis(es):      Past Medical History:  has a past medical history of Atrial fibrillation (HCC), Benign essential HTN, CKD (chronic kidney disease), stage III (HCC), Enlarged heart, and Hypertensive urgency.  Past Surgical History:  has a past surgical history that includes Tonsillectomy; Upper gastrointestinal endoscopy (N/A, 2021); Colonoscopy (N/A, 2021); Esophagogastroduodenoscopy (N/A, 03/15/2023); Upper gastrointestinal endoscopy (03/15/2023); Upper gastrointestinal endoscopy (03/15/2023); Esophagogastroduodenoscopy (Right, 2024); Upper gastrointestinal endoscopy (N/A, 2024); Colonoscopy (N/A, 2024); and Colonoscopy (N/A, 2024).       Type of Study: Initial MBS     Patient Complaints/Reason for Referral:  Tejas Machado was referred for a MBS to assess the efficiency of his/her swallow function, assess for aspiration, and to make recommendations regarding safe dietary consistencies, effective compensatory strategies, and safe eating environment.       Onset of problem:      Pt reports that he sometimes get choked on small particles of food and coughs.  Happens without food as well with tickle in throat and cough.     Behavior/Cognition/Vision/Hearing:  Behavior/Cognition: Alert;Cooperative  Vision: Within Functional Limits  Hearing: Within functional limits    Impressions:  Patient presents with safe swallow for Regular diet with thin liquids as evidenced by no observed  aspiration noted with consistencies tested.  Recommend small sips and bites, only feed when alert and awake and upright at 90 degrees for all PO intake.  Recommend close monitoring for overt/clinical s/s of aspiration and D/C PO intake and complete Modified Barium Swallow Study should they occur.  Results and recommendations reported to

## 2024-12-18 DIAGNOSIS — R91.1 LUNG NODULE SEEN ON IMAGING STUDY: Primary | ICD-10-CM

## 2024-12-30 DIAGNOSIS — R91.1 LUNG NODULE SEEN ON IMAGING STUDY: Primary | ICD-10-CM

## 2025-01-03 ENCOUNTER — HOSPITAL ENCOUNTER (OUTPATIENT)
Dept: NUCLEAR MEDICINE | Age: 60
Discharge: HOME | End: 2025-01-05
Attending: STUDENT IN AN ORGANIZED HEALTH CARE EDUCATION/TRAINING PROGRAM
Payer: COMMERCIAL

## 2025-01-03 ENCOUNTER — HOSPITAL ENCOUNTER (OUTPATIENT)
Dept: CT IMAGING | Age: 60
Discharge: HOME | End: 2025-01-03
Attending: STUDENT IN AN ORGANIZED HEALTH CARE EDUCATION/TRAINING PROGRAM
Payer: COMMERCIAL

## 2025-01-03 DIAGNOSIS — R91.1 LUNG NODULE SEEN ON IMAGING STUDY: ICD-10-CM

## 2025-01-03 LAB — GLUCOSE BLD-MCNC: 82 MG/DL (ref 75–110)

## 2025-01-03 PROCEDURE — 2500000003 HC RX 250 WO HCPCS: Performed by: STUDENT IN AN ORGANIZED HEALTH CARE EDUCATION/TRAINING PROGRAM

## 2025-01-03 PROCEDURE — 71250 CT THORAX DX C-: CPT

## 2025-01-03 PROCEDURE — A9609 HC RX DIAGNOSTIC RADIOPHARMACEUTICAL: HCPCS | Performed by: STUDENT IN AN ORGANIZED HEALTH CARE EDUCATION/TRAINING PROGRAM

## 2025-01-03 PROCEDURE — 3430000000 HC RX DIAGNOSTIC RADIOPHARMACEUTICAL: Performed by: STUDENT IN AN ORGANIZED HEALTH CARE EDUCATION/TRAINING PROGRAM

## 2025-01-03 PROCEDURE — 82947 ASSAY GLUCOSE BLOOD QUANT: CPT

## 2025-01-03 PROCEDURE — 78815 PET IMAGE W/CT SKULL-THIGH: CPT

## 2025-01-03 RX ORDER — FLUDEOXYGLUCOSE F 18 200 MCI/ML
10 INJECTION, SOLUTION INTRAVENOUS
Status: COMPLETED | OUTPATIENT
Start: 2025-01-03 | End: 2025-01-03

## 2025-01-03 RX ORDER — SODIUM CHLORIDE 0.9 % (FLUSH) 0.9 %
10 SYRINGE (ML) INJECTION ONCE
Status: COMPLETED | OUTPATIENT
Start: 2025-01-03 | End: 2025-01-03

## 2025-01-03 RX ADMIN — FLUDEOXYGLUCOSE F 18 8.96 MILLICURIE: 200 INJECTION, SOLUTION INTRAVENOUS at 15:15

## 2025-01-03 RX ADMIN — SODIUM CHLORIDE, PRESERVATIVE FREE 10 ML: 5 INJECTION INTRAVENOUS at 15:15

## 2025-01-07 DIAGNOSIS — R91.1 LUNG NODULE SEEN ON IMAGING STUDY: Primary | ICD-10-CM

## 2025-01-08 DIAGNOSIS — D64.9 ANEMIA, UNSPECIFIED TYPE: ICD-10-CM

## 2025-01-08 DIAGNOSIS — Z12.5 SCREENING FOR PROSTATE CANCER: Primary | ICD-10-CM

## 2025-01-17 ENCOUNTER — OFFICE VISIT (OUTPATIENT)
Dept: PULMONOLOGY | Age: 60
End: 2025-01-17
Payer: COMMERCIAL

## 2025-01-17 VITALS
RESPIRATION RATE: 16 BRPM | WEIGHT: 233 LBS | SYSTOLIC BLOOD PRESSURE: 131 MMHG | BODY MASS INDEX: 33.36 KG/M2 | DIASTOLIC BLOOD PRESSURE: 78 MMHG | HEART RATE: 74 BPM | HEIGHT: 70 IN | OXYGEN SATURATION: 97 %

## 2025-01-17 DIAGNOSIS — R91.8 MULTIPLE PULMONARY NODULES: Primary | ICD-10-CM

## 2025-01-17 DIAGNOSIS — D72.10 EOSINOPHILIA, UNSPECIFIED TYPE: ICD-10-CM

## 2025-01-17 PROCEDURE — 3078F DIAST BP <80 MM HG: CPT | Performed by: INTERNAL MEDICINE

## 2025-01-17 PROCEDURE — 3017F COLORECTAL CA SCREEN DOC REV: CPT | Performed by: INTERNAL MEDICINE

## 2025-01-17 PROCEDURE — 99204 OFFICE O/P NEW MOD 45 MIN: CPT | Performed by: INTERNAL MEDICINE

## 2025-01-17 PROCEDURE — G8427 DOCREV CUR MEDS BY ELIG CLIN: HCPCS | Performed by: INTERNAL MEDICINE

## 2025-01-17 PROCEDURE — 3075F SYST BP GE 130 - 139MM HG: CPT | Performed by: INTERNAL MEDICINE

## 2025-01-17 PROCEDURE — G8417 CALC BMI ABV UP PARAM F/U: HCPCS | Performed by: INTERNAL MEDICINE

## 2025-01-17 PROCEDURE — 1036F TOBACCO NON-USER: CPT | Performed by: INTERNAL MEDICINE

## 2025-01-17 NOTE — PROGRESS NOTES
shortness of breath, cough, or phlegm production. He has not received annual influenza vaccinations, pneumonia vaccine, or recent COVID-19 booster shots. He possesses an albuterol inhaler for use as needed, although he reports minimal usage.    He has some kidney problems, but it does not bother him.    SOCIAL HISTORY  The patient quit smoking 2 years ago after smoking for about 30 years at a rate of 1 pack a day.    FAMILY HISTORY  The patient reports no family history of lung cancer. The patient mentions a family history of high blood pressure.    MEDICATIONS  Current: albuterol (as needed)    IMMUNIZATIONS  The patient has not received the influenza vaccine, pneumonia vaccine, or recent COVID-19 booster.    PHYSICAL EXAMINATION     Physical Exam      VITAL SIGNS:   /78 (Site: Left Upper Arm)   Pulse 74   Resp 16   Ht 1.778 m (5' 10\")   Wt 105.7 kg (233 lb)   SpO2 97% Comment: room air at rest  BMI 33.43 kg/m²   Wt Readings from Last 3 Encounters:   01/17/25 105.7 kg (233 lb)   01/15/25 106.6 kg (235 lb)   11/22/24 106.6 kg (235 lb)       SYSTEMIC EXAMINATION:  General appearance -  [x] well appearing  [] overweight  [] obese   [] morbidly obese [] cachectic [x] comfortable  [x] in no acute distress  [] chronically ill-appearing  [] in mild to moderate respiratory distress  Mental status -  [x] alert  [] oriented to person, place, and time  [] anxious  [] depressed mood   Head-  [x] atraumatic  [x] normocephalic  Eyes -  [] pupils equal and reactive    [] extraocular eye movements intact  [x] sclera anicteric  Ears -  [x] hearing grossly normal bilaterally [] bilateral TM's and external ear canals normal  Nose -  [x] normal and patent [] no erythema  [] discharge  Mouth -  [x] mucous membranes moist  [] pharynx normal without lesions [] erythematous  [] exudate noted  Mallampati Airway Score -  [] I (soft palate, uvula, fauces, tonsillar pillars visible) [] II (soft palate, uvula, fauces visible) []

## 2025-01-24 ENCOUNTER — HOSPITAL ENCOUNTER (OUTPATIENT)
Age: 60
Discharge: HOME | End: 2025-01-24
Payer: COMMERCIAL

## 2025-01-24 ENCOUNTER — INITIAL CONSULT (OUTPATIENT)
Dept: ONCOLOGY | Age: 60
End: 2025-01-24
Payer: COMMERCIAL

## 2025-01-24 ENCOUNTER — TELEPHONE (OUTPATIENT)
Dept: ONCOLOGY | Age: 60
End: 2025-01-24

## 2025-01-24 VITALS
HEART RATE: 53 BPM | RESPIRATION RATE: 16 BRPM | TEMPERATURE: 97.6 F | DIASTOLIC BLOOD PRESSURE: 95 MMHG | BODY MASS INDEX: 33.5 KG/M2 | SYSTOLIC BLOOD PRESSURE: 160 MMHG | HEIGHT: 70 IN | WEIGHT: 234 LBS

## 2025-01-24 DIAGNOSIS — D50.0 IRON DEFICIENCY ANEMIA DUE TO CHRONIC BLOOD LOSS: ICD-10-CM

## 2025-01-24 DIAGNOSIS — D50.0 IRON DEFICIENCY ANEMIA DUE TO CHRONIC BLOOD LOSS: Primary | ICD-10-CM

## 2025-01-24 DIAGNOSIS — K90.9 IRON MALABSORPTION: ICD-10-CM

## 2025-01-24 LAB
BASOPHILS # BLD: 0.04 K/UL (ref 0–0.2)
BASOPHILS NFR BLD: 1 % (ref 0–2)
EOSINOPHIL # BLD: 0.18 K/UL (ref 0–0.44)
EOSINOPHILS RELATIVE PERCENT: 5 % (ref 1–4)
ERYTHROCYTE [DISTWIDTH] IN BLOOD BY AUTOMATED COUNT: 15.8 % (ref 11.8–14.4)
FERRITIN SERPL-MCNC: 12 NG/ML
FOLATE SERPL-MCNC: 11.7 NG/ML (ref 4.8–24.2)
HCT VFR BLD AUTO: 29.6 % (ref 40.7–50.3)
HGB BLD-MCNC: 8.4 G/DL (ref 13–17)
IMM GRANULOCYTES # BLD AUTO: 0.01 K/UL (ref 0–0.3)
IMM GRANULOCYTES NFR BLD: 0 %
IRON SATN MFR SERPL: 3 % (ref 20–55)
IRON SERPL-MCNC: 12 UG/DL (ref 61–157)
LYMPHOCYTES NFR BLD: 0.87 K/UL (ref 1.1–3.7)
LYMPHOCYTES RELATIVE PERCENT: 26 % (ref 24–43)
MCH RBC QN AUTO: 22.2 PG (ref 25.2–33.5)
MCHC RBC AUTO-ENTMCNC: 28.4 G/DL (ref 28.4–34.8)
MCV RBC AUTO: 78.1 FL (ref 82.6–102.9)
MONOCYTES NFR BLD: 0.29 K/UL (ref 0.1–1.2)
MONOCYTES NFR BLD: 9 % (ref 3–12)
NEUTROPHILS NFR BLD: 59 % (ref 36–65)
NEUTS SEG NFR BLD: 1.93 K/UL (ref 1.5–8.1)
NRBC BLD-RTO: 0 PER 100 WBC
PLATELET # BLD AUTO: 167 K/UL (ref 138–453)
PMV BLD AUTO: 9.7 FL (ref 8.1–13.5)
RBC # BLD AUTO: 3.79 M/UL (ref 4.21–5.77)
RBC # BLD: ABNORMAL 10*6/UL
TIBC SERPL-MCNC: 355 UG/DL (ref 250–450)
UNSATURATED IRON BINDING CAPACITY: 343 UG/DL (ref 112–347)
VIT B12 SERPL-MCNC: 644 PG/ML (ref 232–1245)
WBC OTHER # BLD: 3.3 K/UL (ref 3.5–11.3)

## 2025-01-24 PROCEDURE — 83540 ASSAY OF IRON: CPT

## 2025-01-24 PROCEDURE — 99202 OFFICE O/P NEW SF 15 MIN: CPT | Performed by: INTERNAL MEDICINE

## 2025-01-24 PROCEDURE — 85025 COMPLETE CBC W/AUTO DIFF WBC: CPT

## 2025-01-24 PROCEDURE — G8427 DOCREV CUR MEDS BY ELIG CLIN: HCPCS | Performed by: INTERNAL MEDICINE

## 2025-01-24 PROCEDURE — 82607 VITAMIN B-12: CPT

## 2025-01-24 PROCEDURE — 82746 ASSAY OF FOLIC ACID SERUM: CPT

## 2025-01-24 PROCEDURE — 3077F SYST BP >= 140 MM HG: CPT | Performed by: INTERNAL MEDICINE

## 2025-01-24 PROCEDURE — 99244 OFF/OP CNSLTJ NEW/EST MOD 40: CPT | Performed by: INTERNAL MEDICINE

## 2025-01-24 PROCEDURE — 3080F DIAST BP >= 90 MM HG: CPT | Performed by: INTERNAL MEDICINE

## 2025-01-24 PROCEDURE — G8417 CALC BMI ABV UP PARAM F/U: HCPCS | Performed by: INTERNAL MEDICINE

## 2025-01-24 PROCEDURE — 36415 COLL VENOUS BLD VENIPUNCTURE: CPT

## 2025-01-24 PROCEDURE — 83550 IRON BINDING TEST: CPT

## 2025-01-24 PROCEDURE — 82728 ASSAY OF FERRITIN: CPT

## 2025-01-24 RX ORDER — HYDROCORTISONE SODIUM SUCCINATE 100 MG/2ML
100 INJECTION INTRAMUSCULAR; INTRAVENOUS
OUTPATIENT
Start: 2025-01-24

## 2025-01-24 RX ORDER — ALBUTEROL SULFATE 90 UG/1
4 INHALANT RESPIRATORY (INHALATION) PRN
OUTPATIENT
Start: 2025-01-24

## 2025-01-24 RX ORDER — FAMOTIDINE 10 MG/ML
20 INJECTION, SOLUTION INTRAVENOUS
OUTPATIENT
Start: 2025-01-24

## 2025-01-24 RX ORDER — ONDANSETRON 2 MG/ML
8 INJECTION INTRAMUSCULAR; INTRAVENOUS
OUTPATIENT
Start: 2025-01-24

## 2025-01-24 RX ORDER — SODIUM CHLORIDE 9 MG/ML
5-250 INJECTION, SOLUTION INTRAVENOUS PRN
OUTPATIENT
Start: 2025-01-24

## 2025-01-24 RX ORDER — ACETAMINOPHEN 325 MG/1
650 TABLET ORAL
OUTPATIENT
Start: 2025-01-24

## 2025-01-24 RX ORDER — HEPARIN SODIUM (PORCINE) LOCK FLUSH IV SOLN 100 UNIT/ML 100 UNIT/ML
500 SOLUTION INTRAVENOUS PRN
OUTPATIENT
Start: 2025-01-24

## 2025-01-24 RX ORDER — EPINEPHRINE 1 MG/ML
0.3 INJECTION, SOLUTION, CONCENTRATE INTRAVENOUS PRN
OUTPATIENT
Start: 2025-01-24

## 2025-01-24 RX ORDER — SODIUM CHLORIDE 9 MG/ML
INJECTION, SOLUTION INTRAVENOUS CONTINUOUS
OUTPATIENT
Start: 2025-01-24

## 2025-01-24 RX ORDER — DIPHENHYDRAMINE HYDROCHLORIDE 50 MG/ML
50 INJECTION INTRAMUSCULAR; INTRAVENOUS
OUTPATIENT
Start: 2025-01-24

## 2025-01-24 RX ORDER — SODIUM CHLORIDE 0.9 % (FLUSH) 0.9 %
5-40 SYRINGE (ML) INJECTION PRN
OUTPATIENT
Start: 2025-01-24

## 2025-01-24 NOTE — TELEPHONE ENCOUNTER
BETO HERE FOR CONSULT   Labs today   IV iron infusion  RTC 2 weeks after last infusion  LABS ORDERED: CBC, FERRITIN, IRON & TIBCM VIT B12 & FOLATE   NEW ORDER PENDING PRECERT   MD VISIT: TBD   LABS PRINTED AND DONE ON EXIT   AVS PRINTED AND GIVEN ON EXIT

## 2025-01-24 NOTE — PROGRESS NOTES
Patient ID: Tejas Machado, 1965, [id number], 59 y.o.  Referred by : Abbie Cueva MD   Reason for consultation:   Iron deficiency anemia  HISTORY OF PRESENT ILLNESS:    Oncologic History:  Tejas Machado is a 59 y.o. male was seen here initial consultation visit for acute on chronic anemia and severe iron deficiency.    Patient has a history of atrial fibrillation has been following with cardiology and was on Eliquis and had a significant GI bleed in 2022 and that time his Eliquis was discontinued for bleed..  Subsequently he was put on aspirin and Eliquis again.  Patient recently again presented to ER with a hemoglobin of 6.6 requiring PRBC transfusion.  Patient was seen by gastroenterology at that time in August of last year and was planning for.  Capsule endoscopy as outpatient.    His Eliquis was discontinued at that time.  And since then he is only on aspirin patient also has a history of chronic kidney disease and following with nephrology.  He does have a history of chronic constipation and he is taking iron pill every other day.    Patient also following with pulmonologist for history of right upper lobe nodular density.  He quit smoking about 3 years ago.  Prior to that he has a 40-pack-year history of smoking.  He had a recent PET scan in January of this year which showed no metabolically active uptake in the pulmonary nodule however did show some activity in the prostate.    Past Medical History:   Diagnosis Date    Atrial fibrillation (HCC)     Benign essential HTN     CKD (chronic kidney disease), stage III (HCC) 09/29/2020    Secondary to suspected ischemic nephrosclerosis baseline creatinine 1.5-1.9    Enlarged heart     Hypertensive urgency 10/14/2023       Past Surgical History:   Procedure Laterality Date    COLONOSCOPY N/A 04/14/2021    COLONOSCOPY WITH BIOPSY performed by Maureen Sanchez MD at [facility]    COLONOSCOPY N/A 07/18/2024    COLONOSCOPY DIAGNOSTIC    COLONOSCOPY N/A

## 2025-02-07 ENCOUNTER — HOSPITAL ENCOUNTER (OUTPATIENT)
Dept: INFUSION THERAPY | Facility: MEDICAL CENTER | Age: 60
Discharge: HOME | End: 2025-02-07
Payer: COMMERCIAL

## 2025-02-07 DIAGNOSIS — D50.0 IRON DEFICIENCY ANEMIA DUE TO CHRONIC BLOOD LOSS: ICD-10-CM

## 2025-02-07 DIAGNOSIS — K90.9 IRON MALABSORPTION: Primary | ICD-10-CM

## 2025-02-07 PROCEDURE — 96365 THER/PROPH/DIAG IV INF INIT: CPT

## 2025-02-07 PROCEDURE — 6360000002 HC RX W HCPCS: Performed by: INTERNAL MEDICINE

## 2025-02-07 PROCEDURE — 96366 THER/PROPH/DIAG IV INF ADDON: CPT

## 2025-02-07 PROCEDURE — 2580000003 HC RX 258: Performed by: INTERNAL MEDICINE

## 2025-02-07 RX ORDER — SODIUM CHLORIDE 9 MG/ML
5-250 INJECTION, SOLUTION INTRAVENOUS PRN
OUTPATIENT
Start: 2025-02-21

## 2025-02-07 RX ORDER — FAMOTIDINE 10 MG/ML
20 INJECTION, SOLUTION INTRAVENOUS
OUTPATIENT
Start: 2025-02-21

## 2025-02-07 RX ORDER — HEPARIN 100 UNIT/ML
500 SYRINGE INTRAVENOUS PRN
OUTPATIENT
Start: 2025-02-21

## 2025-02-07 RX ORDER — SODIUM CHLORIDE 9 MG/ML
INJECTION, SOLUTION INTRAVENOUS CONTINUOUS
OUTPATIENT
Start: 2025-02-21

## 2025-02-07 RX ORDER — EPINEPHRINE 1 MG/ML
0.3 INJECTION, SOLUTION INTRAMUSCULAR; SUBCUTANEOUS PRN
OUTPATIENT
Start: 2025-02-21

## 2025-02-07 RX ORDER — SODIUM CHLORIDE 9 MG/ML
5-250 INJECTION, SOLUTION INTRAVENOUS PRN
Status: DISCONTINUED | OUTPATIENT
Start: 2025-02-07 | End: 2025-02-08 | Stop reason: HOSPADM

## 2025-02-07 RX ORDER — ACETAMINOPHEN 325 MG/1
650 TABLET ORAL
OUTPATIENT
Start: 2025-02-21

## 2025-02-07 RX ORDER — ALBUTEROL SULFATE 90 UG/1
4 INHALANT RESPIRATORY (INHALATION) PRN
OUTPATIENT
Start: 2025-02-21

## 2025-02-07 RX ORDER — SODIUM CHLORIDE 0.9 % (FLUSH) 0.9 %
5-40 SYRINGE (ML) INJECTION PRN
OUTPATIENT
Start: 2025-02-21

## 2025-02-07 RX ORDER — DIPHENHYDRAMINE HYDROCHLORIDE 50 MG/ML
50 INJECTION INTRAMUSCULAR; INTRAVENOUS
OUTPATIENT
Start: 2025-02-21

## 2025-02-07 RX ORDER — HYDROCORTISONE SODIUM SUCCINATE 100 MG/2ML
100 INJECTION INTRAMUSCULAR; INTRAVENOUS
OUTPATIENT
Start: 2025-02-21

## 2025-02-07 RX ORDER — ONDANSETRON 2 MG/ML
8 INJECTION INTRAMUSCULAR; INTRAVENOUS
OUTPATIENT
Start: 2025-02-21

## 2025-02-07 RX ADMIN — SODIUM CHLORIDE 100 ML/HR: 9 INJECTION, SOLUTION INTRAVENOUS at 14:05

## 2025-02-07 RX ADMIN — IRON SUCROSE 300 MG: 20 INJECTION, SOLUTION INTRAVENOUS at 14:22

## 2025-02-07 NOTE — PROGRESS NOTES
Patient arrived ambulatory with family member for 1/3 Venofer infusion.  Patient denies complaints or concerns.  IV inserted per unit protocol.  Printed education about Venofer provided.  Venofer infused with no sign adverse reaction;line flushed.  IV removed with pressure dressing applied.  Patient ambulated off unit with family member at discharge. Printed calendar in hand.

## 2025-02-21 ENCOUNTER — HOSPITAL ENCOUNTER (OUTPATIENT)
Dept: INFUSION THERAPY | Facility: MEDICAL CENTER | Age: 60
Discharge: HOME | End: 2025-02-21
Payer: COMMERCIAL

## 2025-02-21 VITALS
TEMPERATURE: 97.5 F | HEART RATE: 56 BPM | SYSTOLIC BLOOD PRESSURE: 149 MMHG | RESPIRATION RATE: 16 BRPM | DIASTOLIC BLOOD PRESSURE: 72 MMHG

## 2025-02-21 DIAGNOSIS — K90.9 IRON MALABSORPTION: Primary | ICD-10-CM

## 2025-02-21 DIAGNOSIS — D50.0 IRON DEFICIENCY ANEMIA DUE TO CHRONIC BLOOD LOSS: ICD-10-CM

## 2025-02-21 PROCEDURE — 96365 THER/PROPH/DIAG IV INF INIT: CPT

## 2025-02-21 PROCEDURE — 6360000002 HC RX W HCPCS: Performed by: INTERNAL MEDICINE

## 2025-02-21 PROCEDURE — 2580000003 HC RX 258: Performed by: INTERNAL MEDICINE

## 2025-02-21 PROCEDURE — 96366 THER/PROPH/DIAG IV INF ADDON: CPT

## 2025-02-21 RX ORDER — SODIUM CHLORIDE 9 MG/ML
INJECTION, SOLUTION INTRAVENOUS CONTINUOUS
OUTPATIENT
Start: 2025-03-07

## 2025-02-21 RX ORDER — SODIUM CHLORIDE 9 MG/ML
5-250 INJECTION, SOLUTION INTRAVENOUS PRN
OUTPATIENT
Start: 2025-03-07

## 2025-02-21 RX ORDER — SODIUM CHLORIDE 9 MG/ML
5-250 INJECTION, SOLUTION INTRAVENOUS PRN
Status: DISCONTINUED | OUTPATIENT
Start: 2025-02-21 | End: 2025-02-22 | Stop reason: HOSPADM

## 2025-02-21 RX ORDER — ALBUTEROL SULFATE 90 UG/1
4 INHALANT RESPIRATORY (INHALATION) PRN
OUTPATIENT
Start: 2025-03-07

## 2025-02-21 RX ORDER — FAMOTIDINE 10 MG/ML
20 INJECTION, SOLUTION INTRAVENOUS
OUTPATIENT
Start: 2025-03-07

## 2025-02-21 RX ORDER — ONDANSETRON 2 MG/ML
8 INJECTION INTRAMUSCULAR; INTRAVENOUS
OUTPATIENT
Start: 2025-03-07

## 2025-02-21 RX ORDER — HEPARIN 100 UNIT/ML
500 SYRINGE INTRAVENOUS PRN
OUTPATIENT
Start: 2025-03-07

## 2025-02-21 RX ORDER — HYDROCORTISONE SODIUM SUCCINATE 100 MG/2ML
100 INJECTION INTRAMUSCULAR; INTRAVENOUS
OUTPATIENT
Start: 2025-03-07

## 2025-02-21 RX ORDER — ACETAMINOPHEN 325 MG/1
650 TABLET ORAL
OUTPATIENT
Start: 2025-03-07

## 2025-02-21 RX ORDER — DIPHENHYDRAMINE HYDROCHLORIDE 50 MG/ML
50 INJECTION INTRAMUSCULAR; INTRAVENOUS
OUTPATIENT
Start: 2025-03-07

## 2025-02-21 RX ORDER — EPINEPHRINE 1 MG/ML
0.3 INJECTION, SOLUTION INTRAMUSCULAR; SUBCUTANEOUS PRN
OUTPATIENT
Start: 2025-03-07

## 2025-02-21 RX ORDER — SODIUM CHLORIDE 0.9 % (FLUSH) 0.9 %
5-40 SYRINGE (ML) INJECTION PRN
OUTPATIENT
Start: 2025-03-07

## 2025-02-21 RX ADMIN — SODIUM CHLORIDE 20 ML/HR: 0.9 INJECTION, SOLUTION INTRAVENOUS at 14:17

## 2025-02-21 RX ADMIN — IRON SUCROSE 300 MG: 20 INJECTION, SOLUTION INTRAVENOUS at 14:28

## 2025-02-21 NOTE — PROGRESS NOTES
Patient arrive ambulatory for 2 of 3 Venofer infusions.  Pt states he tolerated previous infusion well , no adverse reaction  Denies other complaint or concern.  Vitals as charted.  Peripheral IV established per policy.  Venofer infused with no sign of adverse reaction; line flushed while monitoring patient post infusion.  Intact IV catheter removed with pressure dressing applied.  Patient discharge.

## 2025-03-04 DIAGNOSIS — R05.1 ACUTE COUGH: ICD-10-CM

## 2025-03-04 RX ORDER — BENZONATATE 100 MG/1
100-200 CAPSULE ORAL 3 TIMES DAILY PRN
Qty: 60 CAPSULE | Refills: 0 | Status: SHIPPED | OUTPATIENT
Start: 2025-03-04 | End: 2025-03-14

## 2025-03-04 NOTE — TELEPHONE ENCOUNTER
Patient calling to ask if he can get a script for Benzonatate he came down with a cough and still had a few pills left from last script and states that the medication has helped tremendously

## 2025-03-07 ENCOUNTER — TELEPHONE (OUTPATIENT)
Dept: INFUSION THERAPY | Facility: MEDICAL CENTER | Age: 60
End: 2025-03-07

## 2025-03-18 ENCOUNTER — HOSPITAL ENCOUNTER (OUTPATIENT)
Dept: INFUSION THERAPY | Facility: MEDICAL CENTER | Age: 60
Discharge: HOME | End: 2025-03-18
Payer: COMMERCIAL

## 2025-03-18 VITALS
SYSTOLIC BLOOD PRESSURE: 114 MMHG | RESPIRATION RATE: 18 BRPM | TEMPERATURE: 97.9 F | DIASTOLIC BLOOD PRESSURE: 61 MMHG | HEART RATE: 63 BPM

## 2025-03-18 DIAGNOSIS — K90.9 IRON MALABSORPTION: Primary | ICD-10-CM

## 2025-03-18 DIAGNOSIS — D50.0 IRON DEFICIENCY ANEMIA DUE TO CHRONIC BLOOD LOSS: ICD-10-CM

## 2025-03-18 PROCEDURE — 2580000003 HC RX 258: Performed by: INTERNAL MEDICINE

## 2025-03-18 PROCEDURE — 96365 THER/PROPH/DIAG IV INF INIT: CPT

## 2025-03-18 PROCEDURE — 6360000002 HC RX W HCPCS: Performed by: INTERNAL MEDICINE

## 2025-03-18 PROCEDURE — 96366 THER/PROPH/DIAG IV INF ADDON: CPT

## 2025-03-18 RX ORDER — HEPARIN 100 UNIT/ML
500 SYRINGE INTRAVENOUS PRN
OUTPATIENT
Start: 2025-03-18

## 2025-03-18 RX ORDER — HYDROCORTISONE SODIUM SUCCINATE 100 MG/2ML
100 INJECTION INTRAMUSCULAR; INTRAVENOUS
OUTPATIENT
Start: 2025-03-18

## 2025-03-18 RX ORDER — SODIUM CHLORIDE 9 MG/ML
5-250 INJECTION, SOLUTION INTRAVENOUS PRN
Status: DISCONTINUED | OUTPATIENT
Start: 2025-03-18 | End: 2025-03-19 | Stop reason: HOSPADM

## 2025-03-18 RX ORDER — SODIUM CHLORIDE 9 MG/ML
INJECTION, SOLUTION INTRAVENOUS CONTINUOUS
OUTPATIENT
Start: 2025-03-18

## 2025-03-18 RX ORDER — EPINEPHRINE 1 MG/ML
0.3 INJECTION, SOLUTION INTRAMUSCULAR; SUBCUTANEOUS PRN
OUTPATIENT
Start: 2025-03-18

## 2025-03-18 RX ORDER — ACETAMINOPHEN 325 MG/1
650 TABLET ORAL
OUTPATIENT
Start: 2025-03-18

## 2025-03-18 RX ORDER — FAMOTIDINE 10 MG/ML
20 INJECTION, SOLUTION INTRAVENOUS
OUTPATIENT
Start: 2025-03-18

## 2025-03-18 RX ORDER — SODIUM CHLORIDE 9 MG/ML
5-250 INJECTION, SOLUTION INTRAVENOUS PRN
OUTPATIENT
Start: 2025-03-18

## 2025-03-18 RX ORDER — DIPHENHYDRAMINE HYDROCHLORIDE 50 MG/ML
50 INJECTION, SOLUTION INTRAMUSCULAR; INTRAVENOUS
OUTPATIENT
Start: 2025-03-18

## 2025-03-18 RX ORDER — SODIUM CHLORIDE 0.9 % (FLUSH) 0.9 %
5-40 SYRINGE (ML) INJECTION PRN
OUTPATIENT
Start: 2025-03-18

## 2025-03-18 RX ORDER — ALBUTEROL SULFATE 90 UG/1
4 INHALANT RESPIRATORY (INHALATION) PRN
OUTPATIENT
Start: 2025-03-18

## 2025-03-18 RX ORDER — ONDANSETRON 2 MG/ML
8 INJECTION INTRAMUSCULAR; INTRAVENOUS
OUTPATIENT
Start: 2025-03-18

## 2025-03-18 RX ADMIN — IRON SUCROSE 400 MG: 20 INJECTION, SOLUTION INTRAVENOUS at 13:21

## 2025-03-18 RX ADMIN — SODIUM CHLORIDE 100 ML/HR: 0.9 INJECTION, SOLUTION INTRAVENOUS at 13:10

## 2025-03-18 ASSESSMENT — PAIN DESCRIPTION - LOCATION: LOCATION: SHOULDER

## 2025-03-18 ASSESSMENT — PAIN SCALES - GENERAL: PAINLEVEL_OUTOF10: 2

## 2025-03-18 NOTE — PROGRESS NOTES
Patient presents ambulatory for 3/3 Venofer. VS as charted. Medications and allergies reviewed. IV inserted per protocol to right hand and IV fluids started.     Venofer administered with no adverse reactions noted. Line flushed. IV removed per protocol and dressing applied.     Patient discharged ambulatory and aware of next appointment.

## 2025-03-28 ENCOUNTER — TELEPHONE (OUTPATIENT)
Dept: ONCOLOGY | Age: 60
End: 2025-03-28

## 2025-03-28 ENCOUNTER — OFFICE VISIT (OUTPATIENT)
Dept: ONCOLOGY | Age: 60
End: 2025-03-28
Payer: COMMERCIAL

## 2025-03-28 VITALS
HEART RATE: 60 BPM | SYSTOLIC BLOOD PRESSURE: 134 MMHG | RESPIRATION RATE: 16 BRPM | BODY MASS INDEX: 33.42 KG/M2 | DIASTOLIC BLOOD PRESSURE: 83 MMHG | TEMPERATURE: 97 F | WEIGHT: 232.9 LBS

## 2025-03-28 DIAGNOSIS — D50.0 IRON DEFICIENCY ANEMIA DUE TO CHRONIC BLOOD LOSS: Primary | ICD-10-CM

## 2025-03-28 PROCEDURE — 3078F DIAST BP <80 MM HG: CPT | Performed by: INTERNAL MEDICINE

## 2025-03-28 PROCEDURE — G8427 DOCREV CUR MEDS BY ELIG CLIN: HCPCS | Performed by: INTERNAL MEDICINE

## 2025-03-28 PROCEDURE — 3017F COLORECTAL CA SCREEN DOC REV: CPT | Performed by: INTERNAL MEDICINE

## 2025-03-28 PROCEDURE — 1036F TOBACCO NON-USER: CPT | Performed by: INTERNAL MEDICINE

## 2025-03-28 PROCEDURE — 3075F SYST BP GE 130 - 139MM HG: CPT | Performed by: INTERNAL MEDICINE

## 2025-03-28 PROCEDURE — 99211 OFF/OP EST MAY X REQ PHY/QHP: CPT | Performed by: INTERNAL MEDICINE

## 2025-03-28 PROCEDURE — G8417 CALC BMI ABV UP PARAM F/U: HCPCS | Performed by: INTERNAL MEDICINE

## 2025-03-28 PROCEDURE — 99204 OFFICE O/P NEW MOD 45 MIN: CPT | Performed by: INTERNAL MEDICINE

## 2025-03-28 NOTE — TELEPHONE ENCOUNTER
BETO HERE FOR MD VISIT  RTc on 4/11 w labs 3  days prior  LAB ORDERS GIVEN TO PT  MD VISIT 4/11/25 @ 9:45AM  AVS PRINTED W/ INSTRUCTIONS AND GIVEN TO PT ON EXIT

## 2025-03-28 NOTE — PROGRESS NOTES
Neurological: negative for headaches, dizziness, seizures, weakness, numbness       OBJECTIVE:         Vitals:    03/28/25 0945   BP: 134/83   Pulse:    Resp:    Temp:        PHYSICAL EXAM:   General appearance - well appearing, no in pain or distress   Mental status - alert and cooperative   Eyes - pupils equal and reactive, extraocular eye movements intact   Ears - bilateral TM's and external ear canals normal   Mouth - mucous membranes moist, pharynx normal without lesions   Neck - supple, no significant adenopathy   Lymphatics - no palpable lymphadenopathy, no hepatosplenomegaly   Chest - clear to auscultation, no wheezes, rales or rhonchi, symmetric air entry   Heart - normal rate, regular rhythm, normal S1, S2, no murmurs, rubs, clicks or gallops   Abdomen - soft, nontender, nondistended, no masses or organomegaly   Neurological - alert, oriented, normal speech, no focal findings or movement disorder noted   Musculoskeletal - no joint tenderness, deformity or swelling   Extremities - peripheral pulses normal, no pedal edema, no clubbing or cyanosis   Skin - normal coloration and turgor, no rashes, no suspicious skin lesions noted ,      LABORATORY DATA:     Lab Results   Component Value Date    WBC 3.3 (L) 01/24/2025    HGB 8.4 (L) 01/24/2025    HCT 29.6 (L) 01/24/2025    MCV 78.1 (L) 01/24/2025     01/24/2025    LYMPHOPCT 26 01/24/2025    RBC 3.79 (L) 01/24/2025    MCH 22.2 (L) 01/24/2025    MCHC 28.4 01/24/2025    RDW 15.8 (H) 01/24/2025    NEUTOPHILPCT 59 01/24/2025    MONOPCT 9 01/24/2025    EOSPCT 5 (H) 01/24/2025    BASOPCT 1 01/24/2025    NEUTROABS 1.93 01/24/2025    LYMPHSABS 0.87 (L) 01/24/2025    MONOSABS 0.29 01/24/2025    EOSABS 0.18 01/24/2025    BASOSABS 0.04 01/24/2025         Chemistry        Component Value Date/Time     10/29/2024 1605    K 4.2 10/29/2024 1605     10/29/2024 1605    CO2 26 10/29/2024 1605    BUN 27 (H) 10/29/2024 1605    CREATININE 2.3 (H) 10/29/2024 1605

## 2025-04-07 ENCOUNTER — HOSPITAL ENCOUNTER (OUTPATIENT)
Age: 60
Setting detail: SPECIMEN
Discharge: HOME | End: 2025-04-07

## 2025-04-07 DIAGNOSIS — D50.0 IRON DEFICIENCY ANEMIA DUE TO CHRONIC BLOOD LOSS: ICD-10-CM

## 2025-04-07 LAB
BASOPHILS # BLD: 0.04 K/UL (ref 0–0.2)
BASOPHILS NFR BLD: 1 % (ref 0–2)
EOSINOPHIL # BLD: 0.23 K/UL (ref 0–0.44)
EOSINOPHILS RELATIVE PERCENT: 6 % (ref 1–4)
ERYTHROCYTE [DISTWIDTH] IN BLOOD BY AUTOMATED COUNT: 20.4 % (ref 11.8–14.4)
FOLATE SERPL-MCNC: 10.1 NG/ML (ref 4.8–24.2)
HCT VFR BLD AUTO: 33.8 % (ref 40.7–50.3)
HGB BLD-MCNC: 9.9 G/DL (ref 13–17)
IMM GRANULOCYTES # BLD AUTO: 0.04 K/UL (ref 0–0.3)
IMM GRANULOCYTES NFR BLD: 1 %
IRON SATN MFR SERPL: 11 % (ref 20–55)
IRON SERPL-MCNC: 32 UG/DL (ref 61–157)
LYMPHOCYTES NFR BLD: 0.72 K/UL (ref 1.1–3.7)
LYMPHOCYTES RELATIVE PERCENT: 19 % (ref 24–43)
MCH RBC QN AUTO: 23.9 PG (ref 25.2–33.5)
MCHC RBC AUTO-ENTMCNC: 29.3 G/DL (ref 28.4–34.8)
MCV RBC AUTO: 81.6 FL (ref 82.6–102.9)
MONOCYTES NFR BLD: 0.3 K/UL (ref 0.1–1.2)
MONOCYTES NFR BLD: 8 % (ref 3–12)
MORPHOLOGY: ABNORMAL
NEUTROPHILS NFR BLD: 65 % (ref 36–65)
NEUTS SEG NFR BLD: 2.47 K/UL (ref 1.5–8.1)
NRBC BLD-RTO: 0 PER 100 WBC
PLATELET # BLD AUTO: 150 K/UL (ref 138–453)
PMV BLD AUTO: 11.7 FL (ref 8.1–13.5)
PSA SERPL-MCNC: 1.24 NG/ML (ref 0–4)
RBC # BLD AUTO: 4.14 M/UL (ref 4.21–5.77)
TIBC SERPL-MCNC: 301 UG/DL (ref 250–450)
UNSATURATED IRON BINDING CAPACITY: 269 UG/DL (ref 112–347)
VIT B12 SERPL-MCNC: 495 PG/ML (ref 232–1245)
WBC OTHER # BLD: 3.8 K/UL (ref 3.5–11.3)

## 2025-04-11 ENCOUNTER — TELEPHONE (OUTPATIENT)
Dept: ONCOLOGY | Age: 60
End: 2025-04-11

## 2025-04-11 ENCOUNTER — OFFICE VISIT (OUTPATIENT)
Dept: ONCOLOGY | Age: 60
End: 2025-04-11
Payer: COMMERCIAL

## 2025-04-11 VITALS
BODY MASS INDEX: 33.76 KG/M2 | DIASTOLIC BLOOD PRESSURE: 91 MMHG | WEIGHT: 235.8 LBS | RESPIRATION RATE: 16 BRPM | SYSTOLIC BLOOD PRESSURE: 144 MMHG | HEIGHT: 70 IN | HEART RATE: 60 BPM | TEMPERATURE: 59 F

## 2025-04-11 DIAGNOSIS — D50.0 IRON DEFICIENCY ANEMIA DUE TO CHRONIC BLOOD LOSS: Primary | ICD-10-CM

## 2025-04-11 DIAGNOSIS — K90.9 IRON MALABSORPTION: ICD-10-CM

## 2025-04-11 PROCEDURE — 1036F TOBACCO NON-USER: CPT | Performed by: INTERNAL MEDICINE

## 2025-04-11 PROCEDURE — 3017F COLORECTAL CA SCREEN DOC REV: CPT | Performed by: INTERNAL MEDICINE

## 2025-04-11 PROCEDURE — 3077F SYST BP >= 140 MM HG: CPT | Performed by: INTERNAL MEDICINE

## 2025-04-11 PROCEDURE — G8417 CALC BMI ABV UP PARAM F/U: HCPCS | Performed by: INTERNAL MEDICINE

## 2025-04-11 PROCEDURE — 99211 OFF/OP EST MAY X REQ PHY/QHP: CPT | Performed by: INTERNAL MEDICINE

## 2025-04-11 PROCEDURE — G8427 DOCREV CUR MEDS BY ELIG CLIN: HCPCS | Performed by: INTERNAL MEDICINE

## 2025-04-11 PROCEDURE — 99214 OFFICE O/P EST MOD 30 MIN: CPT | Performed by: INTERNAL MEDICINE

## 2025-04-11 PROCEDURE — 3079F DIAST BP 80-89 MM HG: CPT | Performed by: INTERNAL MEDICINE

## 2025-04-11 RX ORDER — LIDOCAINE HYDROCHLORIDE 10 MG/ML
0.25 INJECTION, SOLUTION EPIDURAL; INFILTRATION; INTRACAUDAL; PERINEURAL
OUTPATIENT
Start: 2025-04-11

## 2025-04-11 RX ORDER — ALBUTEROL SULFATE 0.83 MG/ML
2.5 SOLUTION RESPIRATORY (INHALATION)
OUTPATIENT
Start: 2025-04-11

## 2025-04-11 RX ORDER — 0.9 % SODIUM CHLORIDE 0.9 %
1000 INTRAVENOUS SOLUTION INTRAVENOUS ONCE
OUTPATIENT
Start: 2025-04-11 | End: 2025-04-11

## 2025-04-11 RX ORDER — ACETAMINOPHEN 325 MG/1
650 TABLET ORAL ONCE
OUTPATIENT
Start: 2025-04-11 | End: 2025-04-11

## 2025-04-11 RX ORDER — DIPHENHYDRAMINE HYDROCHLORIDE 50 MG/ML
25 INJECTION, SOLUTION INTRAMUSCULAR; INTRAVENOUS ONCE
OUTPATIENT
Start: 2025-04-11 | End: 2025-04-11

## 2025-04-11 RX ORDER — DIPHENHYDRAMINE HCL 25 MG
25 TABLET ORAL ONCE
OUTPATIENT
Start: 2025-04-11 | End: 2025-04-11

## 2025-04-11 NOTE — PROGRESS NOTES
tremor, weight changes, change in bowel habits and hair loss   Musculoskeletal: negative for myalgias, arthralgias, pain, joint swelling,and bone pain   Neurological: negative for headaches, dizziness, seizures, weakness, numbness       OBJECTIVE:         Vitals:    04/11/25 1007   BP: (!) 144/91   Pulse:    Resp:    Temp: (!) 59 °F (15 °C)       PHYSICAL EXAM:   General appearance - well appearing, no in pain or distress   Mental status - alert and cooperative   Eyes - pupils equal and reactive, extraocular eye movements intact   Ears - bilateral TM's and external ear canals normal   Mouth - mucous membranes moist, pharynx normal without lesions   Neck - supple, no significant adenopathy   Lymphatics - no palpable lymphadenopathy, no hepatosplenomegaly   Chest - clear to auscultation, no wheezes, rales or rhonchi, symmetric air entry   Heart - normal rate, regular rhythm, normal S1, S2, no murmurs, rubs, clicks or gallops   Abdomen - soft, nontender, nondistended, no masses or organomegaly   Neurological - alert, oriented, normal speech, no focal findings or movement disorder noted   Musculoskeletal - no joint tenderness, deformity or swelling   Extremities - peripheral pulses normal, no pedal edema, no clubbing or cyanosis   Skin - normal coloration and turgor, no rashes, no suspicious skin lesions noted ,      LABORATORY DATA:     Lab Results   Component Value Date    WBC 3.8 04/07/2025    HGB 9.9 (L) 04/07/2025    HCT 33.8 (L) 04/07/2025    MCV 81.6 (L) 04/07/2025     04/07/2025    LYMPHOPCT 19 (L) 04/07/2025    RBC 4.14 (L) 04/07/2025    MCH 23.9 (L) 04/07/2025    MCHC 29.3 04/07/2025    RDW 20.4 (H) 04/07/2025    NEUTOPHILPCT 65 04/07/2025    MONOPCT 8 04/07/2025    EOSPCT 6 (H) 04/07/2025    BASOPCT 1 04/07/2025    NEUTROABS 2.47 04/07/2025    LYMPHSABS 0.72 (L) 04/07/2025    MONOSABS 0.30 04/07/2025    EOSABS 0.23 04/07/2025    COLTEN 0.04 04/07/2025         Chemistry        Component Value

## 2025-04-11 NOTE — TELEPHONE ENCOUNTER
BETO HERE FOR MD VISIT  Venofer 3 more doses  RTc 2 months w lab 3 days prior  NEW ORDER IS PENDING PRECERT  LAB ORDERS GIVEN TO PT  MD VISIT 6/4/25 @ 2:15PM  AVS PRINTED W/ INSTRUCTIONS AND GIVEN TO PT ON EXIT

## 2025-04-15 ENCOUNTER — TELEPHONE (OUTPATIENT)
Dept: ONCOLOGY | Age: 60
End: 2025-04-15

## 2025-04-15 NOTE — TELEPHONE ENCOUNTER
I CALLED AND TALKED TO BETO ABOUT SCHEDULING HIS IRON INFUSIONS. BETO ASKS IF I COULD CALL HIM BACK BETWEEN 3:00 AND 4:00 SO HE CAN CHECK WITH HIS WIFE, WHO WILL BRING HIM, WHAT HER DAYS OFF ARE. I TOLD HIM I WOULD TRY TO CALL HIM BACK THEN.

## 2025-04-28 ENCOUNTER — HOSPITAL ENCOUNTER (OUTPATIENT)
Dept: INFUSION THERAPY | Facility: MEDICAL CENTER | Age: 60
Discharge: HOME | End: 2025-04-28
Payer: COMMERCIAL

## 2025-04-28 ENCOUNTER — TELEPHONE (OUTPATIENT)
Dept: INFUSION THERAPY | Facility: MEDICAL CENTER | Age: 60
End: 2025-04-28

## 2025-04-28 VITALS — RESPIRATION RATE: 16 BRPM | TEMPERATURE: 98.2 F | HEART RATE: 64 BPM

## 2025-04-28 DIAGNOSIS — K90.9 IRON MALABSORPTION: Primary | ICD-10-CM

## 2025-04-28 DIAGNOSIS — D50.0 IRON DEFICIENCY ANEMIA DUE TO CHRONIC BLOOD LOSS: ICD-10-CM

## 2025-04-28 PROCEDURE — 96366 THER/PROPH/DIAG IV INF ADDON: CPT

## 2025-04-28 PROCEDURE — 6360000002 HC RX W HCPCS: Performed by: INTERNAL MEDICINE

## 2025-04-28 PROCEDURE — 96365 THER/PROPH/DIAG IV INF INIT: CPT

## 2025-04-28 PROCEDURE — 2580000003 HC RX 258: Performed by: INTERNAL MEDICINE

## 2025-04-28 RX ORDER — SODIUM CHLORIDE 9 MG/ML
5-250 INJECTION, SOLUTION INTRAVENOUS PRN
Status: DISCONTINUED | OUTPATIENT
Start: 2025-04-28 | End: 2025-04-29 | Stop reason: HOSPADM

## 2025-04-28 RX ORDER — DIPHENHYDRAMINE HYDROCHLORIDE 50 MG/ML
25 INJECTION, SOLUTION INTRAMUSCULAR; INTRAVENOUS ONCE
Status: DISCONTINUED | OUTPATIENT
Start: 2025-04-28 | End: 2025-04-29 | Stop reason: HOSPADM

## 2025-04-28 RX ORDER — SODIUM CHLORIDE 9 MG/ML
5-250 INJECTION, SOLUTION INTRAVENOUS PRN
OUTPATIENT
Start: 2025-05-05

## 2025-04-28 RX ORDER — DIPHENHYDRAMINE HYDROCHLORIDE 50 MG/ML
50 INJECTION, SOLUTION INTRAMUSCULAR; INTRAVENOUS
OUTPATIENT
Start: 2025-05-05

## 2025-04-28 RX ORDER — LIDOCAINE HYDROCHLORIDE 10 MG/ML
0.25 INJECTION, SOLUTION EPIDURAL; INFILTRATION; INTRACAUDAL; PERINEURAL
OUTPATIENT
Start: 2025-05-05

## 2025-04-28 RX ORDER — SODIUM CHLORIDE 9 MG/ML
INJECTION, SOLUTION INTRAVENOUS CONTINUOUS
OUTPATIENT
Start: 2025-05-05

## 2025-04-28 RX ORDER — DIPHENHYDRAMINE HYDROCHLORIDE 50 MG/ML
25 INJECTION, SOLUTION INTRAMUSCULAR; INTRAVENOUS ONCE
OUTPATIENT
Start: 2025-05-05 | End: 2025-05-05

## 2025-04-28 RX ORDER — ONDANSETRON 2 MG/ML
8 INJECTION INTRAMUSCULAR; INTRAVENOUS
OUTPATIENT
Start: 2025-05-05

## 2025-04-28 RX ORDER — ACETAMINOPHEN 325 MG/1
650 TABLET ORAL
OUTPATIENT
Start: 2025-05-05

## 2025-04-28 RX ORDER — ACETAMINOPHEN 325 MG/1
650 TABLET ORAL ONCE
Status: DISCONTINUED | OUTPATIENT
Start: 2025-04-28 | End: 2025-04-29 | Stop reason: HOSPADM

## 2025-04-28 RX ORDER — FAMOTIDINE 10 MG/ML
20 INJECTION, SOLUTION INTRAVENOUS
OUTPATIENT
Start: 2025-05-05

## 2025-04-28 RX ORDER — 0.9 % SODIUM CHLORIDE 0.9 %
1000 INTRAVENOUS SOLUTION INTRAVENOUS ONCE
Status: DISCONTINUED | OUTPATIENT
Start: 2025-04-28 | End: 2025-04-29 | Stop reason: HOSPADM

## 2025-04-28 RX ORDER — EPINEPHRINE 1 MG/ML
0.3 INJECTION, SOLUTION INTRAMUSCULAR; SUBCUTANEOUS PRN
OUTPATIENT
Start: 2025-05-05

## 2025-04-28 RX ORDER — ALBUTEROL SULFATE 0.83 MG/ML
2.5 SOLUTION RESPIRATORY (INHALATION)
OUTPATIENT
Start: 2025-05-05

## 2025-04-28 RX ORDER — 0.9 % SODIUM CHLORIDE 0.9 %
1000 INTRAVENOUS SOLUTION INTRAVENOUS ONCE
OUTPATIENT
Start: 2025-05-05 | End: 2025-05-05

## 2025-04-28 RX ORDER — ALBUTEROL SULFATE 90 UG/1
4 INHALANT RESPIRATORY (INHALATION) PRN
OUTPATIENT
Start: 2025-05-05

## 2025-04-28 RX ORDER — HEPARIN 100 UNIT/ML
500 SYRINGE INTRAVENOUS PRN
OUTPATIENT
Start: 2025-05-05

## 2025-04-28 RX ORDER — HYDROCORTISONE SODIUM SUCCINATE 100 MG/2ML
100 INJECTION INTRAMUSCULAR; INTRAVENOUS
OUTPATIENT
Start: 2025-05-05

## 2025-04-28 RX ORDER — ACETAMINOPHEN 325 MG/1
650 TABLET ORAL ONCE
OUTPATIENT
Start: 2025-05-05 | End: 2025-05-05

## 2025-04-28 RX ORDER — SODIUM CHLORIDE 0.9 % (FLUSH) 0.9 %
5-40 SYRINGE (ML) INJECTION PRN
OUTPATIENT
Start: 2025-05-05

## 2025-04-28 RX ADMIN — SODIUM CHLORIDE 175 ML/HR: 0.9 INJECTION, SOLUTION INTRAVENOUS at 11:46

## 2025-04-28 RX ADMIN — IRON SUCROSE 300 MG: 20 INJECTION, SOLUTION INTRAVENOUS at 11:48

## 2025-04-28 NOTE — TELEPHONE ENCOUNTER
Previous administration notes for venofer reviewed and md note reviewed and no recent cmp available, spoke with Dr Dick and pt and pt states tolerates venofer well and does not need hydration or premed and spoke with Dr Dick and Ok to not give hydration or premeds.

## 2025-04-28 NOTE — PROGRESS NOTES
Pt arrives per amb per self and discussed premeds and hydration orders from Dr Dick and per md, do not give and pt does not want either hydration or premeds and NS infusing before and after venofer and pt notified of allergy signs and checking on pt during infusion and states no complaints.  Pt discharged per amb per self with next appts.

## 2025-05-02 ENCOUNTER — HOSPITAL ENCOUNTER (OUTPATIENT)
Dept: PULMONOLOGY | Age: 60
Discharge: HOME | End: 2025-05-02
Attending: INTERNAL MEDICINE
Payer: COMMERCIAL

## 2025-05-02 ENCOUNTER — OFFICE VISIT (OUTPATIENT)
Dept: PULMONOLOGY | Age: 60
End: 2025-05-02
Payer: COMMERCIAL

## 2025-05-02 VITALS
BODY MASS INDEX: 33.07 KG/M2 | TEMPERATURE: 97.7 F | SYSTOLIC BLOOD PRESSURE: 173 MMHG | HEIGHT: 70 IN | WEIGHT: 231 LBS | OXYGEN SATURATION: 100 % | RESPIRATION RATE: 18 BRPM | DIASTOLIC BLOOD PRESSURE: 98 MMHG | HEART RATE: 58 BPM

## 2025-05-02 DIAGNOSIS — G47.33 OSA (OBSTRUCTIVE SLEEP APNEA): Primary | ICD-10-CM

## 2025-05-02 DIAGNOSIS — R91.8 MULTIPLE PULMONARY NODULES: ICD-10-CM

## 2025-05-02 DIAGNOSIS — D72.10 EOSINOPHILIA, UNSPECIFIED TYPE: ICD-10-CM

## 2025-05-02 PROBLEM — J18.9 PNEUMONIA DUE TO INFECTIOUS ORGANISM: Status: RESOLVED | Noted: 2023-11-08 | Resolved: 2025-05-02

## 2025-05-02 PROCEDURE — 99214 OFFICE O/P EST MOD 30 MIN: CPT | Performed by: INTERNAL MEDICINE

## 2025-05-02 PROCEDURE — 94664 DEMO&/EVAL PT USE INHALER: CPT

## 2025-05-02 PROCEDURE — 94060 EVALUATION OF WHEEZING: CPT

## 2025-05-02 PROCEDURE — 3077F SYST BP >= 140 MM HG: CPT | Performed by: INTERNAL MEDICINE

## 2025-05-02 PROCEDURE — 94729 DIFFUSING CAPACITY: CPT

## 2025-05-02 PROCEDURE — G8427 DOCREV CUR MEDS BY ELIG CLIN: HCPCS | Performed by: INTERNAL MEDICINE

## 2025-05-02 PROCEDURE — 1036F TOBACCO NON-USER: CPT | Performed by: INTERNAL MEDICINE

## 2025-05-02 PROCEDURE — 94726 PLETHYSMOGRAPHY LUNG VOLUMES: CPT

## 2025-05-02 PROCEDURE — 94640 AIRWAY INHALATION TREATMENT: CPT

## 2025-05-02 PROCEDURE — 3080F DIAST BP >= 90 MM HG: CPT | Performed by: INTERNAL MEDICINE

## 2025-05-02 PROCEDURE — 3017F COLORECTAL CA SCREEN DOC REV: CPT | Performed by: INTERNAL MEDICINE

## 2025-05-02 PROCEDURE — G8417 CALC BMI ABV UP PARAM F/U: HCPCS | Performed by: INTERNAL MEDICINE

## 2025-05-02 NOTE — PATIENT INSTRUCTIONS
[email or web address]        YOU ARE BEING REFERRED TO:    [facility] (a department of [facility])    [address] [place] [address] [address]  [place] [address]    Main Phone Number: [phone]    Phone number for scheduling sleep study: [phone]      Please contact the above numbers to schedule your sleep study.     Once you have completed the FIRST NIGHT you may be asked to return for a SECOND NIGHT if necessary.   After the SECOND NIGHT the sleep center will order a CPAP/BiPAP machine for you.     An order for the machine will be sent to a durable medical equipment company ([employer]).   The sleep center will provide you with the [employer] companies information.     Once you have your machine please contact our office to schedule a follow up appointment.   Your follow up will be scheduled for a date 30-90 days after you have received your machine.   At that follow up visit we will need to have a compliance download from your DME company.   Please contact your [employer] company to have the compliance download faxed to our office at   [phone] for your follow up appointment.       IF YOU HAVE ANY QUESTIONS ABOUT YOUR SLEEP STUDY   PLEASE CONTACT THE SLEEP CENTER.

## 2025-05-02 NOTE — PROGRESS NOTES
PULMONARY MEDICINE OUTPATIENT NOTE     PATIENT:Tejas Machado  YOB: 1965  MRN:[id number]     REFERRED BY:Abbie Cueva MD      HISTORY     Tejas Machado is a 59 y.o. years old male, here for established patient evaluation.     INITIAL VISIT: 1/17/2025  LAST VISIT: 1/17/2025  DATE OF VISIT: 5/2/2025    CHIEF COMPLIANT:Pulmonary Nodule (4 month follow up. )      EVALUATION/MANAGEMENT DETAILS FROM PREVIOUS VISITS/ENCOUNTERS:    LDCT (12/16/2024) 5 mm nodular density right upper lobe with an area of mixed reticulonodular opacification in the right lower lobe measuring 1 cm, 7 mm right lower lobe ill-defined pulmonary nodule    LDCT (1/3/2025) reports minimal decrease in the size of reticulonodular opacity in the right lower lobe suggesting resolving infectious/inflammatory process    PET/CT (1/3/2025) reported no metabolically active FDG uptake in the pulmonary nodules or mediastinum, asymmetric uptake in the right side of prostate SUV max 5.36    TOBACCO HISTORY:  He  reports that he quit smoking about 3 years ago. His smoking use included cigarettes. He started smoking about 46 years ago. He has a 44 pack-year smoking history. He has quit using smokeless tobacco.    AVOCATION/OCCUPATIONAL EXPOSURE:     Yes No   ASBESTOS [] [x]   SILICA DUST [] [x]   COAL [] [x]   FOUNDRY [] [x]   QUARRY [] [x]   COTTON MILL [] [x]   PETS [] [x]   PARAKEETS  []  [x]   TUBERCULOSIS [] [x]   HOT TUB [] [x]   DRUGS [] [x]   OTHER [] [x]     PULMONARY CO-MORBIDITIES/RISK FACTORS:     Yes No   NASO-BRONCHIAL/ENVIRONMENTAL ALLERGIES [] [x]   EOSINOPHILIA [] [x]   BRONCHIAL ASTHMA [] [x]   CHRONIC BRONCHITIS/EMPHYSEMA/COPD [] [x]   CHRONIC SINUSITIS/POSTNASAL DRIP [] [x]   ACID REFLUX/GERD [] [x]   ACE/ARB USAGE [] [x]   ASPIRIN USAGE [] [x]   CORONARY ARTERY DISEASE [] [x]   CONGESTIVE CARDIAC FAILURE [x] []   ATRIAL FIBRILLATION [x] []   AMIODARONE USAGE [] [x]   PULMONARY HYPERTENSION [] [x]   VENOUS

## 2025-05-02 NOTE — PROCEDURES
PULMONARY FUNCTION TEST    Date: 5/2/2025   Name: Tejas Machado  YOB: 1965            Spirometry shows postbronchodilator FEV1 of 2.35 L (70% predicted), FVC 2.85 L (66% predicted).  The FEV1/FVC ratio is 82%.  No bronchodilator response seen.  Lung volumes show reduced total lung capacity (84% predicted), consistent with mild restrictive impairment.  Flow volume loop consistent with restriction.  Airway resistance is increased.  Diffusion capacity is mildly reduced (69% predicted).  However, when corrected for alveolar volume it is within normal limit.    Impression:  Overall study shows mild restrictive ventilatory impairment.  No bronchodilator response seen.  Diffusion capacity is mildly reduced, however when corrected for alveolar volume it is within normal limit.

## 2025-05-12 ENCOUNTER — HOSPITAL ENCOUNTER (OUTPATIENT)
Dept: INFUSION THERAPY | Facility: MEDICAL CENTER | Age: 60
Discharge: HOME | End: 2025-05-12
Payer: COMMERCIAL

## 2025-05-12 VITALS
OXYGEN SATURATION: 100 % | RESPIRATION RATE: 16 BRPM | BODY MASS INDEX: 33.46 KG/M2 | HEART RATE: 59 BPM | WEIGHT: 233.2 LBS | SYSTOLIC BLOOD PRESSURE: 171 MMHG | DIASTOLIC BLOOD PRESSURE: 83 MMHG | TEMPERATURE: 97.5 F

## 2025-05-12 DIAGNOSIS — K90.9 IRON MALABSORPTION: Primary | ICD-10-CM

## 2025-05-12 DIAGNOSIS — D50.0 IRON DEFICIENCY ANEMIA DUE TO CHRONIC BLOOD LOSS: ICD-10-CM

## 2025-05-12 PROCEDURE — 2580000003 HC RX 258: Performed by: INTERNAL MEDICINE

## 2025-05-12 PROCEDURE — 96366 THER/PROPH/DIAG IV INF ADDON: CPT

## 2025-05-12 PROCEDURE — 96365 THER/PROPH/DIAG IV INF INIT: CPT

## 2025-05-12 PROCEDURE — 6360000002 HC RX W HCPCS: Performed by: INTERNAL MEDICINE

## 2025-05-12 RX ORDER — LIDOCAINE HYDROCHLORIDE 10 MG/ML
0.25 INJECTION, SOLUTION EPIDURAL; INFILTRATION; INTRACAUDAL; PERINEURAL
OUTPATIENT
Start: 2025-05-12

## 2025-05-12 RX ORDER — SODIUM CHLORIDE 0.9 % (FLUSH) 0.9 %
5-40 SYRINGE (ML) INJECTION PRN
OUTPATIENT
Start: 2025-05-12

## 2025-05-12 RX ORDER — ACETAMINOPHEN 325 MG/1
650 TABLET ORAL ONCE
Status: CANCELLED | OUTPATIENT
Start: 2025-05-12 | End: 2025-05-12

## 2025-05-12 RX ORDER — DIPHENHYDRAMINE HYDROCHLORIDE 50 MG/ML
25 INJECTION, SOLUTION INTRAMUSCULAR; INTRAVENOUS ONCE
Status: CANCELLED | OUTPATIENT
Start: 2025-05-12 | End: 2025-05-12

## 2025-05-12 RX ORDER — SODIUM CHLORIDE 9 MG/ML
INJECTION, SOLUTION INTRAVENOUS CONTINUOUS
OUTPATIENT
Start: 2025-05-12

## 2025-05-12 RX ORDER — DIPHENHYDRAMINE HYDROCHLORIDE 50 MG/ML
50 INJECTION, SOLUTION INTRAMUSCULAR; INTRAVENOUS
OUTPATIENT
Start: 2025-05-12

## 2025-05-12 RX ORDER — ONDANSETRON 2 MG/ML
8 INJECTION INTRAMUSCULAR; INTRAVENOUS
OUTPATIENT
Start: 2025-05-12

## 2025-05-12 RX ORDER — SODIUM CHLORIDE 9 MG/ML
5-250 INJECTION, SOLUTION INTRAVENOUS PRN
OUTPATIENT
Start: 2025-05-12

## 2025-05-12 RX ORDER — HYDROCORTISONE SODIUM SUCCINATE 100 MG/2ML
100 INJECTION INTRAMUSCULAR; INTRAVENOUS
OUTPATIENT
Start: 2025-05-12

## 2025-05-12 RX ORDER — EPINEPHRINE 1 MG/ML
0.3 INJECTION, SOLUTION INTRAMUSCULAR; SUBCUTANEOUS PRN
OUTPATIENT
Start: 2025-05-12

## 2025-05-12 RX ORDER — ALBUTEROL SULFATE 0.83 MG/ML
2.5 SOLUTION RESPIRATORY (INHALATION)
OUTPATIENT
Start: 2025-05-12

## 2025-05-12 RX ORDER — FAMOTIDINE 10 MG/ML
20 INJECTION, SOLUTION INTRAVENOUS
OUTPATIENT
Start: 2025-05-12

## 2025-05-12 RX ORDER — ALBUTEROL SULFATE 90 UG/1
4 INHALANT RESPIRATORY (INHALATION) PRN
OUTPATIENT
Start: 2025-05-12

## 2025-05-12 RX ORDER — 0.9 % SODIUM CHLORIDE 0.9 %
1000 INTRAVENOUS SOLUTION INTRAVENOUS ONCE
Status: CANCELLED | OUTPATIENT
Start: 2025-05-12 | End: 2025-05-12

## 2025-05-12 RX ORDER — HEPARIN 100 UNIT/ML
500 SYRINGE INTRAVENOUS PRN
OUTPATIENT
Start: 2025-05-12

## 2025-05-12 RX ORDER — SODIUM CHLORIDE 9 MG/ML
5-250 INJECTION, SOLUTION INTRAVENOUS PRN
Status: DISCONTINUED | OUTPATIENT
Start: 2025-05-12 | End: 2025-05-13 | Stop reason: HOSPADM

## 2025-05-12 RX ORDER — ACETAMINOPHEN 325 MG/1
650 TABLET ORAL
OUTPATIENT
Start: 2025-05-12

## 2025-05-12 RX ADMIN — IRON SUCROSE 300 MG: 20 INJECTION, SOLUTION INTRAVENOUS at 12:02

## 2025-05-12 RX ADMIN — SODIUM CHLORIDE 50 ML/HR: 0.9 INJECTION, SOLUTION INTRAVENOUS at 11:42

## 2025-05-12 NOTE — PROGRESS NOTES
Patient arrived ambulatory by self for Iron infusion. History and vital signs obtained. IV access obtained, normal saline initiated. Patient received and tolerated well entire dose of iron infusion (See MAR). Patient line was flushed, IV removed per protocol. Patient discharged with all belongings and follow up appointments.

## 2025-05-20 ENCOUNTER — HOSPITAL ENCOUNTER (OUTPATIENT)
Dept: CT IMAGING | Age: 60
Discharge: HOME | End: 2025-05-22
Attending: INTERNAL MEDICINE
Payer: COMMERCIAL

## 2025-05-20 DIAGNOSIS — R91.8 MULTIPLE PULMONARY NODULES: ICD-10-CM

## 2025-05-20 PROCEDURE — 71250 CT THORAX DX C-: CPT

## 2025-05-27 ENCOUNTER — HOSPITAL ENCOUNTER (OUTPATIENT)
Dept: INFUSION THERAPY | Facility: MEDICAL CENTER | Age: 60
Discharge: HOME | End: 2025-05-27
Payer: COMMERCIAL

## 2025-05-27 VITALS
OXYGEN SATURATION: 98 % | HEART RATE: 61 BPM | TEMPERATURE: 98.5 F | DIASTOLIC BLOOD PRESSURE: 79 MMHG | RESPIRATION RATE: 16 BRPM | SYSTOLIC BLOOD PRESSURE: 140 MMHG

## 2025-05-27 DIAGNOSIS — K90.9 IRON MALABSORPTION: Primary | ICD-10-CM

## 2025-05-27 DIAGNOSIS — D50.0 IRON DEFICIENCY ANEMIA DUE TO CHRONIC BLOOD LOSS: ICD-10-CM

## 2025-05-27 PROCEDURE — 96365 THER/PROPH/DIAG IV INF INIT: CPT

## 2025-05-27 PROCEDURE — 6360000002 HC RX W HCPCS: Performed by: INTERNAL MEDICINE

## 2025-05-27 PROCEDURE — 96366 THER/PROPH/DIAG IV INF ADDON: CPT

## 2025-05-27 PROCEDURE — 2580000003 HC RX 258: Performed by: INTERNAL MEDICINE

## 2025-05-27 RX ORDER — SODIUM CHLORIDE 9 MG/ML
INJECTION, SOLUTION INTRAVENOUS CONTINUOUS
OUTPATIENT
Start: 2025-05-27

## 2025-05-27 RX ORDER — SODIUM CHLORIDE 9 MG/ML
5-250 INJECTION, SOLUTION INTRAVENOUS PRN
Status: DISCONTINUED | OUTPATIENT
Start: 2025-05-27 | End: 2025-05-28 | Stop reason: HOSPADM

## 2025-05-27 RX ORDER — ONDANSETRON 2 MG/ML
8 INJECTION INTRAMUSCULAR; INTRAVENOUS
OUTPATIENT
Start: 2025-05-27

## 2025-05-27 RX ORDER — LIDOCAINE HYDROCHLORIDE 10 MG/ML
0.25 INJECTION, SOLUTION EPIDURAL; INFILTRATION; INTRACAUDAL; PERINEURAL
OUTPATIENT
Start: 2025-05-27

## 2025-05-27 RX ORDER — HEPARIN 100 UNIT/ML
500 SYRINGE INTRAVENOUS PRN
OUTPATIENT
Start: 2025-05-27

## 2025-05-27 RX ORDER — HYDROCORTISONE SODIUM SUCCINATE 100 MG/2ML
100 INJECTION INTRAMUSCULAR; INTRAVENOUS
OUTPATIENT
Start: 2025-05-27

## 2025-05-27 RX ORDER — ALBUTEROL SULFATE 0.83 MG/ML
2.5 SOLUTION RESPIRATORY (INHALATION)
OUTPATIENT
Start: 2025-05-27

## 2025-05-27 RX ORDER — SODIUM CHLORIDE 9 MG/ML
5-250 INJECTION, SOLUTION INTRAVENOUS PRN
OUTPATIENT
Start: 2025-05-27

## 2025-05-27 RX ORDER — EPINEPHRINE 1 MG/ML
0.3 INJECTION, SOLUTION INTRAMUSCULAR; SUBCUTANEOUS PRN
OUTPATIENT
Start: 2025-05-27

## 2025-05-27 RX ORDER — FAMOTIDINE 10 MG/ML
20 INJECTION, SOLUTION INTRAVENOUS
OUTPATIENT
Start: 2025-05-27

## 2025-05-27 RX ORDER — DIPHENHYDRAMINE HYDROCHLORIDE 50 MG/ML
50 INJECTION, SOLUTION INTRAMUSCULAR; INTRAVENOUS
OUTPATIENT
Start: 2025-05-27

## 2025-05-27 RX ORDER — ALBUTEROL SULFATE 90 UG/1
4 INHALANT RESPIRATORY (INHALATION) PRN
OUTPATIENT
Start: 2025-05-27

## 2025-05-27 RX ORDER — SODIUM CHLORIDE 0.9 % (FLUSH) 0.9 %
5-40 SYRINGE (ML) INJECTION PRN
OUTPATIENT
Start: 2025-05-27

## 2025-05-27 RX ORDER — ACETAMINOPHEN 325 MG/1
650 TABLET ORAL
OUTPATIENT
Start: 2025-05-27

## 2025-05-27 RX ADMIN — IRON SUCROSE 400 MG: 20 INJECTION, SOLUTION INTRAVENOUS at 11:39

## 2025-05-27 NOTE — PROGRESS NOTES
Patient arrive ambulatory for 3 of 3 Venofer infusions.  Pt states he tolerated previous infusion well , no adverse reaction  Denies other complaint or concern.  Vitals as charted.  Peripheral IV established per policy.  Venofer infused with no sign of adverse reaction; line flushed while monitoring patient post infusion.  Intact IV catheter removed with pressure dressing applied.  Patient discharge.

## 2025-06-03 ENCOUNTER — HOSPITAL ENCOUNTER (OUTPATIENT)
Age: 60
Setting detail: SPECIMEN
Discharge: HOME | End: 2025-06-03

## 2025-06-03 DIAGNOSIS — D50.0 IRON DEFICIENCY ANEMIA DUE TO CHRONIC BLOOD LOSS: ICD-10-CM

## 2025-06-03 DIAGNOSIS — G47.33 OSA (OBSTRUCTIVE SLEEP APNEA): ICD-10-CM

## 2025-06-03 DIAGNOSIS — K90.9 IRON MALABSORPTION: ICD-10-CM

## 2025-06-03 LAB
BASOPHILS # BLD: 0.03 K/UL (ref 0–0.2)
BASOPHILS NFR BLD: 1 % (ref 0–2)
EOSINOPHIL # BLD: 0.21 K/UL (ref 0–0.44)
EOSINOPHILS RELATIVE PERCENT: 6 % (ref 1–4)
ERYTHROCYTE [DISTWIDTH] IN BLOOD BY AUTOMATED COUNT: 16.8 % (ref 11.8–14.4)
FERRITIN SERPL-MCNC: 203 NG/ML
HCT VFR BLD AUTO: 35.3 % (ref 40.7–50.3)
HGB BLD-MCNC: 10.8 G/DL (ref 13–17)
IMM GRANULOCYTES # BLD AUTO: <0.03 K/UL (ref 0–0.3)
IMM GRANULOCYTES NFR BLD: 1 %
IRON SATN MFR SERPL: 20 % (ref 20–55)
IRON SERPL-MCNC: 57 UG/DL (ref 61–157)
LYMPHOCYTES NFR BLD: 0.82 K/UL (ref 1.1–3.7)
LYMPHOCYTES RELATIVE PERCENT: 24 % (ref 24–43)
MCH RBC QN AUTO: 26.5 PG (ref 25.2–33.5)
MCHC RBC AUTO-ENTMCNC: 30.6 G/DL (ref 28.4–34.8)
MCV RBC AUTO: 86.5 FL (ref 82.6–102.9)
MONOCYTES NFR BLD: 0.26 K/UL (ref 0.1–1.2)
MONOCYTES NFR BLD: 8 % (ref 3–12)
NEUTROPHILS NFR BLD: 60 % (ref 36–65)
NEUTS SEG NFR BLD: 2.14 K/UL (ref 1.5–8.1)
NRBC BLD-RTO: 0 PER 100 WBC
PLATELET # BLD AUTO: 169 K/UL (ref 138–453)
PMV BLD AUTO: 11.5 FL (ref 8.1–13.5)
RBC # BLD AUTO: 4.08 M/UL (ref 4.21–5.77)
RBC # BLD: ABNORMAL 10*6/UL
TIBC SERPL-MCNC: 284 UG/DL (ref 250–450)
UNSATURATED IRON BINDING CAPACITY: 227 UG/DL (ref 112–347)
WBC OTHER # BLD: 3.5 K/UL (ref 3.5–11.3)

## 2025-06-04 ENCOUNTER — OFFICE VISIT (OUTPATIENT)
Age: 60
End: 2025-06-04
Payer: COMMERCIAL

## 2025-06-04 ENCOUNTER — TELEPHONE (OUTPATIENT)
Age: 60
End: 2025-06-04

## 2025-06-04 VITALS
WEIGHT: 232.2 LBS | TEMPERATURE: 97.7 F | DIASTOLIC BLOOD PRESSURE: 87 MMHG | BODY MASS INDEX: 33.32 KG/M2 | RESPIRATION RATE: 16 BRPM | SYSTOLIC BLOOD PRESSURE: 155 MMHG | HEART RATE: 60 BPM

## 2025-06-04 DIAGNOSIS — K90.9 IRON MALABSORPTION: ICD-10-CM

## 2025-06-04 DIAGNOSIS — D50.0 IRON DEFICIENCY ANEMIA DUE TO CHRONIC BLOOD LOSS: Primary | ICD-10-CM

## 2025-06-04 PROCEDURE — 99213 OFFICE O/P EST LOW 20 MIN: CPT | Performed by: INTERNAL MEDICINE

## 2025-06-04 PROCEDURE — 3079F DIAST BP 80-89 MM HG: CPT | Performed by: INTERNAL MEDICINE

## 2025-06-04 PROCEDURE — 3077F SYST BP >= 140 MM HG: CPT | Performed by: INTERNAL MEDICINE

## 2025-06-04 PROCEDURE — G8417 CALC BMI ABV UP PARAM F/U: HCPCS | Performed by: INTERNAL MEDICINE

## 2025-06-04 PROCEDURE — G8427 DOCREV CUR MEDS BY ELIG CLIN: HCPCS | Performed by: INTERNAL MEDICINE

## 2025-06-04 PROCEDURE — 3017F COLORECTAL CA SCREEN DOC REV: CPT | Performed by: INTERNAL MEDICINE

## 2025-06-04 PROCEDURE — 1036F TOBACCO NON-USER: CPT | Performed by: INTERNAL MEDICINE

## 2025-06-04 PROCEDURE — 99214 OFFICE O/P EST MOD 30 MIN: CPT | Performed by: INTERNAL MEDICINE

## 2025-06-04 NOTE — PROGRESS NOTES
Patient ID: Tejas Machado, 1965, [id number], 59 y.o.  Referred by : Abbie Cueva MD   Reason for consultation:   Iron deficiency anemia  Completed IV Venofer on 3/18/2025  HISTORY OF PRESENT ILLNESS:    Oncologic History:  Tejas Machado is a 59 y.o. male was seen here initial consultation visit for acute on chronic anemia and severe iron deficiency.    Patient has a history of atrial fibrillation has been following with cardiology and was on Eliquis and had a significant GI bleed in 2022 and that time his Eliquis was discontinued for bleed..  Subsequently he was put on aspirin and Eliquis again.  Patient recently again presented to ER with a hemoglobin of 6.6 requiring PRBC transfusion.  Patient was seen by gastroenterology at that time in August of last year and was planning for.  Capsule endoscopy as outpatient.    His Eliquis was discontinued at that time.  And since then he is only on aspirin patient also has a history of chronic kidney disease and following with nephrology.  He does have a history of chronic constipation and he is taking iron pill every other day.    Patient also following with pulmonologist for history of right upper lobe nodular density.  He quit smoking about 3 years ago.  Prior to that he has a 40-pack-year history of smoking.  He had a recent PET scan in January of this year which showed no metabolically active uptake in the pulmonary nodule however did show some activity in the prostate.    Interval history:  Patient is returning for follow-up today to discuss lab results and further recommendations.  He is taking iron pill off and on.  His iron levels and hemoglobin has improved.  He denied any bleeding symptoms.  Denied any chest pain shortness of breath.  He has mild constipation with taking Colace regularly and moving his bowels regularly     During this visit patient's allergy, social, medical, surgical history and medications were reviewed and updated.     Past

## 2025-06-04 NOTE — TELEPHONE ENCOUNTER
BETO HERE FOR FOLLOW UP   RTC in 3 months w labs prior  LABS ORDERED: CBC, FERRITIN, IRON & TIBC   MD VISIT: 9/17/25 @ 3PM   LABS PRINTED AND GIVEN ON EXIT   AVS PRINTED AND GIVEN ON EXIT

## 2025-07-03 ENCOUNTER — OFFICE VISIT (OUTPATIENT)
Dept: FAMILY MEDICINE CLINIC | Age: 60
End: 2025-07-03
Payer: COMMERCIAL

## 2025-07-03 VITALS
WEIGHT: 234 LBS | HEIGHT: 70 IN | TEMPERATURE: 98.1 F | DIASTOLIC BLOOD PRESSURE: 84 MMHG | SYSTOLIC BLOOD PRESSURE: 144 MMHG | BODY MASS INDEX: 33.5 KG/M2 | HEART RATE: 60 BPM | OXYGEN SATURATION: 96 %

## 2025-07-03 DIAGNOSIS — Z00.00 PHYSICAL EXAM, ANNUAL: ICD-10-CM

## 2025-07-03 DIAGNOSIS — I10 HYPERTENSION, ESSENTIAL: Primary | ICD-10-CM

## 2025-07-03 DIAGNOSIS — J30.89 SEASONAL ALLERGIC RHINITIS DUE TO OTHER ALLERGIC TRIGGER: ICD-10-CM

## 2025-07-03 DIAGNOSIS — G47.30 SLEEP APNEA, UNSPECIFIED TYPE: ICD-10-CM

## 2025-07-03 DIAGNOSIS — I50.42 CHRONIC COMBINED SYSTOLIC AND DIASTOLIC CHF (CONGESTIVE HEART FAILURE) (HCC): ICD-10-CM

## 2025-07-03 PROCEDURE — G8417 CALC BMI ABV UP PARAM F/U: HCPCS | Performed by: STUDENT IN AN ORGANIZED HEALTH CARE EDUCATION/TRAINING PROGRAM

## 2025-07-03 PROCEDURE — 1036F TOBACCO NON-USER: CPT | Performed by: STUDENT IN AN ORGANIZED HEALTH CARE EDUCATION/TRAINING PROGRAM

## 2025-07-03 PROCEDURE — G8427 DOCREV CUR MEDS BY ELIG CLIN: HCPCS | Performed by: STUDENT IN AN ORGANIZED HEALTH CARE EDUCATION/TRAINING PROGRAM

## 2025-07-03 PROCEDURE — 99214 OFFICE O/P EST MOD 30 MIN: CPT | Performed by: STUDENT IN AN ORGANIZED HEALTH CARE EDUCATION/TRAINING PROGRAM

## 2025-07-03 PROCEDURE — 3079F DIAST BP 80-89 MM HG: CPT | Performed by: STUDENT IN AN ORGANIZED HEALTH CARE EDUCATION/TRAINING PROGRAM

## 2025-07-03 PROCEDURE — 3077F SYST BP >= 140 MM HG: CPT | Performed by: STUDENT IN AN ORGANIZED HEALTH CARE EDUCATION/TRAINING PROGRAM

## 2025-07-03 PROCEDURE — 3017F COLORECTAL CA SCREEN DOC REV: CPT | Performed by: STUDENT IN AN ORGANIZED HEALTH CARE EDUCATION/TRAINING PROGRAM

## 2025-07-03 PROCEDURE — 99396 PREV VISIT EST AGE 40-64: CPT | Performed by: STUDENT IN AN ORGANIZED HEALTH CARE EDUCATION/TRAINING PROGRAM

## 2025-07-03 RX ORDER — SPIRONOLACTONE 50 MG/1
50 TABLET, FILM COATED ORAL DAILY
Qty: 90 TABLET | Refills: 0 | Status: SHIPPED | OUTPATIENT
Start: 2025-07-03 | End: 2025-07-03 | Stop reason: CLARIF

## 2025-07-03 ASSESSMENT — PATIENT HEALTH QUESTIONNAIRE - PHQ9
SUM OF ALL RESPONSES TO PHQ QUESTIONS 1-9: 0
SUM OF ALL RESPONSES TO PHQ QUESTIONS 1-9: 0
1. LITTLE INTEREST OR PLEASURE IN DOING THINGS: NOT AT ALL
SUM OF ALL RESPONSES TO PHQ QUESTIONS 1-9: 0
2. FEELING DOWN, DEPRESSED OR HOPELESS: NOT AT ALL
SUM OF ALL RESPONSES TO PHQ QUESTIONS 1-9: 0

## 2025-07-04 RX ORDER — SPIRONOLACTONE 50 MG/1
50 TABLET, FILM COATED ORAL DAILY
Qty: 90 TABLET | Refills: 0 | Status: SHIPPED | OUTPATIENT
Start: 2025-07-04

## 2025-07-05 ENCOUNTER — APPOINTMENT (OUTPATIENT)
Dept: CT IMAGING | Age: 60
DRG: 053 | End: 2025-07-05
Payer: COMMERCIAL

## 2025-07-05 ENCOUNTER — HOSPITAL ENCOUNTER (INPATIENT)
Age: 60
LOS: 3 days | Discharge: HOME | DRG: 053 | End: 2025-07-08
Attending: EMERGENCY MEDICINE | Admitting: STUDENT IN AN ORGANIZED HEALTH CARE EDUCATION/TRAINING PROGRAM
Payer: COMMERCIAL

## 2025-07-05 DIAGNOSIS — I16.0 HYPERTENSIVE URGENCY: ICD-10-CM

## 2025-07-05 DIAGNOSIS — R56.9 SEIZURE (HCC): Primary | ICD-10-CM

## 2025-07-05 LAB
ANION GAP SERPL CALCULATED.3IONS-SCNC: 12 MMOL/L (ref 9–16)
BASOPHILS # BLD: 0.03 K/UL (ref 0–0.2)
BASOPHILS NFR BLD: 1 % (ref 0–2)
BNP SERPL-MCNC: 207 PG/ML (ref 0–125)
BUN SERPL-MCNC: 32 MG/DL (ref 8–23)
CA-I BLD-SCNC: 1.07 MMOL/L (ref 1.13–1.33)
CALCIUM SERPL-MCNC: 8.8 MG/DL (ref 8.6–10.4)
CHLORIDE SERPL-SCNC: 108 MMOL/L (ref 98–107)
CO2 SERPL-SCNC: 19 MMOL/L (ref 20–31)
CREAT SERPL-MCNC: 2.4 MG/DL (ref 0.7–1.2)
EOSINOPHIL # BLD: 0.21 K/UL (ref 0–0.44)
EOSINOPHILS RELATIVE PERCENT: 5 % (ref 1–4)
ERYTHROCYTE [DISTWIDTH] IN BLOOD BY AUTOMATED COUNT: 15.4 % (ref 11.8–14.4)
ETHANOL PERCENT: <0.01 %
ETHANOLAMINE SERPL-MCNC: <10 MG/DL (ref 0–0.08)
GFR, ESTIMATED: 30 ML/MIN/1.73M2
GLUCOSE SERPL-MCNC: 115 MG/DL (ref 74–99)
HCT VFR BLD AUTO: 37.8 % (ref 40.7–50.3)
HGB BLD-MCNC: 12 G/DL (ref 13–17)
IMM GRANULOCYTES # BLD AUTO: <0.03 K/UL (ref 0–0.3)
IMM GRANULOCYTES NFR BLD: 0 %
LYMPHOCYTES NFR BLD: 0.77 K/UL (ref 1.1–3.7)
LYMPHOCYTES RELATIVE PERCENT: 17 % (ref 24–43)
MAGNESIUM SERPL-MCNC: 2.3 MG/DL (ref 1.6–2.4)
MCH RBC QN AUTO: 27.4 PG (ref 25.2–33.5)
MCHC RBC AUTO-ENTMCNC: 31.7 G/DL (ref 28.4–34.8)
MCV RBC AUTO: 86.3 FL (ref 82.6–102.9)
MONOCYTES NFR BLD: 0.29 K/UL (ref 0.1–1.2)
MONOCYTES NFR BLD: 6 % (ref 3–12)
NEUTROPHILS NFR BLD: 71 % (ref 36–65)
NEUTS SEG NFR BLD: 3.36 K/UL (ref 1.5–8.1)
NRBC BLD-RTO: 0 PER 100 WBC
PHOSPHATE SERPL-MCNC: 2.7 MG/DL (ref 2.5–4.5)
PLATELET # BLD AUTO: 155 K/UL (ref 138–453)
PMV BLD AUTO: 12.1 FL (ref 8.1–13.5)
POTASSIUM SERPL-SCNC: 4.2 MMOL/L (ref 3.7–5.3)
RBC # BLD AUTO: 4.38 M/UL (ref 4.21–5.77)
RBC # BLD: ABNORMAL 10*6/UL
SODIUM SERPL-SCNC: 139 MMOL/L (ref 136–145)
TROPONIN I SERPL HS-MCNC: 20 NG/L (ref 0–22)
TROPONIN I SERPL HS-MCNC: 20 NG/L (ref 0–22)
WBC OTHER # BLD: 4.7 K/UL (ref 3.5–11.3)

## 2025-07-05 PROCEDURE — 6370000000 HC RX 637 (ALT 250 FOR IP): Performed by: EMERGENCY MEDICINE

## 2025-07-05 PROCEDURE — G0480 DRUG TEST DEF 1-7 CLASSES: HCPCS

## 2025-07-05 PROCEDURE — 93005 ELECTROCARDIOGRAM TRACING: CPT | Performed by: STUDENT IN AN ORGANIZED HEALTH CARE EDUCATION/TRAINING PROGRAM

## 2025-07-05 PROCEDURE — 83735 ASSAY OF MAGNESIUM: CPT

## 2025-07-05 PROCEDURE — 70450 CT HEAD/BRAIN W/O DYE: CPT

## 2025-07-05 PROCEDURE — 2580000003 HC RX 258: Performed by: EMERGENCY MEDICINE

## 2025-07-05 PROCEDURE — 82330 ASSAY OF CALCIUM: CPT

## 2025-07-05 PROCEDURE — 6360000002 HC RX W HCPCS

## 2025-07-05 PROCEDURE — 84484 ASSAY OF TROPONIN QUANT: CPT

## 2025-07-05 PROCEDURE — 99285 EMERGENCY DEPT VISIT HI MDM: CPT

## 2025-07-05 PROCEDURE — 2060000000 HC ICU INTERMEDIATE R&B

## 2025-07-05 PROCEDURE — 80048 BASIC METABOLIC PNL TOTAL CA: CPT

## 2025-07-05 PROCEDURE — 84100 ASSAY OF PHOSPHORUS: CPT

## 2025-07-05 PROCEDURE — 85025 COMPLETE CBC W/AUTO DIFF WBC: CPT

## 2025-07-05 PROCEDURE — 83880 ASSAY OF NATRIURETIC PEPTIDE: CPT

## 2025-07-05 PROCEDURE — 96374 THER/PROPH/DIAG INJ IV PUSH: CPT

## 2025-07-05 RX ORDER — CYCLOBENZAPRINE HCL 10 MG
10 TABLET ORAL ONCE
Status: COMPLETED | OUTPATIENT
Start: 2025-07-05 | End: 2025-07-05

## 2025-07-05 RX ORDER — CALCIUM GLUCONATE 20 MG/ML
2000 INJECTION, SOLUTION INTRAVENOUS ONCE
Status: COMPLETED | OUTPATIENT
Start: 2025-07-05 | End: 2025-07-05

## 2025-07-05 RX ORDER — SODIUM CHLORIDE, SODIUM LACTATE, POTASSIUM CHLORIDE, AND CALCIUM CHLORIDE .6; .31; .03; .02 G/100ML; G/100ML; G/100ML; G/100ML
1000 INJECTION, SOLUTION INTRAVENOUS ONCE
Status: COMPLETED | OUTPATIENT
Start: 2025-07-05 | End: 2025-07-06

## 2025-07-05 RX ADMIN — CYCLOBENZAPRINE 10 MG: 10 TABLET, FILM COATED ORAL at 22:17

## 2025-07-05 RX ADMIN — CALCIUM GLUCONATE 2000 MG: 20 INJECTION, SOLUTION INTRAVENOUS at 21:26

## 2025-07-05 RX ADMIN — SODIUM CHLORIDE, SODIUM LACTATE, POTASSIUM CHLORIDE, AND CALCIUM CHLORIDE 1000 ML: .6; .31; .03; .02 INJECTION, SOLUTION INTRAVENOUS at 22:17

## 2025-07-05 ASSESSMENT — PAIN SCALES - GENERAL: PAINLEVEL_OUTOF10: 2

## 2025-07-05 ASSESSMENT — PAIN - FUNCTIONAL ASSESSMENT: PAIN_FUNCTIONAL_ASSESSMENT: 0-10

## 2025-07-06 PROBLEM — F10.90 ALCOHOL USE DISORDER: Status: ACTIVE | Noted: 2025-07-06

## 2025-07-06 PROBLEM — F19.10 POLYSUBSTANCE ABUSE (HCC): Status: ACTIVE | Noted: 2025-07-06

## 2025-07-06 PROBLEM — R56.9 ALCOHOL RELATED SEIZURE (HCC): Status: ACTIVE | Noted: 2025-07-06

## 2025-07-06 PROBLEM — R56.9 PROVOKED SEIZURE (HCC): Status: ACTIVE | Noted: 2025-07-06

## 2025-07-06 LAB
AMPHET UR QL SCN: NEGATIVE
ANION GAP SERPL CALCULATED.3IONS-SCNC: 10 MMOL/L (ref 9–16)
BARBITURATES UR QL SCN: NEGATIVE
BASOPHILS # BLD: <0.03 K/UL (ref 0–0.2)
BASOPHILS NFR BLD: 1 % (ref 0–2)
BENZODIAZ UR QL: NEGATIVE
BUN SERPL-MCNC: 31 MG/DL (ref 8–23)
CALCIUM SERPL-MCNC: 9 MG/DL (ref 8.6–10.4)
CANNABINOIDS UR QL SCN: NEGATIVE
CHLORIDE SERPL-SCNC: 108 MMOL/L (ref 98–107)
CO2 SERPL-SCNC: 20 MMOL/L (ref 20–31)
COCAINE UR QL SCN: NEGATIVE
CREAT SERPL-MCNC: 2.4 MG/DL (ref 0.7–1.2)
EOSINOPHIL # BLD: 0.22 K/UL (ref 0–0.44)
EOSINOPHILS RELATIVE PERCENT: 6 % (ref 1–4)
ERYTHROCYTE [DISTWIDTH] IN BLOOD BY AUTOMATED COUNT: 15.6 % (ref 11.8–14.4)
FENTANYL UR QL: NEGATIVE
GFR, ESTIMATED: 30 ML/MIN/1.73M2
GLUCOSE SERPL-MCNC: 123 MG/DL (ref 74–99)
HCT VFR BLD AUTO: 35.2 % (ref 40.7–50.3)
HGB BLD-MCNC: 10.9 G/DL (ref 13–17)
IMM GRANULOCYTES # BLD AUTO: <0.03 K/UL (ref 0–0.3)
IMM GRANULOCYTES NFR BLD: 0 %
LYMPHOCYTES NFR BLD: 1.07 K/UL (ref 1.1–3.7)
LYMPHOCYTES RELATIVE PERCENT: 29 % (ref 24–43)
MCH RBC QN AUTO: 27.3 PG (ref 25.2–33.5)
MCHC RBC AUTO-ENTMCNC: 31 G/DL (ref 28.4–34.8)
MCV RBC AUTO: 88 FL (ref 82.6–102.9)
METHADONE UR QL: NEGATIVE
MONOCYTES NFR BLD: 0.3 K/UL (ref 0.1–1.2)
MONOCYTES NFR BLD: 8 % (ref 3–12)
NEUTROPHILS NFR BLD: 56 % (ref 36–65)
NEUTS SEG NFR BLD: 2.14 K/UL (ref 1.5–8.1)
NRBC BLD-RTO: 0 PER 100 WBC
OPIATES UR QL SCN: NEGATIVE
OXYCODONE UR QL SCN: NEGATIVE
PCP UR QL SCN: NEGATIVE
PLATELET # BLD AUTO: 133 K/UL (ref 138–453)
PMV BLD AUTO: 11.9 FL (ref 8.1–13.5)
POTASSIUM SERPL-SCNC: 3.7 MMOL/L (ref 3.7–5.3)
RBC # BLD AUTO: 4 M/UL (ref 4.21–5.77)
RBC # BLD: ABNORMAL 10*6/UL
SODIUM SERPL-SCNC: 138 MMOL/L (ref 136–145)
TEST INFORMATION: NORMAL
WBC OTHER # BLD: 3.8 K/UL (ref 3.5–11.3)

## 2025-07-06 PROCEDURE — 6360000002 HC RX W HCPCS

## 2025-07-06 PROCEDURE — 97161 PT EVAL LOW COMPLEX 20 MIN: CPT

## 2025-07-06 PROCEDURE — 2060000000 HC ICU INTERMEDIATE R&B

## 2025-07-06 PROCEDURE — 6370000000 HC RX 637 (ALT 250 FOR IP)

## 2025-07-06 PROCEDURE — 80307 DRUG TEST PRSMV CHEM ANLYZR: CPT

## 2025-07-06 PROCEDURE — 97530 THERAPEUTIC ACTIVITIES: CPT

## 2025-07-06 PROCEDURE — 6370000000 HC RX 637 (ALT 250 FOR IP): Performed by: STUDENT IN AN ORGANIZED HEALTH CARE EDUCATION/TRAINING PROGRAM

## 2025-07-06 PROCEDURE — 95819 EEG AWAKE AND ASLEEP: CPT

## 2025-07-06 PROCEDURE — 36415 COLL VENOUS BLD VENIPUNCTURE: CPT

## 2025-07-06 PROCEDURE — 85025 COMPLETE CBC W/AUTO DIFF WBC: CPT

## 2025-07-06 PROCEDURE — 4A00X4Z MEASUREMENT OF CENTRAL NERVOUS ELECTRICAL ACTIVITY, EXTERNAL APPROACH: ICD-10-PCS | Performed by: PSYCHIATRY & NEUROLOGY

## 2025-07-06 PROCEDURE — 97165 OT EVAL LOW COMPLEX 30 MIN: CPT

## 2025-07-06 PROCEDURE — 80048 BASIC METABOLIC PNL TOTAL CA: CPT

## 2025-07-06 PROCEDURE — 99222 1ST HOSP IP/OBS MODERATE 55: CPT | Performed by: STUDENT IN AN ORGANIZED HEALTH CARE EDUCATION/TRAINING PROGRAM

## 2025-07-06 PROCEDURE — 95819 EEG AWAKE AND ASLEEP: CPT | Performed by: PSYCHIATRY & NEUROLOGY

## 2025-07-06 PROCEDURE — 2500000003 HC RX 250 WO HCPCS

## 2025-07-06 RX ORDER — PANTOPRAZOLE SODIUM 40 MG/1
40 TABLET, DELAYED RELEASE ORAL
Status: DISCONTINUED | OUTPATIENT
Start: 2025-07-06 | End: 2025-07-08 | Stop reason: HOSPADM

## 2025-07-06 RX ORDER — ENOXAPARIN SODIUM 100 MG/ML
30 INJECTION SUBCUTANEOUS 2 TIMES DAILY
Status: DISCONTINUED | OUTPATIENT
Start: 2025-07-06 | End: 2025-07-08 | Stop reason: HOSPADM

## 2025-07-06 RX ORDER — VITAMIN B COMPLEX
1000 TABLET ORAL DAILY
Status: DISCONTINUED | OUTPATIENT
Start: 2025-07-06 | End: 2025-07-08 | Stop reason: HOSPADM

## 2025-07-06 RX ORDER — POTASSIUM CHLORIDE 1500 MG/1
40 TABLET, EXTENDED RELEASE ORAL PRN
Status: DISCONTINUED | OUTPATIENT
Start: 2025-07-06 | End: 2025-07-08 | Stop reason: HOSPADM

## 2025-07-06 RX ORDER — LORAZEPAM 2 MG/ML
2 INJECTION INTRAMUSCULAR EVERY 5 MIN PRN
Status: DISCONTINUED | OUTPATIENT
Start: 2025-07-06 | End: 2025-07-08 | Stop reason: HOSPADM

## 2025-07-06 RX ORDER — LANOLIN ALCOHOL/MO/W.PET/CERES
100 CREAM (GRAM) TOPICAL DAILY
Status: DISCONTINUED | OUTPATIENT
Start: 2025-07-06 | End: 2025-07-08 | Stop reason: HOSPADM

## 2025-07-06 RX ORDER — HYDRALAZINE HYDROCHLORIDE 20 MG/ML
10 INJECTION INTRAMUSCULAR; INTRAVENOUS ONCE
Status: COMPLETED | OUTPATIENT
Start: 2025-07-06 | End: 2025-07-06

## 2025-07-06 RX ORDER — ONDANSETRON 4 MG/1
4 TABLET, ORALLY DISINTEGRATING ORAL EVERY 8 HOURS PRN
Status: DISCONTINUED | OUTPATIENT
Start: 2025-07-06 | End: 2025-07-08 | Stop reason: HOSPADM

## 2025-07-06 RX ORDER — FLUTICASONE PROPIONATE 50 MCG
1 SPRAY, SUSPENSION (ML) NASAL DAILY PRN
Status: DISCONTINUED | OUTPATIENT
Start: 2025-07-06 | End: 2025-07-08 | Stop reason: HOSPADM

## 2025-07-06 RX ORDER — DOXAZOSIN 2 MG/1
2 TABLET ORAL DAILY
Status: DISCONTINUED | OUTPATIENT
Start: 2025-07-06 | End: 2025-07-08 | Stop reason: HOSPADM

## 2025-07-06 RX ORDER — HYDRALAZINE HYDROCHLORIDE 50 MG/1
100 TABLET, FILM COATED ORAL 3 TIMES DAILY
Status: DISCONTINUED | OUTPATIENT
Start: 2025-07-06 | End: 2025-07-08 | Stop reason: HOSPADM

## 2025-07-06 RX ORDER — ATORVASTATIN CALCIUM 40 MG/1
40 TABLET, FILM COATED ORAL DAILY
Status: DISCONTINUED | OUTPATIENT
Start: 2025-07-06 | End: 2025-07-08 | Stop reason: HOSPADM

## 2025-07-06 RX ORDER — SPIRONOLACTONE 25 MG/1
50 TABLET ORAL DAILY
Status: DISCONTINUED | OUTPATIENT
Start: 2025-07-06 | End: 2025-07-08 | Stop reason: HOSPADM

## 2025-07-06 RX ORDER — CARVEDILOL 12.5 MG/1
12.5 TABLET ORAL 2 TIMES DAILY WITH MEALS
Status: DISCONTINUED | OUTPATIENT
Start: 2025-07-06 | End: 2025-07-08 | Stop reason: HOSPADM

## 2025-07-06 RX ORDER — ACETAMINOPHEN 650 MG/1
650 SUPPOSITORY RECTAL EVERY 6 HOURS PRN
Status: DISCONTINUED | OUTPATIENT
Start: 2025-07-06 | End: 2025-07-08 | Stop reason: HOSPADM

## 2025-07-06 RX ORDER — MAGNESIUM SULFATE IN WATER 40 MG/ML
2000 INJECTION, SOLUTION INTRAVENOUS PRN
Status: DISCONTINUED | OUTPATIENT
Start: 2025-07-06 | End: 2025-07-08 | Stop reason: HOSPADM

## 2025-07-06 RX ORDER — ACETAMINOPHEN 325 MG/1
650 TABLET ORAL EVERY 6 HOURS PRN
Status: DISCONTINUED | OUTPATIENT
Start: 2025-07-06 | End: 2025-07-08 | Stop reason: HOSPADM

## 2025-07-06 RX ORDER — CYCLOBENZAPRINE HCL 10 MG
10 TABLET ORAL 3 TIMES DAILY PRN
Status: DISCONTINUED | OUTPATIENT
Start: 2025-07-06 | End: 2025-07-08 | Stop reason: HOSPADM

## 2025-07-06 RX ORDER — POTASSIUM CHLORIDE 7.45 MG/ML
10 INJECTION INTRAVENOUS PRN
Status: DISCONTINUED | OUTPATIENT
Start: 2025-07-06 | End: 2025-07-08 | Stop reason: HOSPADM

## 2025-07-06 RX ORDER — POLYETHYLENE GLYCOL 3350 17 G/17G
17 POWDER, FOR SOLUTION ORAL DAILY PRN
Status: DISCONTINUED | OUTPATIENT
Start: 2025-07-06 | End: 2025-07-08 | Stop reason: HOSPADM

## 2025-07-06 RX ORDER — SODIUM CHLORIDE 0.9 % (FLUSH) 0.9 %
5-40 SYRINGE (ML) INJECTION EVERY 12 HOURS SCHEDULED
Status: DISCONTINUED | OUTPATIENT
Start: 2025-07-06 | End: 2025-07-08 | Stop reason: HOSPADM

## 2025-07-06 RX ORDER — NIFEDIPINE 30 MG/1
60 TABLET, EXTENDED RELEASE ORAL DAILY
Status: DISCONTINUED | OUTPATIENT
Start: 2025-07-06 | End: 2025-07-08 | Stop reason: HOSPADM

## 2025-07-06 RX ORDER — FERROUS SULFATE 325(65) MG
325 TABLET, DELAYED RELEASE (ENTERIC COATED) ORAL 2 TIMES DAILY
Status: DISCONTINUED | OUTPATIENT
Start: 2025-07-06 | End: 2025-07-08 | Stop reason: HOSPADM

## 2025-07-06 RX ORDER — SODIUM CHLORIDE 0.9 % (FLUSH) 0.9 %
5-40 SYRINGE (ML) INJECTION PRN
Status: DISCONTINUED | OUTPATIENT
Start: 2025-07-06 | End: 2025-07-08 | Stop reason: HOSPADM

## 2025-07-06 RX ORDER — FOLIC ACID 1 MG/1
1 TABLET ORAL DAILY
Status: DISCONTINUED | OUTPATIENT
Start: 2025-07-06 | End: 2025-07-08 | Stop reason: HOSPADM

## 2025-07-06 RX ORDER — ALBUTEROL SULFATE 90 UG/1
2 INHALANT RESPIRATORY (INHALATION) EVERY 6 HOURS PRN
Status: DISCONTINUED | OUTPATIENT
Start: 2025-07-06 | End: 2025-07-08 | Stop reason: HOSPADM

## 2025-07-06 RX ORDER — ASPIRIN 81 MG/1
81 TABLET ORAL DAILY
Status: DISCONTINUED | OUTPATIENT
Start: 2025-07-06 | End: 2025-07-08 | Stop reason: HOSPADM

## 2025-07-06 RX ORDER — LABETALOL HYDROCHLORIDE 5 MG/ML
10 INJECTION, SOLUTION INTRAVENOUS
Status: DISCONTINUED | OUTPATIENT
Start: 2025-07-06 | End: 2025-07-08 | Stop reason: HOSPADM

## 2025-07-06 RX ORDER — SODIUM CHLORIDE 9 MG/ML
INJECTION, SOLUTION INTRAVENOUS PRN
Status: DISCONTINUED | OUTPATIENT
Start: 2025-07-06 | End: 2025-07-08 | Stop reason: HOSPADM

## 2025-07-06 RX ORDER — LOSARTAN POTASSIUM 50 MG/1
100 TABLET ORAL DAILY
Status: DISCONTINUED | OUTPATIENT
Start: 2025-07-06 | End: 2025-07-08 | Stop reason: HOSPADM

## 2025-07-06 RX ORDER — ONDANSETRON 2 MG/ML
4 INJECTION INTRAMUSCULAR; INTRAVENOUS EVERY 6 HOURS PRN
Status: DISCONTINUED | OUTPATIENT
Start: 2025-07-06 | End: 2025-07-08 | Stop reason: HOSPADM

## 2025-07-06 RX ADMIN — CARVEDILOL 12.5 MG: 12.5 TABLET, FILM COATED ORAL at 17:42

## 2025-07-06 RX ADMIN — FERROUS SULFATE TAB EC 325 MG (65 MG FE EQUIVALENT) 325 MG: 325 (65 FE) TABLET DELAYED RESPONSE at 23:07

## 2025-07-06 RX ADMIN — PANTOPRAZOLE SODIUM 40 MG: 40 TABLET, DELAYED RELEASE ORAL at 07:49

## 2025-07-06 RX ADMIN — ENOXAPARIN SODIUM 30 MG: 100 INJECTION SUBCUTANEOUS at 07:50

## 2025-07-06 RX ADMIN — Medication 1000 UNITS: at 07:50

## 2025-07-06 RX ADMIN — SPIRONOLACTONE 50 MG: 25 TABLET, FILM COATED ORAL at 07:50

## 2025-07-06 RX ADMIN — FERROUS SULFATE TAB EC 325 MG (65 MG FE EQUIVALENT) 325 MG: 325 (65 FE) TABLET DELAYED RESPONSE at 07:50

## 2025-07-06 RX ADMIN — DOXAZOSIN 2 MG: 2 TABLET ORAL at 07:50

## 2025-07-06 RX ADMIN — SODIUM CHLORIDE, PRESERVATIVE FREE 10 ML: 5 INJECTION INTRAVENOUS at 07:50

## 2025-07-06 RX ADMIN — CYCLOBENZAPRINE 10 MG: 10 TABLET, FILM COATED ORAL at 23:07

## 2025-07-06 RX ADMIN — ENOXAPARIN SODIUM 30 MG: 100 INJECTION SUBCUTANEOUS at 23:06

## 2025-07-06 RX ADMIN — HYDRALAZINE HYDROCHLORIDE 10 MG: 20 INJECTION INTRAMUSCULAR; INTRAVENOUS at 02:02

## 2025-07-06 RX ADMIN — Medication 10 MG: at 01:28

## 2025-07-06 RX ADMIN — ASPIRIN 81 MG: 81 TABLET, COATED ORAL at 07:49

## 2025-07-06 RX ADMIN — HYDRALAZINE HYDROCHLORIDE 100 MG: 50 TABLET ORAL at 23:06

## 2025-07-06 RX ADMIN — FERROUS SULFATE TAB EC 325 MG (65 MG FE EQUIVALENT) 325 MG: 325 (65 FE) TABLET DELAYED RESPONSE at 01:28

## 2025-07-06 RX ADMIN — FOLIC ACID 1 MG: 1 TABLET ORAL at 23:07

## 2025-07-06 RX ADMIN — ATORVASTATIN CALCIUM 40 MG: 40 TABLET, FILM COATED ORAL at 07:50

## 2025-07-06 RX ADMIN — CARVEDILOL 12.5 MG: 12.5 TABLET, FILM COATED ORAL at 07:50

## 2025-07-06 RX ADMIN — HYDRALAZINE HYDROCHLORIDE 100 MG: 50 TABLET ORAL at 07:49

## 2025-07-06 RX ADMIN — Medication 100 MG: at 23:07

## 2025-07-06 RX ADMIN — SODIUM CHLORIDE, PRESERVATIVE FREE 10 ML: 5 INJECTION INTRAVENOUS at 23:08

## 2025-07-06 RX ADMIN — NIFEDIPINE 60 MG: 30 TABLET, FILM COATED, EXTENDED RELEASE ORAL at 07:49

## 2025-07-06 RX ADMIN — HYDRALAZINE HYDROCHLORIDE 100 MG: 50 TABLET ORAL at 14:49

## 2025-07-06 RX ADMIN — LOSARTAN POTASSIUM 100 MG: 50 TABLET, FILM COATED ORAL at 07:49

## 2025-07-06 ASSESSMENT — PAIN SCALES - GENERAL
PAINLEVEL_OUTOF10: 0
PAINLEVEL_OUTOF10: 0

## 2025-07-06 ASSESSMENT — LIFESTYLE VARIABLES
HOW OFTEN DO YOU HAVE A DRINK CONTAINING ALCOHOL: MONTHLY OR LESS
HOW MANY STANDARD DRINKS CONTAINING ALCOHOL DO YOU HAVE ON A TYPICAL DAY: 1 OR 2

## 2025-07-06 NOTE — PROCEDURES
PROCEDURE NOTE  Date: 7/6/2025   Name: Tejas Machado  YOB: 1965    EEG    Date/Time: 7/6/2025 10:11 AM    Performed by: Davi Shaikh MD  Authorized by: Cyril Garvey MD        EEG REPORT     CLINICAL NEUROPHYSIOLOGY LABORATORY  DEPARTMENT OF NEUROLOGY  [facility]       Patient: Tejas aMchado Age: 59 y.o.  MRN: [id number]      Referring Provider: No ref. provider found    History: This routine 30 minute scalp EEG was recorded with video- monitoring for a 59 y.o.. male  who presented with encephalopathy. This EEG was performed to evaluate for focal and epileptiform abnormalities.     Tejas Machado   Current Facility-Administered Medications   Medication Dose Route Frequency Provider Last Rate Last Admin    sodium chloride flush 0.9 % injection 5-40 mL  5-40 mL IntraVENous 2 times per day Cyril Garvey MD   10 mL at 07/06/25 0750    sodium chloride flush 0.9 % injection 5-40 mL  5-40 mL IntraVENous PRN Cyril Garvey MD        0.9 % sodium chloride infusion   IntraVENous PRN Cyril Garvey MD        potassium chloride (KLOR-CON M) extended release tablet 40 mEq  40 mEq Oral PRN Cyril Garvey MD        Or    potassium bicarb-citric acid (EFFER-K) effervescent tablet 40 mEq  40 mEq Oral PRN Cyril Garvey MD        Or    potassium chloride 10 mEq/100 mL IVPB (Peripheral Line)  10 mEq IntraVENous PRN Cyril Garvey MD        magnesium sulfate 2000 mg in 50 mL IVPB premix  2,000 mg IntraVENous PRN Cyril Garvey MD        LORazepam (ATIVAN) injection 2 mg  2 mg IntraVENous Q5 Min PRN Cyril Garvey MD        enoxaparin Sodium (LOVENOX) injection 30 mg  30 mg SubCUTAneous BID Cyril Garvey MD   30 mg at 07/06/25 0750    ondansetron (ZOFRAN-ODT) disintegrating tablet 4 mg  4 mg Oral Q8H PRCyril Cueva MD        Or    ondansetron (ZOFRAN) injection 4 mg  4 mg IntraVENous Q6H PRCyril Cueva MD        polyethylene glycol (GLYCOLAX) packet 17 g  17 g Oral Daily PRN Cyril Garvey MD

## 2025-07-06 NOTE — CARE COORDINATION
Case Management Assessment  Initial Evaluation    Date/Time of Evaluation: 7/6/2025 8:04 AM  Assessment Completed by: Eloy Zaragoza    If patient is discharged prior to next notation, then this note serves as note for discharge by case management.    Patient Name: Tejas Machado                   YOB: 1965  Diagnosis: Seizure (HCC) [R56.9]                   Date / Time: 7/5/2025  7:58 PM    Patient Admission Status: Inpatient   Readmission Risk (Low < 19, Mod (19-27), High > 27): Readmission Risk Score: 17.9    Current PCP: Abbie Cueva MD  PCP verified by CM? (P) Yes    Chart Reviewed: Yes      History Provided by: (P) Patient  Patient Orientation: (P) Alert and Oriented    Patient Cognition: (P) Alert    Hospitalization in the last 30 days (Readmission):  No    If yes, Readmission Assessment in  Navigator will be completed.    Advance Directives:      Code Status: Full Code   Patient's Primary Decision Maker is: (P) Legal Next of Kin    Primary Decision Maker: Renata Machado - Spouse - [phone]    Discharge Planning:    Patient lives with: (P) Spouse/Significant Other, Children Type of Home: (P) House  Primary Care Giver: (P) Self  Patient Support Systems include: (P) Spouse/Significant Other   Current Financial resources: (P) Medicaid  Current community resources: (P) None  Current services prior to admission: (P) None            Current DME:              Type of Home Care services:  (P) None    ADLS  Prior functional level: (P) Independent in ADLs/IADLs  Current functional level: (P) Independent in ADLs/IADLs    PT AM-PAC:   /24  OT AM-PAC:   /24    Family can provide assistance at DC: (P) Yes  Would you like Case Management to discuss the discharge plan with any other family members/significant others, and if so, who? (P) No  Plans to Return to Present Housing: (P) Yes  Other Identified Issues/Barriers to RETURNING to current housing: None  Potential Assistance needed at discharge:

## 2025-07-06 NOTE — H&P
[facility] Neurology   IN-PATIENT SERVICE   [facility]    HISTORY AND PHYSICAL EXAMINATION            Date:   7/5/2025  Patient name:  Tejas Machado  Date of admission:  7/5/2025  7:58 PM  MRN:   [id number]  Account:  [id number]  YOB: 1965  PCP:    Abbie Cueva MD  Room:   02/02  Code Status:    Prior    Chief Complaint:     Chief Complaint   Patient presents with    Seizures    Dizziness     History Obtained From:     patient, spouse, electronic medical record    History of Present Illness:     Tejas Machado is a 59 y.o. male with a past medical history of CKD, CHF, and alcohol use disorder who presented after a first-time seizure.  Patient states that he was lying on the couch at 3:50 PM on 7/5/2025 when he started having a cramp in the right leg.  Patient then stood up and started having a cramp in the left leg as well.  Patient's wife was with him and went to get him some tonic water.  Patient's wife was talking on the phone while he was moaning in pain from the cramps.  She noticed that he stopped moaning and turned around to look at him, and noticed that he was unresponsive, eyes rolled to the back of his head.  Spouse attempted aggressive sternal rub without response.  Spouse describes rigid extremities with flexed right upper extremity and extended left upper extremity.  They endorse associated urinary incontinence, but deny tongue bite. The episode lasted approximately 2 to 3 minutes.  Patient was drowsy with slurred speech and confusion afterwards for approximately 5 to 10 minutes.    Patient endorses drinking approximately 1/2 pint of liquor 2 times per week.  He states that he most likely drank a full pint of liquor on the 4th of July.  Denied any alcohol intake on the day of presentation.  Patient also has 44-pack-year smoking history, having quit 3 years ago.  Endorses remote history of crack/cocaine use.  Patient also had MRI brain in October 2023 which

## 2025-07-06 NOTE — DISCHARGE INSTRUCTIONS
You were admitted to hospital for concern for seizure.  You are worked up for seizure, likely your seizure is provoked from increased alcohol intake.  Please do not take alcohol further.  You are not being prescribed with any medication for seizure.  Please call and make appointment with neurology for further medication recommendations.  AS RECOMMENDED YOU SHOULD NOT DRIVE OR OPERATE ANY HEAVY MACHINERY UNTIL CLEARED BY A NEUROLOGIST.    Do not drive for 6 months as you had a seizure.  Please continue to take thiamine and folic acid as prescribed.  Please call to make appointment with your PCP for follow-up postdischarge.  Please continue to take your other medications as recommended previously.  Please return to hospital if you experience any seizure, syncope, confusion, dizziness, chest pain, shortness of breath, palpitations, fever or any other concerning complaint.

## 2025-07-06 NOTE — PLAN OF CARE
Problem: Chronic Conditions and Co-morbidities  Goal: Patient's chronic conditions and co-morbidity symptoms are monitored and maintained or improved  7/6/2025 0756 by Eloy Sheikh RN  Outcome: Progressing  7/6/2025 0630 by Lula Peterson RN  Outcome: Progressing     Problem: Discharge Planning  Goal: Discharge to home or other facility with appropriate resources  7/6/2025 0756 by Eloy Sheikh RN  Outcome: Progressing  7/6/2025 0630 by Lula Peterson RN  Outcome: Progressing     Problem: Seizure Precautions  Goal: Remains free of injury related to seizures activity  7/6/2025 0756 by Eloy Sheikh RN  Outcome: Progressing  7/6/2025 0630 by Lula Peterson RN  Outcome: Progressing     Problem: Pain  Goal: Verbalizes/displays adequate comfort level or baseline comfort level  7/6/2025 0756 by Eloy Sheikh RN  Outcome: Progressing  7/6/2025 0630 by Lula Peterson RN  Outcome: Progressing     Problem: Safety - Adult  Goal: Free from fall injury  7/6/2025 0756 by Eloy Sheikh RN  Outcome: Progressing  7/6/2025 0630 by Lula Peterson RN  Outcome: Progressing

## 2025-07-06 NOTE — PLAN OF CARE
Problem: Chronic Conditions and Co-morbidities  Goal: Patient's chronic conditions and co-morbidity symptoms are monitored and maintained or improved  Outcome: Progressing     Problem: Discharge Planning  Goal: Discharge to home or other facility with appropriate resources  Outcome: Progressing     Problem: Seizure Precautions  Goal: Remains free of injury related to seizures activity  Outcome: Progressing     Problem: Pain  Goal: Verbalizes/displays adequate comfort level or baseline comfort level  Outcome: Progressing     Problem: Safety - Adult  Goal: Free from fall injury  Outcome: Progressing

## 2025-07-06 NOTE — ED PROVIDER NOTES
[facility]     Emergency Department     Faculty Note/ Attestation      Pt Name: Tejas Machado                                       MRN: [id number]  Birthdate 1965  Date of evaluation: 7/5/2025  Note Started: 8:01 PM EDT    Patients PCP:    Abbie Cueva MD    Attestation  I performed a history and physical examination of the patient and discussed management with the resident. I reviewed the resident’s note and agree with the documented findings and plan of care. Any areas of disagreement are noted on the chart. I was personally present for the key portions of any procedures. I have documented in the chart those procedures where I was not present during the key portions. I have reviewed the emergency nurses triage note. I agree with the chief complaint, past medical history, past surgical history, allergies, medications, social and family history as documented unless otherwise noted below.    For Physician Assistant/ Nurse Practitioner cases/documentation I have personally evaluated this patient and have completed at least one if not all key elements of the E/M (history, physical exam, and MDM). Additional findings are as noted.    Initial Screens:        Eugenio Coma Scale  Eye Opening: Spontaneous  Best Verbal Response: Oriented  Best Motor Response: Obeys commands  Parker Coma Scale Score: 15    Vitals:    Vitals:    07/05/25 1953   BP: (!) 154/103   Pulse: 57   Resp: 16   Temp: 97.3 °F (36.3 °C)   SpO2: 96%   Weight: 106.1 kg (234 lb)       CHIEF COMPLAINT       Chief Complaint   Patient presents with    Seizures       58 YO M with multiple medical comorbidity at 1550 the pt was having spasms into his legs bilaterally the pt wife noted tonic colonic seizure activity and he was tense and was confused for at least 5-10 minutes.  The pt has having no CP or SOB there was no illness the pt did have loss of bladder.  The pt has significant EtOH hx and was drinking heavily.  
were completed with a voice recognition program.  Efforts were made to edit the dictations but occasionally words are mis-transcribed.)

## 2025-07-06 NOTE — ED NOTES
Arrived patient to ED presents with dizziness and lower leg cramps, As per wife, patient had bilateral leg cramps this evening and suddenly having muscle stiffness that lasted almost 2 minutes. As per wife, she is concern that patient is having seizures, patient also complaint of dizziness. No history of seizures, has a history of Afib, hypertension and CKD. alert and oriented on arrival. Hooked to cardiac monitor at 57bpm, BP- 189/104mmhg, EKG done and reviewed. Bloods collected. Bed on lowest position. Call light provided. Wife at bedside.  
Neurology resident @ bedside to evaluate patient   
Patient to Ct scan via stretcher  
The following labs were labeled with appropriate pt sticker and tubed to lab:     [] Blue     [] Lavender   [] on ice  [] Green/yellow  [] Green/black [] on ice  [] Grey  [] on ice  [] Yellow  [] Red  [] Pink  [] Type/ Screen  [] ABG  [] VBG    [] COVID-19 swab    [] Rapid  [] PCR  [] Flu swab  [] Peds Viral Panel     [x] Urine Sample  [] Fecal Sample  [] Pelvic Cultures  [] Blood Cultures  [] X 2  [] STREP Cultures  [] Wound Cultures   
The following labs were labeled with appropriate pt sticker and tubed to lab:     [] Blue     [] Lavender   [] on ice  [x] Green/yellow  [] Green/black [] on ice  [] Grey  [] on ice  [] Yellow  [] Red  [] Pink  [] Type/ Screen  [] ABG  [] VBG    [] COVID-19 swab    [] Rapid  [] PCR  [] Flu swab  [] Peds Viral Panel     [] Urine Sample  [] Fecal Sample  [] Pelvic Cultures  [] Blood Cultures  [] X 2  [] STREP Cultures  [] Wound Cultures   
The following labs were labeled with appropriate pt sticker and tubed to lab:     [x] Blue     [x] Lavender   [] on ice  [x] Green/yellow  [x] Green/black [] on ice  [] Grey  [] on ice  [] Yellow  [x] Red  [] Pink  [] Type/ Screen  [] ABG  [] VBG    [] COVID-19 swab    [] Rapid  [] PCR  [] Flu swab  [] Peds Viral Panel     [] Urine Sample  [] Fecal Sample  [] Pelvic Cultures  [] Blood Cultures  [] X 2  [] STREP Cultures  [] Wound Cultures   
Abnormal; Notable for the following components:       Result Value    Chloride 108 (*)     CO2 19 (*)     Glucose 115 (*)     BUN 32 (*)     Creatinine 2.4 (*)     Est, Glom Filt Rate 30 (*)     All other components within normal limits   BRAIN NATRIURETIC PEPTIDE - Abnormal; Notable for the following components:    NT Pro- (*)     All other components within normal limits   CBC WITH AUTO DIFFERENTIAL - Abnormal; Notable for the following components:    Hemoglobin 12.0 (*)     Hematocrit 37.8 (*)     RDW 15.4 (*)     Neutrophils % 71 (*)     Lymphocytes % 17 (*)     Eosinophils % 5 (*)     Lymphocytes Absolute 0.77 (*)     All other components within normal limits   CALCIUM, IONIZED - Abnormal; Notable for the following components:    Calcium, Ionized 1.07 (*)     All other components within normal limits   MAGNESIUM   TROPONIN   TROPONIN   PHOSPHORUS   ETHANOL       Electronically signed by Hai Desir RN on 7/5/2025 at 11:40 PM

## 2025-07-07 ENCOUNTER — APPOINTMENT (OUTPATIENT)
Dept: MRI IMAGING | Age: 60
DRG: 053 | End: 2025-07-07
Payer: COMMERCIAL

## 2025-07-07 LAB
ANION GAP SERPL CALCULATED.3IONS-SCNC: 9 MMOL/L (ref 9–16)
BASOPHILS # BLD: <0.03 K/UL (ref 0–0.2)
BASOPHILS NFR BLD: 1 % (ref 0–2)
BUN SERPL-MCNC: 27 MG/DL (ref 8–23)
CALCIUM SERPL-MCNC: 8.8 MG/DL (ref 8.6–10.4)
CHLORIDE SERPL-SCNC: 109 MMOL/L (ref 98–107)
CO2 SERPL-SCNC: 21 MMOL/L (ref 20–31)
CREAT SERPL-MCNC: 2.1 MG/DL (ref 0.7–1.2)
EOSINOPHIL # BLD: 0.2 K/UL (ref 0–0.44)
EOSINOPHILS RELATIVE PERCENT: 6 % (ref 1–4)
ERYTHROCYTE [DISTWIDTH] IN BLOOD BY AUTOMATED COUNT: 16 % (ref 11.8–14.4)
GFR, ESTIMATED: 35 ML/MIN/1.73M2
GLUCOSE SERPL-MCNC: 101 MG/DL (ref 74–99)
HCT VFR BLD AUTO: 35.2 % (ref 40.7–50.3)
HGB BLD-MCNC: 11.1 G/DL (ref 13–17)
IMM GRANULOCYTES # BLD AUTO: <0.03 K/UL (ref 0–0.3)
IMM GRANULOCYTES NFR BLD: 1 %
LYMPHOCYTES NFR BLD: 1.06 K/UL (ref 1.1–3.7)
LYMPHOCYTES RELATIVE PERCENT: 30 % (ref 24–43)
MCH RBC QN AUTO: 27.4 PG (ref 25.2–33.5)
MCHC RBC AUTO-ENTMCNC: 31.5 G/DL (ref 28.4–34.8)
MCV RBC AUTO: 86.9 FL (ref 82.6–102.9)
MONOCYTES NFR BLD: 0.21 K/UL (ref 0.1–1.2)
MONOCYTES NFR BLD: 6 % (ref 3–12)
NEUTROPHILS NFR BLD: 57 % (ref 36–65)
NEUTS SEG NFR BLD: 1.98 K/UL (ref 1.5–8.1)
NRBC BLD-RTO: 0 PER 100 WBC
PLATELET # BLD AUTO: ABNORMAL K/UL (ref 138–453)
PLATELET, FLUORESCENCE: 131 K/UL (ref 138–453)
PLATELETS.RETICULATED NFR BLD AUTO: 3.6 % (ref 1.1–10.3)
POTASSIUM SERPL-SCNC: 4.1 MMOL/L (ref 3.7–5.3)
RBC # BLD AUTO: 4.05 M/UL (ref 4.21–5.77)
RBC # BLD: ABNORMAL 10*6/UL
SODIUM SERPL-SCNC: 139 MMOL/L (ref 136–145)
WBC OTHER # BLD: 3.5 K/UL (ref 3.5–11.3)

## 2025-07-07 PROCEDURE — 36415 COLL VENOUS BLD VENIPUNCTURE: CPT

## 2025-07-07 PROCEDURE — 2500000003 HC RX 250 WO HCPCS

## 2025-07-07 PROCEDURE — 99232 SBSQ HOSP IP/OBS MODERATE 35: CPT | Performed by: PSYCHIATRY & NEUROLOGY

## 2025-07-07 PROCEDURE — 80048 BASIC METABOLIC PNL TOTAL CA: CPT

## 2025-07-07 PROCEDURE — 85055 RETICULATED PLATELET ASSAY: CPT

## 2025-07-07 PROCEDURE — 6370000000 HC RX 637 (ALT 250 FOR IP)

## 2025-07-07 PROCEDURE — 6370000000 HC RX 637 (ALT 250 FOR IP): Performed by: STUDENT IN AN ORGANIZED HEALTH CARE EDUCATION/TRAINING PROGRAM

## 2025-07-07 PROCEDURE — 85025 COMPLETE CBC W/AUTO DIFF WBC: CPT

## 2025-07-07 PROCEDURE — 70553 MRI BRAIN STEM W/O & W/DYE: CPT

## 2025-07-07 PROCEDURE — 2060000000 HC ICU INTERMEDIATE R&B

## 2025-07-07 PROCEDURE — 6360000002 HC RX W HCPCS

## 2025-07-07 PROCEDURE — 6360000004 HC RX CONTRAST MEDICATION

## 2025-07-07 PROCEDURE — A9576 INJ PROHANCE MULTIPACK: HCPCS

## 2025-07-07 PROCEDURE — 6360000002 HC RX W HCPCS: Performed by: STUDENT IN AN ORGANIZED HEALTH CARE EDUCATION/TRAINING PROGRAM

## 2025-07-07 RX ORDER — GADOTERIDOL 279.3 MG/ML
20 INJECTION INTRAVENOUS
Status: COMPLETED | OUTPATIENT
Start: 2025-07-07 | End: 2025-07-07

## 2025-07-07 RX ORDER — LORAZEPAM 2 MG/ML
1 INJECTION INTRAMUSCULAR EVERY 6 HOURS PRN
Status: DISCONTINUED | OUTPATIENT
Start: 2025-07-07 | End: 2025-07-08 | Stop reason: HOSPADM

## 2025-07-07 RX ADMIN — FOLIC ACID 1 MG: 1 TABLET ORAL at 08:42

## 2025-07-07 RX ADMIN — SODIUM CHLORIDE, PRESERVATIVE FREE 10 ML: 5 INJECTION INTRAVENOUS at 08:43

## 2025-07-07 RX ADMIN — NIFEDIPINE 60 MG: 30 TABLET, FILM COATED, EXTENDED RELEASE ORAL at 08:41

## 2025-07-07 RX ADMIN — HYDRALAZINE HYDROCHLORIDE 100 MG: 50 TABLET ORAL at 08:41

## 2025-07-07 RX ADMIN — ATORVASTATIN CALCIUM 40 MG: 40 TABLET, FILM COATED ORAL at 08:42

## 2025-07-07 RX ADMIN — SPIRONOLACTONE 50 MG: 25 TABLET, FILM COATED ORAL at 08:42

## 2025-07-07 RX ADMIN — FERROUS SULFATE TAB EC 325 MG (65 MG FE EQUIVALENT) 325 MG: 325 (65 FE) TABLET DELAYED RESPONSE at 21:34

## 2025-07-07 RX ADMIN — ASPIRIN 81 MG: 81 TABLET, COATED ORAL at 08:42

## 2025-07-07 RX ADMIN — HYDRALAZINE HYDROCHLORIDE 100 MG: 50 TABLET ORAL at 21:34

## 2025-07-07 RX ADMIN — SODIUM CHLORIDE, PRESERVATIVE FREE 10 ML: 5 INJECTION INTRAVENOUS at 21:34

## 2025-07-07 RX ADMIN — ENOXAPARIN SODIUM 30 MG: 100 INJECTION SUBCUTANEOUS at 21:34

## 2025-07-07 RX ADMIN — LOSARTAN POTASSIUM 100 MG: 50 TABLET, FILM COATED ORAL at 08:42

## 2025-07-07 RX ADMIN — DOXAZOSIN 2 MG: 2 TABLET ORAL at 08:41

## 2025-07-07 RX ADMIN — Medication 100 MG: at 08:42

## 2025-07-07 RX ADMIN — Medication 1 MG: at 21:34

## 2025-07-07 RX ADMIN — CARVEDILOL 12.5 MG: 12.5 TABLET, FILM COATED ORAL at 17:01

## 2025-07-07 RX ADMIN — PANTOPRAZOLE SODIUM 40 MG: 40 TABLET, DELAYED RELEASE ORAL at 08:42

## 2025-07-07 RX ADMIN — HYDRALAZINE HYDROCHLORIDE 100 MG: 50 TABLET ORAL at 15:17

## 2025-07-07 RX ADMIN — FERROUS SULFATE TAB EC 325 MG (65 MG FE EQUIVALENT) 325 MG: 325 (65 FE) TABLET DELAYED RESPONSE at 08:42

## 2025-07-07 RX ADMIN — Medication 1000 UNITS: at 08:42

## 2025-07-07 RX ADMIN — GADOTERIDOL 20 ML: 279.3 INJECTION, SOLUTION INTRAVENOUS at 21:18

## 2025-07-07 ASSESSMENT — ENCOUNTER SYMPTOMS
ABDOMINAL PAIN: 0
SHORTNESS OF BREATH: 0
ABDOMINAL DISTENTION: 0
WHEEZING: 0

## 2025-07-07 NOTE — ADT AUTH CERT
Cyril Garvey MD   Resident  Neurology     H&P     Attested     Date of Service: 7/6/2025 12:27 AM     Attested           Attestation signed by Kt Diaz MD at 7/6/2025  7:22 PM        Attending Physician Statement:  I have discussed the case of Tejas Machado, including pertinent history and exam findings with the resident Dr. Guru GONZALES. I have seen and examined the patient and the key elements of the encounter have been performed by me.  I have reviewed medications, clinical laboratory, imaging and other diagnostic tests with the residents. I agree with the assessment, plan and orders as documented by the resident with changes made to the note as needed.      Assessment  New onset seizure 7/5/25 around 3:50PM concerning for provoked event in the setting of long standing life long ETOH use disorder with ETOH toxicity during fourth of July weekend and binge drinking   Alcohol use disorder 1/2 pint of liquor 2 times per week  Polysubstance use disorder including ETOH, TObacco, Crack/cocaine  Chronic Cerebrovascular white matter disease 2/2 comorbid risk factors      Plan  -FU MRI brain W/WO to rule out focal lesion or acute process to explain new onset seizure  -Routine 30 minute EEG 7/6/25-WNL  -monitor for ETOH withdrawal, supplement thiamine and folic acid   -seizure precautions while admitted however holding AED for now given single likely provoked event   -Agree with plan as discussed and documented by Dr. Guru GONZALES     -CA planning home next 24 hours pending mri brain   Kt Diaz MD 7/6/2025 10:24 AM              Expand All Collapse All      [facility] Neurology   IN-PATIENT SERVICE   [facility]     HISTORY AND PHYSICAL EXAMINATION            Date:                            7/5/2025  Patient name:              Tejas Machado  Date of admission:      7/5/2025  7:58 PM  MRN:                           [id number]  Account:                      [id number]  Date of Birth:

## 2025-07-07 NOTE — PLAN OF CARE
Problem: Seizure Precautions  Goal: Remains free of injury related to seizures activity  7/7/2025 1519 by Faby Linares, RN  Outcome: Progressing     Problem: Pain  Goal: Verbalizes/displays adequate comfort level or baseline comfort level  7/7/2025 1519 by Faby Linares, RN  Outcome: Progressing

## 2025-07-08 ENCOUNTER — TELEPHONE (OUTPATIENT)
Dept: FAMILY MEDICINE CLINIC | Age: 60
End: 2025-07-08

## 2025-07-08 VITALS
OXYGEN SATURATION: 96 % | RESPIRATION RATE: 19 BRPM | HEART RATE: 74 BPM | DIASTOLIC BLOOD PRESSURE: 85 MMHG | SYSTOLIC BLOOD PRESSURE: 162 MMHG | BODY MASS INDEX: 33.58 KG/M2 | WEIGHT: 234 LBS | TEMPERATURE: 97.6 F

## 2025-07-08 LAB
ANION GAP SERPL CALCULATED.3IONS-SCNC: 10 MMOL/L (ref 9–16)
BASOPHILS # BLD: 0.03 K/UL (ref 0–0.2)
BASOPHILS NFR BLD: 1 % (ref 0–2)
BUN SERPL-MCNC: 26 MG/DL (ref 8–23)
CALCIUM SERPL-MCNC: 8.9 MG/DL (ref 8.6–10.4)
CHLORIDE SERPL-SCNC: 106 MMOL/L (ref 98–107)
CO2 SERPL-SCNC: 23 MMOL/L (ref 20–31)
CREAT SERPL-MCNC: 2 MG/DL (ref 0.7–1.2)
EKG ATRIAL RATE: 56 BPM
EKG P AXIS: 41 DEGREES
EKG P-R INTERVAL: 162 MS
EKG Q-T INTERVAL: 460 MS
EKG QRS DURATION: 96 MS
EKG QTC CALCULATION (BAZETT): 443 MS
EKG R AXIS: -36 DEGREES
EKG T AXIS: 72 DEGREES
EKG VENTRICULAR RATE: 56 BPM
EOSINOPHIL # BLD: 0.17 K/UL (ref 0–0.44)
EOSINOPHILS RELATIVE PERCENT: 5 % (ref 1–4)
ERYTHROCYTE [DISTWIDTH] IN BLOOD BY AUTOMATED COUNT: 16.1 % (ref 11.8–14.4)
GFR, ESTIMATED: 37 ML/MIN/1.73M2
GLUCOSE SERPL-MCNC: 94 MG/DL (ref 74–99)
HCT VFR BLD AUTO: 36.6 % (ref 40.7–50.3)
HGB BLD-MCNC: 11.7 G/DL (ref 13–17)
IMM GRANULOCYTES # BLD AUTO: 0 K/UL (ref 0–0.3)
IMM GRANULOCYTES NFR BLD: 0 %
LYMPHOCYTES NFR BLD: 0.76 K/UL (ref 1.1–3.7)
LYMPHOCYTES RELATIVE PERCENT: 23 % (ref 24–43)
MCH RBC QN AUTO: 27.6 PG (ref 25.2–33.5)
MCHC RBC AUTO-ENTMCNC: 32 G/DL (ref 28.4–34.8)
MCV RBC AUTO: 86.3 FL (ref 82.6–102.9)
MONOCYTES NFR BLD: 0.26 K/UL (ref 0.1–1.2)
MONOCYTES NFR BLD: 8 % (ref 3–12)
MORPHOLOGY: ABNORMAL
NEUTROPHILS NFR BLD: 63 % (ref 36–65)
NEUTS SEG NFR BLD: 2.08 K/UL (ref 1.5–8.1)
NRBC BLD-RTO: 0 PER 100 WBC
PLATELET # BLD AUTO: ABNORMAL K/UL (ref 138–453)
PLATELET, FLUORESCENCE: 147 K/UL (ref 138–453)
PLATELETS.RETICULATED NFR BLD AUTO: 4.4 % (ref 1.1–10.3)
POTASSIUM SERPL-SCNC: 4.2 MMOL/L (ref 3.7–5.3)
RBC # BLD AUTO: 4.24 M/UL (ref 4.21–5.77)
RBC # BLD: ABNORMAL 10*6/UL
SODIUM SERPL-SCNC: 139 MMOL/L (ref 136–145)
WBC OTHER # BLD: 3.3 K/UL (ref 3.5–11.3)

## 2025-07-08 PROCEDURE — 80048 BASIC METABOLIC PNL TOTAL CA: CPT

## 2025-07-08 PROCEDURE — 6360000002 HC RX W HCPCS

## 2025-07-08 PROCEDURE — 99239 HOSP IP/OBS DSCHRG MGMT >30: CPT | Performed by: PSYCHIATRY & NEUROLOGY

## 2025-07-08 PROCEDURE — 36415 COLL VENOUS BLD VENIPUNCTURE: CPT

## 2025-07-08 PROCEDURE — 6370000000 HC RX 637 (ALT 250 FOR IP)

## 2025-07-08 PROCEDURE — 2500000003 HC RX 250 WO HCPCS

## 2025-07-08 PROCEDURE — 85055 RETICULATED PLATELET ASSAY: CPT

## 2025-07-08 PROCEDURE — 93010 ELECTROCARDIOGRAM REPORT: CPT | Performed by: INTERNAL MEDICINE

## 2025-07-08 PROCEDURE — 6370000000 HC RX 637 (ALT 250 FOR IP): Performed by: STUDENT IN AN ORGANIZED HEALTH CARE EDUCATION/TRAINING PROGRAM

## 2025-07-08 PROCEDURE — 85025 COMPLETE CBC W/AUTO DIFF WBC: CPT

## 2025-07-08 RX ORDER — FOLIC ACID 1 MG/1
1 TABLET ORAL DAILY
Qty: 30 TABLET | Refills: 0 | Status: SHIPPED | OUTPATIENT
Start: 2025-07-08

## 2025-07-08 RX ORDER — LANOLIN ALCOHOL/MO/W.PET/CERES
100 CREAM (GRAM) TOPICAL DAILY
Qty: 30 TABLET | Refills: 0 | Status: SHIPPED | OUTPATIENT
Start: 2025-07-08

## 2025-07-08 RX ADMIN — PANTOPRAZOLE SODIUM 40 MG: 40 TABLET, DELAYED RELEASE ORAL at 08:31

## 2025-07-08 RX ADMIN — NIFEDIPINE 60 MG: 30 TABLET, FILM COATED, EXTENDED RELEASE ORAL at 08:30

## 2025-07-08 RX ADMIN — FOLIC ACID 1 MG: 1 TABLET ORAL at 08:30

## 2025-07-08 RX ADMIN — SODIUM CHLORIDE, PRESERVATIVE FREE 10 ML: 5 INJECTION INTRAVENOUS at 08:30

## 2025-07-08 RX ADMIN — FERROUS SULFATE TAB EC 325 MG (65 MG FE EQUIVALENT) 325 MG: 325 (65 FE) TABLET DELAYED RESPONSE at 08:31

## 2025-07-08 RX ADMIN — Medication 100 MG: at 08:30

## 2025-07-08 RX ADMIN — ENOXAPARIN SODIUM 30 MG: 100 INJECTION SUBCUTANEOUS at 08:31

## 2025-07-08 RX ADMIN — HYDRALAZINE HYDROCHLORIDE 100 MG: 50 TABLET ORAL at 08:31

## 2025-07-08 RX ADMIN — ASPIRIN 81 MG: 81 TABLET, COATED ORAL at 08:31

## 2025-07-08 RX ADMIN — CARVEDILOL 12.5 MG: 12.5 TABLET, FILM COATED ORAL at 08:31

## 2025-07-08 RX ADMIN — HYDRALAZINE HYDROCHLORIDE 100 MG: 50 TABLET ORAL at 13:53

## 2025-07-08 RX ADMIN — ATORVASTATIN CALCIUM 40 MG: 40 TABLET, FILM COATED ORAL at 08:30

## 2025-07-08 RX ADMIN — SPIRONOLACTONE 50 MG: 25 TABLET, FILM COATED ORAL at 08:30

## 2025-07-08 RX ADMIN — Medication 1000 UNITS: at 08:31

## 2025-07-08 RX ADMIN — LOSARTAN POTASSIUM 100 MG: 50 TABLET, FILM COATED ORAL at 08:31

## 2025-07-08 RX ADMIN — DOXAZOSIN 2 MG: 2 TABLET ORAL at 08:30

## 2025-07-08 ASSESSMENT — ENCOUNTER SYMPTOMS
ABDOMINAL PAIN: 0
SHORTNESS OF BREATH: 0
ABDOMINAL DISTENTION: 0
WHEEZING: 0

## 2025-07-08 NOTE — TELEPHONE ENCOUNTER
Patients wife stated the patient had a seizure and is in the [facility]. Wife states he may be getting out today and she would like to make a HFU but your next HFU is 8/8/25, please advise.

## 2025-07-08 NOTE — DISCHARGE SUMMARY
[facility]     Department of Neurology    INPATIENT DISCHARGE SUMMARY        Patient Identification:  Tejas Machado is a 59 y.o. male.  :  1965  MRN: [id number]     Acct: [id number]   Admit Date:  2025  Discharge date: 2025    Attending Provider: Kt Diaz MD                                     Admission Diagnoses:   Seizure (HCC) [R56.9]    Discharge Diagnoses:   Principal Problem:    Alcohol related seizure (HCC)  Active Problems:    Hypertensive urgency    Alcohol consumption binge drinking    Bilateral carotid artery stenosis    Tobacco abuse    Stage 3b chronic kidney disease (HCC)    White matter abnormality on MRI of brain    Seizure (HCC)    Alcohol use disorder    Polysubstance abuse (HCC)    Provoked seizure (HCC)  Resolved Problems:    * No resolved hospital problems. *       Consults:   none    Brief Inpatient course:    Tejas Machado is a 59 y.o. male with a past medical history of CKD, CHF, and alcohol use disorder who presented after a first-time seizure.   Patient stated that he was lying on the couch at 3:50 PM on 2025 when he started having a cramp in the right leg.  Patient then stood up and started having a cramp in the left leg as well.  Patient's wife was with him at the time of seizure and went to get him some tonic water.  Patient's wife was talking on the phone while he was moaning in pain from the cramps.  She noticed that he stopped moaning and turned around to look at him, and noticed that he was unresponsive, eyes rolled to the back of his head.  Spouse attempted aggressive sternal rub without response.  Spouse describes rigid extremities with flexed right upper extremity and extended left upper extremity.  They endorsed associated urinary incontinence, but denied tongue bite. The episode lasted approximately 2 to 3 minutes.  Patient was drowsy with slurred speech and confusion afterwards for approximately 5 to 10 minutes.

## 2025-07-08 NOTE — PROGRESS NOTES
[facility] Neurology   IN-PATIENT SERVICE  General Neurology Progress Note   [facility]                Date:   7/7/2025  Patient name:  Tejas Machado  Date of admission:  7/5/2025  7:58 PM  MRN:   [id number]  Account:  [id number]  YOB: 1965  PCP:    Abbie Cueva MD  Room:   [address] [place] [address]  Code Status:    Full Code  Chief Complaint:   Chief Complaint   Patient presents with    Seizures    Dizziness       Interval history      The patient was seen and examined at bedside this morning.   Patient intermittently slightly bradycardic overnight.  Patient is AOX4, no seizure or confusion overnight.  Labs, chart, and VS reviewed. No acute events overnight.   Discharge planning after MRI brain  Labs reviewed, creatinine 2.1, BUN 27, sodium 139, potassium 4.1, hemoglobin 9.1, platelets 131.      Brief History     Tejas Machado is a 59 y.o. male with a past medical history of CKD, CHF, and alcohol use disorder who presented after a first-time seizure.  Patient states that he was lying on the couch at 3:50 PM on 7/5/2025 when he started having a cramp in the right leg.  Patient then stood up and started having a cramp in the left leg as well.  Patient's wife was with him and went to get him some tonic water.  Patient's wife was talking on the phone while he was moaning in pain from the cramps.  She noticed that he stopped moaning and turned around to look at him, and noticed that he was unresponsive, eyes rolled to the back of his head.  Spouse attempted aggressive sternal rub without response.  Spouse describes rigid extremities with flexed right upper extremity and extended left upper extremity.  They endorse associated urinary incontinence, but deny tongue bite. The episode lasted approximately 2 to 3 minutes.  Patient was drowsy with slurred speech and confusion afterwards for approximately 5 to 10 minutes.     Patient endorses drinking approximately 1/2 pint of liquor 2 
CLINICAL PHARMACY NOTE: MEDS TO BEDS    Total # of Prescriptions Filled: 2   The following medications were delivered to the patient:  Thiamine 100 mg  Folic acid 1 mg    Additional Documentation:   Delivered to pt on 7/8 at 3:11 pm. Pt had no copay.  
Discharge instructions reviewed with patient. IV access removed. No questions at this time per patient and spouse.  
EEG completed in lab.  
Occupational Therapy  Occupational Therapy Initial Evaluation  Facility/Department: [facility] 1C STEPDOWN   Patient Name: Tejas Machado        MRN: [id number]    : 1965    Date of Service: 2025    Chief Complaint   Patient presents with    Seizures    Dizziness     Past Medical History:  has a past medical history of Atrial fibrillation (HCC), Benign essential HTN, CKD (chronic kidney disease), stage III (HCC), Enlarged heart, and Hypertensive urgency.  Past Surgical History:  has a past surgical history that includes Tonsillectomy; Upper gastrointestinal endoscopy (N/A, 2021); Colonoscopy (N/A, 2021); Esophagogastroduodenoscopy (N/A, 03/15/2023); Upper gastrointestinal endoscopy (03/15/2023); Upper gastrointestinal endoscopy (03/15/2023); Esophagogastroduodenoscopy (Right, 2024); Upper gastrointestinal endoscopy (N/A, 2024); Colonoscopy (N/A, 2024); and Colonoscopy (N/A, 2024).    Discharge Recommendations   No therapy recommended at discharge.     OT Equipment Recommendations  Equipment Needed: No    Assessment  Assessment: No acute OT deficits noted this date. Pt in agreeable to discharge from acute OT services. Please reorder OT if changes arise.  Prognosis: Good  Decision Making: Low Complexity  No Skilled OT: Independent with functional mobility;Independent with ADL's;No OT goals identified  REQUIRES OT FOLLOW-UP: No  Activity Tolerance  Activity Tolerance: Patient Tolerated treatment well  Safety Devices  Type of Devices: Call light within reach;Gait belt;Left in bed;Nurse notified;Telesitter in use  Restraints  Restraints Initially in Place: No    AM-PAC  AM-PAC Daily Activity - Inpatient   How much help is needed for putting on and taking off regular lower body clothing?: None  How much help is needed for bathing (which includes washing, rinsing, drying)?: None  How much help is needed for toileting (which includes using toilet, bedpan, or urinal)?: None  How much 
assistance (Simultaneous filing. User may not have seen previous data.)  Bed Mobility Comments: HOB elevated (Simultaneous filing. User may not have seen previous data.)         Transfers  Sit to Stand: Independent  Stand to Sit: Independent  Comment: no AD used    Ambulation  Surface: Level tile  Device: No Device  Assistance: Independent  Gait Deviations: None  Distance: 300 ft  Comments: No LOB noted.  More Ambulation?: No    Stairs/Curb  Stairs?: No          Balance   Balance  Posture: Good  Sitting - Static: Good  Sitting - Dynamic: Good  Standing - Static: Good  Standing - Dynamic: Good  Comments: Assessed with no AD         Patient Education  Patient Education  Education Given To: Patient  Education Provided: Role of Therapy;Plan of Care  Education Method: Verbal  Barriers to Learning: None  Education Outcome: Verbalized understanding;Demonstrated understanding    Plan  Physical Therapy Plan  General Plan:  (d/c PT)    Goals  Short Term Goals  Time Frame for Short Term Goals: 14 visits  Short Term Goal 1: Complete transfers independently  Short Term Goal 2: Complete 300 ft of gait independently  Short Term Goal 3: Complete full flight of steps with R handrail and mod I  Short Term Goal 4: Participate in 30 minutes of therapy to promote endurance    Minutes  PT Individual Minutes  Time In: 0809  Time Out: 0821  Minutes: 12  Time Code Minutes  Timed Code Treatment Minutes: 8 Minutes    Electronically signed by Tatiana Mcnally PT on 7/6/25 at 12:08 PM EDT    
syntax and sound a- like substitutions which may escape proofreading. In such instances, actual meaning can be extrapolated by contextual derivation.

## 2025-07-09 ENCOUNTER — TELEPHONE (OUTPATIENT)
Dept: FAMILY MEDICINE CLINIC | Age: 60
End: 2025-07-09

## 2025-07-09 NOTE — TELEPHONE ENCOUNTER
Care Transitions Initial Follow Up Call    Outreach made within 2 business days of discharge: Yes    Patient: Tejas Machado Patient : 1965   MRN: [id number]  Reason for Admission: seizure  Discharge Date: 25       Spoke with: Tejas Thosmon department/facility: [facility] Interactive Patient Contact:  Was patient able to fill all prescriptions: Yes  Was patient instructed to bring all medications to the follow-up visit: Yes  Is patient taking all medications as directed in the discharge summary? Yes  Does patient understand their discharge instructions: Yes  Does patient have questions or concerns that need addressed prior to 7-14 day follow up office visit: no    Additional needs identified to be addressed with provider  No needs identified             Scheduled appointment with PCP within 7-14 days    Follow Up  Future Appointments   Date Time Provider Department Center   2025  2:00 PM Gay Gonzales NP-C [facility]   2025  3:00 PM Jorge Dick MD [facility]   10/9/2025  1:40 PM Abbie Cueva MD [facility] DEP   11/10/2025 11:30 AM Kaylen Strickland MD [facility]   2025 10:30 AM Jemal Clemens MD Resp Spec TOP   2025  2:50 PM Dianna Beyer APRN - CNP [facility] [place] None       Betzy Villarreal MA

## 2025-07-10 NOTE — PROGRESS NOTES
(PROCARDIA XL) 30 MG extended release tablet Take 2 tablets by mouth daily 90 tablet 1    Bioflavonoid Products (VITAMIN C) CHEW Take by mouth      albuterol sulfate HFA (PROVENTIL;VENTOLIN;PROAIR) 108 (90 Base) MCG/ACT inhaler Inhale 2 puffs into the lungs every 6 hours as needed for Wheezing 18 g 3    pantoprazole (PROTONIX) 40 MG tablet take 1 tablet by mouth EVERY MORNING BEFORE BREAKFAST 30 tablet 1    atorvastatin (LIPITOR) 40 MG tablet take 1 tablet by mouth once daily 90 tablet 0    sildenafil (VIAGRA) 50 MG tablet Take 1 tablet by mouth as needed for Erectile Dysfunction 30 tablet 3    FEROSUL 325 (65 Fe) MG tablet take 1 tablet by mouth twice a day 60 tablet 0    fluticasone (FLONASE) 50 MCG/ACT nasal spray 1 spray by Each Nostril route daily (Patient taking differently: 1 spray by Each Nostril route daily as needed) 32 g 1    VITAMIN D, CHOLECALCIFEROL, PO Take 1 tablet by mouth daily      aspirin 81 MG EC tablet Take 1 tablet by mouth daily      folic acid (FOLVITE) 1 MG tablet Take 1 tablet by mouth daily 30 tablet 0    thiamine 100 MG tablet Take 1 tablet by mouth daily 30 tablet 0     No current facility-administered medications for this visit.         PHYSICAL EXAM     BP (!) 144/84   Pulse 60   Temp 98.1 °F (36.7 °C) (Temporal)   Ht 1.778 m (5' 10\")   Wt 106.1 kg (234 lb)   SpO2 96%   BMI 33.58 kg/m²    Physical Exam  Vitals and nursing note reviewed.   HENT:      Head: Normocephalic.      Right Ear: There is impacted cerumen.      Left Ear: Tympanic membrane normal.   Eyes:      Conjunctiva/sclera: Conjunctivae normal.   Cardiovascular:      Rate and Rhythm: Normal rate and regular rhythm.      Heart sounds: No murmur heard.  Pulmonary:      Effort: Pulmonary effort is normal.   Abdominal:      General: Abdomen is flat. Bowel sounds are normal.      Palpations: Abdomen is soft.      Tenderness: There is no abdominal tenderness.   Musculoskeletal:         General: Normal range of motion.

## 2025-07-11 ENCOUNTER — OFFICE VISIT (OUTPATIENT)
Dept: FAMILY MEDICINE CLINIC | Age: 60
End: 2025-07-11

## 2025-07-11 VITALS
OXYGEN SATURATION: 95 % | BODY MASS INDEX: 33.15 KG/M2 | DIASTOLIC BLOOD PRESSURE: 88 MMHG | WEIGHT: 231 LBS | TEMPERATURE: 97.2 F | SYSTOLIC BLOOD PRESSURE: 162 MMHG | HEART RATE: 54 BPM

## 2025-07-11 DIAGNOSIS — R56.9 SEIZURE (HCC): ICD-10-CM

## 2025-07-11 DIAGNOSIS — Z09 HOSPITAL DISCHARGE FOLLOW-UP: Primary | ICD-10-CM

## 2025-07-11 DIAGNOSIS — I10 HYPERTENSION, ESSENTIAL: ICD-10-CM

## 2025-07-11 NOTE — PROGRESS NOTES
Post-Discharge Transitional Care  Follow Up      Tejas Machado   YOB: 1965    Date of Office Visit:  7/11/2025  Date of Hospital Admission: 7/5/25  Date of Hospital Discharge: 7/8/25  Risk of hospital readmission (high >=14%. Medium >=10%) :Readmission Risk Score: 16.6      Care management risk score Rising risk (score 2-5) and Complex Care (Scores >=6): No Risk Score On File     Non face to face  following discharge, date last encounter closed (first attempt may have been earlier): 07/09/2025    Call initiated 2 business days of discharge: Yes    ASSESSMENT/PLAN:   Hospital discharge follow-up  -     TX DISCHARGE MEDS RECONCILED W/ CURRENT OUTPATIENT MED LIST  - Labs and imaging reviewed with patient and spouse in office during visit.  - Discussed getting scheduled with neurology for follow up to discuss further plan of care.   - Medications are able to be resumed as prescribed.  Hypertension, essential  - Uncontrolled  -Discussed calling cardiology to see if they need an earlier visit due to elevated BP in office today and during hospital stay.     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 Patient agreeable at this time.  - No driving for 6 months or until cleared by neurology.  Patient does not drive    Medical Decision Making: moderate complexity and high complexity  No follow-ups on file.    On this date 7/11/2025 I have spent 35 minutes reviewing previous notes, test results and face to face with the patient discussing the diagnosis and importance of compliance with the treatment plan as well as documenting on the day of the visit.       Subjective:   HPI:  Follow up of Hospital problems/diagnosis(es):     Here today for hospital follow up he was admitted at [facility] from 7/5/25 - 7/8/25 after having a seizure at home.  After

## 2025-07-11 NOTE — PATIENT INSTRUCTIONS
Kt Diaz MD - Neurology    [address] [facility] [address] [facility] [address]  [place] [address]  [phone]

## (undated) DEVICE — BIPOLAR ELECTROHEMOSTASIS CATHETER: Brand: GOLD PROBE

## (undated) DEVICE — FORCEPS BX L240CM WRK CHN 2.8MM STD CAP W/ NDL MIC MESH

## (undated) DEVICE — ELECTRODE PT RET AD L9FT HI MOIST COND ADH HYDRGEL CORDED

## (undated) DEVICE — DEFENDO AIR WATER SUCTION AND BIOPSY VALVE KIT FOR  OLYMPUS: Brand: DEFENDO AIR/WATER/SUCTION AND BIOPSY VALVE

## (undated) DEVICE — ENDO KIT W/SYRINGE: Brand: MEDLINE INDUSTRIES, INC.

## (undated) DEVICE — BITEBLOCK 54FR W/ DENT RIM BLOX

## (undated) DEVICE — DISPOSABLE DISTAL ATTACHMENT: Brand: DISPOSABLE DISTAL ATTACHMENT

## (undated) DEVICE — FIAPC® PROBE W/ FILTER 2200 A OD 2.3MM/6.9FR; L 2.2M/7.2FT: Brand: ERBE